# Patient Record
Sex: FEMALE | Race: WHITE | NOT HISPANIC OR LATINO | Employment: FULL TIME | URBAN - METROPOLITAN AREA
[De-identification: names, ages, dates, MRNs, and addresses within clinical notes are randomized per-mention and may not be internally consistent; named-entity substitution may affect disease eponyms.]

---

## 2018-02-22 ENCOUNTER — CLINICAL SUPPORT (OUTPATIENT)
Dept: ONCOLOGY | Facility: HOSPITAL | Age: 68
End: 2018-02-22

## 2018-02-22 ENCOUNTER — HOSPITAL ENCOUNTER (OUTPATIENT)
Dept: ONCOLOGY | Facility: HOSPITAL | Age: 68
Discharge: HOME | End: 2018-02-22
Attending: INTERNAL MEDICINE | Admitting: INTERNAL MEDICINE

## 2018-02-22 ENCOUNTER — HOSPITAL ENCOUNTER (OUTPATIENT)
Dept: ONCOLOGY | Facility: CLINIC | Age: 68
Setting detail: INFUSION SERIES
Discharge: HOME | End: 2018-02-22
Attending: INTERNAL MEDICINE | Admitting: INTERNAL MEDICINE

## 2018-02-22 NOTE — PROGRESS NOTES
PATIENTS ONCOLOGY RECORD LOCATED IN [facility]      Subjective     Name:  EDEN DE LA VEGA     Date:  2018  Address:  [address] [place] [address]  Home: [phone]  :  1950 AGE:  67 y.o.        RECORDS OBTAINED:  Patients Oncology Record is located in [facility]

## 2018-03-29 ENCOUNTER — HOSPITAL ENCOUNTER (OUTPATIENT)
Dept: ULTRASOUND IMAGING | Facility: HOSPITAL | Age: 68
Discharge: HOME | End: 2018-03-29
Attending: INTERNAL MEDICINE | Admitting: INTERNAL MEDICINE

## 2018-04-19 ENCOUNTER — CLINICAL SUPPORT (OUTPATIENT)
Dept: ONCOLOGY | Facility: HOSPITAL | Age: 68
End: 2018-04-19

## 2018-04-19 ENCOUNTER — HOSPITAL ENCOUNTER (OUTPATIENT)
Dept: ONCOLOGY | Facility: CLINIC | Age: 68
Setting detail: INFUSION SERIES
Discharge: HOME | End: 2018-04-19
Attending: INTERNAL MEDICINE | Admitting: INTERNAL MEDICINE

## 2018-04-19 NOTE — PROGRESS NOTES
PATIENTS ONCOLOGY RECORD LOCATED IN [facility]      Subjective     Name:  EDEN DE LA VEGA     Date:  2018  Address:  [address] [place] [address]  Home: [phone]  :  1950 AGE:  67 y.o.        RECORDS OBTAINED:  Patients Oncology Record is located in [facility]

## 2019-01-08 ENCOUNTER — HOSPITAL ENCOUNTER (OUTPATIENT)
Dept: LAB | Facility: HOSPITAL | Age: 69
Discharge: HOME | End: 2019-01-08
Attending: INTERNAL MEDICINE | Admitting: INTERNAL MEDICINE

## 2019-01-08 LAB
ANION GAP SERPL CALC-SCNC: 15.3 MMOL/L (ref 10–20)
BILIRUB UR QL STRIP: NEGATIVE MG/DL
BUN SERPL-MCNC: 17 MG/DL (ref 8–20)
BUN/CREAT SERPL: 14.2 (ref 5.4–26.2)
CALCIUM SERPL-MCNC: 9.6 MG/DL (ref 8.9–10.3)
CASTS URNS QL MICRO: ABNORMAL /[LPF]
CHLORIDE SERPL-SCNC: 102 MMOL/L (ref 101–111)
COLOR UR: YELLOW
CONV BACTERIA IN URINE MICRO: NEGATIVE
CONV CLARITY OF URINE: CLEAR
CONV CO2: 24 MMOL/L (ref 22–32)
CONV HYALINE CASTS IN URINE MICRO: 4 /[LPF] (ref 0–5)
CONV PROTEIN IN URINE BY AUTOMATED TEST STRIP: ABNORMAL MG/DL
CONV SMALL ROUND CELLS: ABNORMAL /[HPF]
CONV UROBILINOGEN IN URINE BY AUTOMATED TEST STRIP: 0.2 MG/DL
CREAT 24H UR-MCNC: 223.2 MG/DL
CREAT UR-MCNC: 1.2 MG/DL (ref 0.4–1)
CULTURE INDICATED?: ABNORMAL
ERYTHROCYTE [DISTWIDTH] IN BLOOD BY AUTOMATED COUNT: 12.9 % (ref 11.5–14.5)
GLUCOSE SERPL-MCNC: 248 MG/DL (ref 65–99)
GLUCOSE UR QL: 250 MG/DL
HCT VFR BLD AUTO: 43.5 % (ref 35–49)
HGB BLD-MCNC: 14.4 G/DL (ref 12–15)
HGB UR QL STRIP: NEGATIVE
KETONES UR QL STRIP: NEGATIVE MG/DL
LEUKOCYTE ESTERASE UR QL STRIP: NEGATIVE
MCH RBC QN AUTO: 29.9 PG (ref 26–32)
MCHC RBC AUTO-ENTMCNC: 33.2 G/DL (ref 32–36)
MCV RBC AUTO: 90.1 FL (ref 80–94)
NITRITE UR QL STRIP: NEGATIVE
PH UR STRIP.AUTO: 5.5 [PH] (ref 4.5–8)
PLATELET # BLD AUTO: 291 10*3/UL (ref 150–450)
PMV BLD AUTO: 7.3 FL (ref 7.4–10.4)
POTASSIUM SERPL-SCNC: 4.3 MMOL/L (ref 3.6–5.1)
PROT UR-MCNC: 26 MG/DL
PROT/CREAT UR: 0.1 MG/MG (ref 0–22)
RBC # BLD AUTO: 4.82 10*6/UL (ref 4–5.4)
RBC #/AREA URNS HPF: 1 /[HPF] (ref 0–3)
SODIUM SERPL-SCNC: 137 MMOL/L (ref 136–144)
SP GR UR: 1.03 (ref 1–1.03)
SPERM URNS QL MICRO: ABNORMAL /[HPF]
SQUAMOUS SPT QL MICRO: 2 /[HPF] (ref 0–5)
UNIDENT CRYS URNS QL MICRO: ABNORMAL /[HPF]
WBC # BLD AUTO: 10.4 10*3/UL (ref 4.5–11.5)
WBC #/AREA URNS HPF: 2 /[HPF] (ref 0–5)
YEAST SPEC QL WET PREP: ABNORMAL /[HPF]

## 2019-09-30 ENCOUNTER — APPOINTMENT (OUTPATIENT)
Dept: GENERAL RADIOLOGY | Facility: HOSPITAL | Age: 69
End: 2019-09-30

## 2019-09-30 ENCOUNTER — HOSPITAL ENCOUNTER (INPATIENT)
Facility: HOSPITAL | Age: 69
LOS: 9 days | Discharge: HOME | End: 2019-10-09
Attending: EMERGENCY MEDICINE | Admitting: INTERNAL MEDICINE

## 2019-09-30 DIAGNOSIS — I21.4 NON-STEMI (NON-ST ELEVATED MYOCARDIAL INFARCTION) (HCC): ICD-10-CM

## 2019-09-30 DIAGNOSIS — I95.9 HYPOTENSION, UNSPECIFIED HYPOTENSION TYPE: ICD-10-CM

## 2019-09-30 DIAGNOSIS — J44.1 COPD EXACERBATION (HCC): Primary | ICD-10-CM

## 2019-09-30 DIAGNOSIS — R19.7 DIARRHEA OF PRESUMED INFECTIOUS ORIGIN: ICD-10-CM

## 2019-09-30 DIAGNOSIS — N39.0 URINARY TRACT INFECTION WITHOUT HEMATURIA, SITE UNSPECIFIED: ICD-10-CM

## 2019-09-30 DIAGNOSIS — A41.9 SEPSIS, DUE TO UNSPECIFIED ORGANISM: ICD-10-CM

## 2019-09-30 LAB
ALBUMIN SERPL-MCNC: 2.5 G/DL (ref 3.5–4.8)
ALBUMIN SERPL-MCNC: 3.1 G/DL (ref 3.5–4.8)
ALBUMIN/GLOB SERPL: 0.7 G/DL (ref 1–1.7)
ALBUMIN/GLOB SERPL: 0.7 G/DL (ref 1–1.7)
ALP SERPL-CCNC: 58 U/L (ref 32–91)
ALP SERPL-CCNC: 66 U/L (ref 32–91)
ALT SERPL W P-5'-P-CCNC: 10 U/L (ref 14–54)
ALT SERPL W P-5'-P-CCNC: 11 U/L (ref 14–54)
ANION GAP SERPL CALCULATED.3IONS-SCNC: 13.6 MMOL/L (ref 5–15)
ANION GAP SERPL CALCULATED.3IONS-SCNC: 14.7 MMOL/L (ref 5–15)
APTT PPP: 23.9 SECONDS (ref 24–31)
ARTERIAL PATENCY WRIST A: POSITIVE
ARTERIAL PATENCY WRIST A: POSITIVE
AST SERPL-CCNC: 11 U/L (ref 15–41)
AST SERPL-CCNC: 13 U/L (ref 15–41)
ATMOSPHERIC PRESS: ABNORMAL MM[HG]
ATMOSPHERIC PRESS: ABNORMAL MM[HG]
BACTERIA UR QL AUTO: ABNORMAL /HPF
BASE EXCESS BLDA CALC-SCNC: -3.2 MMOL/L (ref 0–3)
BASE EXCESS BLDA CALC-SCNC: -4.3 MMOL/L (ref 0–3)
BASOPHILS # BLD AUTO: 0 10*3/MM3 (ref 0–0.2)
BASOPHILS # BLD AUTO: 0.1 10*3/MM3 (ref 0–0.2)
BASOPHILS # BLD AUTO: 0.1 10*3/MM3 (ref 0–0.2)
BASOPHILS NFR BLD AUTO: 0.3 % (ref 0–1.5)
BASOPHILS NFR BLD AUTO: 0.5 % (ref 0–1.5)
BASOPHILS NFR BLD AUTO: 0.5 % (ref 0–1.5)
BDY SITE: ABNORMAL
BDY SITE: ABNORMAL
BILIRUB SERPL-MCNC: 0.4 MG/DL (ref 0.3–1.2)
BILIRUB SERPL-MCNC: 0.4 MG/DL (ref 0.3–1.2)
BILIRUB UR QL STRIP: ABNORMAL
BNP SERPL-MCNC: 215 PG/ML
BUN BLD-MCNC: 17 MG/DL (ref 8–20)
BUN BLD-MCNC: 20 MG/DL (ref 8–20)
BUN/CREAT SERPL: 8 (ref 5.4–26.2)
BUN/CREAT SERPL: 8.5 (ref 5.4–26.2)
CA-I SERPL ISE-MCNC: 1.22 MMOL/L (ref 1.2–1.3)
CA-I SERPL ISE-MCNC: 1.29 MMOL/L (ref 1.2–1.3)
CALCIUM SPEC-SCNC: 8.3 MG/DL (ref 8.9–10.3)
CALCIUM SPEC-SCNC: 9.2 MG/DL (ref 8.9–10.3)
CHLORIDE SERPL-SCNC: 106 MMOL/L (ref 101–111)
CHLORIDE SERPL-SCNC: 97 MMOL/L (ref 101–111)
CK SERPL-CCNC: 187 U/L (ref 38–234)
CLARITY UR: ABNORMAL
CO2 BLDA-SCNC: 20 MMOL/L (ref 22–29)
CO2 BLDA-SCNC: 22.6 MMOL/L (ref 22–29)
CO2 SERPL-SCNC: 20 MMOL/L (ref 22–32)
CO2 SERPL-SCNC: 24 MMOL/L (ref 22–32)
COLOR UR: ABNORMAL
CREAT BLD-MCNC: 2 MG/DL (ref 0.4–1)
CREAT BLD-MCNC: 2.5 MG/DL (ref 0.4–1)
CRP SERPL-MCNC: 22.77 MG/DL (ref 0–0.7)
D-LACTATE SERPL-SCNC: 1.4 MMOL/L (ref 0.5–2)
DEPRECATED RDW RBC AUTO: 42.4 FL (ref 37–54)
DEPRECATED RDW RBC AUTO: 42.9 FL (ref 37–54)
DEPRECATED RDW RBC AUTO: 42.9 FL (ref 37–54)
EOSINOPHIL # BLD AUTO: 1.1 10*3/MM3 (ref 0–0.4)
EOSINOPHIL # BLD AUTO: 1.3 10*3/MM3 (ref 0–0.4)
EOSINOPHIL # BLD AUTO: 1.8 10*3/MM3 (ref 0–0.4)
EOSINOPHIL NFR BLD AUTO: 11.7 % (ref 0.3–6.2)
EOSINOPHIL NFR BLD AUTO: 7.2 % (ref 0.3–6.2)
EOSINOPHIL NFR BLD AUTO: 8.3 % (ref 0.3–6.2)
ERYTHROCYTE [DISTWIDTH] IN BLOOD BY AUTOMATED COUNT: 13.5 % (ref 12.3–15.4)
ERYTHROCYTE [DISTWIDTH] IN BLOOD BY AUTOMATED COUNT: 13.5 % (ref 12.3–15.4)
ERYTHROCYTE [DISTWIDTH] IN BLOOD BY AUTOMATED COUNT: 13.6 % (ref 12.3–15.4)
GFR SERPL CREATININE-BSD FRML MDRD: 19 ML/MIN/1.73
GFR SERPL CREATININE-BSD FRML MDRD: 25 ML/MIN/1.73
GLOBULIN UR ELPH-MCNC: 3.7 GM/DL (ref 2.5–3.8)
GLOBULIN UR ELPH-MCNC: 4.5 GM/DL (ref 2.5–3.8)
GLUCOSE BLD-MCNC: 207 MG/DL (ref 65–99)
GLUCOSE BLD-MCNC: 86 MG/DL (ref 65–99)
GLUCOSE BLDC GLUCOMTR-MCNC: 90 MG/DL (ref 70–105)
GLUCOSE UR STRIP-MCNC: ABNORMAL MG/DL
HCO3 BLDA-SCNC: 19 MMOL/L (ref 21–28)
HCO3 BLDA-SCNC: 21.5 MMOL/L (ref 21–28)
HCT VFR BLD AUTO: 42.3 % (ref 34–46.6)
HCT VFR BLD AUTO: 48.3 % (ref 34–46.6)
HCT VFR BLD AUTO: 49 % (ref 34–46.6)
HEMODILUTION: NO
HEMODILUTION: NO
HGB BLD-MCNC: 13.9 G/DL (ref 12–15.9)
HGB BLD-MCNC: 15.8 G/DL (ref 12–15.9)
HGB BLD-MCNC: 16.1 G/DL (ref 12–15.9)
HGB UR QL STRIP.AUTO: NEGATIVE
HOLD SPECIMEN: NORMAL
HOROWITZ INDEX BLD+IHG-RTO: <21 %
HOROWITZ INDEX BLD+IHG-RTO: <21 %
HYALINE CASTS UR QL AUTO: ABNORMAL /LPF
INR PPP: 1.09 (ref 0.9–1.1)
IRON 24H UR-MRATE: 16 MCG/DL (ref 28–170)
KETONES UR QL STRIP: ABNORMAL
LEUKOCYTE ESTERASE UR QL STRIP.AUTO: ABNORMAL
LIPASE SERPL-CCNC: 19 U/L (ref 22–51)
LYMPHOCYTES # BLD AUTO: 3 10*3/MM3 (ref 0.7–3.1)
LYMPHOCYTES # BLD AUTO: 3.1 10*3/MM3 (ref 0.7–3.1)
LYMPHOCYTES # BLD AUTO: 3.9 10*3/MM3 (ref 0.7–3.1)
LYMPHOCYTES NFR BLD AUTO: 19.8 % (ref 19.6–45.3)
LYMPHOCYTES NFR BLD AUTO: 20.7 % (ref 19.6–45.3)
LYMPHOCYTES NFR BLD AUTO: 25.4 % (ref 19.6–45.3)
MAGNESIUM SERPL-MCNC: 1.6 MG/DL (ref 1.8–2.5)
MAGNESIUM SERPL-MCNC: 1.9 MG/DL (ref 1.8–2.5)
MCH RBC QN AUTO: 29.6 PG (ref 26.6–33)
MCH RBC QN AUTO: 29.6 PG (ref 26.6–33)
MCH RBC QN AUTO: 29.7 PG (ref 26.6–33)
MCHC RBC AUTO-ENTMCNC: 32.8 G/DL (ref 31.5–35.7)
MCHC RBC AUTO-ENTMCNC: 32.8 G/DL (ref 31.5–35.7)
MCHC RBC AUTO-ENTMCNC: 32.9 G/DL (ref 31.5–35.7)
MCV RBC AUTO: 90 FL (ref 79–97)
MCV RBC AUTO: 90.4 FL (ref 79–97)
MCV RBC AUTO: 90.5 FL (ref 79–97)
MODALITY: ABNORMAL
MODALITY: ABNORMAL
MONOCYTES # BLD AUTO: 0.6 10*3/MM3 (ref 0.1–0.9)
MONOCYTES # BLD AUTO: 0.7 10*3/MM3 (ref 0.1–0.9)
MONOCYTES # BLD AUTO: 0.7 10*3/MM3 (ref 0.1–0.9)
MONOCYTES NFR BLD AUTO: 4.2 % (ref 5–12)
MONOCYTES NFR BLD AUTO: 4.5 % (ref 5–12)
MONOCYTES NFR BLD AUTO: 4.6 % (ref 5–12)
NEUTROPHILS # BLD AUTO: 10 10*3/MM3 (ref 1.7–7)
NEUTROPHILS # BLD AUTO: 10.1 10*3/MM3 (ref 1.7–7)
NEUTROPHILS # BLD AUTO: 8.9 10*3/MM3 (ref 1.7–7)
NEUTROPHILS NFR BLD AUTO: 57.9 % (ref 42.7–76)
NEUTROPHILS NFR BLD AUTO: 66.8 % (ref 42.7–76)
NEUTROPHILS NFR BLD AUTO: 67.6 % (ref 42.7–76)
NITRITE UR QL STRIP: NEGATIVE
NRBC BLD AUTO-RTO: 0 /100 WBC (ref 0–0.2)
NRBC BLD AUTO-RTO: 0.1 /100 WBC (ref 0–0.2)
NRBC BLD AUTO-RTO: 0.1 /100 WBC (ref 0–0.2)
PCO2 BLDA: 30.3 MM HG (ref 35–48)
PCO2 BLDA: 36.7 MM HG (ref 35–48)
PH BLDA: 7.38 PH UNITS (ref 7.35–7.45)
PH BLDA: 7.41 PH UNITS (ref 7.35–7.45)
PH UR STRIP.AUTO: 6 [PH] (ref 5–8)
PHOSPHATE SERPL-MCNC: 3.2 MG/DL (ref 2.4–4.7)
PHOSPHATE SERPL-MCNC: 3.8 MG/DL (ref 2.4–4.7)
PLATELET # BLD AUTO: 358 10*3/MM3 (ref 140–450)
PLATELET # BLD AUTO: 384 10*3/MM3 (ref 140–450)
PLATELET # BLD AUTO: 387 10*3/MM3 (ref 140–450)
PMV BLD AUTO: 7.3 FL (ref 6–12)
PMV BLD AUTO: 7.5 FL (ref 6–12)
PMV BLD AUTO: 7.7 FL (ref 6–12)
PO2 BLDA: 103.5 MM HG (ref 83–108)
PO2 BLDA: 77.8 MM HG (ref 83–108)
POTASSIUM BLD-SCNC: 3.6 MMOL/L (ref 3.6–5.1)
POTASSIUM BLD-SCNC: 3.7 MMOL/L (ref 3.6–5.1)
PROT SERPL-MCNC: 6.2 G/DL (ref 6.1–7.9)
PROT SERPL-MCNC: 7.6 G/DL (ref 6.1–7.9)
PROT UR QL STRIP: ABNORMAL
PROTHROMBIN TIME: 11.3 SECONDS (ref 9.6–11.7)
RBC # BLD AUTO: 4.68 10*6/MM3 (ref 3.77–5.28)
RBC # BLD AUTO: 5.33 10*6/MM3 (ref 3.77–5.28)
RBC # BLD AUTO: 5.44 10*6/MM3 (ref 3.77–5.28)
RBC # UR: ABNORMAL /HPF
REF LAB TEST METHOD: ABNORMAL
SAO2 % BLDCOA: 95.2 % (ref 94–98)
SAO2 % BLDCOA: 98.1 % (ref 94–98)
SODIUM BLD-SCNC: 132 MMOL/L (ref 136–144)
SODIUM BLD-SCNC: 136 MMOL/L (ref 136–144)
SP GR UR STRIP: 1.03 (ref 1–1.03)
SQUAMOUS #/AREA URNS HPF: ABNORMAL /HPF
TROPONIN I SERPL-MCNC: 0.14 NG/ML (ref 0–0.03)
TSH SERPL DL<=0.05 MIU/L-ACNC: 0.04 UIU/ML (ref 0.34–5.6)
UROBILINOGEN UR QL STRIP: ABNORMAL
WBC NRBC COR # BLD: 15 10*3/MM3 (ref 3.4–10.8)
WBC NRBC COR # BLD: 15 10*3/MM3 (ref 3.4–10.8)
WBC NRBC COR # BLD: 15.4 10*3/MM3 (ref 3.4–10.8)
WBC UR QL AUTO: ABNORMAL /HPF
WHOLE BLOOD HOLD SPECIMEN: NORMAL
WHOLE BLOOD HOLD SPECIMEN: NORMAL

## 2019-09-30 PROCEDURE — 86140 C-REACTIVE PROTEIN: CPT | Performed by: EMERGENCY MEDICINE

## 2019-09-30 PROCEDURE — 36600 WITHDRAWAL OF ARTERIAL BLOOD: CPT

## 2019-09-30 PROCEDURE — 87086 URINE CULTURE/COLONY COUNT: CPT | Performed by: EMERGENCY MEDICINE

## 2019-09-30 PROCEDURE — 83540 ASSAY OF IRON: CPT | Performed by: INTERNAL MEDICINE

## 2019-09-30 PROCEDURE — 94640 AIRWAY INHALATION TREATMENT: CPT

## 2019-09-30 PROCEDURE — 0097U HC BIOFIRE FILMARRAY GI PANEL: CPT | Performed by: EMERGENCY MEDICINE

## 2019-09-30 PROCEDURE — 93005 ELECTROCARDIOGRAM TRACING: CPT | Performed by: EMERGENCY MEDICINE

## 2019-09-30 PROCEDURE — 84100 ASSAY OF PHOSPHORUS: CPT | Performed by: INTERNAL MEDICINE

## 2019-09-30 PROCEDURE — 80053 COMPREHEN METABOLIC PANEL: CPT | Performed by: EMERGENCY MEDICINE

## 2019-09-30 PROCEDURE — 84443 ASSAY THYROID STIM HORMONE: CPT | Performed by: INTERNAL MEDICINE

## 2019-09-30 PROCEDURE — 99284 EMERGENCY DEPT VISIT MOD MDM: CPT

## 2019-09-30 PROCEDURE — 25010000002 PIPERACILLIN SOD-TAZOBACTAM PER 1 G: Performed by: EMERGENCY MEDICINE

## 2019-09-30 PROCEDURE — 82803 BLOOD GASES ANY COMBINATION: CPT

## 2019-09-30 PROCEDURE — 83880 ASSAY OF NATRIURETIC PEPTIDE: CPT | Performed by: INTERNAL MEDICINE

## 2019-09-30 PROCEDURE — 82962 GLUCOSE BLOOD TEST: CPT

## 2019-09-30 PROCEDURE — 87081 CULTURE SCREEN ONLY: CPT | Performed by: INTERNAL MEDICINE

## 2019-09-30 PROCEDURE — 82330 ASSAY OF CALCIUM: CPT | Performed by: EMERGENCY MEDICINE

## 2019-09-30 PROCEDURE — 87449 NOS EACH ORGANISM AG IA: CPT | Performed by: EMERGENCY MEDICINE

## 2019-09-30 PROCEDURE — 85730 THROMBOPLASTIN TIME PARTIAL: CPT | Performed by: EMERGENCY MEDICINE

## 2019-09-30 PROCEDURE — 83605 ASSAY OF LACTIC ACID: CPT

## 2019-09-30 PROCEDURE — 25010000002 PIPERACILLIN SOD-TAZOBACTAM PER 1 G: Performed by: INTERNAL MEDICINE

## 2019-09-30 PROCEDURE — 85025 COMPLETE CBC W/AUTO DIFF WBC: CPT | Performed by: EMERGENCY MEDICINE

## 2019-09-30 PROCEDURE — 85610 PROTHROMBIN TIME: CPT | Performed by: EMERGENCY MEDICINE

## 2019-09-30 PROCEDURE — 83735 ASSAY OF MAGNESIUM: CPT | Performed by: EMERGENCY MEDICINE

## 2019-09-30 PROCEDURE — 82330 ASSAY OF CALCIUM: CPT | Performed by: INTERNAL MEDICINE

## 2019-09-30 PROCEDURE — 82550 ASSAY OF CK (CPK): CPT | Performed by: INTERNAL MEDICINE

## 2019-09-30 PROCEDURE — 83735 ASSAY OF MAGNESIUM: CPT | Performed by: INTERNAL MEDICINE

## 2019-09-30 PROCEDURE — 71045 X-RAY EXAM CHEST 1 VIEW: CPT

## 2019-09-30 PROCEDURE — 87324 CLOSTRIDIUM AG IA: CPT | Performed by: EMERGENCY MEDICINE

## 2019-09-30 PROCEDURE — 94799 UNLISTED PULMONARY SVC/PX: CPT

## 2019-09-30 PROCEDURE — 84100 ASSAY OF PHOSPHORUS: CPT | Performed by: EMERGENCY MEDICINE

## 2019-09-30 PROCEDURE — 87040 BLOOD CULTURE FOR BACTERIA: CPT | Performed by: EMERGENCY MEDICINE

## 2019-09-30 PROCEDURE — 81001 URINALYSIS AUTO W/SCOPE: CPT | Performed by: EMERGENCY MEDICINE

## 2019-09-30 PROCEDURE — 84484 ASSAY OF TROPONIN QUANT: CPT | Performed by: INTERNAL MEDICINE

## 2019-09-30 PROCEDURE — 25010000002 VANCOMYCIN 10 G RECONSTITUTED SOLUTION: Performed by: EMERGENCY MEDICINE

## 2019-09-30 PROCEDURE — 80053 COMPREHEN METABOLIC PANEL: CPT | Performed by: INTERNAL MEDICINE

## 2019-09-30 PROCEDURE — 83690 ASSAY OF LIPASE: CPT | Performed by: EMERGENCY MEDICINE

## 2019-09-30 PROCEDURE — P9612 CATHETERIZE FOR URINE SPEC: HCPCS

## 2019-09-30 PROCEDURE — 85025 COMPLETE CBC W/AUTO DIFF WBC: CPT | Performed by: INTERNAL MEDICINE

## 2019-09-30 RX ORDER — NOREPINEPHRINE BIT/0.9 % NACL 8 MG/250ML
.02-.3 INFUSION BOTTLE (ML) INTRAVENOUS
Status: DISCONTINUED | OUTPATIENT
Start: 2019-09-30 | End: 2019-10-01

## 2019-09-30 RX ORDER — PANTOPRAZOLE SODIUM 40 MG/1
40 TABLET, DELAYED RELEASE ORAL EVERY MORNING
Status: DISCONTINUED | OUTPATIENT
Start: 2019-10-01 | End: 2019-10-09 | Stop reason: HOSPADM

## 2019-09-30 RX ORDER — ESOMEPRAZOLE MAGNESIUM 40 MG/1
40 CAPSULE, DELAYED RELEASE ORAL 2 TIMES DAILY
COMMUNITY

## 2019-09-30 RX ORDER — CYCLOBENZAPRINE HCL 10 MG
10 TABLET ORAL
COMMUNITY

## 2019-09-30 RX ORDER — SODIUM CHLORIDE 0.9 % (FLUSH) 0.9 %
10 SYRINGE (ML) INJECTION AS NEEDED
Status: DISCONTINUED | OUTPATIENT
Start: 2019-09-30 | End: 2019-10-09 | Stop reason: HOSPADM

## 2019-09-30 RX ORDER — LOSARTAN POTASSIUM 25 MG/1
25 TABLET ORAL DAILY
COMMUNITY
End: 2021-06-24

## 2019-09-30 RX ORDER — SODIUM CHLORIDE 0.9 % (FLUSH) 0.9 %
10 SYRINGE (ML) INJECTION EVERY 12 HOURS SCHEDULED
Status: DISCONTINUED | OUTPATIENT
Start: 2019-09-30 | End: 2019-10-09 | Stop reason: HOSPADM

## 2019-09-30 RX ORDER — DEXTROAMPHETAMINE SACCHARATE, AMPHETAMINE ASPARTATE, DEXTROAMPHETAMINE SULFATE AND AMPHETAMINE SULFATE 5; 5; 5; 5 MG/1; MG/1; MG/1; MG/1
20 TABLET ORAL 2 TIMES DAILY
COMMUNITY

## 2019-09-30 RX ORDER — ROSUVASTATIN CALCIUM 20 MG/1
20 TABLET, COATED ORAL DAILY
COMMUNITY
End: 2021-06-24

## 2019-09-30 RX ORDER — MINOCYCLINE HYDROCHLORIDE 50 MG/1
50 CAPSULE ORAL DAILY
COMMUNITY
End: 2019-11-11

## 2019-09-30 RX ORDER — GABAPENTIN 300 MG/1
300 CAPSULE ORAL EVERY MORNING
COMMUNITY
End: 2020-11-18 | Stop reason: SDUPTHER

## 2019-09-30 RX ORDER — METOPROLOL TARTRATE 50 MG/1
50 TABLET, FILM COATED ORAL 2 TIMES DAILY
COMMUNITY
End: 2022-07-27 | Stop reason: DRUGHIGH

## 2019-09-30 RX ORDER — IPRATROPIUM BROMIDE AND ALBUTEROL SULFATE 2.5; .5 MG/3ML; MG/3ML
3 SOLUTION RESPIRATORY (INHALATION)
Status: DISCONTINUED | OUTPATIENT
Start: 2019-09-30 | End: 2019-10-09 | Stop reason: HOSPADM

## 2019-09-30 RX ORDER — IPRATROPIUM BROMIDE AND ALBUTEROL SULFATE 2.5; .5 MG/3ML; MG/3ML
3 SOLUTION RESPIRATORY (INHALATION) EVERY 6 HOURS PRN
Status: DISCONTINUED | OUTPATIENT
Start: 2019-09-30 | End: 2019-10-09 | Stop reason: HOSPADM

## 2019-09-30 RX ORDER — DULOXETIN HYDROCHLORIDE 30 MG/1
30 CAPSULE, DELAYED RELEASE ORAL DAILY
COMMUNITY
End: 2021-07-10

## 2019-09-30 RX ORDER — GABAPENTIN 300 MG/1
600 CAPSULE ORAL 3 TIMES DAILY
COMMUNITY
End: 2021-07-10

## 2019-09-30 RX ORDER — LISINOPRIL 5 MG/1
5 TABLET ORAL DAILY
COMMUNITY
End: 2019-10-09 | Stop reason: HOSPADM

## 2019-09-30 RX ORDER — NOREPINEPHRINE BIT/0.9 % NACL 8 MG/250ML
INFUSION BOTTLE (ML) INTRAVENOUS
Status: COMPLETED
Start: 2019-09-30 | End: 2019-09-30

## 2019-09-30 RX ADMIN — VANCOMYCIN HYDROCHLORIDE 1250 MG: 10 INJECTION, POWDER, LYOPHILIZED, FOR SOLUTION INTRAVENOUS at 14:58

## 2019-09-30 RX ADMIN — Medication 10 ML: at 21:30

## 2019-09-30 RX ADMIN — PIPERACILLIN AND TAZOBACTAM 3.38 G: 3; .375 INJECTION, POWDER, LYOPHILIZED, FOR SOLUTION INTRAVENOUS at 14:24

## 2019-09-30 RX ADMIN — SODIUM CHLORIDE 1000 ML: 900 INJECTION, SOLUTION INTRAVENOUS at 12:53

## 2019-09-30 RX ADMIN — NOREPINEPHRINE BITARTRATE 0.1 MCG/KG/MIN: 1 INJECTION, SOLUTION, CONCENTRATE INTRAVENOUS at 15:11

## 2019-09-30 RX ADMIN — SODIUM CHLORIDE, SODIUM LACTATE, POTASSIUM CHLORIDE, AND CALCIUM CHLORIDE 1000 ML: 600; 310; 30; 20 INJECTION, SOLUTION INTRAVENOUS at 15:51

## 2019-09-30 RX ADMIN — SODIUM CHLORIDE 1000 ML: 900 INJECTION, SOLUTION INTRAVENOUS at 13:22

## 2019-09-30 RX ADMIN — PIPERACILLIN SODIUM,TAZOBACTAM SODIUM 3.38 G: 3; .375 INJECTION, POWDER, FOR SOLUTION INTRAVENOUS at 21:30

## 2019-09-30 RX ADMIN — IPRATROPIUM BROMIDE AND ALBUTEROL SULFATE 3 ML: .5; 3 SOLUTION RESPIRATORY (INHALATION) at 21:18

## 2019-09-30 RX ADMIN — Medication 0.1 MCG/KG/MIN: at 15:11

## 2019-10-01 ENCOUNTER — HOSPITAL ENCOUNTER (OUTPATIENT)
Dept: CARDIOLOGY | Facility: HOSPITAL | Age: 69
Discharge: HOME | End: 2019-10-01

## 2019-10-01 ENCOUNTER — APPOINTMENT (OUTPATIENT)
Dept: ULTRASOUND IMAGING | Facility: HOSPITAL | Age: 69
End: 2019-10-01

## 2019-10-01 VITALS
HEIGHT: 62 IN | WEIGHT: 149.91 LBS | SYSTOLIC BLOOD PRESSURE: 127 MMHG | HEART RATE: 96 BPM | OXYGEN SATURATION: 93 % | DIASTOLIC BLOOD PRESSURE: 78 MMHG | BODY MASS INDEX: 27.59 KG/M2 | RESPIRATION RATE: 20 BRPM

## 2019-10-01 LAB
ADV 40+41 DNA STL QL NAA+NON-PROBE: NOT DETECTED
ANION GAP SERPL CALCULATED.3IONS-SCNC: 13.2 MMOL/L (ref 5–15)
ASTRO TYP 1-8 RNA STL QL NAA+NON-PROBE: NOT DETECTED
BACTERIA SPEC AEROBE CULT: NO GROWTH
BACTERIA UR QL AUTO: ABNORMAL /HPF
BILIRUB UR QL STRIP: NEGATIVE
BUN BLD-MCNC: 16 MG/DL (ref 8–20)
BUN/CREAT SERPL: 7.6 (ref 5.4–26.2)
C CAYETANENSIS DNA STL QL NAA+NON-PROBE: NOT DETECTED
C DIFF GDH STL QL: NEGATIVE
CALCIUM SPEC-SCNC: 8 MG/DL (ref 8.9–10.3)
CAMPY SP DNA.DIARRHEA STL QL NAA+PROBE: NOT DETECTED
CHLORIDE SERPL-SCNC: 103 MMOL/L (ref 101–111)
CK SERPL-CCNC: 161 U/L (ref 20–180)
CLARITY UR: ABNORMAL
CO2 SERPL-SCNC: 21 MMOL/L (ref 22–32)
COLOR UR: ABNORMAL
CREAT BLD-MCNC: 2.1 MG/DL (ref 0.4–1)
CRYPTOSP STL CULT: NOT DETECTED
E COLI DNA SPEC QL NAA+PROBE: NOT DETECTED
E HISTOLYT AG STL-ACNC: NOT DETECTED
EAEC PAA PLAS AGGR+AATA ST NAA+NON-PRB: NOT DETECTED
EC STX1 + STX2 GENES STL NAA+PROBE: NOT DETECTED
EOSINOPHIL SPEC QL MICRO: 0 % EOS/100 CELLS (ref 0–0)
EPEC EAE GENE STL QL NAA+NON-PROBE: NOT DETECTED
ETEC LTA+ST1A+ST1B TOX ST NAA+NON-PROBE: NOT DETECTED
G LAMBLIA DNA SPEC QL NAA+PROBE: NOT DETECTED
GFR SERPL CREATININE-BSD FRML MDRD: 23 ML/MIN/1.73
GLUCOSE BLD-MCNC: 181 MG/DL (ref 65–99)
GLUCOSE BLDC GLUCOMTR-MCNC: 171 MG/DL (ref 70–105)
GLUCOSE BLDC GLUCOMTR-MCNC: 251 MG/DL (ref 70–105)
GLUCOSE UR STRIP-MCNC: NEGATIVE MG/DL
HGB UR QL STRIP.AUTO: NEGATIVE
HYALINE CASTS UR QL AUTO: ABNORMAL /LPF
KETONES UR QL STRIP: ABNORMAL
LEUKOCYTE ESTERASE UR QL STRIP.AUTO: NEGATIVE
MAGNESIUM SERPL-MCNC: 1.6 MG/DL (ref 1.8–2.5)
MRSA SPEC QL CULT: NORMAL
NITRITE UR QL STRIP: NEGATIVE
NOROVIRUS GI+II RNA STL QL NAA+NON-PROBE: NOT DETECTED
P SHIGELLOIDES DNA STL QL NAA+PROBE: NOT DETECTED
PH UR STRIP.AUTO: 6 [PH] (ref 5–8)
PHOSPHATE SERPL-MCNC: 3.9 MG/DL (ref 2.4–4.7)
POTASSIUM BLD-SCNC: 3.2 MMOL/L (ref 3.6–5.1)
PROT UR QL STRIP: ABNORMAL
RBC # UR: ABNORMAL /HPF
REF LAB TEST METHOD: ABNORMAL
RV RNA STL NAA+PROBE: NOT DETECTED
SALMONELLA DNA SPEC QL NAA+PROBE: NOT DETECTED
SAPO I+II+IV+V RNA STL QL NAA+NON-PROBE: NOT DETECTED
SHIGELLA SP+EIEC IPAH STL QL NAA+PROBE: NOT DETECTED
SODIUM BLD-SCNC: 134 MMOL/L (ref 136–144)
SODIUM UR-SCNC: 33 MMOL/L
SP GR UR STRIP: 1.03 (ref 1–1.03)
SQUAMOUS #/AREA URNS HPF: ABNORMAL /HPF
TROPONIN I SERPL-MCNC: 0.14 NG/ML (ref 0–0.03)
TSH SERPL DL<=0.05 MIU/L-ACNC: 0.05 UIU/ML (ref 0.34–5.6)
URATE SERPL-MCNC: 5.5 MG/DL (ref 2.6–8)
UROBILINOGEN UR QL STRIP: ABNORMAL
V CHOLERAE DNA SPEC QL NAA+PROBE: NOT DETECTED
VIBRIO DNA SPEC NAA+PROBE: NOT DETECTED
WBC UR QL AUTO: ABNORMAL /HPF
YERSINIA STL CULT: NOT DETECTED

## 2019-10-01 PROCEDURE — 94799 UNLISTED PULMONARY SVC/PX: CPT

## 2019-10-01 PROCEDURE — 25010000002 ENOXAPARIN PER 10 MG: Performed by: INTERNAL MEDICINE

## 2019-10-01 PROCEDURE — 80048 BASIC METABOLIC PNL TOTAL CA: CPT | Performed by: INTERNAL MEDICINE

## 2019-10-01 PROCEDURE — 84550 ASSAY OF BLOOD/URIC ACID: CPT | Performed by: INTERNAL MEDICINE

## 2019-10-01 PROCEDURE — P9047 ALBUMIN (HUMAN), 25%, 50ML: HCPCS | Performed by: INTERNAL MEDICINE

## 2019-10-01 PROCEDURE — 84443 ASSAY THYROID STIM HORMONE: CPT | Performed by: INTERNAL MEDICINE

## 2019-10-01 PROCEDURE — 84300 ASSAY OF URINE SODIUM: CPT | Performed by: INTERNAL MEDICINE

## 2019-10-01 PROCEDURE — 82550 ASSAY OF CK (CPK): CPT | Performed by: INTERNAL MEDICINE

## 2019-10-01 PROCEDURE — 25010000002 ALBUMIN HUMAN 25% PER 50 ML: Performed by: INTERNAL MEDICINE

## 2019-10-01 PROCEDURE — 25810000003 SODIUM CHLORIDE 0.9 % WITH KCL 20 MEQ 20-0.9 MEQ/L-% SOLUTION: Performed by: INTERNAL MEDICINE

## 2019-10-01 PROCEDURE — 93306 TTE W/DOPPLER COMPLETE: CPT

## 2019-10-01 PROCEDURE — 25010000002 MEROPENEM PER 100 MG: Performed by: INTERNAL MEDICINE

## 2019-10-01 PROCEDURE — 87205 SMEAR GRAM STAIN: CPT | Performed by: INTERNAL MEDICINE

## 2019-10-01 PROCEDURE — 82962 GLUCOSE BLOOD TEST: CPT

## 2019-10-01 PROCEDURE — 84484 ASSAY OF TROPONIN QUANT: CPT | Performed by: INTERNAL MEDICINE

## 2019-10-01 PROCEDURE — 94760 N-INVAS EAR/PLS OXIMETRY 1: CPT

## 2019-10-01 PROCEDURE — 83735 ASSAY OF MAGNESIUM: CPT | Performed by: INTERNAL MEDICINE

## 2019-10-01 PROCEDURE — 63710000001 PREDNISONE PER 1 MG: Performed by: INTERNAL MEDICINE

## 2019-10-01 PROCEDURE — 25010000002 MAGNESIUM SULFATE 2 GM/50ML SOLUTION: Performed by: INTERNAL MEDICINE

## 2019-10-01 PROCEDURE — 99222 1ST HOSP IP/OBS MODERATE 55: CPT | Performed by: INTERNAL MEDICINE

## 2019-10-01 PROCEDURE — 84100 ASSAY OF PHOSPHORUS: CPT | Performed by: INTERNAL MEDICINE

## 2019-10-01 PROCEDURE — 81001 URINALYSIS AUTO W/SCOPE: CPT | Performed by: INTERNAL MEDICINE

## 2019-10-01 PROCEDURE — 76775 US EXAM ABDO BACK WALL LIM: CPT

## 2019-10-01 RX ORDER — PREDNISONE 20 MG/1
20 TABLET ORAL 2 TIMES DAILY WITH MEALS
Status: DISCONTINUED | OUTPATIENT
Start: 2019-10-01 | End: 2019-10-03

## 2019-10-01 RX ORDER — BUDESONIDE 0.5 MG/2ML
0.5 INHALANT ORAL
Status: DISCONTINUED | OUTPATIENT
Start: 2019-10-01 | End: 2019-10-09 | Stop reason: HOSPADM

## 2019-10-01 RX ORDER — METOPROLOL TARTRATE 50 MG/1
50 TABLET, FILM COATED ORAL EVERY 12 HOURS SCHEDULED
Status: DISCONTINUED | OUTPATIENT
Start: 2019-10-01 | End: 2019-10-09 | Stop reason: HOSPADM

## 2019-10-01 RX ORDER — SODIUM CHLORIDE AND POTASSIUM CHLORIDE 150; 900 MG/100ML; MG/100ML
100 INJECTION, SOLUTION INTRAVENOUS CONTINUOUS
Status: DISCONTINUED | OUTPATIENT
Start: 2019-10-01 | End: 2019-10-03

## 2019-10-01 RX ORDER — POTASSIUM CHLORIDE 20 MEQ/1
40 TABLET, EXTENDED RELEASE ORAL ONCE
Status: COMPLETED | OUTPATIENT
Start: 2019-10-01 | End: 2019-10-01

## 2019-10-01 RX ORDER — MAGNESIUM SULFATE HEPTAHYDRATE 40 MG/ML
2 INJECTION, SOLUTION INTRAVENOUS EVERY 12 HOURS
Status: COMPLETED | OUTPATIENT
Start: 2019-10-01 | End: 2019-10-02

## 2019-10-01 RX ORDER — ALBUMIN (HUMAN) 12.5 G/50ML
25 SOLUTION INTRAVENOUS EVERY 8 HOURS
Status: COMPLETED | OUTPATIENT
Start: 2019-10-01 | End: 2019-10-02

## 2019-10-01 RX ORDER — METOPROLOL TARTRATE 5 MG/5ML
5 INJECTION INTRAVENOUS ONCE
Status: COMPLETED | OUTPATIENT
Start: 2019-10-01 | End: 2019-10-01

## 2019-10-01 RX ORDER — GUAIFENESIN 600 MG/1
600 TABLET, EXTENDED RELEASE ORAL EVERY 12 HOURS SCHEDULED
Status: DISCONTINUED | OUTPATIENT
Start: 2019-10-01 | End: 2019-10-09 | Stop reason: HOSPADM

## 2019-10-01 RX ADMIN — POTASSIUM CHLORIDE 40 MEQ: 1500 TABLET, EXTENDED RELEASE ORAL at 14:33

## 2019-10-01 RX ADMIN — METOPROLOL TARTRATE 50 MG: 50 TABLET, FILM COATED ORAL at 08:48

## 2019-10-01 RX ADMIN — Medication 10 ML: at 21:26

## 2019-10-01 RX ADMIN — IPRATROPIUM BROMIDE AND ALBUTEROL SULFATE 3 ML: .5; 3 SOLUTION RESPIRATORY (INHALATION) at 01:20

## 2019-10-01 RX ADMIN — GUAIFENESIN 600 MG: 600 TABLET, EXTENDED RELEASE ORAL at 21:26

## 2019-10-01 RX ADMIN — GUAIFENESIN 600 MG: 600 TABLET, EXTENDED RELEASE ORAL at 08:48

## 2019-10-01 RX ADMIN — IPRATROPIUM BROMIDE AND ALBUTEROL SULFATE 3 ML: .5; 3 SOLUTION RESPIRATORY (INHALATION) at 19:14

## 2019-10-01 RX ADMIN — IPRATROPIUM BROMIDE AND ALBUTEROL SULFATE 3 ML: .5; 3 SOLUTION RESPIRATORY (INHALATION) at 12:10

## 2019-10-01 RX ADMIN — MEROPENEM 500 MG: 500 INJECTION, POWDER, FOR SOLUTION INTRAVENOUS at 19:32

## 2019-10-01 RX ADMIN — SODIUM CHLORIDE AND POTASSIUM CHLORIDE 100 ML/HR: 9; 1.49 INJECTION, SOLUTION INTRAVENOUS at 14:34

## 2019-10-01 RX ADMIN — PREDNISONE 20 MG: 20 TABLET ORAL at 08:48

## 2019-10-01 RX ADMIN — ENOXAPARIN SODIUM 30 MG: 30 INJECTION SUBCUTANEOUS at 14:34

## 2019-10-01 RX ADMIN — MAGNESIUM SULFATE HEPTAHYDRATE 2 G: 40 INJECTION, SOLUTION INTRAVENOUS at 14:33

## 2019-10-01 RX ADMIN — PREDNISONE 20 MG: 20 TABLET ORAL at 17:54

## 2019-10-01 RX ADMIN — IPRATROPIUM BROMIDE AND ALBUTEROL SULFATE 3 ML: .5; 3 SOLUTION RESPIRATORY (INHALATION) at 07:04

## 2019-10-01 RX ADMIN — METOPROLOL TARTRATE 50 MG: 50 TABLET, FILM COATED ORAL at 21:26

## 2019-10-01 RX ADMIN — BUDESONIDE 0.5 MG: 0.5 INHALANT RESPIRATORY (INHALATION) at 12:10

## 2019-10-01 RX ADMIN — METOPROLOL TARTRATE 5 MG: 5 INJECTION INTRAVENOUS at 06:49

## 2019-10-01 RX ADMIN — PANTOPRAZOLE SODIUM 40 MG: 40 TABLET, DELAYED RELEASE ORAL at 06:49

## 2019-10-01 RX ADMIN — ALBUMIN HUMAN 25 G: 0.25 SOLUTION INTRAVENOUS at 21:25

## 2019-10-01 RX ADMIN — ALBUMIN HUMAN 25 G: 0.25 SOLUTION INTRAVENOUS at 14:33

## 2019-10-01 RX ADMIN — SODIUM BICARBONATE 50 MEQ: 84 INJECTION, SOLUTION INTRAVENOUS at 14:34

## 2019-10-01 RX ADMIN — BUDESONIDE 0.5 MG: 0.5 INHALANT RESPIRATORY (INHALATION) at 19:17

## 2019-10-01 RX ADMIN — Medication 10 ML: at 08:48

## 2019-10-01 NOTE — PROGRESS NOTES
ICU Daily Progress Note        Sepsis (CMS/[facility])      Assessment/Plan      Urosepsis  Shock, septic  Diarrhea, ? Hypovolemia  Possible left lower lobe pneumonia  Elevated troponin rule out acute coronary syndrome  Acute kidney injury elevated creatinine  COPD patient use trelegy inhaler at home  Diabetes   no significant obstructive sleep apnea based on home sleep study  Smoker less than pack a day for for 56 years  History of high altitude pulmonary edema        Plan  Antibiotics patient received 1 dose of vancomycin in the emergency room as well as Zosyn  DC Zosyn and start meropenem for acute kidney injury    CT scan of the chest without contrast to assess left lower lobe pneumonia    S/p fluid resuscitation per septic protocol  Maintenance IV fluid    Resume blood pressure medications  Will hold lisinopril due to elevated creatinine    Cardiology consult for elevated troponin  2D echo     pressors titration currently off Levophed  Bronchodilators  Oxygen titration  DVT prophylaxis  GI prophylaxis  Glycemic control             LOS: 1 day         Vital signs for last 24 hours:  Vitals:    10/01/19 0545 10/01/19 0649 10/01/19 0704 10/01/19 0715   BP:  161/76 156/74    Pulse: 109 104 93 93   Resp:   16 16   Temp:       TempSrc:       SpO2: 97%  99% 100%   Weight:       Height:           Intake/Output last 3 shifts:  I/O last 3 completed shifts:  In: 3100 [IV Piggyback:3100]  Out: -   Intake/Output this shift:  No intake/output data recorded.    Vent settings for last 24 hours:       Hemodynamic parameters for last 24 hours:       Radiology  Imaging Results (last 24 hours)     Procedure Component Value Units Date/Time    XR Chest 1 View [[id number]] Collected:  09/30/19 1323     Updated:  09/30/19 1325    Narrative:       DATE OF EXAM:  9/30/2019 12:38 PM     PROCEDURE:  XR CHEST 1 VW-     INDICATIONS:  Severe Sepsis triage protocol     COMPARISON:  August 28, 2018     TECHNIQUE:   Single radiographic AP view of the  chest was obtained.     FINDINGS:  Cardiac size is normal and the lungs remain clear.        Impression:       Negative portable chest     Electronically Signed By-Lee Costello On:9/30/2019 1:23 PM  This report was finalized on [id number] by  Lee Costello, .          Labs:  Results from last 7 days   Lab Units 09/30/19  1804   WBC 10*3/mm3 15.40*   HEMOGLOBIN g/dL 13.9   HEMATOCRIT % 42.3   PLATELETS 10*3/mm3 358     Results from last 7 days   Lab Units 10/01/19  0327 09/30/19  1804   SODIUM mmol/L 134* 136   POTASSIUM mmol/L 3.2* 3.6   CHLORIDE mmol/L 103 106   CO2 mmol/L 21.0* 20.0*   BUN mg/dL 16 17   CREATININE mg/dL 2.10* 2.00*   CALCIUM mg/dL 8.0* 8.3*   BILIRUBIN mg/dL  --  0.4   ALK PHOS U/L  --  58   ALT (SGPT) U/L  --  10*   AST (SGOT) U/L  --  13*   GLUCOSE mg/dL 181* 86     Results from last 7 days   Lab Units 09/30/19  1914   PH, ARTERIAL pH units 7.376   PO2 ART mm Hg 77.8*   PCO2, ARTERIAL mm Hg 36.7   HCO3 ART mmol/L 21.5     Results from last 7 days   Lab Units 09/30/19  1804 09/30/19  1250   ALBUMIN g/dL 2.50* 3.10*     Results from last 7 days   Lab Units 10/01/19  0557 09/30/19  1804   CK TOTAL U/L  --  187   TROPONIN I ng/mL 0.140* 0.140*         Results from last 7 days   Lab Units 10/01/19  0327   MAGNESIUM mg/dL 1.6*     Results from last 7 days   Lab Units 09/30/19  1250   INR  1.09   APTT seconds 23.9*     Results from last 7 days   Lab Units 09/30/19  1804   TSH uIU/mL 0.040*           Meds:   SCHEDULE    ipratropium-albuterol 3 mL Nebulization Q6H - RT   pantoprazole 40 mg Oral QAM   piperacillin-tazobactam 3.375 g Intravenous Q12H   sodium chloride 30 mL/kg Intravenous Once   sodium chloride 10 mL Intravenous Q12H     Infusions    norepinephrine 0.02-0.3 mcg/kg/min Last Rate: 0.1 mcg/kg/min (09/30/19 1511)     PRNs  ipratropium-albuterol  •  sodium chloride  •  [COMPLETED] Insert peripheral IV **AND** sodium chloride  •  sodium chloride  •  sodium chloride    Physical Exam:  Physical  Exam   Cardiovascular:   Murmur heard.  Pulmonary/Chest: No respiratory distress. She has wheezes. She has rales. She exhibits no tenderness.   Abdominal: She exhibits no distension. There is no tenderness.   Musculoskeletal: She exhibits edema.       ROS  Review of Systems   HENT: Positive for congestion. Negative for rhinorrhea and sneezing.    Respiratory: Positive for cough, shortness of breath and wheezing. Negative for apnea, choking, chest tightness and stridor.    Cardiovascular: Positive for leg swelling.         Critical Care Time greater than: 1 Hour  Total time spent with patient greater than: 1 Hour

## 2019-10-01 NOTE — CONSULTS
NEPHROLOGY CONSULTATION-----[facility]    [facility]  [phone]  Rashad Moore MD    Patient Care Team:  Deborah Ochoa MD as PCP - General    CC/REASON FOR CONSULTATION: RENAL FAILURE/ELEVATED SERUM CREATININE  PHYSICIAN REQUESTING CONSULTATION:     History of Present Illness     HPI    Patient is a 69 y.o WF, who I actively follow as an outpatient,  whom I was asked to see in consultation for evaluation and management of renal failure/elevated serum creatinine. Patient was admitted, after presenting to ER with 3 days worth of diarrhea and weakness and confusion, and triggered sepsis protocol.  Patient with known CRF/CKD STG 3. No NSAIDs or recent IV dye exposure. No known h/o hepatitis, TB, rheumatic fever, jaundice, SLE, bleeding/bruising disorders.  Some dysuria. No edema or fluid retention.  +Compliance with home meds. Was on ACE-I/ARB in the form of Lisinopril and Losartan  prior to admission. No herbal med use.      Review of Systems   Constitutional: Positive for activity change and fatigue. Negative for appetite change, chills, diaphoresis, fever and unexpected weight change.   HENT: Positive for congestion. Negative for dental problem, drooling, ear discharge, ear pain, facial swelling, hearing loss, mouth sores, nosebleeds, postnasal drip, rhinorrhea, sinus pressure, sinus pain, sneezing, sore throat, tinnitus, trouble swallowing and voice change.    Eyes: Negative for photophobia, pain, discharge, redness, itching and visual disturbance.   Respiratory: Positive for cough, shortness of breath and wheezing. Negative for apnea, choking, chest tightness and stridor.    Cardiovascular: Negative for chest pain, palpitations and leg swelling.   Gastrointestinal: Positive for diarrhea and nausea. Negative for abdominal distention, abdominal pain, anal bleeding, blood in stool, constipation, rectal pain and vomiting.   Endocrine: Negative for cold  intolerance, heat intolerance, polydipsia, polyphagia and polyuria.   Genitourinary: Negative for decreased urine volume, difficulty urinating, dysuria, enuresis, flank pain, frequency, genital sores, hematuria and urgency.   Musculoskeletal: Positive for arthralgias and back pain. Negative for gait problem, joint swelling, myalgias, neck pain and neck stiffness.   Skin: Negative for color change, pallor, rash and wound.   Allergic/Immunologic: Negative for environmental allergies, food allergies and immunocompromised state.   Neurological: Positive for weakness. Negative for dizziness, tremors, seizures, syncope, facial asymmetry, speech difficulty, light-headedness, numbness and headaches.   Hematological: Negative for adenopathy. Does not bruise/bleed easily.   Psychiatric/Behavioral: Negative for agitation, behavioral problems, confusion, decreased concentration, dysphoric mood, hallucinations, self-injury, sleep disturbance and suicidal ideas. The patient is not nervous/anxious and is not hyperactive.           No past medical history on file.    No past surgical history on file.    No family history on file.    Social History     Tobacco Use   • Smoking status: Current Every Day Smoker   Substance Use Topics   • Alcohol use: Not on file   • Drug use: Not on file       Home Meds:   Medications Prior to Admission   Medication Sig Dispense Refill Last Dose   • amphetamine-dextroamphetamine (ADDERALL) 20 MG tablet Take 20 mg by mouth 2 (Two) Times a Day.      • CINNAMON PO Take 1 capsule by mouth 2 (Two) Times a Day.      • cyclobenzaprine (FLEXERIL) 10 MG tablet Take 10 mg by mouth every night at bedtime.      • DULoxetine (CYMBALTA) 30 MG capsule Take 30 mg by mouth Daily.      • esomeprazole (nexIUM) 40 MG capsule Take 40 mg by mouth 2 (Two) Times a Day.      • estrogens, conjugated, (PREMARIN) 1.25 MG tablet Take 1.25 mg by mouth Daily.      • Fluticasone-Umeclidin-Vilant (TRELEGY ELLIPTA) 100-62.5-25 MCG/INH  aerosol powder  Inhale 1 container Daily.      • gabapentin (NEURONTIN) 300 MG capsule Take 300 mg by mouth Every Morning.      • gabapentin (NEURONTIN) 300 MG capsule Take 600 mg by mouth Every Evening.      • insulin aspart (novoLOG) 100 UNIT/ML injection Inject 5 Units under the skin into the appropriate area as directed 3 (Three) Times a Day Before Meals.      • Insulin Degludec (TRESIBA FLEXTOUCH) 200 UNIT/ML solution pen-injector pen injection Inject 32-34 Units under the skin into the appropriate area as directed every night at bedtime.      • lisinopril (PRINIVIL,ZESTRIL) 5 MG tablet Take 5 mg by mouth Daily.      • losartan (COZAAR) 25 MG tablet Take 25 mg by mouth Daily.      • metoprolol tartrate (LOPRESSOR) 50 MG tablet Take 50 mg by mouth 2 (Two) Times a Day.      • minocycline (MINOCIN,DYNACIN) 50 MG capsule Take 50 mg by mouth Daily.      • Mirabegron ER (MYRBETRIQ) 50 MG tablet sustained-release 24 hour 24 hr tablet Take 50 mg by mouth Daily.      • rosuvastatin (CRESTOR) 20 MG tablet Take 20 mg by mouth Daily.      • theophylline (SHARAN-24) 300 MG 24 hr capsule Take 300 mg by mouth Daily.      • TURMERIC PO Take 1 capsule by mouth 2 (Two) Times a Day.          Scheduled Meds:    budesonide 0.5 mg Nebulization BID - RT   enoxaparin 30 mg Subcutaneous Q24H   guaiFENesin 600 mg Oral Q12H   ipratropium-albuterol 3 mL Nebulization Q6H - RT   metoprolol tartrate 50 mg Oral Q12H   pantoprazole 40 mg Oral QAM   predniSONE 20 mg Oral BID With Meals   sodium chloride 30 mL/kg Intravenous Once   sodium chloride 10 mL Intravenous Q12H       Continuous Infusions:       PRN Meds:  ipratropium-albuterol  •  sodium chloride  •  [COMPLETED] Insert peripheral IV **AND** sodium chloride  •  sodium chloride  •  sodium chloride    Allergies:  Patient has no known allergies.    OBJECTIVE    Vital Signs  Temp:  [97.4 °F (36.3 °C)-97.7 °F (36.5 °C)] 97.7 °F (36.5 °C)  Heart Rate:  [] 106  Resp:  [15-16] 16  BP:  ()/(26-76) 157/50    No intake/output data recorded.  I/O last 3 completed shifts:  In: 3100 [IV Piggyback:3100]  Out: -     Physical Exam:  General Appearance: alert, appears stated age and cooperative  Head: normocephalic, without obvious abnormality and atraumatic +DRY OP  Eyes: conjunctivae and sclerae normal and no icterus  Neck: supple and no JVD  Lungs: clear to auscultation and respirations regular  Heart: regular rhythm & normal rate and normal S1, S2 +RADHA  Chest Wall: no abnormalities observed  Abdomen: normal bowel sounds and soft non-tender  Extremities: moves extremities well, no edema, no cyanosis and no redness  Skin: no bleeding, bruising or rash  Neurologic: Alert, and oriented. No focal deficits    Results Review:    I reviewed the patient's new clinical results.    WBC WBC   Date Value Ref Range Status   09/30/2019 15.40 (H) 3.40 - 10.80 10*3/mm3 Final   09/30/2019 15.00 (H) 3.40 - 10.80 10*3/mm3 Final   09/30/2019 15.00 (H) 3.40 - 10.80 10*3/mm3 Final      HGB Hemoglobin   Date Value Ref Range Status   09/30/2019 13.9 12.0 - 15.9 g/dL Final   09/30/2019 16.1 (H) 12.0 - 15.9 g/dL Final   09/30/2019 15.8 12.0 - 15.9 g/dL Final      HCT Hematocrit   Date Value Ref Range Status   09/30/2019 42.3 34.0 - 46.6 % Final   09/30/2019 49.0 (H) 34.0 - 46.6 % Final   09/30/2019 48.3 (H) 34.0 - 46.6 % Final      Platlets No results found for: LABPLAT   MCV MCV   Date Value Ref Range Status   09/30/2019 90.4 79.0 - 97.0 fL Final   09/30/2019 90.0 79.0 - 97.0 fL Final   09/30/2019 90.5 79.0 - 97.0 fL Final          Sodium Sodium   Date Value Ref Range Status   10/01/2019 134 (L) 136 - 144 mmol/L Final   09/30/2019 136 136 - 144 mmol/L Final   09/30/2019 132 (L) 136 - 144 mmol/L Final      Potassium Potassium   Date Value Ref Range Status   10/01/2019 3.2 (L) 3.6 - 5.1 mmol/L Final   09/30/2019 3.6 3.6 - 5.1 mmol/L Final   09/30/2019 3.7 3.6 - 5.1 mmol/L Final      Chloride Chloride   Date Value Ref Range  Status   10/01/2019 103 101 - 111 mmol/L Final   09/30/2019 106 101 - 111 mmol/L Final   09/30/2019 97 (L) 101 - 111 mmol/L Final      CO2 CO2   Date Value Ref Range Status   10/01/2019 21.0 (L) 22.0 - 32.0 mmol/L Final   09/30/2019 20.0 (L) 22.0 - 32.0 mmol/L Final   09/30/2019 24.0 22.0 - 32.0 mmol/L Final      BUN BUN   Date Value Ref Range Status   10/01/2019 16 8 - 20 mg/dL Final   09/30/2019 17 8 - 20 mg/dL Final   09/30/2019 20 8 - 20 mg/dL Final      Creatinine Creatinine   Date Value Ref Range Status   10/01/2019 2.10 (H) 0.40 - 1.00 mg/dL Final   09/30/2019 2.00 (H) 0.40 - 1.00 mg/dL Final   09/30/2019 2.50 (H) 0.40 - 1.00 mg/dL Final      Calcium Calcium   Date Value Ref Range Status   10/01/2019 8.0 (L) 8.9 - 10.3 mg/dL Final   09/30/2019 8.3 (L) 8.9 - 10.3 mg/dL Final   09/30/2019 9.2 8.9 - 10.3 mg/dL Final      PO4 No results found for: CAPO4   Albumin Albumin   Date Value Ref Range Status   09/30/2019 2.50 (L) 3.50 - 4.80 g/dL Final   09/30/2019 3.10 (L) 3.50 - 4.80 g/dL Final      Magnesium Magnesium   Date Value Ref Range Status   10/01/2019 1.6 (L) 1.8 - 2.5 mg/dL Final   09/30/2019 1.6 (L) 1.8 - 2.5 mg/dL Final   09/30/2019 1.9 1.8 - 2.5 mg/dL Final      Uric Acid No results found for: URICACID       Imaging Results (last 72 hours)     Procedure Component Value Units Date/Time    XR Chest 1 View [[id number]] Collected:  09/30/19 1323     Updated:  09/30/19 1325    Narrative:       DATE OF EXAM:  9/30/2019 12:38 PM     PROCEDURE:  XR CHEST 1 VW-     INDICATIONS:  Severe Sepsis triage protocol     COMPARISON:  August 28, 2018     TECHNIQUE:   Single radiographic AP view of the chest was obtained.     FINDINGS:  Cardiac size is normal and the lungs remain clear.        Impression:       Negative portable chest     Electronically Signed By-Lee Costello On:9/30/2019 1:23 PM  This report was finalized on [id number] by  Lee Costello, .            Results for orders placed during the hospital encounter of  09/30/19   XR Chest 1 View    Narrative DATE OF EXAM:  9/30/2019 12:38 PM     PROCEDURE:  XR CHEST 1 VW-     INDICATIONS:  Severe Sepsis triage protocol     COMPARISON:  August 28, 2018     TECHNIQUE:   Single radiographic AP view of the chest was obtained.     FINDINGS:  Cardiac size is normal and the lungs remain clear.        Impression Negative portable chest     Electronically Signed By-Lee Costello On:9/30/2019 1:23 PM  This report was finalized on [id number] by  Lee Costello, .              ASSESSMENT / PLAN      Sepsis (CMS/[facility])    1. RENAL FAILURE-------Nonoliguric. +ARF/ADAMARIS on top of known CRF/CKD STG 3 with a baseline serum creatinine of 1.2. CRF/CKD STG 3 secondary to DGS/HTN NS. +ARF/ADAMARIS is secondary to prerenal state/intravascular volume depletion with concomitant ACE-I/ARB use and some ATN from hypotension. D/C Lisinopril and Losartan. Hydrate. Check urine and serum studies and renal US and PVR. No NSAIDs or IV dye. Dose meds for CrCl less than 10 cc/min until ARF/ADAMARIS is resolved. Support BP. Avoid hypotension    2. HYPOKALEMIA------Replace po and with maintenance IVFs. Secondary to diarrhea. Replete Mg too. Secondary to stool losses    3. HYPONATREMIA-------Hypotonic and clinically hypovolemic. Give NS. Check few urine and serum studies. Give NS    4. HYPOALBUMINEMIA------IV Albumin to temporize.    5. HYPOMAGNESEMIA------Replace IV. Follow levels    6. ACIDOSIS------Type 4 RTA and stool losses. Give IV NaHCO3 x one and follow    7. DIARRHEA/SEPSIS/LEUKOCYTOSIS/HYPOTENSION-------per , Critical Care. Off BP meds. Cx pending        I discussed the patients findings and my recommendations with patient, family, nursing staff and consulting provider    Will follow along closely. Thank you for allowing us to see this patient in renal consultation.    [facility]  [phone]  MD Rashad De Leon MD  10/01/19  10:25 AM

## 2019-10-01 NOTE — PROGRESS NOTES
Discharge Planning Assessment  [facility]     Patient Name: Dyana Montemayor  MRN: [id number]  Today's Date: 10/1/2019    Admit Date: 9/30/2019    Discharge Needs Assessment     Row Name 10/01/19 1145       Living Environment    Lives With  spouse    Unique Family Situation  spouse is in the hospital also being worked up for a cabg    [facility] Care Provided by  [facility]    Quality of Family Relationships  supportive    Able to Return to Prior Arrangements  yes       Resource/Environmental Concerns    Transportation Concerns  car, none       Discharge Needs Assessment    Readmission Within the Last 30 Days  no previous admission in last 30 days    Concerns to be Addressed  no discharge needs identified    Discharge Coordination/Progress  d/c plan home         Discharge Plan     Row Name 10/01/19 1155       Plan    Plan  Physical therapy to eval . Patient is on 3l o2 . r/o pna         Destination      No service coordination in this encounter.      Durable Medical Equipment      No service coordination in this encounter.      Dialysis/Infusion      No service coordination in this encounter.      Home Medical Care      No service coordination in this encounter.      Therapy      No service coordination in this encounter.      Community Resources      No service coordination in this encounter.          Demographic Summary    No documentation.       Functional Status    No documentation.       Psychosocial    No documentation.       Abuse/Neglect    No documentation.       Legal    No documentation.       Substance Abuse    No documentation.       Patient Forms    No documentation.           Aurora Ahmadi RN

## 2019-10-01 NOTE — NURSING NOTE
Placed call to Dr Sotelo answering service. Pt now becoming hypertensive with heart rate trending up from 110's to 120.Will continue to monitor.

## 2019-10-01 NOTE — CONSULTS
Referring Provider: Pulmonologist  Reason for Consultation: STEMI    Patient Care Team:  Deborah Ochoa MD as PCP - General    Chief complaint is pain shortness of breath    Subjective .     History of present illness:  Dyana Montemayor is a 69 y.o. female with history of diabetes COPD hypertension hyperlipidemia presented to the hospital with complaints of malaise fatigue diarrhea chest pain or shortness of breath.  Patient was with her  in the hospital for 3 to 4 days and was not feeling very well and went home but presented to the ER with the symptoms.  Patient was noted to have urosepsis with possible septic shock.  Patient also has left lower lobe pneumonia.  Patient also has acute renal insufficiency.  Patient's troponin also was elevated consistent with non-ST elevation MI patient is not having any symptoms of palpitations PND orthopnea.  No pallor no dizziness syncope or swelling of the feet.    Review of Systems   Constitution: Negative for fever and malaise/fatigue.   HENT: Negative for ear pain and nosebleeds.    Eyes: Negative for blurred vision and double vision.   Cardiovascular: Positive for chest pain. Negative for dyspnea on exertion and palpitations.   Respiratory: Positive for shortness of breath. Negative for cough.    Skin: Negative for rash.   Musculoskeletal: Negative for joint pain.   Gastrointestinal: Negative for abdominal pain, nausea and vomiting.   Neurological: Negative for focal weakness and headaches.   Psychiatric/Behavioral: Negative for depression. The patient is not nervous/anxious.    All other systems reviewed and are negative.      History  No past medical history on file.    No past surgical history on file.    No family history on file.    Social History     Tobacco Use   • Smoking status: Current Every Day Smoker   Substance Use Topics   • Alcohol use: Not on file   • Drug use: Not on file        Medications Prior to Admission   Medication Sig Dispense Refill Last  Dose   • amphetamine-dextroamphetamine (ADDERALL) 20 MG tablet Take 20 mg by mouth 2 (Two) Times a Day.      • CINNAMON PO Take 1 capsule by mouth 2 (Two) Times a Day.      • cyclobenzaprine (FLEXERIL) 10 MG tablet Take 10 mg by mouth every night at bedtime.      • DULoxetine (CYMBALTA) 30 MG capsule Take 30 mg by mouth Daily.      • esomeprazole (nexIUM) 40 MG capsule Take 40 mg by mouth 2 (Two) Times a Day.      • estrogens, conjugated, (PREMARIN) 1.25 MG tablet Take 1.25 mg by mouth Daily.      • Fluticasone-Umeclidin-Vilant (TRELEGY ELLIPTA) 100-62.5-25 MCG/INH aerosol powder  Inhale 1 container Daily.      • gabapentin (NEURONTIN) 300 MG capsule Take 300 mg by mouth Every Morning.      • gabapentin (NEURONTIN) 300 MG capsule Take 600 mg by mouth Every Evening.      • insulin aspart (novoLOG) 100 UNIT/ML injection Inject 5 Units under the skin into the appropriate area as directed 3 (Three) Times a Day Before Meals.      • Insulin Degludec (TRESIBA FLEXTOUCH) 200 UNIT/ML solution pen-injector pen injection Inject 32-34 Units under the skin into the appropriate area as directed every night at bedtime.      • lisinopril (PRINIVIL,ZESTRIL) 5 MG tablet Take 5 mg by mouth Daily.      • losartan (COZAAR) 25 MG tablet Take 25 mg by mouth Daily.      • metoprolol tartrate (LOPRESSOR) 50 MG tablet Take 50 mg by mouth 2 (Two) Times a Day.      • minocycline (MINOCIN,DYNACIN) 50 MG capsule Take 50 mg by mouth Daily.      • Mirabegron ER (MYRBETRIQ) 50 MG tablet sustained-release 24 hour 24 hr tablet Take 50 mg by mouth Daily.      • rosuvastatin (CRESTOR) 20 MG tablet Take 20 mg by mouth Daily.      • theophylline (SHARAN-24) 300 MG 24 hr capsule Take 300 mg by mouth Daily.      • TURMERIC PO Take 1 capsule by mouth 2 (Two) Times a Day.            Patient has no known allergies.    Scheduled Meds:    albumin human 25 g Intravenous Q8H   budesonide 0.5 mg Nebulization BID - RT   enoxaparin 30 mg Subcutaneous Q24H  "  guaiFENesin 600 mg Oral Q12H   ipratropium-albuterol 3 mL Nebulization Q6H - RT   magnesium sulfate 2 g Intravenous Q12H   metoprolol tartrate 50 mg Oral Q12H   pantoprazole 40 mg Oral QAM   potassium chloride 40 mEq Oral Once   predniSONE 20 mg Oral BID With Meals   sodium bicarbonate 50 mEq Intravenous Once   sodium chloride 30 mL/kg Intravenous Once   sodium chloride 10 mL Intravenous Q12H     Continuous Infusions:    sodium chloride 0.9 % with KCl 20 mEq 100 mL/hr     PRN Meds:.ipratropium-albuterol  •  sodium chloride  •  [COMPLETED] Insert peripheral IV **AND** sodium chloride  •  sodium chloride  •  sodium chloride    Objective     VITAL SIGNS  Vitals:    10/01/19 1141 10/01/19 1210 10/01/19 1219 10/01/19 1241   BP: (!) 147/38 149/52  141/42   Pulse: 90 91 91 90   Resp:  16 16    Temp:       TempSrc:       SpO2: 97% 97% 99% 96%   Weight:       Height:           Flowsheet Rows      First Filed Value   Admission Height  157.5 cm (62\") Documented at 09/30/2019 1219   Admission Weight  68 kg (150 lb) Documented at 09/30/2019 1219           TELEMETRY: Sinus rhythm    Physical Exam:  Physical Exam   Constitutional: She appears well-developed and well-nourished.   HENT:   Head: Normocephalic and atraumatic.   Eyes: Conjunctivae and EOM are normal. Pupils are equal, round, and reactive to light. No scleral icterus.   Neck: Normal range of motion. Neck supple. No JVD present. Carotid bruit is not present.   Cardiovascular: Normal rate, regular rhythm, S1 normal, S2 normal, normal heart sounds and intact distal pulses. PMI is not displaced.   Pulmonary/Chest: Effort normal and breath sounds normal. She has no wheezes. She has no rales.   Abdominal: Soft. Bowel sounds are normal.   Musculoskeletal: Normal range of motion.   Neurological: She is alert. She has normal strength.   No focal deficits   Skin: Skin is warm and dry. No rash noted.   Psychiatric: She has a normal mood and affect.        Results Review:   I " reviewed the patient's new clinical results.  Lab Results (last 24 hours)     Procedure Component Value Units Date/Time    Blood Culture - Blood, Arm, Left [[id number]] Collected:  09/30/19 1248    Specimen:  Blood from Arm, Left Updated:  10/01/19 1300     Blood Culture No growth at 24 hours    Blood Culture - Blood, Blood, Venous Line [[id number]] Collected:  09/30/19 1250    Specimen:  Blood, Venous Line Updated:  10/01/19 1300     Blood Culture No growth at 24 hours    POC Glucose Once [[id number]]  (Abnormal) Collected:  10/01/19 1141    Specimen:  Blood Updated:  10/01/19 1145     Glucose 171 mg/dL      Comment: Serial Number: [id number]:  [id number]       CK [[id number]]  (Normal) Collected:  10/01/19 0327    Specimen:  Blood Updated:  10/01/19 1127     Creatine Kinase 161 U/L     TSH [[id number]]  (Abnormal) Collected:  10/01/19 0327    Specimen:  Blood Updated:  10/01/19 1127     TSH 0.050 uIU/mL      Comment: Results may be falsely decreased if patient taking Biotin.       Uric Acid [[id number]]  (Normal) Collected:  10/01/19 0327    Specimen:  Blood Updated:  10/01/19 1127     Uric Acid 5.5 mg/dL     Urine Culture - Urine, Urine, Catheter [[id number]]  (Normal) Collected:  09/30/19 1305    Specimen:  Urine, Catheter Updated:  10/01/19 1058     Urine Culture No growth    Gastrointestinal Panel, PCR - Stool, Per Rectum [[id number]]  (Normal) Collected:  09/30/19 1304    Specimen:  Stool from Per Rectum Updated:  10/01/19 0805     Campylobacter Not Detected     Plesiomonas shigelloides Not Detected     Salmonella Not Detected     Vibrio Not Detected     Vibrio cholerae Not Detected     Yersinia enterocolitica Not Detected     Enteroaggregative E. coli (EAEC) Not Detected     Enteropathogenic E. coli (EPEC) Not Detected     Enterotoxigenic E. coli (ETEC) lt/st Not Detected     Shiga-like toxin-producing E. coli (STEC) stx1/stx2 Not Detected     E. coli O157 Not Detected     Shigella/Enteroinvasive E. coli (EIEC)  Not Detected     Cryptosporidium Not Detected     Cyclospora cayetanensis Not Detected     Entamoeba histolytica Not Detected     Giardia lamblia Not Detected     Adenovirus F40/41 Not Detected     Astrovirus Not Detected     Norovirus GI/GII Not Detected     Rotavirus A Not Detected     Sapovirus (I, II, IV or V) Not Detected    Narrative:       If Aeromonas, Staphylococcus aureus or Bacillus cereus are suspected, please order [id number]: Stool Culture, Aeromonas, S aureus, B Cereus.    Clostridium Difficile Toxin - Stool, Per Rectum [[id number]] Collected:  09/30/19 1304    Specimen:  Stool from Per Rectum Updated:  10/01/19 0729    Narrative:       The following orders were created for panel order Clostridium Difficile Toxin - Stool, Per Rectum.  Procedure                               Abnormality         Status                     ---------                               -----------         ------                     Clostridium Difficile EI...[[id number]]  Normal              Final result                 Please view results for these tests on the individual orders.    Clostridium Difficile EIA - Stool, Per Rectum [[id number]]  (Normal) Collected:  09/30/19 1304    Specimen:  Stool from Per Rectum Updated:  10/01/19 0729     C Diff GDH / Toxin Negative    MRSA Screen Culture - Swab, Nares [[id number]]  (Normal) Collected:  09/30/19 1850    Specimen:  Swab from Nares Updated:  10/01/19 0708     MRSA SCREEN CX No Methicillin Resistant Staphylococcus aureus isolated    Troponin [[id number]]  (Abnormal) Collected:  10/01/19 0557    Specimen:  Blood Updated:  10/01/19 0655     Troponin I 0.140 ng/mL     Narrative:       Troponin I Reference Range:    0.00-0.03  Negative.  Repeat testing in 4-6 hours if clinically indicated.    0.04-0.29  Suspicious for myocardial injury. Serial measurements and clinical  correlation may be necessary to confirm or exclude diagnosis of acute  coronary syndrome.  Repeat testing in 4-6 hours if  indicated.     >0.29 Consistent with myocardial injury.  Recommend clinical and laboratory correlation.     Results my be falsely decreased if patient taking Biotin.     Basic Metabolic Panel [[id number]]  (Abnormal) Collected:  10/01/19 0327    Specimen:  Blood Updated:  10/01/19 0412     Glucose 181 mg/dL      BUN 16 mg/dL      Creatinine 2.10 mg/dL      Sodium 134 mmol/L      Potassium 3.2 mmol/L      Chloride 103 mmol/L      CO2 21.0 mmol/L      Calcium 8.0 mg/dL      eGFR Non African Amer 23 mL/min/1.73      BUN/Creatinine Ratio 7.6     Anion Gap 13.2 mmol/L     Magnesium [[id number]]  (Abnormal) Collected:  10/01/19 0327    Specimen:  Blood Updated:  10/01/19 0408     Magnesium 1.6 mg/dL     Phosphorus [[id number]]  (Normal) Collected:  10/01/19 0327    Specimen:  Blood Updated:  10/01/19 0408     Phosphorus 3.9 mg/dL     CBC Auto Differential [[id number]]  (Abnormal) Collected:  09/30/19 1804    Specimen:  Blood Updated:  09/30/19 1959     WBC 15.40 10*3/mm3      RBC 4.68 10*6/mm3      Hemoglobin 13.9 g/dL      Hematocrit 42.3 %      MCV 90.4 fL      MCH 29.6 pg      MCHC 32.8 g/dL      RDW 13.6 %      RDW-SD 42.9 fl      MPV 7.7 fL      Platelets 358 10*3/mm3      Neutrophil % 57.9 %      Lymphocyte % 25.4 %      Monocyte % 4.5 %      Eosinophil % 11.7 %      Basophil % 0.5 %      Neutrophils, Absolute 8.90 10*3/mm3      Lymphocytes, Absolute 3.90 10*3/mm3      Monocytes, Absolute 0.70 10*3/mm3      Eosinophils, Absolute 1.80 10*3/mm3      Basophils, Absolute 0.10 10*3/mm3      nRBC 0.0 /100 WBC     Comprehensive Metabolic Panel [[id number]]  (Abnormal) Collected:  09/30/19 1804    Specimen:  Blood Updated:  09/30/19 1938     Glucose 86 mg/dL      BUN 17 mg/dL      Creatinine 2.00 mg/dL      Sodium 136 mmol/L      Potassium 3.6 mmol/L      Chloride 106 mmol/L      CO2 20.0 mmol/L      Calcium 8.3 mg/dL      Total Protein 6.2 g/dL      Albumin 2.50 g/dL      ALT (SGPT) 10 U/L      AST (SGOT) 13 U/L      Alkaline  Phosphatase 58 U/L      Total Bilirubin 0.4 mg/dL      eGFR Non African Amer 25 mL/min/1.73      Globulin 3.7 gm/dL      A/G Ratio 0.7 g/dL      BUN/Creatinine Ratio 8.5     Anion Gap 13.6 mmol/L     Troponin [[id number]]  (Abnormal) Collected:  09/30/19 1804    Specimen:  Blood Updated:  09/30/19 1935     Troponin I 0.140 ng/mL     Narrative:       Troponin I Reference Range:    0.00-0.03  Negative.  Repeat testing in 4-6 hours if clinically indicated.    0.04-0.29  Suspicious for myocardial injury. Serial measurements and clinical  correlation may be necessary to confirm or exclude diagnosis of acute  coronary syndrome.  Repeat testing in 4-6 hours if indicated.     >0.29 Consistent with myocardial injury.  Recommend clinical and laboratory correlation.     Results my be falsely decreased if patient taking Biotin.     Iron [[id number]]  (Abnormal) Collected:  09/30/19 1804    Specimen:  Blood Updated:  09/30/19 1932     Iron 16 mcg/dL     CK [[id number]]  (Normal) Collected:  09/30/19 1804    Specimen:  Blood Updated:  09/30/19 1932     Creatine Kinase 187 U/L     Magnesium [[id number]]  (Abnormal) Collected:  09/30/19 1804    Specimen:  Blood Updated:  09/30/19 1932     Magnesium 1.6 mg/dL     Phosphorus [[id number]]  (Normal) Collected:  09/30/19 1804    Specimen:  Blood Updated:  09/30/19 1932     Phosphorus 3.2 mg/dL     TSH [[id number]]  (Abnormal) Collected:  09/30/19 1804    Specimen:  Blood Updated:  09/30/19 1932     TSH 0.040 uIU/mL      Comment: Results may be falsely decreased if patient taking Biotin.       BNP [[id number]]  (Abnormal) Collected:  09/30/19 1804    Specimen:  Blood Updated:  09/30/19 1923     .0 pg/mL      Comment: Results may be falsely decreased if patient taking Biotin.       Lipase [[id number]]  (Abnormal) Collected:  09/30/19 1250    Specimen:  Blood Updated:  09/30/19 1918     Lipase 19 U/L     Blood Gas, Arterial [[id number]]  (Abnormal) Collected:  09/30/19 1914    Specimen:   Arterial Blood Updated:  09/30/19 1917     Site Right Radial     Torey's Test Positive     pH, Arterial 7.376 pH units      pCO2, Arterial 36.7 mm Hg      pO2, Arterial 77.8 mm Hg      HCO3, Arterial 21.5 mmol/L      Base Excess, Arterial -3.2 mmol/L      Comment: Serial Number: [id number]:  [id number]        O2 Saturation, Arterial 95.2 %      CO2 Content 22.6 mmol/L      Barometric Pressure for Blood Gas --     Comment: N/A        Modality Cannula     FIO2 <21 %      Hemodilution No    Calcium, Ionized [[id number]]  (Normal) Collected:  09/30/19 1804    Specimen:  Blood Updated:  09/30/19 1854     Ionized Calcium 1.22 mmol/L     Christine Draw [[id number]] Collected:  09/30/19 1250    Specimen:  Blood Updated:  09/30/19 1848    Narrative:       The following orders were created for panel order Christine Draw.  Procedure                               Abnormality         Status                     ---------                               -----------         ------                     Light Blue Top[[id number]]                                   Final result               Green Top (Gel)[[id number]]                                                             Lavender Top[[id number]]                                     Final result               Gold Top - SST[[id number]]                                                                Please view results for these tests on the individual orders.    POC Glucose Once [[id number]]  (Normal) Collected:  09/30/19 1732    Specimen:  Blood Updated:  09/30/19 1733     Glucose 90 mg/dL      Comment: Serial Number: [id number]:  [id number]       Extra Tubes [[id number]] Collected:  09/30/19 1248    Specimen:  Blood, Venous Line Updated:  09/30/19 1401    Narrative:       The following orders were created for panel order Extra Tubes.  Procedure                               Abnormality         Status                     ---------                               -----------         ------                      Green Top (Gel)[[id number]]                                  Final result                 Please view results for these tests on the individual orders.    Green Top (Gel) [[id number]] Collected:  09/30/19 1248    Specimen:  Blood Updated:  09/30/19 1401     Extra Tube Hold for add-ons.     Comment: Auto resulted.       Light Blue Top [[id number]] Collected:  09/30/19 1250    Specimen:  Blood Updated:  09/30/19 1401     Extra Tube hold for add-on     Comment: Auto resulted       Lavender Top [[id number]] Collected:  09/30/19 1250    Specimen:  Blood Updated:  09/30/19 1401     Extra Tube hold for add-on     Comment: Auto resulted       Urinalysis With Culture If Indicated - Urine, Catheter [[id number]]  (Abnormal) Collected:  09/30/19 1305    Specimen:  Urine, Catheter Updated:  09/30/19 1400     Color, UA Dark Yellow     Appearance, UA Hazy     pH, UA 6.0     Specific Gravity, UA 1.028     Glucose,  mg/dL (Trace)     Ketones, UA Trace     Bilirubin, UA Small (1+)     Comment: Confirmation testing is unavailable.  A serum bilirubin is recommended for further assessment.        Blood, UA Negative     Protein,  mg/dL (2+)     Leuk Esterase, UA Small (1+)     Nitrite, UA Negative     Urobilinogen, UA 0.2 E.U./dL    Urinalysis, Microscopic Only - Urine, Catheter [[id number]]  (Abnormal) Collected:  09/30/19 1305    Specimen:  Urine, Catheter Updated:  09/30/19 1400     RBC, UA None Seen /HPF      WBC, UA 6-12 /HPF      Bacteria, UA 1+ /HPF      Squamous Epithelial Cells, UA 31-50 /HPF      Hyaline Casts, UA None Seen /LPF      Methodology Manual Light Microscopy    Magnesium [[id number]]  (Normal) Collected:  09/30/19 1250    Specimen:  Blood Updated:  09/30/19 1344     Magnesium 1.9 mg/dL     Phosphorus [[id number]]  (Normal) Collected:  09/30/19 1250    Specimen:  Blood Updated:  09/30/19 1344     Phosphorus 3.8 mg/dL     C-reactive Protein [[id number]]  (Abnormal) Collected:  09/30/19 1250     Specimen:  Blood Updated:  09/30/19 1344     C-Reactive Protein 22.77 mg/dL     Comprehensive Metabolic Panel [[id number]]  (Abnormal) Collected:  09/30/19 1250    Specimen:  Blood Updated:  09/30/19 1322     Glucose 207 mg/dL      BUN 20 mg/dL      Creatinine 2.50 mg/dL      Sodium 132 mmol/L      Potassium 3.7 mmol/L      Chloride 97 mmol/L      CO2 24.0 mmol/L      Calcium 9.2 mg/dL      Total Protein 7.6 g/dL      Albumin 3.10 g/dL      ALT (SGPT) 11 U/L      AST (SGOT) 11 U/L      Alkaline Phosphatase 66 U/L      Total Bilirubin 0.4 mg/dL      eGFR Non African Amer 19 mL/min/1.73      Globulin 4.5 gm/dL      A/G Ratio 0.7 g/dL      BUN/Creatinine Ratio 8.0     Anion Gap 14.7 mmol/L     Calcium, Ionized [[id number]]  (Normal) Collected:  09/30/19 1250    Specimen:  Blood Updated:  09/30/19 1313     Ionized Calcium 1.29 mmol/L     Protime-INR [[id number]]  (Normal) Collected:  09/30/19 1250    Specimen:  Blood Updated:  09/30/19 1309     Protime 11.3 Seconds      INR 1.09    aPTT [[id number]]  (Abnormal) Collected:  09/30/19 1250    Specimen:  Blood Updated:  09/30/19 1309     PTT 23.9 seconds           Imaging Results (last 24 hours)     Procedure Component Value Units Date/Time    XR Chest 1 View [[id number]] Collected:  09/30/19 1323     Updated:  09/30/19 1325    Narrative:       DATE OF EXAM:  9/30/2019 12:38 PM     PROCEDURE:  XR CHEST 1 VW-     INDICATIONS:  Severe Sepsis triage protocol     COMPARISON:  August 28, 2018     TECHNIQUE:   Single radiographic AP view of the chest was obtained.     FINDINGS:  Cardiac size is normal and the lungs remain clear.        Impression:       Negative portable chest     Electronically Signed By-Lee Costello On:9/30/2019 1:23 PM  This report was finalized on [id number] by  Lee Costello, .          EKG      I personally viewed and interpreted the patient's EKG/Telemetry data:    ECHOCARDIOGRAM:      STRESS MYOVIEW:    CARDIAC CATHETERIZATION:    OTHER:         Assessment/Plan      1 non-ST segment elevation MI  2.  Urosepsis  3.  Pneumonia  4.  Septic shock  5.  Acute renal failure  6.  COPD  7.  Diabetes  8.  Hypertension  9.  Hyperlipidemia    Patient presented with chest pain shortness of breath and fatigue and has urosepsis with pneumonia  Patient also has septic shock and is being worked up for the same  Patient's troponin is elevated consistent with non-ST segment elevation MI  Patient will have an echocardiogram for LV function valve abnormalities  Patient's renal function is being followed by nephrologist  Patient may need a cardiac catheterization when her infection and renal function are stable  Blood pressure and heart are stable  Continue current medications  I discussed the patients findings and my recommendations with patient and family    Ishan June MD  10/01/19  1:05 PM

## 2019-10-02 LAB
ALBUMIN SERPL-MCNC: 3.1 G/DL (ref 3.5–4.8)
ALBUMIN/GLOB SERPL: 1.1 G/DL (ref 1–1.7)
ALP SERPL-CCNC: 73 U/L (ref 32–91)
ALT SERPL W P-5'-P-CCNC: 11 U/L (ref 14–54)
ANION GAP SERPL CALCULATED.3IONS-SCNC: 16.7 MMOL/L (ref 5–15)
AST SERPL-CCNC: 15 U/L (ref 15–41)
BILIRUB SERPL-MCNC: 0.6 MG/DL (ref 0.3–1.2)
BUN BLD-MCNC: 17 MG/DL (ref 8–20)
BUN/CREAT SERPL: 9.4 (ref 5.4–26.2)
CA-I SERPL ISE-MCNC: 1.23 MMOL/L (ref 1.2–1.3)
CALCIUM SPEC-SCNC: 8.5 MG/DL (ref 8.9–10.3)
CHLORIDE SERPL-SCNC: 102 MMOL/L (ref 101–111)
CO2 SERPL-SCNC: 22 MMOL/L (ref 22–32)
CREAT BLD-MCNC: 1.8 MG/DL (ref 0.4–1)
DEPRECATED RDW RBC AUTO: 43.8 FL (ref 37–54)
ERYTHROCYTE [DISTWIDTH] IN BLOOD BY AUTOMATED COUNT: 13.6 % (ref 12.3–15.4)
GFR SERPL CREATININE-BSD FRML MDRD: 28 ML/MIN/1.73
GLOBULIN UR ELPH-MCNC: 2.9 GM/DL (ref 2.5–3.8)
GLUCOSE BLD-MCNC: 357 MG/DL (ref 65–99)
GLUCOSE BLDC GLUCOMTR-MCNC: 176 MG/DL (ref 70–105)
GLUCOSE BLDC GLUCOMTR-MCNC: 179 MG/DL (ref 70–105)
GLUCOSE BLDC GLUCOMTR-MCNC: 293 MG/DL (ref 70–105)
GLUCOSE BLDC GLUCOMTR-MCNC: 347 MG/DL (ref 70–105)
HCT VFR BLD AUTO: 35.3 % (ref 34–46.6)
HGB BLD-MCNC: 11.4 G/DL (ref 12–15.9)
IRON 24H UR-MRATE: 23 MCG/DL (ref 28–170)
MAGNESIUM SERPL-MCNC: 3 MG/DL (ref 1.8–2.5)
MCH RBC QN AUTO: 29.4 PG (ref 26.6–33)
MCHC RBC AUTO-ENTMCNC: 32.3 G/DL (ref 31.5–35.7)
MCV RBC AUTO: 90.8 FL (ref 79–97)
PHOSPHATE SERPL-MCNC: 3.3 MG/DL (ref 2.4–4.7)
PLATELET # BLD AUTO: 245 10*3/MM3 (ref 140–450)
PMV BLD AUTO: 7.5 FL (ref 6–12)
POTASSIUM BLD-SCNC: 4.7 MMOL/L (ref 3.6–5.1)
PROT SERPL-MCNC: 6 G/DL (ref 6.1–7.9)
RBC # BLD AUTO: 3.89 10*6/MM3 (ref 3.77–5.28)
SODIUM BLD-SCNC: 136 MMOL/L (ref 136–144)
WBC NRBC COR # BLD: 11.3 10*3/MM3 (ref 3.4–10.8)

## 2019-10-02 PROCEDURE — 25010000002 ALBUMIN HUMAN 25% PER 50 ML: Performed by: INTERNAL MEDICINE

## 2019-10-02 PROCEDURE — P9047 ALBUMIN (HUMAN), 25%, 50ML: HCPCS | Performed by: INTERNAL MEDICINE

## 2019-10-02 PROCEDURE — 82962 GLUCOSE BLOOD TEST: CPT

## 2019-10-02 PROCEDURE — 82330 ASSAY OF CALCIUM: CPT | Performed by: INTERNAL MEDICINE

## 2019-10-02 PROCEDURE — 94799 UNLISTED PULMONARY SVC/PX: CPT

## 2019-10-02 PROCEDURE — 25010000002 IRON SUCROSE PER 1 MG: Performed by: INTERNAL MEDICINE

## 2019-10-02 PROCEDURE — 25010000002 MAGNESIUM SULFATE 2 GM/50ML SOLUTION: Performed by: INTERNAL MEDICINE

## 2019-10-02 PROCEDURE — 85027 COMPLETE CBC AUTOMATED: CPT | Performed by: INTERNAL MEDICINE

## 2019-10-02 PROCEDURE — 94760 N-INVAS EAR/PLS OXIMETRY 1: CPT

## 2019-10-02 PROCEDURE — 84100 ASSAY OF PHOSPHORUS: CPT | Performed by: INTERNAL MEDICINE

## 2019-10-02 PROCEDURE — 80053 COMPREHEN METABOLIC PANEL: CPT | Performed by: INTERNAL MEDICINE

## 2019-10-02 PROCEDURE — 63710000001 INSULIN LISPRO (HUMAN) PER 5 UNITS: Performed by: FAMILY MEDICINE

## 2019-10-02 PROCEDURE — 63710000001 INSULIN GLARGINE PER 5 UNITS: Performed by: FAMILY MEDICINE

## 2019-10-02 PROCEDURE — 83540 ASSAY OF IRON: CPT | Performed by: INTERNAL MEDICINE

## 2019-10-02 PROCEDURE — 63710000001 PREDNISONE PER 1 MG: Performed by: INTERNAL MEDICINE

## 2019-10-02 PROCEDURE — 99233 SBSQ HOSP IP/OBS HIGH 50: CPT | Performed by: INTERNAL MEDICINE

## 2019-10-02 PROCEDURE — 25810000003 SODIUM CHLORIDE 0.9 % WITH KCL 20 MEQ 20-0.9 MEQ/L-% SOLUTION: Performed by: INTERNAL MEDICINE

## 2019-10-02 PROCEDURE — 83735 ASSAY OF MAGNESIUM: CPT | Performed by: INTERNAL MEDICINE

## 2019-10-02 PROCEDURE — 25010000002 MEROPENEM PER 100 MG: Performed by: INTERNAL MEDICINE

## 2019-10-02 PROCEDURE — 97162 PT EVAL MOD COMPLEX 30 MIN: CPT

## 2019-10-02 RX ORDER — ROSUVASTATIN CALCIUM 10 MG/1
10 TABLET, COATED ORAL NIGHTLY
Status: DISCONTINUED | OUTPATIENT
Start: 2019-10-02 | End: 2019-10-09 | Stop reason: HOSPADM

## 2019-10-02 RX ORDER — GABAPENTIN 300 MG/1
600 CAPSULE ORAL NIGHTLY
Status: DISCONTINUED | OUTPATIENT
Start: 2019-10-02 | End: 2019-10-09 | Stop reason: HOSPADM

## 2019-10-02 RX ORDER — DULOXETIN HYDROCHLORIDE 30 MG/1
30 CAPSULE, DELAYED RELEASE ORAL DAILY
Status: DISCONTINUED | OUTPATIENT
Start: 2019-10-02 | End: 2019-10-09 | Stop reason: HOSPADM

## 2019-10-02 RX ORDER — INSULIN GLARGINE 100 [IU]/ML
30 INJECTION, SOLUTION SUBCUTANEOUS NIGHTLY
Status: DISCONTINUED | OUTPATIENT
Start: 2019-10-02 | End: 2019-10-09 | Stop reason: HOSPADM

## 2019-10-02 RX ORDER — GABAPENTIN 300 MG/1
300 CAPSULE ORAL DAILY
Status: DISCONTINUED | OUTPATIENT
Start: 2019-10-02 | End: 2019-10-09 | Stop reason: HOSPADM

## 2019-10-02 RX ADMIN — IPRATROPIUM BROMIDE AND ALBUTEROL SULFATE 3 ML: .5; 3 SOLUTION RESPIRATORY (INHALATION) at 12:10

## 2019-10-02 RX ADMIN — SODIUM CHLORIDE AND POTASSIUM CHLORIDE 100 ML/HR: 9; 1.49 INJECTION, SOLUTION INTRAVENOUS at 01:22

## 2019-10-02 RX ADMIN — GUAIFENESIN 600 MG: 600 TABLET, EXTENDED RELEASE ORAL at 20:26

## 2019-10-02 RX ADMIN — INSULIN LISPRO 5 UNITS: 100 INJECTION, SOLUTION INTRAVENOUS; SUBCUTANEOUS at 13:19

## 2019-10-02 RX ADMIN — MAGNESIUM SULFATE HEPTAHYDRATE 2 G: 40 INJECTION, SOLUTION INTRAVENOUS at 00:04

## 2019-10-02 RX ADMIN — GABAPENTIN 300 MG: 300 CAPSULE ORAL at 13:20

## 2019-10-02 RX ADMIN — PREDNISONE 20 MG: 20 TABLET ORAL at 09:10

## 2019-10-02 RX ADMIN — Medication 10 ML: at 20:26

## 2019-10-02 RX ADMIN — IPRATROPIUM BROMIDE AND ALBUTEROL SULFATE 3 ML: .5; 3 SOLUTION RESPIRATORY (INHALATION) at 18:49

## 2019-10-02 RX ADMIN — GABAPENTIN 600 MG: 300 CAPSULE ORAL at 20:26

## 2019-10-02 RX ADMIN — PANTOPRAZOLE SODIUM 40 MG: 40 TABLET, DELAYED RELEASE ORAL at 09:10

## 2019-10-02 RX ADMIN — INSULIN GLARGINE 30 UNITS: 100 INJECTION, SOLUTION SUBCUTANEOUS at 20:25

## 2019-10-02 RX ADMIN — SODIUM CHLORIDE AND POTASSIUM CHLORIDE 100 ML/HR: 9; 1.49 INJECTION, SOLUTION INTRAVENOUS at 12:14

## 2019-10-02 RX ADMIN — MEROPENEM 500 MG: 500 INJECTION, POWDER, FOR SOLUTION INTRAVENOUS at 09:30

## 2019-10-02 RX ADMIN — THEOPHYLLINE ANHYDROUS 300 MG: 300 CAPSULE, EXTENDED RELEASE ORAL at 13:20

## 2019-10-02 RX ADMIN — Medication 10 ML: at 09:11

## 2019-10-02 RX ADMIN — SODIUM CHLORIDE AND POTASSIUM CHLORIDE 100 ML/HR: 9; 1.49 INJECTION, SOLUTION INTRAVENOUS at 23:07

## 2019-10-02 RX ADMIN — INSULIN LISPRO 5 UNITS: 100 INJECTION, SOLUTION INTRAVENOUS; SUBCUTANEOUS at 17:31

## 2019-10-02 RX ADMIN — IPRATROPIUM BROMIDE AND ALBUTEROL SULFATE 3 ML: .5; 3 SOLUTION RESPIRATORY (INHALATION) at 00:00

## 2019-10-02 RX ADMIN — DULOXETINE HYDROCHLORIDE 30 MG: 30 CAPSULE, DELAYED RELEASE ORAL at 13:20

## 2019-10-02 RX ADMIN — METOPROLOL TARTRATE 50 MG: 50 TABLET, FILM COATED ORAL at 20:26

## 2019-10-02 RX ADMIN — PREDNISONE 20 MG: 20 TABLET ORAL at 17:31

## 2019-10-02 RX ADMIN — IPRATROPIUM BROMIDE AND ALBUTEROL SULFATE 3 ML: .5; 3 SOLUTION RESPIRATORY (INHALATION) at 06:49

## 2019-10-02 RX ADMIN — BUDESONIDE 0.5 MG: 0.5 INHALANT RESPIRATORY (INHALATION) at 06:49

## 2019-10-02 RX ADMIN — MEROPENEM 500 MG: 500 INJECTION, POWDER, FOR SOLUTION INTRAVENOUS at 20:25

## 2019-10-02 RX ADMIN — ALBUMIN HUMAN 25 G: 0.25 SOLUTION INTRAVENOUS at 05:21

## 2019-10-02 RX ADMIN — BUDESONIDE 0.5 MG: 0.5 INHALANT RESPIRATORY (INHALATION) at 18:49

## 2019-10-02 RX ADMIN — ROSUVASTATIN CALCIUM 10 MG: 10 TABLET, FILM COATED ORAL at 20:26

## 2019-10-02 RX ADMIN — METOPROLOL TARTRATE 50 MG: 50 TABLET, FILM COATED ORAL at 09:10

## 2019-10-02 RX ADMIN — GUAIFENESIN 600 MG: 600 TABLET, EXTENDED RELEASE ORAL at 09:10

## 2019-10-02 RX ADMIN — IRON SUCROSE 200 MG: 20 INJECTION, SOLUTION INTRAVENOUS at 09:11

## 2019-10-02 NOTE — PLAN OF CARE
Problem: Patient Care Overview  Goal: Plan of Care Review  Outcome: Ongoing (interventions implemented as appropriate)   10/02/19 0385   Coping/Psychosocial   Plan of Care Reviewed With patient   OTHER   Outcome Summary 70 y/o female admitted on 9/30 due to sepsis. Pt able to perform all transfers without physical assistance. Pt normally on room air , works.Currently on 2l/nc and noted desat with activity on room air. Marked inc in work of breathing with activity on room air, recovers with rest in about 2-3 minutes. Pt will likely need supplemental O2 at d/c. Pt will need continued pulmo rehab at d/c but spouse will be undergoing CABG.

## 2019-10-02 NOTE — PROGRESS NOTES
LOS: 2 days   Patient Care Team:  Deborah Ochoa MD as PCP - General    Subjective     Interval History:     Patient Complaints: pt is currently having significant SOB related to her COPD.  She has been without her trelogy inhaler since admission.     History taken from: patient    Review of Systems        Objective     Vital Signs  Temp:  [97.6 °F (36.4 °C)-98.8 °F (37.1 °C)] 98.1 °F (36.7 °C)  Heart Rate:  [] 101  Resp:  [16-22] 20  BP: (135-171)/(61-83) 171/69    Physical Exam:     General Appearance:    Alert, cooperative, in no acute distress   Head:    Normocephalic, without obvious abnormality, atraumatic   Eyes:            Lids and lashes normal, conjunctivae and sclerae normal, no   icterus, no pallor, corneas clear, PERRLA   Ears:    Ears appear intact with no abnormalities noted   Throat:   No oral lesions, no thrush, oral mucosa moist   Neck:   No adenopathy, supple, trachea midline, no thyromegaly, no   carotid bruit, no JVD   Lungs:    course breath sounds with wheezing    Heart:  RRR with murmer   Chest Wall:    No abnormalities observed   Abdomen:     Normal bowel sounds, no masses, no organomegaly, soft        non-tender, non-distended, no guarding, no rebound                tenderness   Extremities:   Moves all extremities well, mild  edema, no cyanosis, no             redness   Pulses:   Pulses palpable and equal bilaterally   Skin:   No bleeding, bruising or rash   Lymph nodes:   No palpable adenopathy   Neurologic:   Cranial nerves 2 - 12 grossly intact, sensation intact, DTR       present and equal bilaterally        Results Review:    Lab Results (last 24 hours)     Procedure Component Value Units Date/Time    POC Glucose Once [[id number]]  (Abnormal) Collected:  10/02/19 1659    Specimen:  Blood Updated:  10/02/19 1700     Glucose 176 mg/dL      Comment: Serial Number: [id number]:  [id number]       Blood Culture - Blood, Arm, Left [[id number]] Collected:  09/30/19 1248     Specimen:  Blood from Arm, Left Updated:  10/02/19 1301     Blood Culture No growth at 2 days    Blood Culture - Blood, Blood, Venous Line [[id number]] Collected:  09/30/19 1250    Specimen:  Blood, Venous Line Updated:  10/02/19 1301     Blood Culture No growth at 2 days    POC Glucose Once [[id number]]  (Abnormal) Collected:  10/02/19 1144    Specimen:  Blood Updated:  10/02/19 1153     Glucose 347 mg/dL      Comment: Serial Number: [id number]:  [id number]       POC Glucose Once [[id number]]  (Abnormal) Collected:  10/02/19 0705    Specimen:  Blood Updated:  10/02/19 0707     Glucose 293 mg/dL      Comment: Serial Number: [id number]:  [id number]       Magnesium [[id number]]  (Abnormal) Collected:  10/02/19 0158    Specimen:  Blood Updated:  10/02/19 0343     Magnesium 3.0 mg/dL     Iron [[id number]]  (Abnormal) Collected:  10/02/19 0158    Specimen:  Blood Updated:  10/02/19 0343     Iron 23 mcg/dL     Comprehensive Metabolic Panel [[id number]]  (Abnormal) Collected:  10/02/19 0158    Specimen:  Blood Updated:  10/02/19 0342     Glucose 357 mg/dL      BUN 17 mg/dL      Creatinine 1.80 mg/dL      Sodium 136 mmol/L      Potassium 4.7 mmol/L      Chloride 102 mmol/L      CO2 22.0 mmol/L      Calcium 8.5 mg/dL      Total Protein 6.0 g/dL      Albumin 3.10 g/dL      ALT (SGPT) 11 U/L      AST (SGOT) 15 U/L      Alkaline Phosphatase 73 U/L      Total Bilirubin 0.6 mg/dL      eGFR Non African Amer 28 mL/min/1.73      Globulin 2.9 gm/dL      A/G Ratio 1.1 g/dL      BUN/Creatinine Ratio 9.4     Anion Gap 16.7 mmol/L     Phosphorus [[id number]]  (Normal) Collected:  10/02/19 0158    Specimen:  Blood Updated:  10/02/19 0339     Phosphorus 3.3 mg/dL     Calcium, Ionized [[id number]]  (Normal) Collected:  10/02/19 0158    Specimen:  Blood Updated:  10/02/19 0309     Ionized Calcium 1.23 mmol/L     CBC (No Diff) [[id number]]  (Abnormal) Collected:  10/02/19 0158    Specimen:  Blood Updated:  10/02/19 0308     WBC 11.30  10*3/mm3      RBC 3.89 10*6/mm3      Hemoglobin 11.4 g/dL      Comment: Result checked         Hematocrit 35.3 %      MCV 90.8 fL      MCH 29.4 pg      MCHC 32.3 g/dL      RDW 13.6 %      RDW-SD 43.8 fl      MPV 7.5 fL      Platelets 245 10*3/mm3     MRSA Screen Culture - Swab, Nares [[id number]]  (Normal) Collected:  09/30/19 1850    Specimen:  Swab from Nares Updated:  10/01/19 2128     MRSA SCREEN CX No Methicillin Resistant Staphylococcus aureus isolated    POC Glucose Once [[id number]]  (Abnormal) Collected:  10/01/19 1932    Specimen:  Blood Updated:  10/01/19 1933     Glucose 251 mg/dL      Comment: Serial Number: [id number]:  [id number]       Eosinophil Smear - Urine, Urine, Clean Catch [[id number]]  (Normal) Collected:  10/01/19 1703    Specimen:  Urine, Clean Catch Updated:  10/01/19 1855     Eosinophil Smear 0 % EOS/100 Cells     Sodium, Urine, Random - Urine, Clean Catch [[id number]] Collected:  10/01/19 1703    Specimen:  Urine, Clean Catch Updated:  10/01/19 1806     Sodium, Urine 33 mmol/L     Urinalysis, Microscopic Only - Urine, Clean Catch [[id number]]  (Abnormal) Collected:  10/01/19 1702    Specimen:  Urine, Clean Catch Updated:  10/01/19 1752     RBC, UA 0-2 /HPF      WBC, UA 3-5 /HPF      Bacteria, UA None Seen /HPF      Squamous Epithelial Cells, UA 0-2 /HPF      Hyaline Casts, UA None Seen /LPF      Methodology Manual Light Microscopy    Urinalysis With Culture If Indicated - Urine, Clean Catch [[id number]]  (Abnormal) Collected:  10/01/19 1702    Specimen:  Urine, Clean Catch Updated:  10/01/19 1737     Color, UA Dark Yellow     Comment: Result checked         Appearance, UA Cloudy     Comment: Result checked         pH, UA 6.0     Specific Gravity, UA 1.026     Glucose, UA Negative     Ketones, UA Trace     Bilirubin, UA Negative     Blood, UA Negative     Protein, UA 30 mg/dL (1+)     Leuk Esterase, UA Negative     Nitrite, UA Negative     Urobilinogen, UA 0.2 E.U./dL           Imaging  Results (last 24 hours)     ** No results found for the last 24 hours. **               I reviewed the patient's new clinical results.    Medication Review:   Scheduled Meds:  budesonide 0.5 mg Nebulization BID - RT   DULoxetine 30 mg Oral Daily   enoxaparin 30 mg Subcutaneous Q24H   gabapentin 300 mg Oral Daily   gabapentin 600 mg Oral Nightly   guaiFENesin 600 mg Oral Q12H   insulin glargine 30 Units Subcutaneous Nightly   insulin lispro 5 Units Subcutaneous TID With Meals   ipratropium-albuterol 3 mL Nebulization Q6H - RT   iron sucrose 200 mg Intravenous Q24H   meropenem 500 mg Intravenous Q12H   metoprolol tartrate 50 mg Oral Q12H   pantoprazole 40 mg Oral QAM   predniSONE 20 mg Oral BID With Meals   rosuvastatin 10 mg Oral Nightly   sodium chloride 30 mL/kg Intravenous Once   sodium chloride 10 mL Intravenous Q12H   theophylline 300 mg Oral Daily     Continuous Infusions:  sodium chloride 0.9 % with KCl 20 mEq 100 mL/hr Last Rate: 100 mL/hr (10/02/19 1214)     PRN Meds:.ipratropium-albuterol  •  sodium chloride  •  [COMPLETED] Insert peripheral IV **AND** sodium chloride  •  sodium chloride  •  sodium chloride     Assessment/Plan       Sepsis (CMS/HCC)   Urinary tract infection - improving   COPD exacerbation with a left lower lobe pneumonia - pulmonary following.  Restart trelogy   DM2 with CKD3 - insulin dependent   Non-STEMI -  Cardiology following.  Stress test vs cardiac cath   ARF with CKD3 - creatine improving.  Renal following      Plan for disposition: home    Deborah Ochoa MD  10/02/19  5:33 PM

## 2019-10-02 NOTE — PROGRESS NOTES
"NEPHROLOGY PROGRESS NOTE    [facility]  [phone]  Rashad Moore MD      Patient Care Team:  Deborah Ochoa MD as PCP - General      Provider:  Rashad Moore MD  Patient Name: Dyana Montemayor  :  1950    SUBJECTIVE:    Patient feeling better. No SOB, CP, dysuria, palpitations.    Medication:    budesonide 0.5 mg Nebulization BID - RT   enoxaparin 30 mg Subcutaneous Q24H   guaiFENesin 600 mg Oral Q12H   ipratropium-albuterol 3 mL Nebulization Q6H - RT   meropenem 500 mg Intravenous Q12H   metoprolol tartrate 50 mg Oral Q12H   pantoprazole 40 mg Oral QAM   predniSONE 20 mg Oral BID With Meals   sodium chloride 30 mL/kg Intravenous Once   sodium chloride 10 mL Intravenous Q12H       sodium chloride 0.9 % with KCl 20 mEq 100 mL/hr Last Rate: 100 mL/hr (10/02/19 0122)       OBJECTIVE    Vital Sign Min/Max for last 24 hours  Temp  Min: 97.6 °F (36.4 °C)  Max: 98.8 °F (37.1 °C)   BP  Min: 101/62  Max: 157/50   Pulse  Min: 82  Max: 118   Resp  Min: 16  Max: 20   SpO2  Min: 92 %  Max: 100 %   No Data Recorded   Weight  Min: 68 kg (149 lb 14.6 oz)  Max: 73.9 kg (162 lb 14.7 oz)     Flowsheet Rows      First Filed Value   Admission Height  157.5 cm (62\") Documented at 2019 1219   Admission Weight  68 kg (150 lb) Documented at 2019 1219          I/O this shift:  In: 240 [P.O.:240]  Out: -   I/O last 3 completed shifts:  In: 3200 [IV Piggyback:3200]  Out: 100 [Urine:100]    Physical Exam:  General Appearance: alert, appears stated age and cooperative  Head: normocephalic, without obvious abnormality and atraumatic  Eyes: conjunctivae and sclerae normal and no icterus  Neck: supple and no JVD  Lungs: clear to auscultation and respirations regular  Heart: regular rhythm & normal rate and normal S1, S2 +RADHA  Chest: Wall no abnormalities observed  Abdomen: normal bowel sounds and soft non-tender  Extremities: moves extremities well, no edema, no cyanosis and no " redness  Skin: no bleeding, bruising or rash, turgor normal, color normal and no leasions noted  Neurologic: Alert, and oriented. No focal deficits    Labs:    WBC WBC   Date Value Ref Range Status   10/02/2019 11.30 (H) 3.40 - 10.80 10*3/mm3 Final   09/30/2019 15.40 (H) 3.40 - 10.80 10*3/mm3 Final   09/30/2019 15.00 (H) 3.40 - 10.80 10*3/mm3 Final   09/30/2019 15.00 (H) 3.40 - 10.80 10*3/mm3 Final      HGB Hemoglobin   Date Value Ref Range Status   10/02/2019 11.4 (L) 12.0 - 15.9 g/dL Final     Comment:     Result checked    09/30/2019 13.9 12.0 - 15.9 g/dL Final   09/30/2019 16.1 (H) 12.0 - 15.9 g/dL Final   09/30/2019 15.8 12.0 - 15.9 g/dL Final      HCT Hematocrit   Date Value Ref Range Status   10/02/2019 35.3 34.0 - 46.6 % Final   09/30/2019 42.3 34.0 - 46.6 % Final   09/30/2019 49.0 (H) 34.0 - 46.6 % Final   09/30/2019 48.3 (H) 34.0 - 46.6 % Final      Platlets No results found for: LABPLAT   MCV MCV   Date Value Ref Range Status   10/02/2019 90.8 79.0 - 97.0 fL Final   09/30/2019 90.4 79.0 - 97.0 fL Final   09/30/2019 90.0 79.0 - 97.0 fL Final   09/30/2019 90.5 79.0 - 97.0 fL Final          Sodium Sodium   Date Value Ref Range Status   10/02/2019 136 136 - 144 mmol/L Final   10/01/2019 134 (L) 136 - 144 mmol/L Final   09/30/2019 136 136 - 144 mmol/L Final   09/30/2019 132 (L) 136 - 144 mmol/L Final      Potassium Potassium   Date Value Ref Range Status   10/02/2019 4.7 3.6 - 5.1 mmol/L Final   10/01/2019 3.2 (L) 3.6 - 5.1 mmol/L Final   09/30/2019 3.6 3.6 - 5.1 mmol/L Final   09/30/2019 3.7 3.6 - 5.1 mmol/L Final      Chloride Chloride   Date Value Ref Range Status   10/02/2019 102 101 - 111 mmol/L Final   10/01/2019 103 101 - 111 mmol/L Final   09/30/2019 106 101 - 111 mmol/L Final   09/30/2019 97 (L) 101 - 111 mmol/L Final      CO2 CO2   Date Value Ref Range Status   10/02/2019 22.0 22.0 - 32.0 mmol/L Final   10/01/2019 21.0 (L) 22.0 - 32.0 mmol/L Final   09/30/2019 20.0 (L) 22.0 - 32.0 mmol/L Final    09/30/2019 24.0 22.0 - 32.0 mmol/L Final      BUN BUN   Date Value Ref Range Status   10/02/2019 17 8 - 20 mg/dL Final   10/01/2019 16 8 - 20 mg/dL Final   09/30/2019 17 8 - 20 mg/dL Final   09/30/2019 20 8 - 20 mg/dL Final      Creatinine Creatinine   Date Value Ref Range Status   10/02/2019 1.80 (H) 0.40 - 1.00 mg/dL Final   10/01/2019 2.10 (H) 0.40 - 1.00 mg/dL Final   09/30/2019 2.00 (H) 0.40 - 1.00 mg/dL Final   09/30/2019 2.50 (H) 0.40 - 1.00 mg/dL Final      Calcium Calcium   Date Value Ref Range Status   10/02/2019 8.5 (L) 8.9 - 10.3 mg/dL Final   10/01/2019 8.0 (L) 8.9 - 10.3 mg/dL Final   09/30/2019 8.3 (L) 8.9 - 10.3 mg/dL Final   09/30/2019 9.2 8.9 - 10.3 mg/dL Final      PO4 No components found for: PO4   Albumin Albumin   Date Value Ref Range Status   10/02/2019 3.10 (L) 3.50 - 4.80 g/dL Final   09/30/2019 2.50 (L) 3.50 - 4.80 g/dL Final   09/30/2019 3.10 (L) 3.50 - 4.80 g/dL Final      Magnesium Magnesium   Date Value Ref Range Status   10/02/2019 3.0 (H) 1.8 - 2.5 mg/dL Final   10/01/2019 1.6 (L) 1.8 - 2.5 mg/dL Final   09/30/2019 1.6 (L) 1.8 - 2.5 mg/dL Final   09/30/2019 1.9 1.8 - 2.5 mg/dL Final      Uric Acid No components found for: URIC ACID     Imaging Results (last 72 hours)     Procedure Component Value Units Date/Time    US Renal Bilateral [[id number]] Collected:  10/01/19 1428     Updated:  10/01/19 1436    Narrative:       Examination: US RENAL BILATERAL-     Date of Exam: 10/1/2019 1:56 PM     Indication: ARF/CKD; A41.9-Sepsis, unspecified organism;  I95.9-Hypotension, unspecified; N39.0-Urinary tract infection, site not  specified; R19.7-Diarrhea, unspecified.     Comparison: None available.     Technique: Grayscale and color Doppler ultrasound evaluation of the  kidneys and urinary bladder was performed     Findings:  The right kidney measures 10.7 x 4.9 x 5.2 cm and the left kidney  measures 9.4 x 3.6 x 4.5 cm. Kidney echogenicity and vascularity appear  within normal limits. There  is no solid kidney mass.  No echogenic  shadowing stone.  No hydronephrosis.        Limited visualization of the urinary bladder is unremarkable. Bladder  volume is calculated at 83 mL.       Impression:       Kidney ultrasound is within normal limits.     Electronically Signed By-Samir Villasenor On:10/1/2019 2:29 PM  This report was finalized on [id number] by  Samir Villasenor, .    XR Chest 1 View [[id number]] Collected:  09/30/19 1323     Updated:  09/30/19 1325    Narrative:       DATE OF EXAM:  9/30/2019 12:38 PM     PROCEDURE:  XR CHEST 1 VW-     INDICATIONS:  Severe Sepsis triage protocol     COMPARISON:  August 28, 2018     TECHNIQUE:   Single radiographic AP view of the chest was obtained.     FINDINGS:  Cardiac size is normal and the lungs remain clear.        Impression:       Negative portable chest     Electronically Signed ByJose Costello On:9/30/2019 1:23 PM  This report was finalized on [id number] by  Lee Costello, .          Results for orders placed during the hospital encounter of 09/30/19   XR Chest 1 View    Narrative DATE OF EXAM:  9/30/2019 12:38 PM     PROCEDURE:  XR CHEST 1 VW-     INDICATIONS:  Severe Sepsis triage protocol     COMPARISON:  August 28, 2018     TECHNIQUE:   Single radiographic AP view of the chest was obtained.     FINDINGS:  Cardiac size is normal and the lungs remain clear.        Impression Negative portable chest     Electronically Signed ByJose Costello On:9/30/2019 1:23 PM  This report was finalized on [id number] by  Lee Costello, .              ASSESSMENT / PLAN      Sepsis (CMS/[facility])      1. ARF/ADAMARIS-------Nonoliguric. +ARF/ADAMARIS on top of known CRF/CKD STG 3 with a baseline serum creatinine of 1.2. CRF/CKD STG 3 secondary to DGS/HTN NS. +ARF/ADAMARIS is secondary to prerenal state/intravascular volume depletion with concomitant ACE-I/ARB use and some ATN from hypotension. D/C Lisinopril and Losartan. Hydrate. Check urine and serum studies and renal US and PVR. No NSAIDs or IV  dye. Dose meds for CrCl less than 10 cc/min until ARF/ADAMARIS is resolved. Support BP. Avoid hypotension     2. HYPOKALEMIA------Replaced     3. HYPONATREMIA-------Hypotonic and clinically hypovolemic. Give NS.       4. HYPOALBUMINEMIA------IV Albumin to temporize.     5. HYPOMAGNESEMIA------Replaced     6. ACIDOSIS------Type 4 RTA and stool losses. Better     7. DIARRHEA/SEPSIS/LEUKOCYTOSIS-------per , Critical Care. Off BP meds. Cx pending    8. HTN WITH CKD------BP ayse Moore MD  [facility]  [phone]  10/02/19  6:59 AM

## 2019-10-02 NOTE — PROGRESS NOTES
Referring Provider: Hospitalist    Reason for follow-up: Non-STEMI     Patient Care Team:  Deborah Ochoa MD as PCP - General    Subjective .  No chest pain or shortness of breath    Objective  Lying in bed comfortably     Review of Systems   Constitution: Negative for fever and malaise/fatigue.   HENT: Negative for ear pain and nosebleeds.    Eyes: Negative for blurred vision and double vision.   Cardiovascular: Negative for chest pain, dyspnea on exertion and palpitations.   Respiratory: Negative for cough and shortness of breath.    Skin: Negative for rash.   Musculoskeletal: Negative for joint pain.   Gastrointestinal: Negative for abdominal pain, nausea and vomiting.   Neurological: Negative for focal weakness and headaches.   Psychiatric/Behavioral: Negative for depression. The patient is not nervous/anxious.    All other systems reviewed and are negative.      Patient has no known allergies.    Scheduled Meds:    budesonide 0.5 mg Nebulization BID - RT   DULoxetine 30 mg Oral Daily   enoxaparin 30 mg Subcutaneous Q24H   gabapentin 300 mg Oral Daily   gabapentin 600 mg Oral Nightly   guaiFENesin 600 mg Oral Q12H   insulin glargine 30 Units Subcutaneous Nightly   insulin lispro 5 Units Subcutaneous TID With Meals   ipratropium-albuterol 3 mL Nebulization Q6H - RT   iron sucrose 200 mg Intravenous Q24H   meropenem 500 mg Intravenous Q12H   metoprolol tartrate 50 mg Oral Q12H   pantoprazole 40 mg Oral QAM   predniSONE 20 mg Oral BID With Meals   rosuvastatin 10 mg Oral Nightly   sodium chloride 30 mL/kg Intravenous Once   sodium chloride 10 mL Intravenous Q12H   theophylline 300 mg Oral Daily     Continuous Infusions:    sodium chloride 0.9 % with KCl 20 mEq 100 mL/hr Last Rate: 100 mL/hr (10/02/19 1214)     PRN Meds:.ipratropium-albuterol  •  sodium chloride  •  [COMPLETED] Insert peripheral IV **AND** sodium chloride  •  sodium chloride  •  sodium chloride        VITAL SIGNS  Vitals:    10/02/19 0700 10/02/19  "1139 10/02/19 1210 10/02/19 1215   BP:  165/83     BP Location:       Patient Position:       Pulse: 114 114 113 110   Resp: 16 22 22 22   Temp:  97.7 °F (36.5 °C)     TempSrc:  Oral     SpO2: 95% 93% 92% 95%   Weight:       Height:           Flowsheet Rows      First Filed Value   Admission Height  157.5 cm (62\") Documented at 09/30/2019 1219   Admission Weight  68 kg (150 lb) Documented at 09/30/2019 1219           TELEMETRY: Sinus rhythm    Physical Exam:  Physical Exam   Constitutional: She appears well-developed and well-nourished.   HENT:   Head: Normocephalic and atraumatic.   Eyes: Conjunctivae and EOM are normal. Pupils are equal, round, and reactive to light. No scleral icterus.   Neck: Normal range of motion. Neck supple. No JVD present. Carotid bruit is not present.   Cardiovascular: Normal rate, regular rhythm, S1 normal, S2 normal, normal heart sounds and intact distal pulses. PMI is not displaced.   Pulmonary/Chest: Effort normal and breath sounds normal. She has no wheezes. She has no rales.   Abdominal: Soft. Bowel sounds are normal.   Musculoskeletal: Normal range of motion.   Neurological: She is alert. She has normal strength.   No focal deficits   Skin: Skin is warm and dry. No rash noted.   Psychiatric: She has a normal mood and affect.        Results Review:   I reviewed the patient's new clinical results.  Lab Results (last 24 hours)     Procedure Component Value Units Date/Time    Blood Culture - Blood, Arm, Left [[id number]] Collected:  09/30/19 1248    Specimen:  Blood from Arm, Left Updated:  10/02/19 1301     Blood Culture No growth at 2 days    Blood Culture - Blood, Blood, Venous Line [[id number]] Collected:  09/30/19 1250    Specimen:  Blood, Venous Line Updated:  10/02/19 1301     Blood Culture No growth at 2 days    POC Glucose Once [[id number]]  (Abnormal) Collected:  10/02/19 1144    Specimen:  Blood Updated:  10/02/19 1153     Glucose 347 mg/dL      Comment: Serial Number: " [id number]:  [id number]       POC Glucose Once [[id number]]  (Abnormal) Collected:  10/02/19 0705    Specimen:  Blood Updated:  10/02/19 0707     Glucose 293 mg/dL      Comment: Serial Number: [id number]:  [id number]       Magnesium [[id number]]  (Abnormal) Collected:  10/02/19 0158    Specimen:  Blood Updated:  10/02/19 0343     Magnesium 3.0 mg/dL     Iron [[id number]]  (Abnormal) Collected:  10/02/19 0158    Specimen:  Blood Updated:  10/02/19 0343     Iron 23 mcg/dL     Comprehensive Metabolic Panel [[id number]]  (Abnormal) Collected:  10/02/19 0158    Specimen:  Blood Updated:  10/02/19 0342     Glucose 357 mg/dL      BUN 17 mg/dL      Creatinine 1.80 mg/dL      Sodium 136 mmol/L      Potassium 4.7 mmol/L      Chloride 102 mmol/L      CO2 22.0 mmol/L      Calcium 8.5 mg/dL      Total Protein 6.0 g/dL      Albumin 3.10 g/dL      ALT (SGPT) 11 U/L      AST (SGOT) 15 U/L      Alkaline Phosphatase 73 U/L      Total Bilirubin 0.6 mg/dL      eGFR Non African Amer 28 mL/min/1.73      Globulin 2.9 gm/dL      A/G Ratio 1.1 g/dL      BUN/Creatinine Ratio 9.4     Anion Gap 16.7 mmol/L     Phosphorus [[id number]]  (Normal) Collected:  10/02/19 0158    Specimen:  Blood Updated:  10/02/19 0339     Phosphorus 3.3 mg/dL     Calcium, Ionized [[id number]]  (Normal) Collected:  10/02/19 0158    Specimen:  Blood Updated:  10/02/19 0309     Ionized Calcium 1.23 mmol/L     CBC (No Diff) [[id number]]  (Abnormal) Collected:  10/02/19 0158    Specimen:  Blood Updated:  10/02/19 0308     WBC 11.30 10*3/mm3      RBC 3.89 10*6/mm3      Hemoglobin 11.4 g/dL      Comment: Result checked         Hematocrit 35.3 %      MCV 90.8 fL      MCH 29.4 pg      MCHC 32.3 g/dL      RDW 13.6 %      RDW-SD 43.8 fl      MPV 7.5 fL      Platelets 245 10*3/mm3     MRSA Screen Culture - Swab, Nares [[id number]]  (Normal) Collected:  09/30/19 1850    Specimen:  Swab from Nares Updated:  10/01/19 2128     MRSA SCREEN CX No Methicillin Resistant  Staphylococcus aureus isolated    POC Glucose Once [[id number]]  (Abnormal) Collected:  10/01/19 1932    Specimen:  Blood Updated:  10/01/19 1933     Glucose 251 mg/dL      Comment: Serial Number: [id number]:  [id number]       Eosinophil Smear - Urine, Urine, Clean Catch [[id number]]  (Normal) Collected:  10/01/19 1703    Specimen:  Urine, Clean Catch Updated:  10/01/19 1855     Eosinophil Smear 0 % EOS/100 Cells     Sodium, Urine, Random - Urine, Clean Catch [[id number]] Collected:  10/01/19 1703    Specimen:  Urine, Clean Catch Updated:  10/01/19 1806     Sodium, Urine 33 mmol/L     Urinalysis, Microscopic Only - Urine, Clean Catch [[id number]]  (Abnormal) Collected:  10/01/19 1702    Specimen:  Urine, Clean Catch Updated:  10/01/19 1752     RBC, UA 0-2 /HPF      WBC, UA 3-5 /HPF      Bacteria, UA None Seen /HPF      Squamous Epithelial Cells, UA 0-2 /HPF      Hyaline Casts, UA None Seen /LPF      Methodology Manual Light Microscopy    Urinalysis With Culture If Indicated - Urine, Clean Catch [[id number]]  (Abnormal) Collected:  10/01/19 1702    Specimen:  Urine, Clean Catch Updated:  10/01/19 1737     Color, UA Dark Yellow     Comment: Result checked         Appearance, UA Cloudy     Comment: Result checked         pH, UA 6.0     Specific Gravity, UA 1.026     Glucose, UA Negative     Ketones, UA Trace     Bilirubin, UA Negative     Blood, UA Negative     Protein, UA 30 mg/dL (1+)     Leuk Esterase, UA Negative     Nitrite, UA Negative     Urobilinogen, UA 0.2 E.U./dL          Imaging Results (last 24 hours)     Procedure Component Value Units Date/Time    US Renal Bilateral [[id number]] Collected:  10/01/19 1428     Updated:  10/01/19 1436    Narrative:       Examination: US RENAL BILATERAL-     Date of Exam: 10/1/2019 1:56 PM     Indication: ARF/CKD; A41.9-Sepsis, unspecified organism;  I95.9-Hypotension, unspecified; N39.0-Urinary tract infection, site not  specified; R19.7-Diarrhea, unspecified.      Comparison: None available.     Technique: Grayscale and color Doppler ultrasound evaluation of the  kidneys and urinary bladder was performed     Findings:  The right kidney measures 10.7 x 4.9 x 5.2 cm and the left kidney  measures 9.4 x 3.6 x 4.5 cm. Kidney echogenicity and vascularity appear  within normal limits. There is no solid kidney mass.  No echogenic  shadowing stone.  No hydronephrosis.        Limited visualization of the urinary bladder is unremarkable. Bladder  volume is calculated at 83 mL.       Impression:       Kidney ultrasound is within normal limits.     Electronically Signed By-Samir Villasenor On:10/1/2019 2:29 PM  This report was finalized on [id number] by  Samir Villasenor, .          EKG      I personally viewed and interpreted the patient's EKG/Telemetry data:    ECHOCARDIOGRAM:    STRESS MYOVIEW:    CARDIAC CATHETERIZATION:    OTHER:         Assessment/Plan     #1 sepsis  2.  Urinary tract infection  3.  COPD exacerbation with a left lower lobe pneumonia  4.  Acute renal failure  5.  Diabetes  6.  Non-STEMI    Patient had mild elevation of troponin which could be secondary renal insufficiency versus demand ischemia  Patient has urosepsis which is improving with antibiotics and other treatments including IV fluids  Patient is followed by the nephrologist and her creatinine is better now  Patient is followed by the pulmonologist for COPD and pneumonia  Patient will have a stress Myoview study in the morning  Patient had an echocardiogram for LV function valve abnormalities  I discussed the patients findings and my recommendations with patient and family    Ishan June MD  10/02/19  2:30 PM

## 2019-10-02 NOTE — PROGRESS NOTES
Continued Stay Note  VARUN Grubbs     Patient Name: Dyana Montemayor  MRN: [id number]  Today's Date: 10/2/2019    Admit Date: 9/30/2019    Discharge Plan     Row Name 10/02/19 1537       Plan    Plan  Physical therapy is recommending home health . Pt is refusing and states she is still working . D/C plan home .    Patient/Family in Agreement with Plan  yes        Discharge Codes    No documentation.             Aurora Ahmadi RN

## 2019-10-02 NOTE — PLAN OF CARE
Problem: Patient Care Overview  Goal: Plan of Care Review  Outcome: Ongoing (interventions implemented as appropriate)      Problem: Fall Risk (Adult)  Goal: Identify Related Risk Factors and Signs and Symptoms  Outcome: Ongoing (interventions implemented as appropriate)    Goal: Absence of Fall  Outcome: Ongoing (interventions implemented as appropriate)

## 2019-10-02 NOTE — PROGRESS NOTES
ICU Daily Progress Note        Sepsis (CMS/[facility])      Assessment/Plan      Urosepsis improving  Shock, septic resolved  Diarrhea, ? Hypovolemia  Possible left lower lobe pneumonia  Elevated troponin rule out acute coronary syndrome  Acute kidney injury elevated creatinine  COPD patient use trelegy inhaler at home  Diabetes   no significant obstructive sleep apnea based on home sleep study  Smoker less than pack a day for for 56 years  History of high altitude pulmonary edema        Plan  Antibiotics patient received 1 dose of vancomycin in the emergency room as well as Zosyn   off Zosyn and  Continue meropenem  Will adjust based on final culture results   currently blood cultures and urine cultures are negative    CT scan of the chest without contrast to assess left lower lobe pneumonia    S/p fluid resuscitation per septic protocol  Maintenance IV fluid    Resume blood pressure medications  Will hold lisinopril due to elevated creatinine    Cardiology consult for elevated troponin  2D echo     pressors titration currently off Levophed  Bronchodilators  Oxygen titration  DVT prophylaxis  GI prophylaxis  Glycemic control             LOS: 2 days         Vital signs for last 24 hours:  Vitals:    10/02/19 0700 10/02/19 1139 10/02/19 1210 10/02/19 1215   BP:  165/83     BP Location:       Patient Position:       Pulse: 114 114 113 110   Resp: 16 22 22 22   Temp:  97.7 °F (36.5 °C)     TempSrc:  Oral     SpO2: 95% 93% 92% 95%   Weight:       Height:           Intake/Output last 3 shifts:  I/O last 3 completed shifts:  In: 340 [P.O.:240; IV Piggyback:100]  Out: 100 [Urine:100]  Intake/Output this shift:  No intake/output data recorded.    Vent settings for last 24 hours:       Hemodynamic parameters for last 24 hours:       Radiology  Imaging Results (last 24 hours)     Procedure Component Value Units Date/Time    US Renal Bilateral [[id number]] Collected:  10/01/19 1428     Updated:  10/01/19 1436    Narrative:        Examination: US RENAL BILATERAL-     Date of Exam: 10/1/2019 1:56 PM     Indication: ARF/CKD; A41.9-Sepsis, unspecified organism;  I95.9-Hypotension, unspecified; N39.0-Urinary tract infection, site not  specified; R19.7-Diarrhea, unspecified.     Comparison: None available.     Technique: Grayscale and color Doppler ultrasound evaluation of the  kidneys and urinary bladder was performed     Findings:  The right kidney measures 10.7 x 4.9 x 5.2 cm and the left kidney  measures 9.4 x 3.6 x 4.5 cm. Kidney echogenicity and vascularity appear  within normal limits. There is no solid kidney mass.  No echogenic  shadowing stone.  No hydronephrosis.        Limited visualization of the urinary bladder is unremarkable. Bladder  volume is calculated at 83 mL.       Impression:       Kidney ultrasound is within normal limits.     Electronically Signed By-Samir Villasenor On:10/1/2019 2:29 PM  This report was finalized on [id number] by  Samir Villasenor, .          Labs:  Results from last 7 days   Lab Units 10/02/19  0158   WBC 10*3/mm3 11.30*   HEMOGLOBIN g/dL 11.4*   HEMATOCRIT % 35.3   PLATELETS 10*3/mm3 245     Results from last 7 days   Lab Units 10/02/19  0158   SODIUM mmol/L 136   POTASSIUM mmol/L 4.7   CHLORIDE mmol/L 102   CO2 mmol/L 22.0   BUN mg/dL 17   CREATININE mg/dL 1.80*   CALCIUM mg/dL 8.5*   BILIRUBIN mg/dL 0.6   ALK PHOS U/L 73   ALT (SGPT) U/L 11*   AST (SGOT) U/L 15   GLUCOSE mg/dL 357*     Results from last 7 days   Lab Units 09/30/19  1914   PH, ARTERIAL pH units 7.376   PO2 ART mm Hg 77.8*   PCO2, ARTERIAL mm Hg 36.7   HCO3 ART mmol/L 21.5     Results from last 7 days   Lab Units 10/02/19  0158 09/30/19  1804 09/30/19  1250   ALBUMIN g/dL 3.10* 2.50* 3.10*     Results from last 7 days   Lab Units 10/01/19  0557 10/01/19  0327 09/30/19  1804   CK TOTAL U/L  --  161 187   TROPONIN I ng/mL 0.140*  --  0.140*         Results from last 7 days   Lab Units 10/02/19  0158   MAGNESIUM mg/dL 3.0*     Results from  last 7 days   Lab Units 09/30/19  1250   INR  1.09   APTT seconds 23.9*     Results from last 7 days   Lab Units 10/01/19  0327   TSH uIU/mL 0.050*           Meds:   SCHEDULE    budesonide 0.5 mg Nebulization BID - RT   DULoxetine 30 mg Oral Daily   enoxaparin 30 mg Subcutaneous Q24H   gabapentin 300 mg Oral Daily   gabapentin 600 mg Oral Nightly   guaiFENesin 600 mg Oral Q12H   insulin glargine 30 Units Subcutaneous Nightly   insulin lispro 5 Units Subcutaneous TID With Meals   ipratropium-albuterol 3 mL Nebulization Q6H - RT   iron sucrose 200 mg Intravenous Q24H   meropenem 500 mg Intravenous Q12H   metoprolol tartrate 50 mg Oral Q12H   pantoprazole 40 mg Oral QAM   predniSONE 20 mg Oral BID With Meals   rosuvastatin 10 mg Oral Nightly   sodium chloride 30 mL/kg Intravenous Once   sodium chloride 10 mL Intravenous Q12H   theophylline 300 mg Oral Daily     Infusions    sodium chloride 0.9 % with KCl 20 mEq 100 mL/hr Last Rate: 100 mL/hr (10/02/19 1214)     PRNs  ipratropium-albuterol  •  sodium chloride  •  [COMPLETED] Insert peripheral IV **AND** sodium chloride  •  sodium chloride  •  sodium chloride    Physical Exam:  Physical Exam   Cardiovascular:   Murmur heard.  Pulmonary/Chest: No respiratory distress. She has wheezes. She has rales. She exhibits no tenderness.   Abdominal: She exhibits no distension. There is no tenderness.   Musculoskeletal: She exhibits edema.       ROS  Review of Systems   HENT: Positive for congestion. Negative for rhinorrhea and sneezing.    Respiratory: Positive for cough, shortness of breath and wheezing. Negative for apnea, choking, chest tightness and stridor.    Cardiovascular: Positive for leg swelling.         Critical Care Time greater than: 1 Hour  Total time spent with patient greater than: 1 Hour

## 2019-10-02 NOTE — THERAPY EVALUATION
Patient Name: Dyana Montemayor  : 1950    MRN: [id number]                              Today's Date: 10/2/2019       Admit Date: 2019    Visit Dx:     ICD-10-CM ICD-9-CM   1. Sepsis, due to unspecified organism A41.9 038.9     995.91   2. Hypotension, unspecified hypotension type I95.9 458.9   3. Urinary tract infection without hematuria, site unspecified N39.0 599.0   4. Diarrhea of presumed infectious origin R19.7 009.3     Patient Active Problem List   Diagnosis   • Sepsis (CMS/[facility])     No past medical history on file.  No past surgical history on file.  General Information     Row Name 10/02/19 1209          PT Evaluation Time/Intention    Document Type  evaluation  -CR     Mode of Treatment  physical therapy  -CR     Row Name 10/02/19 1209          General Information    Patient Profile Reviewed?  yes  -CR     Prior Level of Function  independent:;work;driving;dependent:;all household mobility  -CR     Row Name 10/02/19 1209          Relationship/Environment    Lives With  -- spouse will be undergoing CABG on 10/4  -     Row Name 10/02/19 1209          Cognitive Assessment/Intervention- PT/OT    Orientation Status (Cognition)  oriented x 4  -CR     Row Name 10/02/19 1209          Safety Issues, Functional Mobility    Impairments Affecting Function (Mobility)  endurance/activity tolerance;shortness of breath  -CR       User Key  (r) = Recorded By, (t) = Taken By, (c) = Cosigned By    Initials Name Provider Type    CR Reyes, Carmela, PT Physical Therapist        Mobility     Row Name 10/02/19 1209          Bed Mobility Assessment/Treatment    Bed Mobility Assessment/Treatment  bed mobility (all) activities  -CR     [place] Level (Bed Mobility)  conditional independence  -CR     Row Name 10/02/19 1209          Bed-Chair Transfer    Bed-Chair [place] (Transfers)  independent  -CR     Row Name 10/02/19 1209          Sit-Stand Transfer    Sit-Stand [place] (Transfers)  independent  -CR      Row Name 10/02/19 1209          Gait/Stairs Assessment/Training    [place] Level (Gait)  unable to assess pt SOA , inc work of breathing after using BSC  -CR       User Key  (r) = Recorded By, (t) = Taken By, (c) = Cosigned By    Initials Name Provider Type    CR Reyes, Carmela, GREGOR Physical Therapist        Obj/Interventions     Row Name 10/02/19 1210          General ROM    GENERAL ROM COMMENTS  active BUe/Le wfl  -CR     Row Name 10/02/19 1210          MMT (Manual Muscle Testing)    General MMT Comments  grossly wfl  -CR     Row Name 10/02/19 1210          Static Sitting Balance    Level of [place] (Unsupported Sitting, Static Balance)  independent  -CR     Sitting Position (Unsupported Sitting, Static Balance)  sitting on edge of bed  -CR     Time Able to Maintain Position (Unsupported Sitting, Static Balance)  1 to 2 minutes  -CR     Row Name 10/02/19 1210          Dynamic Sitting Balance    Level of [place], Reaches Outside Midline (Sitting, Dynamic Balance)  independent  -CR     Sitting Position, Reaches Outside Midline (Sitting, Dynamic Balance)  sitting on edge of bed  -CR     Row Name 10/02/19 1210          Static Standing Balance    Level of [place] (Supported Standing, Static Balance)  conditional independence  -CR     Time Able to Maintain Position (Supported Standing, Static Balance)  45 to 60 seconds  -CR     Row Name 10/02/19 1210          Dynamic Standing Balance    Level of [place], Reaches Outside Midline (Standing, Dynamic Balance)  supervision  -CR     Time Able to Maintain Position, Reaches Outside Midline (Standing, Dynamic Balance)  45 to 60 seconds  -CR     Row Name 10/02/19 1210          Sensory Assessment/Intervention    Sensory General Assessment  no sensation deficits identified  -CR       User Key  (r) = Recorded By, (t) = Taken By, (c) = Cosigned By    Initials Name Provider Type    CR Reyes, Carmela, PT Physical Therapist        Goals/Plan     Row Name  10/02/19 1214          Gait Training Goal 1 (PT)    Activity/Assistive Device (Gait Training Goal 1, PT)  gait (walking locomotion);increase endurance/gait distance;increase energy conservation  -CR     [place] Level (Gait Training Goal 1, PT)  conditional independence  -CR     Distance (Gait Goal 1, PT)  200  -CR     Time Frame (Gait Training Goal 1, PT)  1 week  -CR       User Key  (r) = Recorded By, (t) = Taken By, (c) = Cosigned By    Initials Name Provider Type    CR Reyes, Carmela, GREGOR Physical Therapist        Clinical Impression     Row Name 10/02/19 1212          Pain Assessment    Additional Documentation  Pain Scale: Numbers Pre/Post-Treatment (Group)  -CR     Row Name 10/02/19 1212          Pain Scale: Numbers Pre/Post-Treatment    Pain Scale: Numbers, Pretreatment  0/10 - no pain  -CR     Pain Scale: Numbers, Post-Treatment  0/10 - no pain  -CR     Row Name 10/02/19 1212          Plan of Care Review    Plan of Care Reviewed With  patient  -CR     Row Name 10/02/19 1212          Physical Therapy Clinical Impression    Criteria for Skilled Interventions Met (PT Clinical Impression)  yes;treatment indicated  -CR     Rehab Potential (PT Clinical Summary)  good, to achieve stated therapy goals  -CR     Row Name 10/02/19 1212          Vital Signs    Pre SpO2 (%)  94  -CR     O2 Delivery Pre Treatment  supplemental O2  -CR     Intra SpO2 (%)  85  -CR     O2 Delivery Intra Treatment  room air  -CR     Post SpO2 (%)  98  -CR     O2 Delivery Post Treatment  supplemental O2  -CR     Row Name 10/02/19 1212          Positioning and Restraints    Pre-Treatment Position  in bed  -CR     Post Treatment Position  bed  -CR     In Bed  notified nsg;supine;call light within reach  -CR       User Key  (r) = Recorded By, (t) = Taken By, (c) = Cosigned By    Initials Name Provider Type    CR Reyes, Carmela, PT Physical Therapist        Outcome Measures     Row Name 10/02/19 1223          How much help from another person  do you currently need...    Turning from your back to your side while in flat bed without using bedrails?  4  -CR     Moving from lying on back to sitting on the side of a flat bed without bedrails?  4  -CR     Moving to and from a bed to a chair (including a wheelchair)?  4  -CR     Standing up from a chair using your arms (e.g., wheelchair, bedside chair)?  4  -CR     Climbing 3-5 steps with a railing?  3  -CR     To walk in hospital room?  3  -CR     AM-PAC 6 Clicks Score (PT)  22  -CR     Row Name 10/02/19 1223          Functional Assessment    Outcome Measure Options  AM-PAC 6 Clicks Basic Mobility (PT)  -CR       User Key  (r) = Recorded By, (t) = Taken By, (c) = Cosigned By    Initials Name Provider Type    CR Reyes, Carmela, PT Physical Therapist        Physical Therapy Education     Title: PT OT SLP Therapies (In Progress)     Topic: Physical Therapy (In Progress)     Point: Mobility training (In Progress)     Learning Progress Summary           Patient Acceptance, E, NR by CR at 10/2/2019 12:15 PM                   Point: Home exercise program (In Progress)     Learning Progress Summary           Patient Acceptance, E, NR by CR at 10/2/2019 12:15 PM                               User Key     Initials Effective Dates Name Provider Type Discipline    CR 03/01/19 -  Reyes, Carmela, PT Physical Therapist PT              PT Recommendation and Plan  Planned Therapy Interventions (PT Eval): gait training, patient/family education, other (see comments), home exercise program(pulmo rehab)  Outcome Summary/Treatment Plan (PT)  Anticipated Discharge Disposition (PT): home with home health  Plan of Care Reviewed With: patient  Outcome Summary: 70 y/o female admitted on 9/30 due to sepsis. Pt able to perform all transfers without physical assistance. Pt normally on room air , works.Currently on 2l/nc and noted desat with activity on room air. Marked inc in work of breathing with activity on room air, recovers with rest  in about 2-3 minutes. Pt will likely need supplemental O2 at d/c. Pt will need continued pulmo rehab at d/c but spouse will be undergoing CABG.      Time Calculation:   PT Charges     Row Name 10/02/19 1223             Time Calculation    Start Time  1017  -CR      Stop Time  1033  -CR      Time Calculation (min)  16 min  -CR      PT Received On  10/02/19  -CR      PT - Next Appointment  10/03/19  -CR      PT Goal Re-Cert Due Date  10/16/19  -CR         Time Calculation- PT    Total Timed Code Minutes- PT  0 minute(s)  -CR        User Key  (r) = Recorded By, (t) = Taken By, (c) = Cosigned By    Initials Name Provider Type    CR Reyes, Carmela, PT Physical Therapist        Therapy Charges for Today     Code Description Service Date Service Provider Modifiers Qty    36732299126 HC PT EVAL MOD COMPLEXITY 4 10/2/2019 Reyes, Carmela, PT GP 1          PT G-Codes  Outcome Measure Options: AM-PAC 6 Clicks Basic Mobility (PT)  AM-PAC 6 Clicks Score (PT): 22    Carmela Reyes, PT  10/2/2019

## 2019-10-03 ENCOUNTER — APPOINTMENT (OUTPATIENT)
Dept: NUCLEAR MEDICINE | Facility: HOSPITAL | Age: 69
End: 2019-10-03

## 2019-10-03 LAB
ALBUMIN SERPL-MCNC: 3.5 G/DL (ref 3.5–4.8)
ALBUMIN/GLOB SERPL: 1.3 G/DL (ref 1–1.7)
ALP SERPL-CCNC: 75 U/L (ref 32–91)
ALT SERPL W P-5'-P-CCNC: 15 U/L (ref 14–54)
ANION GAP SERPL CALCULATED.3IONS-SCNC: 14.7 MMOL/L (ref 5–15)
AST SERPL-CCNC: 15 U/L (ref 15–41)
BH CV ECHO MEAS - ACS: 1.5 CM
BH CV ECHO MEAS - AO MAX PG (FULL): 9.5 MMHG
BH CV ECHO MEAS - AO MAX PG: 15.7 MMHG
BH CV ECHO MEAS - AO MEAN PG (FULL): 6.4 MMHG
BH CV ECHO MEAS - AO MEAN PG: 10.3 MMHG
BH CV ECHO MEAS - AO ROOT AREA (BSA CORRECTED): 1.6
BH CV ECHO MEAS - AO ROOT AREA: 6 CM^2
BH CV ECHO MEAS - AO ROOT DIAM: 2.8 CM
BH CV ECHO MEAS - AO V2 MAX: 198.3 CM/SEC
BH CV ECHO MEAS - AO V2 MEAN: 154.3 CM/SEC
BH CV ECHO MEAS - AO V2 VTI: 43.4 CM
BH CV ECHO MEAS - ASC AORTA: 2.7 CM
BH CV ECHO MEAS - AVA(I,A): 1.8 CM^2
BH CV ECHO MEAS - AVA(I,D): 1.8 CM^2
BH CV ECHO MEAS - AVA(V,A): 1.8 CM^2
BH CV ECHO MEAS - AVA(V,D): 1.8 CM^2
BH CV ECHO MEAS - BSA(HAYCOCK): 1.7 M^2
BH CV ECHO MEAS - BSA: 1.7 M^2
BH CV ECHO MEAS - BZI_BMI: 27.3 KILOGRAMS/M^2
BH CV ECHO MEAS - BZI_METRIC_HEIGHT: 157.5 CM
BH CV ECHO MEAS - BZI_METRIC_WEIGHT: 67.6 KG
BH CV ECHO MEAS - EDV(CUBED): 38 ML
BH CV ECHO MEAS - EDV(MOD-SP2): 41.4 ML
BH CV ECHO MEAS - EDV(MOD-SP4): 57.7 ML
BH CV ECHO MEAS - EDV(TEICH): 46.1 ML
BH CV ECHO MEAS - EF(CUBED): 65.3 %
BH CV ECHO MEAS - EF(MOD-BP): 64 %
BH CV ECHO MEAS - EF(MOD-SP2): 67.7 %
BH CV ECHO MEAS - EF(MOD-SP4): 58.4 %
BH CV ECHO MEAS - EF(TEICH): 58 %
BH CV ECHO MEAS - ESV(CUBED): 13.2 ML
BH CV ECHO MEAS - ESV(MOD-SP2): 13.4 ML
BH CV ECHO MEAS - ESV(MOD-SP4): 24 ML
BH CV ECHO MEAS - ESV(TEICH): 19.4 ML
BH CV ECHO MEAS - FS: 29.7 %
BH CV ECHO MEAS - IVS/LVPW: 0.93
BH CV ECHO MEAS - IVSD: 1.4 CM
BH CV ECHO MEAS - LA DIMENSION(2D): 3.3 CM
BH CV ECHO MEAS - LV DIASTOLIC VOL/BSA (35-75): 34.2 ML/M^2
BH CV ECHO MEAS - LV MASS(C)D: 182.3 GRAMS
BH CV ECHO MEAS - LV MASS(C)DI: 108.1 GRAMS/M^2
BH CV ECHO MEAS - LV MAX PG: 6.2 MMHG
BH CV ECHO MEAS - LV MEAN PG: 3.9 MMHG
BH CV ECHO MEAS - LV SYSTOLIC VOL/BSA (12-30): 14.2 ML/M^2
BH CV ECHO MEAS - LV V1 MAX: 124.9 CM/SEC
BH CV ECHO MEAS - LV V1 MEAN: 94.4 CM/SEC
BH CV ECHO MEAS - LV V1 VTI: 26.1 CM
BH CV ECHO MEAS - LVIDD: 3.4 CM
BH CV ECHO MEAS - LVIDS: 2.4 CM
BH CV ECHO MEAS - LVOT AREA: 2.9 CM^2
BH CV ECHO MEAS - LVOT DIAM: 1.9 CM
BH CV ECHO MEAS - LVPWD: 1.5 CM
BH CV ECHO MEAS - MV A MAX VEL: 170.1 CM/SEC
BH CV ECHO MEAS - MV DEC SLOPE: 366.1 CM/SEC^2
BH CV ECHO MEAS - MV DEC TIME: 0.33 SEC
BH CV ECHO MEAS - MV E MAX VEL: 120.6 CM/SEC
BH CV ECHO MEAS - MV E/A: 0.71
BH CV ECHO MEAS - MV MAX PG: 10.5 MMHG
BH CV ECHO MEAS - MV MEAN PG: 6 MMHG
BH CV ECHO MEAS - MV V2 MAX: 162.4 CM/SEC
BH CV ECHO MEAS - MV V2 MEAN: 118.5 CM/SEC
BH CV ECHO MEAS - MV V2 VTI: 31.2 CM
BH CV ECHO MEAS - MVA(VTI): 2.4 CM^2
BH CV ECHO MEAS - PA ACC TIME: 0.12 SEC
BH CV ECHO MEAS - PA MAX PG (FULL): 1 MMHG
BH CV ECHO MEAS - PA MAX PG: 3.7 MMHG
BH CV ECHO MEAS - PA MEAN PG (FULL): 0.51 MMHG
BH CV ECHO MEAS - PA MEAN PG: 2.7 MMHG
BH CV ECHO MEAS - PA PR(ACCEL): 22.9 MMHG
BH CV ECHO MEAS - PA V2 MAX: 96.5 CM/SEC
BH CV ECHO MEAS - PA V2 MEAN: 80.3 CM/SEC
BH CV ECHO MEAS - PA V2 VTI: 24.1 CM
BH CV ECHO MEAS - RV MAX PG: 2.7 MMHG
BH CV ECHO MEAS - RV MEAN PG: 2.2 MMHG
BH CV ECHO MEAS - RV V1 MAX: 81.8 CM/SEC
BH CV ECHO MEAS - RV V1 MEAN: 73.3 CM/SEC
BH CV ECHO MEAS - RV V1 VTI: 19.3 CM
BH CV ECHO MEAS - RVDD: 2.4 CM
BH CV ECHO MEAS - SI(AO): 154.6 ML/M^2
BH CV ECHO MEAS - SI(CUBED): 14.7 ML/M^2
BH CV ECHO MEAS - SI(LVOT): 45.2 ML/M^2
BH CV ECHO MEAS - SI(MOD-SP2): 16.6 ML/M^2
BH CV ECHO MEAS - SI(MOD-SP4): 20 ML/M^2
BH CV ECHO MEAS - SI(TEICH): 15.9 ML/M^2
BH CV ECHO MEAS - SV(AO): 260.8 ML
BH CV ECHO MEAS - SV(CUBED): 24.8 ML
BH CV ECHO MEAS - SV(LVOT): 76.2 ML
BH CV ECHO MEAS - SV(MOD-SP2): 28 ML
BH CV ECHO MEAS - SV(MOD-SP4): 33.7 ML
BH CV ECHO MEAS - SV(TEICH): 26.8 ML
BH CV NUCLEAR PRIOR STUDY: 3
BH CV STRESS BP STAGE 3: NORMAL
BH CV STRESS BP STAGE 4: NORMAL
BH CV STRESS COMMENTS STAGE 1: NORMAL
BH CV STRESS COMMENTS STAGE 2: NORMAL
BH CV STRESS DOSE REGADENOSON STAGE 1: 0.4
BH CV STRESS DURATION MIN STAGE 1: 0
BH CV STRESS DURATION MIN STAGE 2: 4
BH CV STRESS DURATION SEC STAGE 1: 10
BH CV STRESS DURATION SEC STAGE 2: 0
BH CV STRESS HR STAGE 1: 113
BH CV STRESS HR STAGE 2: 120
BH CV STRESS HR STAGE 3: 120
BH CV STRESS HR STAGE 4: 119
BH CV STRESS PROTOCOL 1: NORMAL
BH CV STRESS RECOVERY BP: NORMAL MMHG
BH CV STRESS RECOVERY HR: 117 BPM
BH CV STRESS STAGE 1: 1
BH CV STRESS STAGE 2: 2
BH CV STRESS STAGE 3: 3
BH CV STRESS STAGE 4: 4
BILIRUB SERPL-MCNC: 0.7 MG/DL (ref 0.3–1.2)
BUN BLD-MCNC: 17 MG/DL (ref 8–20)
BUN/CREAT SERPL: 13.1 (ref 5.4–26.2)
CA-I SERPL ISE-MCNC: 1.24 MMOL/L (ref 1.2–1.3)
CALCIUM SPEC-SCNC: 8.6 MG/DL (ref 8.9–10.3)
CHLORIDE SERPL-SCNC: 105 MMOL/L (ref 101–111)
CO2 SERPL-SCNC: 21 MMOL/L (ref 22–32)
CREAT BLD-MCNC: 1.3 MG/DL (ref 0.4–1)
DEPRECATED RDW RBC AUTO: 41.6 FL (ref 37–54)
ERYTHROCYTE [DISTWIDTH] IN BLOOD BY AUTOMATED COUNT: 13.4 % (ref 12.3–15.4)
GFR SERPL CREATININE-BSD FRML MDRD: 40 ML/MIN/1.73
GLOBULIN UR ELPH-MCNC: 2.7 GM/DL (ref 2.5–3.8)
GLUCOSE BLD-MCNC: 202 MG/DL (ref 65–99)
GLUCOSE BLDC GLUCOMTR-MCNC: 187 MG/DL (ref 70–105)
GLUCOSE BLDC GLUCOMTR-MCNC: 198 MG/DL (ref 70–105)
GLUCOSE BLDC GLUCOMTR-MCNC: 249 MG/DL (ref 70–105)
HCT VFR BLD AUTO: 33.6 % (ref 34–46.6)
HGB BLD-MCNC: 11.5 G/DL (ref 12–15.9)
LV EF NUC BP: 56 %
MAGNESIUM SERPL-MCNC: 2.2 MG/DL (ref 1.8–2.5)
MAXIMAL PREDICTED HEART RATE: 150 BPM
MCH RBC QN AUTO: 30.5 PG (ref 26.6–33)
MCHC RBC AUTO-ENTMCNC: 34.2 G/DL (ref 31.5–35.7)
MCV RBC AUTO: 89.3 FL (ref 79–97)
PERCENT MAX PREDICTED HR: 80 %
PHOSPHATE SERPL-MCNC: 2.6 MG/DL (ref 2.4–4.7)
PLATELET # BLD AUTO: 240 10*3/MM3 (ref 140–450)
PMV BLD AUTO: 6.9 FL (ref 6–12)
POTASSIUM BLD-SCNC: 4.7 MMOL/L (ref 3.6–5.1)
PROT SERPL-MCNC: 6.2 G/DL (ref 6.1–7.9)
RBC # BLD AUTO: 3.76 10*6/MM3 (ref 3.77–5.28)
SODIUM BLD-SCNC: 136 MMOL/L (ref 136–144)
STRESS BASELINE BP: NORMAL MMHG
STRESS BASELINE HR: 115 BPM
STRESS PERCENT HR: 94 %
STRESS POST PEAK BP: NORMAL MMHG
STRESS POST PEAK HR: 120 BPM
STRESS TARGET HR: 128 BPM
WBC NRBC COR # BLD: 13.5 10*3/MM3 (ref 3.4–10.8)

## 2019-10-03 PROCEDURE — 99232 SBSQ HOSP IP/OBS MODERATE 35: CPT | Performed by: INTERNAL MEDICINE

## 2019-10-03 PROCEDURE — A9500 TC99M SESTAMIBI: HCPCS | Performed by: FAMILY MEDICINE

## 2019-10-03 PROCEDURE — 63710000001 INSULIN LISPRO (HUMAN) PER 5 UNITS: Performed by: FAMILY MEDICINE

## 2019-10-03 PROCEDURE — 25010000002 IRON SUCROSE PER 1 MG: Performed by: INTERNAL MEDICINE

## 2019-10-03 PROCEDURE — 94799 UNLISTED PULMONARY SVC/PX: CPT

## 2019-10-03 PROCEDURE — 82962 GLUCOSE BLOOD TEST: CPT

## 2019-10-03 PROCEDURE — 93306 TTE W/DOPPLER COMPLETE: CPT | Performed by: INTERNAL MEDICINE

## 2019-10-03 PROCEDURE — 80053 COMPREHEN METABOLIC PANEL: CPT | Performed by: INTERNAL MEDICINE

## 2019-10-03 PROCEDURE — 78452 HT MUSCLE IMAGE SPECT MULT: CPT

## 2019-10-03 PROCEDURE — 93017 CV STRESS TEST TRACING ONLY: CPT

## 2019-10-03 PROCEDURE — 93018 CV STRESS TEST I&R ONLY: CPT | Performed by: INTERNAL MEDICINE

## 2019-10-03 PROCEDURE — 84100 ASSAY OF PHOSPHORUS: CPT | Performed by: INTERNAL MEDICINE

## 2019-10-03 PROCEDURE — 83735 ASSAY OF MAGNESIUM: CPT | Performed by: INTERNAL MEDICINE

## 2019-10-03 PROCEDURE — 25010000002 MEROPENEM PER 100 MG: Performed by: INTERNAL MEDICINE

## 2019-10-03 PROCEDURE — 85027 COMPLETE CBC AUTOMATED: CPT | Performed by: INTERNAL MEDICINE

## 2019-10-03 PROCEDURE — 82330 ASSAY OF CALCIUM: CPT | Performed by: INTERNAL MEDICINE

## 2019-10-03 PROCEDURE — 97116 GAIT TRAINING THERAPY: CPT

## 2019-10-03 PROCEDURE — 93016 CV STRESS TEST SUPVJ ONLY: CPT | Performed by: NURSE PRACTITIONER

## 2019-10-03 PROCEDURE — 63710000001 INSULIN GLARGINE PER 5 UNITS: Performed by: FAMILY MEDICINE

## 2019-10-03 PROCEDURE — 78452 HT MUSCLE IMAGE SPECT MULT: CPT | Performed by: INTERNAL MEDICINE

## 2019-10-03 PROCEDURE — 0 TECHNETIUM SESTAMIBI: Performed by: FAMILY MEDICINE

## 2019-10-03 PROCEDURE — 25010000002 ENOXAPARIN PER 10 MG: Performed by: FAMILY MEDICINE

## 2019-10-03 PROCEDURE — 25010000002 REGADENOSON 0.4 MG/5ML SOLUTION: Performed by: FAMILY MEDICINE

## 2019-10-03 RX ORDER — METOPROLOL TARTRATE 50 MG/1
50 TABLET, FILM COATED ORAL ONCE
Status: COMPLETED | OUTPATIENT
Start: 2019-10-03 | End: 2019-10-03

## 2019-10-03 RX ADMIN — ROSUVASTATIN CALCIUM 10 MG: 10 TABLET, FILM COATED ORAL at 21:43

## 2019-10-03 RX ADMIN — IPRATROPIUM BROMIDE AND ALBUTEROL SULFATE 3 ML: .5; 3 SOLUTION RESPIRATORY (INHALATION) at 19:13

## 2019-10-03 RX ADMIN — ENOXAPARIN SODIUM 40 MG: 40 INJECTION SUBCUTANEOUS at 18:11

## 2019-10-03 RX ADMIN — IPRATROPIUM BROMIDE AND ALBUTEROL SULFATE 3 ML: .5; 3 SOLUTION RESPIRATORY (INHALATION) at 00:28

## 2019-10-03 RX ADMIN — REGADENOSON 0.4 MG: 0.08 INJECTION, SOLUTION INTRAVENOUS at 10:05

## 2019-10-03 RX ADMIN — MEROPENEM 500 MG: 500 INJECTION, POWDER, FOR SOLUTION INTRAVENOUS at 19:24

## 2019-10-03 RX ADMIN — GUAIFENESIN 600 MG: 600 TABLET, EXTENDED RELEASE ORAL at 21:43

## 2019-10-03 RX ADMIN — GABAPENTIN 300 MG: 300 CAPSULE ORAL at 09:30

## 2019-10-03 RX ADMIN — GABAPENTIN 600 MG: 300 CAPSULE ORAL at 21:43

## 2019-10-03 RX ADMIN — PANTOPRAZOLE SODIUM 40 MG: 40 TABLET, DELAYED RELEASE ORAL at 06:27

## 2019-10-03 RX ADMIN — TECHNETIUM TC 99M SESTAMIBI 1 DOSE: 1 INJECTION INTRAVENOUS at 10:05

## 2019-10-03 RX ADMIN — INSULIN LISPRO 5 UNITS: 100 INJECTION, SOLUTION INTRAVENOUS; SUBCUTANEOUS at 18:12

## 2019-10-03 RX ADMIN — INSULIN GLARGINE 30 UNITS: 100 INJECTION, SOLUTION SUBCUTANEOUS at 21:43

## 2019-10-03 RX ADMIN — BUDESONIDE 0.5 MG: 0.5 INHALANT RESPIRATORY (INHALATION) at 19:13

## 2019-10-03 RX ADMIN — Medication 10 ML: at 21:44

## 2019-10-03 RX ADMIN — IRON SUCROSE 200 MG: 20 INJECTION, SOLUTION INTRAVENOUS at 09:30

## 2019-10-03 RX ADMIN — DULOXETINE HYDROCHLORIDE 30 MG: 30 CAPSULE, DELAYED RELEASE ORAL at 09:30

## 2019-10-03 RX ADMIN — METOPROLOL TARTRATE 50 MG: 50 TABLET, FILM COATED ORAL at 14:29

## 2019-10-03 RX ADMIN — IPRATROPIUM BROMIDE AND ALBUTEROL SULFATE 3 ML: .5; 3 SOLUTION RESPIRATORY (INHALATION) at 11:52

## 2019-10-03 RX ADMIN — MEROPENEM 500 MG: 500 INJECTION, POWDER, FOR SOLUTION INTRAVENOUS at 10:41

## 2019-10-03 RX ADMIN — METOPROLOL TARTRATE 50 MG: 50 TABLET, FILM COATED ORAL at 21:43

## 2019-10-03 RX ADMIN — TECHNETIUM TC 99M SESTAMIBI 1 DOSE: 1 INJECTION INTRAVENOUS at 08:45

## 2019-10-03 RX ADMIN — BUDESONIDE 0.5 MG: 0.5 INHALANT RESPIRATORY (INHALATION) at 06:46

## 2019-10-03 RX ADMIN — IPRATROPIUM BROMIDE AND ALBUTEROL SULFATE 3 ML: .5; 3 SOLUTION RESPIRATORY (INHALATION) at 06:46

## 2019-10-03 RX ADMIN — IPRATROPIUM BROMIDE AND ALBUTEROL SULFATE 3 ML: .5; 3 SOLUTION RESPIRATORY (INHALATION) at 23:31

## 2019-10-03 RX ADMIN — THEOPHYLLINE ANHYDROUS 300 MG: 300 CAPSULE, EXTENDED RELEASE ORAL at 09:30

## 2019-10-03 NOTE — PLAN OF CARE
Problem: Patient Care Overview  Goal: Plan of Care Review  Outcome: Ongoing (interventions implemented as appropriate)   10/03/19 1691   Coping/Psychosocial   Plan of Care Reviewed With patient   Plan of Care Review   Progress improving   OTHER   Outcome Summary Pt requiring less O2 this session and able to ambulate 150 ft on room air without inc work of breathing nor report of SOA. Pt did desat but able to quickly recover upon sitting. RN made aware.

## 2019-10-03 NOTE — PROGRESS NOTES
LOS: 3 days   Patient Care Team:  Deborah Ochoa MD as PCP - General    Subjective     Interval History:     Patient Complaints: pt is feeling better    History taken from: patient    Review of Systems   Constitutional: Positive for activity change and fatigue.   HENT: Positive for sinus pressure.    Respiratory: Positive for cough, chest tightness, shortness of breath and wheezing.    Cardiovascular: Negative for chest pain and palpitations.   Gastrointestinal: Negative for abdominal pain.   Genitourinary: Negative for frequency.   Musculoskeletal: Positive for arthralgias and back pain.   Neurological: Positive for weakness.   Psychiatric/Behavioral: Negative for confusion.           Objective     Vital Signs  Temp:  [97.8 °F (36.6 °C)-99 °F (37.2 °C)] 98.6 °F (37 °C)  Heart Rate:  [] 112  Resp:  [12-20] 16  BP: (113-155)/(52-91) 131/57    Physical Exam:     General Appearance:    Alert, cooperative, in no acute distress   Head:    Normocephalic, without obvious abnormality, atraumatic   Eyes:            Lids and lashes normal, conjunctivae and sclerae normal, no   icterus, no pallor, corneas clear, PERRLA   Ears:    Ears appear intact with no abnormalities noted   Throat:   No oral lesions, no thrush, oral mucosa moist   Neck:   No adenopathy, supple, trachea midline, no thyromegaly, no   carotid bruit, no JVD   Lungs:     Clear to auscultation,respirations regular, scattered wheezes    Heart:    Regular rhythm and normal rate, normal S1 and S2, no            murmur, no gallop, no rub, no click   Chest Wall:    No abnormalities observed   Abdomen:     Normal bowel sounds, no masses, no organomegaly, soft        non-tender, non-distended, no guarding, no rebound                tenderness   Extremities:   Moves all extremities well, LE  Edema present, no cyanosis, no             redness   Pulses:   Pulses palpable and equal bilaterally   Skin:   No bleeding, bruising or rash   Lymph nodes:   No palpable  adenopathy   Neurologic:   Cranial nerves 2 - 12 grossly intact, sensation intact, DTR       present and equal bilaterally        Results Review:    Lab Results (last 24 hours)     Procedure Component Value Units Date/Time    POC Glucose Once [[id number]]  (Abnormal) Collected:  10/03/19 1626    Specimen:  Blood Updated:  10/03/19 1630     Glucose 249 mg/dL      Comment: Serial Number: [id number]:  [id number]       Blood Culture - Blood, Arm, Left [[id number]] Collected:  09/30/19 1248    Specimen:  Blood from Arm, Left Updated:  10/03/19 1315     Blood Culture No growth at 3 days    Blood Culture - Blood, Blood, Venous Line [[id number]] Collected:  09/30/19 1250    Specimen:  Blood, Venous Line Updated:  10/03/19 1300     Blood Culture No growth at 3 days    POC Glucose Once [[id number]]  (Abnormal) Collected:  10/03/19 0739    Specimen:  Blood Updated:  10/03/19 0742     Glucose 198 mg/dL      Comment: Serial Number: [id number]:  [id number]       Magnesium [[id number]]  (Normal) Collected:  10/03/19 0326    Specimen:  Blood Updated:  10/03/19 0519     Magnesium 2.2 mg/dL     Comprehensive Metabolic Panel [[id number]]  (Abnormal) Collected:  10/03/19 0326    Specimen:  Blood Updated:  10/03/19 0517     Glucose 202 mg/dL      BUN 17 mg/dL      Creatinine 1.30 mg/dL      Sodium 136 mmol/L      Potassium 4.7 mmol/L      Chloride 105 mmol/L      CO2 21.0 mmol/L      Calcium 8.6 mg/dL      Total Protein 6.2 g/dL      Albumin 3.50 g/dL      ALT (SGPT) 15 U/L      AST (SGOT) 15 U/L      Alkaline Phosphatase 75 U/L      Total Bilirubin 0.7 mg/dL      eGFR Non African Amer 40 mL/min/1.73      Globulin 2.7 gm/dL      A/G Ratio 1.3 g/dL      BUN/Creatinine Ratio 13.1     Anion Gap 14.7 mmol/L     Phosphorus [[id number]]  (Normal) Collected:  10/03/19 0326    Specimen:  Blood Updated:  10/03/19 0517     Phosphorus 2.6 mg/dL     Calcium, Ionized [[id number]]  (Normal) Collected:  10/03/19 0326    Specimen:  Blood Updated:   10/03/19 0507     Ionized Calcium 1.24 mmol/L     CBC (No Diff) [[id number]]  (Abnormal) Collected:  10/03/19 0326    Specimen:  Blood Updated:  10/03/19 0448     WBC 13.50 10*3/mm3      RBC 3.76 10*6/mm3      Hemoglobin 11.5 g/dL      Hematocrit 33.6 %      MCV 89.3 fL      MCH 30.5 pg      MCHC 34.2 g/dL      RDW 13.4 %      RDW-SD 41.6 fl      MPV 6.9 fL      Platelets 240 10*3/mm3     POC Glucose Once [[id number]]  (Abnormal) Collected:  10/02/19 1926    Specimen:  Blood Updated:  10/02/19 1927     Glucose 179 mg/dL      Comment: Serial Number: [id number]:  [id number]              Imaging Results (last 24 hours)     ** No results found for the last 24 hours. **               I reviewed the patient's new clinical results.    Medication Review:   Scheduled Meds:  budesonide 0.5 mg Nebulization BID - RT   DULoxetine 30 mg Oral Daily   enoxaparin 40 mg Subcutaneous Q24H   gabapentin 300 mg Oral Daily   gabapentin 600 mg Oral Nightly   guaiFENesin 600 mg Oral Q12H   insulin glargine 30 Units Subcutaneous Nightly   insulin lispro 5 Units Subcutaneous TID With Meals   ipratropium-albuterol 3 mL Nebulization Q6H - RT   iron sucrose 200 mg Intravenous Q24H   meropenem 500 mg Intravenous Q8H   metoprolol tartrate 50 mg Oral Q12H   pantoprazole 40 mg Oral QAM   rosuvastatin 10 mg Oral Nightly   sodium chloride 30 mL/kg Intravenous Once   sodium chloride 10 mL Intravenous Q12H   theophylline 300 mg Oral Daily   Fluticasone-Umeclidin-Vilant 1 each Inhalation Daily     Continuous Infusions:   PRN Meds:.ipratropium-albuterol  •  sodium chloride  •  [COMPLETED] Insert peripheral IV **AND** sodium chloride  •  sodium chloride  •  sodium chloride     Assessment/Plan       Sepsis (CMS/HCC)  Urinary tract infection - improving   COPD exacerbation with a left lower lobe pneumonia - pulmonary following.  Restart trelogy.  Much improved today.     DM2 with CKD3 - insulin dependent   Non-STEMI -  Cardiology following.  Stress test  done today.  Results pending   ARF with CKD3 - creatine improving.  Renal following    Plan for disposition:home.  Possible tomorrow    Deborah Ochoa MD  10/03/19  5:22 PM

## 2019-10-03 NOTE — PLAN OF CARE
Problem: Patient Care Overview  Goal: Plan of Care Review  Outcome: Ongoing (interventions implemented as appropriate)   10/02/19 0516 10/03/19 0400 10/03/19 0504   Coping/Psychosocial   Plan of Care Reviewed With --  patient;family --    Plan of Care Review   Progress improving --  --    OTHER   Outcome Summary --  --  Pt slept throuhgout the night with no distress or complaints; will be having myoview today       Problem: Fall Risk (Adult)  Goal: Identify Related Risk Factors and Signs and Symptoms  Outcome: Ongoing (interventions implemented as appropriate)    Goal: Absence of Fall  Outcome: Ongoing (interventions implemented as appropriate)

## 2019-10-03 NOTE — PROGRESS NOTES
Referring Provider: Hospitalist    Reason for follow-up: Non-STEMI     Patient Care Team:  Deborah Ochoa MD as PCP - General    Subjective .  No chest pain or shortness of breath    Objective  Lying in bed comfortably     Review of Systems   Constitution: Negative for fever and malaise/fatigue.   HENT: Negative for ear pain and nosebleeds.    Eyes: Negative for blurred vision and double vision.   Cardiovascular: Negative for chest pain, dyspnea on exertion and palpitations.   Respiratory: Negative for cough and shortness of breath.    Skin: Negative for rash.   Musculoskeletal: Negative for joint pain.   Gastrointestinal: Negative for abdominal pain, nausea and vomiting.   Neurological: Negative for focal weakness and headaches.   Psychiatric/Behavioral: Negative for depression. The patient is not nervous/anxious.    All other systems reviewed and are negative.      Patient has no known allergies.    Scheduled Meds:    budesonide 0.5 mg Nebulization BID - RT   DULoxetine 30 mg Oral Daily   enoxaparin 40 mg Subcutaneous Q24H   gabapentin 300 mg Oral Daily   gabapentin 600 mg Oral Nightly   guaiFENesin 600 mg Oral Q12H   insulin glargine 30 Units Subcutaneous Nightly   insulin lispro 5 Units Subcutaneous TID With Meals   ipratropium-albuterol 3 mL Nebulization Q6H - RT   iron sucrose 200 mg Intravenous Q24H   meropenem 500 mg Intravenous Q12H   metoprolol tartrate 50 mg Oral Q12H   pantoprazole 40 mg Oral QAM   rosuvastatin 10 mg Oral Nightly   sodium chloride 30 mL/kg Intravenous Once   sodium chloride 10 mL Intravenous Q12H   theophylline 300 mg Oral Daily   Fluticasone-Umeclidin-Vilant 1 each Inhalation Daily     Continuous Infusions:    sodium chloride 0.9 % with KCl 20 mEq 100 mL/hr Last Rate: 100 mL/hr (10/02/19 8885)     PRN Meds:.ipratropium-albuterol  •  sodium chloride  •  [COMPLETED] Insert peripheral IV **AND** sodium chloride  •  sodium chloride  •  sodium chloride        VITAL SIGNS  Vitals:     "10/03/19 0529 10/03/19 0607 10/03/19 0646 10/03/19 0651   BP:  136/62     BP Location:       Patient Position:  Lying     Pulse:  96 98 97   Resp:  18 20 20   Temp:  98.5 °F (36.9 °C)     TempSrc:  Oral     SpO2:  98% 98% 100%   Weight: 74.4 kg (164 lb 0.4 oz)      Height:           Flowsheet Rows      First Filed Value   Admission Height  157.5 cm (62\") Documented at 09/30/2019 1219   Admission Weight  68 kg (150 lb) Documented at 09/30/2019 1219           TELEMETRY: Sinus rhythm    Physical Exam:  Physical Exam   Constitutional: She appears well-developed and well-nourished.   HENT:   Head: Normocephalic and atraumatic.   Eyes: Conjunctivae and EOM are normal. Pupils are equal, round, and reactive to light. No scleral icterus.   Neck: Normal range of motion. Neck supple. No JVD present. Carotid bruit is not present.   Cardiovascular: Normal rate, regular rhythm, S1 normal, S2 normal, normal heart sounds and intact distal pulses. PMI is not displaced.   Pulmonary/Chest: Effort normal and breath sounds normal. She has no wheezes. She has no rales.   Abdominal: Soft. Bowel sounds are normal.   Musculoskeletal: Normal range of motion.   Neurological: She is alert. She has normal strength.   No focal deficits   Skin: Skin is warm and dry. No rash noted.   Psychiatric: She has a normal mood and affect.        Results Review:   I reviewed the patient's new clinical results.  Lab Results (last 24 hours)     Procedure Component Value Units Date/Time    POC Glucose Once [[id number]]  (Abnormal) Collected:  10/03/19 0739    Specimen:  Blood Updated:  10/03/19 0742     Glucose 198 mg/dL      Comment: Serial Number: [id number]:  [id number]       Magnesium [[id number]]  (Normal) Collected:  10/03/19 0326    Specimen:  Blood Updated:  10/03/19 0519     Magnesium 2.2 mg/dL     Comprehensive Metabolic Panel [[id number]]  (Abnormal) Collected:  10/03/19 0326    Specimen:  Blood Updated:  10/03/19 0517     Glucose 202 mg/dL      " BUN 17 mg/dL      Creatinine 1.30 mg/dL      Sodium 136 mmol/L      Potassium 4.7 mmol/L      Chloride 105 mmol/L      CO2 21.0 mmol/L      Calcium 8.6 mg/dL      Total Protein 6.2 g/dL      Albumin 3.50 g/dL      ALT (SGPT) 15 U/L      AST (SGOT) 15 U/L      Alkaline Phosphatase 75 U/L      Total Bilirubin 0.7 mg/dL      eGFR Non African Amer 40 mL/min/1.73      Globulin 2.7 gm/dL      A/G Ratio 1.3 g/dL      BUN/Creatinine Ratio 13.1     Anion Gap 14.7 mmol/L     Phosphorus [[id number]]  (Normal) Collected:  10/03/19 0326    Specimen:  Blood Updated:  10/03/19 0517     Phosphorus 2.6 mg/dL     Calcium, Ionized [[id number]]  (Normal) Collected:  10/03/19 0326    Specimen:  Blood Updated:  10/03/19 0507     Ionized Calcium 1.24 mmol/L     CBC (No Diff) [[id number]]  (Abnormal) Collected:  10/03/19 0326    Specimen:  Blood Updated:  10/03/19 0448     WBC 13.50 10*3/mm3      RBC 3.76 10*6/mm3      Hemoglobin 11.5 g/dL      Hematocrit 33.6 %      MCV 89.3 fL      MCH 30.5 pg      MCHC 34.2 g/dL      RDW 13.4 %      RDW-SD 41.6 fl      MPV 6.9 fL      Platelets 240 10*3/mm3     POC Glucose Once [[id number]]  (Abnormal) Collected:  10/02/19 1926    Specimen:  Blood Updated:  10/02/19 1927     Glucose 179 mg/dL      Comment: Serial Number: [id number]:  [id number]       POC Glucose Once [[id number]]  (Abnormal) Collected:  10/02/19 1659    Specimen:  Blood Updated:  10/02/19 1700     Glucose 176 mg/dL      Comment: Serial Number: [id number]:  [id number]       Blood Culture - Blood, Arm, Left [[id number]] Collected:  09/30/19 1248    Specimen:  Blood from Arm, Left Updated:  10/02/19 1301     Blood Culture No growth at 2 days    Blood Culture - Blood, Blood, Venous Line [[id number]] Collected:  09/30/19 1250    Specimen:  Blood, Venous Line Updated:  10/02/19 1301     Blood Culture No growth at 2 days    POC Glucose Once [[id number]]  (Abnormal) Collected:  10/02/19 1144    Specimen:  Blood Updated:  10/02/19 1150      Glucose 347 mg/dL      Comment: Serial Number: [id number]:  [id number]             Imaging Results (last 24 hours)     ** No results found for the last 24 hours. **          EKG      I personally viewed and interpreted the patient's EKG/Telemetry data:    ECHOCARDIOGRAM:    STRESS MYOVIEW:    CARDIAC CATHETERIZATION:    OTHER:         Assessment/Plan     #1 sepsis  2.  Urinary tract infection  3.  COPD exacerbation with a left lower lobe pneumonia  4.  Acute renal failure  5.  Diabetes  6.  Non-STEMI    Patient had mild elevation of troponin which could be secondary renal insufficiency versus demand ischemia  Patient has urosepsis which is improving with antibiotics and other treatments including IV fluids  Patient is followed by the nephrologist and her creatinine is better now  Patient is followed by the pulmonologist for COPD and pneumonia  Patient is having a stress Myoview today  Further treatment based on stress test results  Patient had an echocardiogram for LV function valve abnormalities  I discussed the patients findings and my recommendations with patient and family    Ishan June MD  10/03/19  9:53 AM

## 2019-10-03 NOTE — THERAPY TREATMENT NOTE
Patient Name: Dyana Montemayor  : 1950    MRN: [id number]                              Today's Date: 10/3/2019       Admit Date: 2019    Visit Dx:     ICD-10-CM ICD-9-CM   1. Sepsis, due to unspecified organism A41.9 038.9     995.91   2. Hypotension, unspecified hypotension type I95.9 458.9   3. Urinary tract infection without hematuria, site unspecified N39.0 599.0   4. Diarrhea of presumed infectious origin R19.7 009.3     Patient Active Problem List   Diagnosis   • Sepsis (CMS/[facility])     No past medical history on file.  No past surgical history on file.  General Information     Row Name 10/03/19 1438          PT Evaluation Time/Intention    Document Type  therapy note (daily note)  -CR     Mode of Treatment  physical therapy  -CR       User Key  (r) = Recorded By, (t) = Taken By, (c) = Cosigned By    Initials Name Provider Type    CR Reyes, Carmela, PT Physical Therapist        Mobility     Row Name 10/03/19 1438          Bed Mobility Assessment/Treatment    [place] Level (Bed Mobility)  conditional independence  -CR     Row Name 10/03/19 1438          Sit-Stand Transfer    Sit-Stand [place] (Transfers)  independent  -CR     Row Name 10/03/19 1438          Gait/Stairs Assessment/Training    [place] Level (Gait)  stand by assist  -CR     Distance in Feet (Gait)  150  -CR     Deviations/Abnormal Patterns (Gait)  gait speed decreased  -CR       User Key  (r) = Recorded By, (t) = Taken By, (c) = Cosigned By    Initials Name Provider Type    CR Reyes, Carmela, PT Physical Therapist        Obj/Interventions     Row Name 10/03/19 1439          Dynamic Standing Balance    Level of [place], Reaches Outside Midline (Standing, Dynamic Balance)  standby assist  -CR     Time Able to Maintain Position, Reaches Outside Midline (Standing, Dynamic Balance)  more than 5 minutes  -CR       User Key  (r) = Recorded By, (t) = Taken By, (c) = Cosigned By    Initials Name Provider Type    CR Reyes,  GREGOR Quintanilla Physical Therapist        Goals/Plan     Row Name 10/03/19 1440          Gait Training Goal 1 (PT)    Progress/Outcome (Gait Training Goal 1, PT)  good progress toward goal  -CR       User Key  (r) = Recorded By, (t) = Taken By, (c) = Cosigned By    Initials Name Provider Type    CR Reyes, Carmela, PT Physical Therapist        Clinical Impression     Row Name 10/03/19 1439          Pain Assessment    Additional Documentation  Pain Scale: Numbers Pre/Post-Treatment (Group)  -CR     Row Name 10/03/19 1439          Pain Scale: Numbers Pre/Post-Treatment    Pain Scale: Numbers, Pretreatment  0/10 - no pain  -CR     Pain Scale: Numbers, Post-Treatment  2/10  -CR     Pain Location  chest  -CR     Pre/Post Treatment Pain Comment  when coughing  -CR     Row Name 10/03/19 1439          Plan of Care Review    Plan of Care Reviewed With  patient  -CR     Row Name 10/03/19 1439          Physical Therapy Clinical Impression    Rehab Potential (PT Clinical Summary)  good, to achieve stated therapy goals  -CR     Row Name 10/03/19 1439          Vital Signs    Pre SpO2 (%)  97  -CR     O2 Delivery Pre Treatment  supplemental O2  -CR     Intra SpO2 (%)  85  -CR     O2 Delivery Intra Treatment  room air  -CR     Post SpO2 (%)  94  -CR     O2 Delivery Post Treatment  supplemental O2  -CR     Row Name 10/03/19 1439          Positioning and Restraints    Pre-Treatment Position  in bed  -CR     Post Treatment Position  bed  -CR     In Bed  notified nsg;sitting EOB;call light within reach  -CR       User Key  (r) = Recorded By, (t) = Taken By, (c) = Cosigned By    Initials Name Provider Type    CR Reyes, Carmela, PT Physical Therapist        Outcome Measures     Row Name 10/03/19 1442          How much help from another person do you currently need...    Turning from your back to your side while in flat bed without using bedrails?  4  -CR     Moving from lying on back to sitting on the side of a flat bed without bedrails?  4   -CR     Moving to and from a bed to a chair (including a wheelchair)?  4  -CR     Standing up from a chair using your arms (e.g., wheelchair, bedside chair)?  4  -CR     Climbing 3-5 steps with a railing?  3  -CR     To walk in hospital room?  4  -CR     AM-PAC 6 Clicks Score (PT)  23  -CR       User Key  (r) = Recorded By, (t) = Taken By, (c) = Cosigned By    Initials Name Provider Type    CR Reyes, Carmela, PT Physical Therapist        Physical Therapy Education     Title: PT OT SLP Therapies (In Progress)     Topic: Physical Therapy (In Progress)     Point: Mobility training (Done)     Learning Progress Summary           Patient Acceptance, E, DU by CR at 10/3/2019  2:41 PM    Acceptance, E, NR by CR at 10/2/2019 12:15 PM                   Point: Home exercise program (In Progress)     Learning Progress Summary           Patient Acceptance, E, NR by CR at 10/2/2019 12:15 PM                               User Key     Initials Effective Dates Name Provider Type Discipline    CR 03/01/19 -  Reyes, Carmela, PT Physical Therapist PT              PT Recommendation and Plan  Planned Therapy Interventions (PT Eval): gait training, patient/family education, other (see comments), home exercise program(pulmo rehab)  Outcome Summary/Treatment Plan (PT)  Anticipated Discharge Disposition (PT): home with home health  Plan of Care Reviewed With: patient  Progress: improving  Outcome Summary: Pt requiring less O2 this session and able to ambulate 150 ft on room air without inc work of breathing nor report of SOA. Pt did desat but able to quickly recover upon sitting. RN made aware.      Time Calculation:   PT Charges     Row Name 10/03/19 1442             Time Calculation    Start Time  1240  -CR      Stop Time  1255  -CR      Time Calculation (min)  15 min  -CR      PT Received On  10/03/19  -CR      PT - Next Appointment  10/05/19  -CR         Time Calculation- PT    Total Timed Code Minutes- PT  15 minute(s)  -CR        User  Key  (r) = Recorded By, (t) = Taken By, (c) = Cosigned By    Initials Name Provider Type    CR Reyes, Carmela, PT Physical Therapist        Therapy Charges for Today     Code Description Service Date Service Provider Modifiers Qty    10739904906 HC PT EVAL MOD COMPLEXITY 4 10/2/2019 Reyes, Carmela, PT GP 1    30023831405 HC GAIT TRAINING EA 15 MIN 10/3/2019 Reyes, Carmela, PT GP 1          PT G-Codes  Outcome Measure Options: AM-PAC 6 Clicks Basic Mobility (PT)  AM-PAC 6 Clicks Score (PT): 23    Carmela Reyes, PT  10/3/2019

## 2019-10-03 NOTE — PROGRESS NOTES
"NEPHROLOGY PROGRESS NOTE    [facility]  [phone]  Espinoza Dunn MD      Patient Care Team:  Deborah Ochoa MD as PCP - General      Provider:  Espinoza Dunn MD  Patient Name: Dyana Montemayor  :  1950    SUBJECTIVE:  F/u ADAMARIS  Patient feeling better. No SOB, CP, dysuria, palpitations.    Medication:    budesonide 0.5 mg Nebulization BID - RT   DULoxetine 30 mg Oral Daily   enoxaparin 40 mg Subcutaneous Q24H   gabapentin 300 mg Oral Daily   gabapentin 600 mg Oral Nightly   guaiFENesin 600 mg Oral Q12H   insulin glargine 30 Units Subcutaneous Nightly   insulin lispro 5 Units Subcutaneous TID With Meals   ipratropium-albuterol 3 mL Nebulization Q6H - RT   iron sucrose 200 mg Intravenous Q24H   meropenem 500 mg Intravenous Q8H   metoprolol tartrate 50 mg Oral Q12H   pantoprazole 40 mg Oral QAM   rosuvastatin 10 mg Oral Nightly   sodium chloride 30 mL/kg Intravenous Once   sodium chloride 10 mL Intravenous Q12H   theophylline 300 mg Oral Daily   Fluticasone-Umeclidin-Vilant 1 each Inhalation Daily       sodium chloride 0.9 % with KCl 20 mEq 100 mL/hr Last Rate: 100 mL/hr (10/02/19 2307)       OBJECTIVE    Vital Sign Min/Max for last 24 hours  Temp  Min: 97.8 °F (36.6 °C)  Max: 99 °F (37.2 °C)   BP  Min: 113/91  Max: 171/69   Pulse  Min: 84  Max: 113   Resp  Min: 12  Max: 22   SpO2  Min: 92 %  Max: 100 %   No Data Recorded   Weight  Min: 74.4 kg (164 lb 0.4 oz)  Max: 74.4 kg (164 lb 0.4 oz)     Flowsheet Rows      First Filed Value   Admission Height  157.5 cm (62\") Documented at 2019 1219   Admission Weight  68 kg (150 lb) Documented at 2019 1219          No intake/output data recorded.  I/O last 3 completed shifts:  In: 840 [P.O.:840]  Out: 950 [Urine:950]    Physical Exam:  General Appearance: alert, appears stated age and cooperative  Head: normocephalic, without obvious abnormality and atraumatic  Eyes: conjunctivae and sclerae normal and no icterus  Neck: supple and " no JVD  Lungs: clear to auscultation and respirations regular  Heart: regular rhythm & normal rate and normal S1, S2 +RADHA  Chest: Wall no abnormalities observed  Abdomen: normal bowel sounds and soft non-tender  Extremities: moves extremities well, no edema, no cyanosis and no redness  Skin: no bleeding, bruising or rash, turgor normal, color normal and no leasions noted  Neurologic: Alert, and oriented. No focal deficits    Labs:    WBC WBC   Date Value Ref Range Status   10/03/2019 13.50 (H) 3.40 - 10.80 10*3/mm3 Final   10/02/2019 11.30 (H) 3.40 - 10.80 10*3/mm3 Final   09/30/2019 15.40 (H) 3.40 - 10.80 10*3/mm3 Final   09/30/2019 15.00 (H) 3.40 - 10.80 10*3/mm3 Final   09/30/2019 15.00 (H) 3.40 - 10.80 10*3/mm3 Final      HGB Hemoglobin   Date Value Ref Range Status   10/03/2019 11.5 (L) 12.0 - 15.9 g/dL Final   10/02/2019 11.4 (L) 12.0 - 15.9 g/dL Final     Comment:     Result checked    09/30/2019 13.9 12.0 - 15.9 g/dL Final   09/30/2019 16.1 (H) 12.0 - 15.9 g/dL Final   09/30/2019 15.8 12.0 - 15.9 g/dL Final      HCT Hematocrit   Date Value Ref Range Status   10/03/2019 33.6 (L) 34.0 - 46.6 % Final   10/02/2019 35.3 34.0 - 46.6 % Final   09/30/2019 42.3 34.0 - 46.6 % Final   09/30/2019 49.0 (H) 34.0 - 46.6 % Final   09/30/2019 48.3 (H) 34.0 - 46.6 % Final      Platlets No results found for: LABPLAT   MCV MCV   Date Value Ref Range Status   10/03/2019 89.3 79.0 - 97.0 fL Final   10/02/2019 90.8 79.0 - 97.0 fL Final   09/30/2019 90.4 79.0 - 97.0 fL Final   09/30/2019 90.0 79.0 - 97.0 fL Final   09/30/2019 90.5 79.0 - 97.0 fL Final          Sodium Sodium   Date Value Ref Range Status   10/03/2019 136 136 - 144 mmol/L Final   10/02/2019 136 136 - 144 mmol/L Final   10/01/2019 134 (L) 136 - 144 mmol/L Final   09/30/2019 136 136 - 144 mmol/L Final   09/30/2019 132 (L) 136 - 144 mmol/L Final      Potassium Potassium   Date Value Ref Range Status   10/03/2019 4.7 3.6 - 5.1 mmol/L Final   10/02/2019 4.7 3.6 - 5.1  mmol/L Final   10/01/2019 3.2 (L) 3.6 - 5.1 mmol/L Final   09/30/2019 3.6 3.6 - 5.1 mmol/L Final   09/30/2019 3.7 3.6 - 5.1 mmol/L Final      Chloride Chloride   Date Value Ref Range Status   10/03/2019 105 101 - 111 mmol/L Final   10/02/2019 102 101 - 111 mmol/L Final   10/01/2019 103 101 - 111 mmol/L Final   09/30/2019 106 101 - 111 mmol/L Final   09/30/2019 97 (L) 101 - 111 mmol/L Final      CO2 CO2   Date Value Ref Range Status   10/03/2019 21.0 (L) 22.0 - 32.0 mmol/L Final   10/02/2019 22.0 22.0 - 32.0 mmol/L Final   10/01/2019 21.0 (L) 22.0 - 32.0 mmol/L Final   09/30/2019 20.0 (L) 22.0 - 32.0 mmol/L Final   09/30/2019 24.0 22.0 - 32.0 mmol/L Final      BUN BUN   Date Value Ref Range Status   10/03/2019 17 8 - 20 mg/dL Final   10/02/2019 17 8 - 20 mg/dL Final   10/01/2019 16 8 - 20 mg/dL Final   09/30/2019 17 8 - 20 mg/dL Final   09/30/2019 20 8 - 20 mg/dL Final      Creatinine Creatinine   Date Value Ref Range Status   10/03/2019 1.30 (H) 0.40 - 1.00 mg/dL Final   10/02/2019 1.80 (H) 0.40 - 1.00 mg/dL Final   10/01/2019 2.10 (H) 0.40 - 1.00 mg/dL Final   09/30/2019 2.00 (H) 0.40 - 1.00 mg/dL Final   09/30/2019 2.50 (H) 0.40 - 1.00 mg/dL Final      Calcium Calcium   Date Value Ref Range Status   10/03/2019 8.6 (L) 8.9 - 10.3 mg/dL Final   10/02/2019 8.5 (L) 8.9 - 10.3 mg/dL Final   10/01/2019 8.0 (L) 8.9 - 10.3 mg/dL Final   09/30/2019 8.3 (L) 8.9 - 10.3 mg/dL Final   09/30/2019 9.2 8.9 - 10.3 mg/dL Final      PO4 No components found for: PO4   Albumin Albumin   Date Value Ref Range Status   10/03/2019 3.50 3.50 - 4.80 g/dL Final   10/02/2019 3.10 (L) 3.50 - 4.80 g/dL Final   09/30/2019 2.50 (L) 3.50 - 4.80 g/dL Final   09/30/2019 3.10 (L) 3.50 - 4.80 g/dL Final      Magnesium Magnesium   Date Value Ref Range Status   10/03/2019 2.2 1.8 - 2.5 mg/dL Final   10/02/2019 3.0 (H) 1.8 - 2.5 mg/dL Final   10/01/2019 1.6 (L) 1.8 - 2.5 mg/dL Final   09/30/2019 1.6 (L) 1.8 - 2.5 mg/dL Final   09/30/2019 1.9 1.8 - 2.5  mg/dL Final      Uric Acid No components found for: URIC ACID     Imaging Results (last 72 hours)     Procedure Component Value Units Date/Time    US Renal Bilateral [[id number]] Collected:  10/01/19 1428     Updated:  10/01/19 1436    Narrative:       Examination: US RENAL BILATERAL-     Date of Exam: 10/1/2019 1:56 PM     Indication: ARF/CKD; A41.9-Sepsis, unspecified organism;  I95.9-Hypotension, unspecified; N39.0-Urinary tract infection, site not  specified; R19.7-Diarrhea, unspecified.     Comparison: None available.     Technique: Grayscale and color Doppler ultrasound evaluation of the  kidneys and urinary bladder was performed     Findings:  The right kidney measures 10.7 x 4.9 x 5.2 cm and the left kidney  measures 9.4 x 3.6 x 4.5 cm. Kidney echogenicity and vascularity appear  within normal limits. There is no solid kidney mass.  No echogenic  shadowing stone.  No hydronephrosis.        Limited visualization of the urinary bladder is unremarkable. Bladder  volume is calculated at 83 mL.       Impression:       Kidney ultrasound is within normal limits.     Electronically Signed BySammi Villasenor On:10/1/2019 2:29 PM  This report was finalized on [id number] by  Samir Villasenor, .    XR Chest 1 View [[id number]] Collected:  09/30/19 1323     Updated:  09/30/19 1325    Narrative:       DATE OF EXAM:  9/30/2019 12:38 PM     PROCEDURE:  XR CHEST 1 VW-     INDICATIONS:  Severe Sepsis triage protocol     COMPARISON:  August 28, 2018     TECHNIQUE:   Single radiographic AP view of the chest was obtained.     FINDINGS:  Cardiac size is normal and the lungs remain clear.        Impression:       Negative portable chest     Electronically Signed ByJose Costello On:9/30/2019 1:23 PM  This report was finalized on [id number] by  Lee Costello, .          Results for orders placed during the hospital encounter of 09/30/19   XR Chest 1 View    Narrative DATE OF EXAM:  9/30/2019 12:38 PM     PROCEDURE:  XR CHEST 1 VW-      INDICATIONS:  Severe Sepsis triage protocol     COMPARISON:  August 28, 2018     TECHNIQUE:   Single radiographic AP view of the chest was obtained.     FINDINGS:  Cardiac size is normal and the lungs remain clear.        Impression Negative portable chest     Electronically Signed By-Lee Costello On:9/30/2019 1:23 PM  This report was finalized on [id number] by  Lee Costello, .              ASSESSMENT / PLAN      Sepsis (CMS/[facility])      1. ARF/ADAMARIS-------Nonoliguric. +ARF/ADAMARIS on top of known CRF/CKD STG 3 with a baseline serum creatinine of 1.2. CRF/CKD STG 3 secondary to DGS/HTN NS. +ARF/ADAMARIS is secondary to prerenal state/intravascular volume depletion with concomitant ACE-I/ARB use and some ATN from hypotension. D/C Lisinopril and Losartan. Hydrate. Check urine and serum studies and renal US and PVR. No NSAIDs or IV dye. Dose meds for CrCl less than 10 cc/min until ARF/ADAMARIS is resolved. Support BP. Avoid hypotension     2. HYPOKALEMIA------Replaced     3. HYPONATREMIA-------Hypotonic and clinically hypovolemic. Give NS.       4. HYPOALBUMINEMIA------IV Albumin to temporize.     5. HYPOMAGNESEMIA------Replaced     6. ACIDOSIS------Type 4 RTA and stool losses. Better     7. DIARRHEA/SEPSIS/LEUKOCYTOSIS-------per , Critical Care. Off BP meds. Cx pending    8. HTN WITH CKD------BP okay      Cr better  For cath at some point, will need mucomyst and ivf pre-cath    Espinoza Dunn MD  [facility]  [phone]  10/03/19  11:49 AM

## 2019-10-04 LAB
ANION GAP SERPL CALCULATED.3IONS-SCNC: 13 MMOL/L (ref 5–15)
BUN BLD-MCNC: 13 MG/DL (ref 8–20)
BUN/CREAT SERPL: 9.3 (ref 5.4–26.2)
CALCIUM SPEC-SCNC: 8.8 MG/DL (ref 8.9–10.3)
CHLORIDE SERPL-SCNC: 104 MMOL/L (ref 101–111)
CO2 SERPL-SCNC: 27 MMOL/L (ref 22–32)
CREAT BLD-MCNC: 1.4 MG/DL (ref 0.4–1)
DEPRECATED RDW RBC AUTO: 44.2 FL (ref 37–54)
ERYTHROCYTE [DISTWIDTH] IN BLOOD BY AUTOMATED COUNT: 13.8 % (ref 12.3–15.4)
GFR SERPL CREATININE-BSD FRML MDRD: 37 ML/MIN/1.73
GLUCOSE BLD-MCNC: 58 MG/DL (ref 65–99)
GLUCOSE BLDC GLUCOMTR-MCNC: 146 MG/DL (ref 70–105)
GLUCOSE BLDC GLUCOMTR-MCNC: 211 MG/DL (ref 70–105)
GLUCOSE BLDC GLUCOMTR-MCNC: 244 MG/DL (ref 70–105)
GLUCOSE BLDC GLUCOMTR-MCNC: 51 MG/DL (ref 70–105)
GLUCOSE BLDC GLUCOMTR-MCNC: 61 MG/DL (ref 70–105)
GLUCOSE BLDC GLUCOMTR-MCNC: 67 MG/DL (ref 70–105)
GLUCOSE BLDC GLUCOMTR-MCNC: 85 MG/DL (ref 70–105)
GLUCOSE BLDC GLUCOMTR-MCNC: 94 MG/DL (ref 70–105)
HCT VFR BLD AUTO: 37.2 % (ref 34–46.6)
HGB BLD-MCNC: 12.2 G/DL (ref 12–15.9)
MAGNESIUM SERPL-MCNC: 1.8 MG/DL (ref 1.8–2.5)
MCH RBC QN AUTO: 29.5 PG (ref 26.6–33)
MCHC RBC AUTO-ENTMCNC: 32.8 G/DL (ref 31.5–35.7)
MCV RBC AUTO: 89.7 FL (ref 79–97)
PHOSPHATE SERPL-MCNC: 3.3 MG/DL (ref 2.4–4.7)
PLATELET # BLD AUTO: 276 10*3/MM3 (ref 140–450)
PMV BLD AUTO: 6.9 FL (ref 6–12)
POTASSIUM BLD-SCNC: 4 MMOL/L (ref 3.6–5.1)
RBC # BLD AUTO: 4.15 10*6/MM3 (ref 3.77–5.28)
SODIUM BLD-SCNC: 140 MMOL/L (ref 136–144)
WBC NRBC COR # BLD: 14.8 10*3/MM3 (ref 3.4–10.8)

## 2019-10-04 PROCEDURE — 25010000002 IRON SUCROSE PER 1 MG: Performed by: INTERNAL MEDICINE

## 2019-10-04 PROCEDURE — 99233 SBSQ HOSP IP/OBS HIGH 50: CPT | Performed by: INTERNAL MEDICINE

## 2019-10-04 PROCEDURE — 63710000001 INSULIN GLARGINE PER 5 UNITS: Performed by: FAMILY MEDICINE

## 2019-10-04 PROCEDURE — 85027 COMPLETE CBC AUTOMATED: CPT | Performed by: FAMILY MEDICINE

## 2019-10-04 PROCEDURE — 94760 N-INVAS EAR/PLS OXIMETRY 1: CPT

## 2019-10-04 PROCEDURE — 94799 UNLISTED PULMONARY SVC/PX: CPT

## 2019-10-04 PROCEDURE — 80048 BASIC METABOLIC PNL TOTAL CA: CPT | Performed by: INTERNAL MEDICINE

## 2019-10-04 PROCEDURE — 82962 GLUCOSE BLOOD TEST: CPT

## 2019-10-04 PROCEDURE — 84100 ASSAY OF PHOSPHORUS: CPT | Performed by: INTERNAL MEDICINE

## 2019-10-04 PROCEDURE — 83735 ASSAY OF MAGNESIUM: CPT | Performed by: INTERNAL MEDICINE

## 2019-10-04 PROCEDURE — 25010000002 MEROPENEM PER 100 MG: Performed by: INTERNAL MEDICINE

## 2019-10-04 PROCEDURE — 25010000002 ENOXAPARIN PER 10 MG: Performed by: FAMILY MEDICINE

## 2019-10-04 RX ADMIN — BUDESONIDE 0.5 MG: 0.5 INHALANT RESPIRATORY (INHALATION) at 18:37

## 2019-10-04 RX ADMIN — METOPROLOL TARTRATE 50 MG: 50 TABLET, FILM COATED ORAL at 20:15

## 2019-10-04 RX ADMIN — Medication 10 ML: at 08:32

## 2019-10-04 RX ADMIN — GUAIFENESIN 600 MG: 600 TABLET, EXTENDED RELEASE ORAL at 20:15

## 2019-10-04 RX ADMIN — GABAPENTIN 600 MG: 300 CAPSULE ORAL at 20:14

## 2019-10-04 RX ADMIN — IPRATROPIUM BROMIDE AND ALBUTEROL SULFATE 3 ML: .5; 3 SOLUTION RESPIRATORY (INHALATION) at 06:40

## 2019-10-04 RX ADMIN — BUDESONIDE 0.5 MG: 0.5 INHALANT RESPIRATORY (INHALATION) at 06:40

## 2019-10-04 RX ADMIN — MEROPENEM 500 MG: 500 INJECTION, POWDER, FOR SOLUTION INTRAVENOUS at 02:54

## 2019-10-04 RX ADMIN — GUAIFENESIN 600 MG: 600 TABLET, EXTENDED RELEASE ORAL at 08:31

## 2019-10-04 RX ADMIN — MEROPENEM 500 MG: 500 INJECTION, POWDER, FOR SOLUTION INTRAVENOUS at 10:37

## 2019-10-04 RX ADMIN — GABAPENTIN 300 MG: 300 CAPSULE ORAL at 08:31

## 2019-10-04 RX ADMIN — THEOPHYLLINE ANHYDROUS 300 MG: 300 CAPSULE, EXTENDED RELEASE ORAL at 08:32

## 2019-10-04 RX ADMIN — MEROPENEM 500 MG: 500 INJECTION, POWDER, FOR SOLUTION INTRAVENOUS at 17:55

## 2019-10-04 RX ADMIN — IRON SUCROSE 200 MG: 20 INJECTION, SOLUTION INTRAVENOUS at 08:30

## 2019-10-04 RX ADMIN — INSULIN GLARGINE 30 UNITS: 100 INJECTION, SOLUTION SUBCUTANEOUS at 20:40

## 2019-10-04 RX ADMIN — METOPROLOL TARTRATE 50 MG: 50 TABLET, FILM COATED ORAL at 08:31

## 2019-10-04 RX ADMIN — ENOXAPARIN SODIUM 40 MG: 40 INJECTION SUBCUTANEOUS at 15:33

## 2019-10-04 RX ADMIN — IPRATROPIUM BROMIDE AND ALBUTEROL SULFATE 3 ML: .5; 3 SOLUTION RESPIRATORY (INHALATION) at 11:00

## 2019-10-04 RX ADMIN — DULOXETINE HYDROCHLORIDE 30 MG: 30 CAPSULE, DELAYED RELEASE ORAL at 08:31

## 2019-10-04 RX ADMIN — IPRATROPIUM BROMIDE AND ALBUTEROL SULFATE 3 ML: .5; 3 SOLUTION RESPIRATORY (INHALATION) at 18:37

## 2019-10-04 RX ADMIN — PANTOPRAZOLE SODIUM 40 MG: 40 TABLET, DELAYED RELEASE ORAL at 06:09

## 2019-10-04 RX ADMIN — ROSUVASTATIN CALCIUM 10 MG: 10 TABLET, FILM COATED ORAL at 20:15

## 2019-10-04 RX ADMIN — Medication 10 ML: at 20:14

## 2019-10-04 NOTE — PROGRESS NOTES
ICU Daily Progress Note        Sepsis (CMS/[facility])      Assessment/Plan      Urosepsis improving  Shock, septic resolved  Diarrhea, ? Hypovolemia  Possible left lower lobe pneumonia  Elevated troponin rule out acute coronary syndrome  Acute kidney injury elevated creatinine  COPD patient use trelegy inhaler at home  Diabetes   no significant obstructive sleep apnea based on home sleep study  Smoker less than pack a day for for 56 years  History of high altitude pulmonary edema        Plan  Antibiotics meropenem  Bronchodilators  Oxygen titration  DVT prophylaxis  GI prophylaxis  Glycemic control             LOS: 4 days         Vital signs for last 24 hours:  Vitals:    10/04/19 0645 10/04/19 1100 10/04/19 1105 10/04/19 1448   BP:  125/74  133/54   Patient Position:  Lying     Pulse: 91 86 86 88   Resp: 16 16 16 18   Temp:  98.6 °F (37 °C)  97.5 °F (36.4 °C)   TempSrc:  Oral  Oral   SpO2: (!) 89% 91%  93%   Weight:       Height:           Intake/Output last 3 shifts:  I/O last 3 completed shifts:  In: 720 [P.O.:720]  Out: 1950 [Urine:1950]  Intake/Output this shift:  I/O this shift:  In: 820 [P.O.:720; IV Piggyback:100]  Out: 1300 [Urine:1300]    Vent settings for last 24 hours:       Hemodynamic parameters for last 24 hours:       Radiology  Imaging Results (last 24 hours)     ** No results found for the last 24 hours. **          Labs:  Results from last 7 days   Lab Units 10/04/19  0149   WBC 10*3/mm3 14.80*   HEMOGLOBIN g/dL 12.2   HEMATOCRIT % 37.2   PLATELETS 10*3/mm3 276     Results from last 7 days   Lab Units 10/04/19  0150 10/03/19  0326   SODIUM mmol/L 140 136   POTASSIUM mmol/L 4.0 4.7   CHLORIDE mmol/L 104 105   CO2 mmol/L 27.0 21.0*   BUN mg/dL 13 17   CREATININE mg/dL 1.40* 1.30*   CALCIUM mg/dL 8.8* 8.6*   BILIRUBIN mg/dL  --  0.7   ALK PHOS U/L  --  75   ALT (SGPT) U/L  --  15   AST (SGOT) U/L  --  15   GLUCOSE mg/dL 58* 202*     Results from last 7 days   Lab Units 09/30/19  1914   PH, ARTERIAL pH  units 7.376   PO2 ART mm Hg 77.8*   PCO2, ARTERIAL mm Hg 36.7   HCO3 ART mmol/L 21.5     Results from last 7 days   Lab Units 10/03/19  0326 10/02/19  0158 09/30/19  1804   ALBUMIN g/dL 3.50 3.10* 2.50*     Results from last 7 days   Lab Units 10/01/19  0557 10/01/19  0327 09/30/19  1804   CK TOTAL U/L  --  161 187   TROPONIN I ng/mL 0.140*  --  0.140*         Results from last 7 days   Lab Units 10/04/19  0150   MAGNESIUM mg/dL 1.8     Results from last 7 days   Lab Units 09/30/19  1250   INR  1.09   APTT seconds 23.9*     Results from last 7 days   Lab Units 10/01/19  0327   TSH uIU/mL 0.050*           Meds:   SCHEDULE    budesonide 0.5 mg Nebulization BID - RT   DULoxetine 30 mg Oral Daily   enoxaparin 40 mg Subcutaneous Q24H   gabapentin 300 mg Oral Daily   gabapentin 600 mg Oral Nightly   guaiFENesin 600 mg Oral Q12H   insulin glargine 30 Units Subcutaneous Nightly   insulin lispro 5 Units Subcutaneous TID With Meals   ipratropium-albuterol 3 mL Nebulization Q6H - RT   meropenem 500 mg Intravenous Q8H   metoprolol tartrate 50 mg Oral Q12H   pantoprazole 40 mg Oral QAM   rosuvastatin 10 mg Oral Nightly   sodium chloride 10 mL Intravenous Q12H   theophylline 300 mg Oral Daily   Fluticasone-Umeclidin-Vilant 1 each Inhalation Daily     Infusions     PRNs  ipratropium-albuterol  •  sodium chloride  •  [COMPLETED] Insert peripheral IV **AND** sodium chloride  •  sodium chloride  •  sodium chloride    Physical Exam:  Physical Exam   Cardiovascular:   Murmur heard.  Pulmonary/Chest: No respiratory distress. She has wheezes. She has rales. She exhibits no tenderness.   Abdominal: She exhibits no distension. There is no tenderness.   Musculoskeletal: She exhibits edema.       ROS  Review of Systems   HENT: Positive for congestion. Negative for rhinorrhea and sneezing.    Respiratory: Positive for cough, shortness of breath and wheezing. Negative for apnea, choking, chest tightness and stridor.    Cardiovascular: Positive  for leg swelling.         Critical Care Time greater than: 1 Hour  Total time spent with patient greater than: 1 Hour

## 2019-10-04 NOTE — PLAN OF CARE
Problem: Patient Care Overview  Goal: Plan of Care Review  Outcome: Ongoing (interventions implemented as appropriate)   10/04/19 0810   Coping/Psychosocial   Plan of Care Reviewed With patient   Plan of Care Review   Progress improving   OTHER   Outcome Summary Patient currently pain free. Patient awaiting heart cath planned for Monday due to abnormal myoview.      Goal: Individualization and Mutuality  Outcome: Ongoing (interventions implemented as appropriate)    Goal: Discharge Needs Assessment  Outcome: Ongoing (interventions implemented as appropriate)      Problem: Fall Risk (Adult)  Goal: Identify Related Risk Factors and Signs and Symptoms  Outcome: Outcome(s) achieved Date Met: 10/04/19    Goal: Absence of Fall  Outcome: Ongoing (interventions implemented as appropriate)

## 2019-10-04 NOTE — PROGRESS NOTES
Referring Provider: Hospitalist    Reason for follow-up: Non-STEMI     Patient Care Team:  Deborah Ochoa MD as PCP - General    Subjective .  No chest pain or shortness of breath    Objective  Lying in bed comfortably     Review of Systems   Constitution: Negative for fever and malaise/fatigue.   HENT: Negative for ear pain and nosebleeds.    Eyes: Negative for blurred vision and double vision.   Cardiovascular: Negative for chest pain, dyspnea on exertion and palpitations.   Respiratory: Negative for cough and shortness of breath.    Skin: Negative for rash.   Musculoskeletal: Negative for joint pain.   Gastrointestinal: Negative for abdominal pain, nausea and vomiting.   Neurological: Negative for focal weakness and headaches.   Psychiatric/Behavioral: Negative for depression. The patient is not nervous/anxious.    All other systems reviewed and are negative.      Patient has no known allergies.    Scheduled Meds:    budesonide 0.5 mg Nebulization BID - RT   DULoxetine 30 mg Oral Daily   enoxaparin 40 mg Subcutaneous Q24H   gabapentin 300 mg Oral Daily   gabapentin 600 mg Oral Nightly   guaiFENesin 600 mg Oral Q12H   insulin glargine 30 Units Subcutaneous Nightly   insulin lispro 5 Units Subcutaneous TID With Meals   ipratropium-albuterol 3 mL Nebulization Q6H - RT   meropenem 500 mg Intravenous Q8H   metoprolol tartrate 50 mg Oral Q12H   pantoprazole 40 mg Oral QAM   rosuvastatin 10 mg Oral Nightly   sodium chloride 10 mL Intravenous Q12H   theophylline 300 mg Oral Daily   Fluticasone-Umeclidin-Vilant 1 each Inhalation Daily     Continuous Infusions:     PRN Meds:.ipratropium-albuterol  •  sodium chloride  •  [COMPLETED] Insert peripheral IV **AND** sodium chloride  •  sodium chloride  •  sodium chloride        VITAL SIGNS  Vitals:    10/04/19 0640 10/04/19 0645 10/04/19 1100 10/04/19 1105   BP:   125/74    Patient Position:   Lying    Pulse: 87 91 86 86   Resp: 16 16 16 16   Temp:   98.6 °F (37 °C)   "  TempSrc:   Oral    SpO2: (!) 89% (!) 89% 91%    Weight:       Height:           Flowsheet Rows      First Filed Value   Admission Height  157.5 cm (62\") Documented at 09/30/2019 1219   Admission Weight  68 kg (150 lb) Documented at 09/30/2019 1219           TELEMETRY: Sinus rhythm    Physical Exam:  Physical Exam   Constitutional: She appears well-developed and well-nourished.   HENT:   Head: Normocephalic and atraumatic.   Eyes: Conjunctivae and EOM are normal. Pupils are equal, round, and reactive to light. No scleral icterus.   Neck: Normal range of motion. Neck supple. No JVD present. Carotid bruit is not present.   Cardiovascular: Normal rate, regular rhythm, S1 normal, S2 normal, normal heart sounds and intact distal pulses. PMI is not displaced.   Pulmonary/Chest: Effort normal and breath sounds normal. She has no wheezes. She has no rales.   Abdominal: Soft. Bowel sounds are normal.   Musculoskeletal: Normal range of motion.   Neurological: She is alert. She has normal strength.   No focal deficits   Skin: Skin is warm and dry. No rash noted.   Psychiatric: She has a normal mood and affect.        Results Review:   I reviewed the patient's new clinical results.  Lab Results (last 24 hours)     Procedure Component Value Units Date/Time    Blood Culture - Blood, Arm, Left [[id number]] Collected:  09/30/19 1248    Specimen:  Blood from Arm, Left Updated:  10/04/19 1315     Blood Culture No growth at 4 days    Blood Culture - Blood, Blood, Venous Line [[id number]] Collected:  09/30/19 1250    Specimen:  Blood, Venous Line Updated:  10/04/19 1300     Blood Culture No growth at 4 days    POC Glucose Once [[id number]]  (Abnormal) Collected:  10/04/19 1106    Specimen:  Blood Updated:  10/04/19 1110     Glucose 211 mg/dL      Comment: Serial Number: [id number]:  [id number]       POC Glucose Once [[id number]]  (Normal) Collected:  10/04/19 0801    Specimen:  Blood Updated:  10/04/19 0803     Glucose 94 mg/dL      " Comment: Serial Number: [id number]:  [id number]       POC Glucose Once [[id number]]  (Abnormal) Collected:  10/04/19 0741    Specimen:  Blood Updated:  10/04/19 0742     Glucose 67 mg/dL      Comment: Serial Number: [id number]:  [id number]       POC Glucose Once [[id number]]  (Abnormal) Collected:  10/04/19 0723    Specimen:  Blood Updated:  10/04/19 0725     Glucose 51 mg/dL      Comment: Serial Number: [id number]:  [id number]       POC Glucose Once [[id number]]  (Normal) Collected:  10/04/19 0342    Specimen:  Blood Updated:  10/04/19 0343     Glucose 85 mg/dL      Comment: Serial Number: [id number]:  [id number]       POC Glucose Once [[id number]]  (Abnormal) Collected:  10/04/19 0325    Specimen:  Blood Updated:  10/04/19 0328     Glucose 61 mg/dL      Comment: Serial Number: [id number]:  [id number]       Phosphorus [[id number]]  (Normal) Collected:  10/04/19 0150    Specimen:  Blood Updated:  10/04/19 0323     Phosphorus 3.3 mg/dL     Basic Metabolic Panel [[id number]]  (Abnormal) Collected:  10/04/19 0150    Specimen:  Blood Updated:  10/04/19 0323     Glucose 58 mg/dL      BUN 13 mg/dL      Creatinine 1.40 mg/dL      Sodium 140 mmol/L      Potassium 4.0 mmol/L      Chloride 104 mmol/L      CO2 27.0 mmol/L      Calcium 8.8 mg/dL      eGFR Non African Amer 37 mL/min/1.73      BUN/Creatinine Ratio 9.3     Anion Gap 13.0 mmol/L     Magnesium [[id number]]  (Normal) Collected:  10/04/19 0150    Specimen:  Blood Updated:  10/04/19 0320     Magnesium 1.8 mg/dL     CBC (No Diff) [[id number]]  (Abnormal) Collected:  10/04/19 0149    Specimen:  Blood Updated:  10/04/19 0314     WBC 14.80 10*3/mm3      RBC 4.15 10*6/mm3      Hemoglobin 12.2 g/dL      Hematocrit 37.2 %      MCV 89.7 fL      MCH 29.5 pg      MCHC 32.8 g/dL      RDW 13.8 %      RDW-SD 44.2 fl      MPV 6.9 fL      Platelets 276 10*3/mm3     POC Glucose Once [[id number]]  (Abnormal) Collected:  10/03/19 1906    Specimen:  Blood Updated:   10/03/19 1907     Glucose 187 mg/dL      Comment: Serial Number: [id number]:  [id number]       POC Glucose Once [[id number]]  (Abnormal) Collected:  10/03/19 1626    Specimen:  Blood Updated:  10/03/19 1630     Glucose 249 mg/dL      Comment: Serial Number: [id number]:  [id number]             Imaging Results (last 24 hours)     ** No results found for the last 24 hours. **          EKG      I personally viewed and interpreted the patient's EKG/Telemetry data:    ECHOCARDIOGRAM:    STRESS MYOVIEW:    CARDIAC CATHETERIZATION:    OTHER:         Assessment/Plan     1 sepsis  2.  Urinary tract infection  3.  COPD exacerbation with a left lower lobe pneumonia  4.  Acute renal failure  5.  Diabetes  6.  Non-STEMI    Patient had mild elevation of troponin which could be secondary renal insufficiency versus demand ischemia  Patient has urosepsis which is improving with antibiotics and other treatments including IV fluids  Patient is followed by the nephrologist and her creatinine is better now  Patient is followed by the pulmonologist for COPD and pneumonia  Patient had a stress mostly which is abnormal with anteroapical wall ischemia   Patient will have a cardiac catheterization performed.  Discussed with patient and family about procedure risks and benefits   Patient will need clearance from nephrologist for cardiac catheterization  Patient's  is having coronary artery bypass surgery today and hence will delay the cardiac catheterization by day or 2.    Patient had an echocardiogram for LV function valve abnormalities  Blood pressure and heart rate are stable  I discussed the patients findings and my recommendations with patient and family    Ishan June MD  10/04/19  2:18 PM

## 2019-10-04 NOTE — NURSING NOTE
Dr. June in to see patient, patient's  is currently undergoing CABG. Dr. June gave ok for patient to transport to [facility] to visit  once awake. Patients family at bedside and aware. Will call report to [facility] nurse once patient is able to visit  so she is aware wife is a patient also.

## 2019-10-04 NOTE — PLAN OF CARE
Problem: Patient Care Overview  Goal: Plan of Care Review  Outcome: Ongoing (interventions implemented as appropriate)   10/04/19 3613   Coping/Psychosocial   Plan of Care Reviewed With patient   Plan of Care Review   Progress improving   OTHER   Outcome Summary Pt had myoview yesterday, have not heard any results. Titrating O2; currently on 0.5 liters       Problem: Fall Risk (Adult)  Goal: Identify Related Risk Factors and Signs and Symptoms  Outcome: Ongoing (interventions implemented as appropriate)    Goal: Absence of Fall  Outcome: Ongoing (interventions implemented as appropriate)

## 2019-10-04 NOTE — PROGRESS NOTES
LOS: 4 days   Patient Care Team:  Deborah Ochoa MD as PCP - General    Subjective     Interval History:     Patient Complaints: Has slight nonproductive cough last night.  This morning this has resolved.  Daughter at bedside    History taken from: patient    Review of Systems   Constitutional: Positive for activity change and fatigue.   Respiratory: Positive for cough, chest tightness, shortness of breath and wheezing.    Cardiovascular: Negative for chest pain and palpitations.   Gastrointestinal: Negative for abdominal pain.   Genitourinary: Negative for frequency.   Musculoskeletal: Positive for arthralgias and back pain.   Neurological: Positive for weakness.   Psychiatric/Behavioral: Negative for confusion.           Objective     Vital Signs  Temp:  [97.9 °F (36.6 °C)-98.8 °F (37.1 °C)] 98.8 °F (37.1 °C)  Heart Rate:  [] 91  Resp:  [12-18] 16  BP: (131-150)/(55-74) 138/68    Physical Exam:     General Appearance:    Alert, cooperative, in no acute distress, chronically ill-appearing and appears markedly older than stated age   Head:    Normocephalic, without obvious abnormality, atraumatic   Eyes:            Lids and lashes normal, conjunctivae and sclerae normal, no   icterus, no pallor, corneas clear, PERRLA   Ears:    Ears appear intact with no abnormalities noted   Throat:   No oral lesions, no thrush, oral mucosa moist   Neck:   No adenopathy, supple, trachea midline, no thyromegaly, no   carotid bruit, no JVD   Lungs:    Distant but adequate adventitial flow,respirations regular, scattered wheezes    Heart:    Regular rhythm and normal rate, normal S1 and S2, no            murmur, no gallop, no rub, no click   Chest Wall:    No abnormalities observed   Abdomen:     Normal bowel sounds, no masses, no organomegaly, soft        non-tender, non-distended, no guarding, no rebound                tenderness   Extremities:   Moves all extremities well, LE  Edema present, no cyanosis, no              redness   Pulses:   Pulses palpable and equal bilaterally   Skin:   No bleeding, bruising or rash   Lymph nodes:   No palpable adenopathy   Neurologic:   Cranial nerves 2 - 12 grossly intact, sensation intact, DTR       present and equal bilaterally        Results Review:    Lab Results (last 24 hours)     Procedure Component Value Units Date/Time    POC Glucose Once [[id number]]  (Normal) Collected:  10/04/19 0801    Specimen:  Blood Updated:  10/04/19 0803     Glucose 94 mg/dL      Comment: Serial Number: [id number]:  [id number]       POC Glucose Once [[id number]]  (Abnormal) Collected:  10/04/19 0741    Specimen:  Blood Updated:  10/04/19 0742     Glucose 67 mg/dL      Comment: Serial Number: [id number]:  [id number]       POC Glucose Once [[id number]]  (Abnormal) Collected:  10/04/19 0723    Specimen:  Blood Updated:  10/04/19 0725     Glucose 51 mg/dL      Comment: Serial Number: [id number]:  [id number]       POC Glucose Once [[id number]]  (Normal) Collected:  10/04/19 0342    Specimen:  Blood Updated:  10/04/19 0343     Glucose 85 mg/dL      Comment: Serial Number: [id number]:  [id number]       POC Glucose Once [[id number]]  (Abnormal) Collected:  10/04/19 0325    Specimen:  Blood Updated:  10/04/19 0328     Glucose 61 mg/dL      Comment: Serial Number: [id number]:  [id number]       Phosphorus [[id number]]  (Normal) Collected:  10/04/19 0150    Specimen:  Blood Updated:  10/04/19 0323     Phosphorus 3.3 mg/dL     Basic Metabolic Panel [[id number]]  (Abnormal) Collected:  10/04/19 0150    Specimen:  Blood Updated:  10/04/19 0323     Glucose 58 mg/dL      BUN 13 mg/dL      Creatinine 1.40 mg/dL      Sodium 140 mmol/L      Potassium 4.0 mmol/L      Chloride 104 mmol/L      CO2 27.0 mmol/L      Calcium 8.8 mg/dL      eGFR Non African Amer 37 mL/min/1.73      BUN/Creatinine Ratio 9.3     Anion Gap 13.0 mmol/L     Magnesium [[id number]]  (Normal) Collected:  10/04/19 0150    Specimen:  Blood  Updated:  10/04/19 0320     Magnesium 1.8 mg/dL     CBC (No Diff) [[id number]]  (Abnormal) Collected:  10/04/19 0149    Specimen:  Blood Updated:  10/04/19 0314     WBC 14.80 10*3/mm3      RBC 4.15 10*6/mm3      Hemoglobin 12.2 g/dL      Hematocrit 37.2 %      MCV 89.7 fL      MCH 29.5 pg      MCHC 32.8 g/dL      RDW 13.8 %      RDW-SD 44.2 fl      MPV 6.9 fL      Platelets 276 10*3/mm3     POC Glucose Once [[id number]]  (Abnormal) Collected:  10/03/19 1906    Specimen:  Blood Updated:  10/03/19 1907     Glucose 187 mg/dL      Comment: Serial Number: [id number]:  [id number]       POC Glucose Once [[id number]]  (Abnormal) Collected:  10/03/19 1626    Specimen:  Blood Updated:  10/03/19 1630     Glucose 249 mg/dL      Comment: Serial Number: [id number]:  [id number]       Blood Culture - Blood, Arm, Left [[id number]] Collected:  09/30/19 1248    Specimen:  Blood from Arm, Left Updated:  10/03/19 1315     Blood Culture No growth at 3 days    Blood Culture - Blood, Blood, Venous Line [[id number]] Collected:  09/30/19 1250    Specimen:  Blood, Venous Line Updated:  10/03/19 1300     Blood Culture No growth at 3 days           Imaging Results (last 24 hours)     ** No results found for the last 24 hours. **               I reviewed the patient's new clinical results.    Medication Review:   Scheduled Meds:    budesonide 0.5 mg Nebulization BID - RT   DULoxetine 30 mg Oral Daily   enoxaparin 40 mg Subcutaneous Q24H   gabapentin 300 mg Oral Daily   gabapentin 600 mg Oral Nightly   guaiFENesin 600 mg Oral Q12H   insulin glargine 30 Units Subcutaneous Nightly   insulin lispro 5 Units Subcutaneous TID With Meals   ipratropium-albuterol 3 mL Nebulization Q6H - RT   meropenem 500 mg Intravenous Q8H   metoprolol tartrate 50 mg Oral Q12H   pantoprazole 40 mg Oral QAM   rosuvastatin 10 mg Oral Nightly   sodium chloride 30 mL/kg Intravenous Once   sodium chloride 10 mL Intravenous Q12H   theophylline 300 mg Oral Daily    Fluticasone-Umeclidin-Vilant 1 each Inhalation Daily     Continuous Infusions:   PRN Meds:.ipratropium-albuterol  •  sodium chloride  •  [COMPLETED] Insert peripheral IV **AND** sodium chloride  •  sodium chloride  •  sodium chloride     Assessment/Plan      Abnormal stress Myoview with large area of severe ischemia anterior wall and apex: Further work-up per cardiology.  Suspect patient will most likely need further cardiac intervention.      Sepsis (CMS/HCC)  Urinary tract infection - improving   COPD exacerbation with a left lower lobe pneumonia - pulmonary following.  Restart trelogy.  Much improved today.  Patient will need home nebulizer for albuterol Nebules 4 times daily as needed.  She has no rescue inhalers at home apparently and has been using her [id number] nebulizer kit.   DM2 with CKD3 - insulin dependent   Non-STEMI -  Cardiology following.  Stress test done today.  Results pending   ARF with CKD3 - creatine improving.  Renal following    Plan for disposition: Needs further cardiac intervention for grossly abnormal stress Myoview results.    Lesvia Blanco DO  10/04/19  9:11 AM

## 2019-10-04 NOTE — PROGRESS NOTES
"NEPHROLOGY PROGRESS NOTE    [facility]  [phone]  Espinoza Dunn MD      Patient Care Team:  Deborah Ochoa MD as PCP - General      Provider:  Espinoza Dunn MD  Patient Name: Dyana Montemayor  :  1950    SUBJECTIVE:  F/u ADAMARIS  Patient feeling better. No SOB, CP, dysuria, palpitations.    Medication:    budesonide 0.5 mg Nebulization BID - RT   DULoxetine 30 mg Oral Daily   enoxaparin 40 mg Subcutaneous Q24H   gabapentin 300 mg Oral Daily   gabapentin 600 mg Oral Nightly   guaiFENesin 600 mg Oral Q12H   insulin glargine 30 Units Subcutaneous Nightly   insulin lispro 5 Units Subcutaneous TID With Meals   ipratropium-albuterol 3 mL Nebulization Q6H - RT   iron sucrose 200 mg Intravenous Q24H   meropenem 500 mg Intravenous Q8H   metoprolol tartrate 50 mg Oral Q12H   pantoprazole 40 mg Oral QAM   rosuvastatin 10 mg Oral Nightly   sodium chloride 30 mL/kg Intravenous Once   sodium chloride 10 mL Intravenous Q12H   theophylline 300 mg Oral Daily   Fluticasone-Umeclidin-Vilant 1 each Inhalation Daily          OBJECTIVE    Vital Sign Min/Max for last 24 hours  Temp  Min: 97.9 °F (36.6 °C)  Max: 98.8 °F (37.1 °C)   BP  Min: 131/57  Max: 150/61   Pulse  Min: 84  Max: 117   Resp  Min: 12  Max: 18   SpO2  Min: 89 %  Max: 100 %   No Data Recorded   Weight  Min: 75.3 kg (166 lb 0.1 oz)  Max: 75.3 kg (166 lb 0.1 oz)     Flowsheet Rows      First Filed Value   Admission Height  157.5 cm (62\") Documented at 2019 1219   Admission Weight  68 kg (150 lb) Documented at 2019 1219          No intake/output data recorded.  I/O last 3 completed shifts:  In: 720 [P.O.:720]  Out: 1950 [Urine:1950]    Physical Exam:  General Appearance: alert, appears stated age and cooperative  Head: normocephalic, without obvious abnormality and atraumatic  Eyes: conjunctivae and sclerae normal and no icterus  Neck: supple and no JVD  Lungs: clear to auscultation and respirations regular  Heart: regular rhythm " & normal rate and normal S1, S2 +RADHA  Chest: Wall no abnormalities observed  Abdomen: normal bowel sounds and soft non-tender  Extremities: moves extremities well, no edema, no cyanosis and no redness  Skin: no bleeding, bruising or rash, turgor normal, color normal and no leasions noted  Neurologic: Alert, and oriented. No focal deficits    Labs:    WBC WBC   Date Value Ref Range Status   10/04/2019 14.80 (H) 3.40 - 10.80 10*3/mm3 Final   10/03/2019 13.50 (H) 3.40 - 10.80 10*3/mm3 Final   10/02/2019 11.30 (H) 3.40 - 10.80 10*3/mm3 Final      HGB Hemoglobin   Date Value Ref Range Status   10/04/2019 12.2 12.0 - 15.9 g/dL Final   10/03/2019 11.5 (L) 12.0 - 15.9 g/dL Final   10/02/2019 11.4 (L) 12.0 - 15.9 g/dL Final     Comment:     Result checked       HCT Hematocrit   Date Value Ref Range Status   10/04/2019 37.2 34.0 - 46.6 % Final   10/03/2019 33.6 (L) 34.0 - 46.6 % Final   10/02/2019 35.3 34.0 - 46.6 % Final      Platlets No results found for: LABPLAT   MCV MCV   Date Value Ref Range Status   10/04/2019 89.7 79.0 - 97.0 fL Final   10/03/2019 89.3 79.0 - 97.0 fL Final   10/02/2019 90.8 79.0 - 97.0 fL Final          Sodium Sodium   Date Value Ref Range Status   10/04/2019 140 136 - 144 mmol/L Final   10/03/2019 136 136 - 144 mmol/L Final   10/02/2019 136 136 - 144 mmol/L Final      Potassium Potassium   Date Value Ref Range Status   10/04/2019 4.0 3.6 - 5.1 mmol/L Final   10/03/2019 4.7 3.6 - 5.1 mmol/L Final   10/02/2019 4.7 3.6 - 5.1 mmol/L Final      Chloride Chloride   Date Value Ref Range Status   10/04/2019 104 101 - 111 mmol/L Final   10/03/2019 105 101 - 111 mmol/L Final   10/02/2019 102 101 - 111 mmol/L Final      CO2 CO2   Date Value Ref Range Status   10/04/2019 27.0 22.0 - 32.0 mmol/L Final   10/03/2019 21.0 (L) 22.0 - 32.0 mmol/L Final   10/02/2019 22.0 22.0 - 32.0 mmol/L Final      BUN BUN   Date Value Ref Range Status   10/04/2019 13 8 - 20 mg/dL Final   10/03/2019 17 8 - 20 mg/dL Final   10/02/2019  17 8 - 20 mg/dL Final      Creatinine Creatinine   Date Value Ref Range Status   10/04/2019 1.40 (H) 0.40 - 1.00 mg/dL Final   10/03/2019 1.30 (H) 0.40 - 1.00 mg/dL Final   10/02/2019 1.80 (H) 0.40 - 1.00 mg/dL Final      Calcium Calcium   Date Value Ref Range Status   10/04/2019 8.8 (L) 8.9 - 10.3 mg/dL Final   10/03/2019 8.6 (L) 8.9 - 10.3 mg/dL Final   10/02/2019 8.5 (L) 8.9 - 10.3 mg/dL Final      PO4 No components found for: PO4   Albumin Albumin   Date Value Ref Range Status   10/03/2019 3.50 3.50 - 4.80 g/dL Final   10/02/2019 3.10 (L) 3.50 - 4.80 g/dL Final      Magnesium Magnesium   Date Value Ref Range Status   10/04/2019 1.8 1.8 - 2.5 mg/dL Final   10/03/2019 2.2 1.8 - 2.5 mg/dL Final   10/02/2019 3.0 (H) 1.8 - 2.5 mg/dL Final      Uric Acid No components found for: URIC ACID     Imaging Results (last 72 hours)     Procedure Component Value Units Date/Time    US Renal Bilateral [[id number]] Collected:  10/01/19 1428     Updated:  10/01/19 1436    Narrative:       Examination: US RENAL BILATERAL-     Date of Exam: 10/1/2019 1:56 PM     Indication: ARF/CKD; A41.9-Sepsis, unspecified organism;  I95.9-Hypotension, unspecified; N39.0-Urinary tract infection, site not  specified; R19.7-Diarrhea, unspecified.     Comparison: None available.     Technique: Grayscale and color Doppler ultrasound evaluation of the  kidneys and urinary bladder was performed     Findings:  The right kidney measures 10.7 x 4.9 x 5.2 cm and the left kidney  measures 9.4 x 3.6 x 4.5 cm. Kidney echogenicity and vascularity appear  within normal limits. There is no solid kidney mass.  No echogenic  shadowing stone.  No hydronephrosis.        Limited visualization of the urinary bladder is unremarkable. Bladder  volume is calculated at 83 mL.       Impression:       Kidney ultrasound is within normal limits.     Electronically Signed By-Samir Villaseonr On:10/1/2019 2:29 PM  This report was finalized on [id number] by  Samir Villasenor, .     XR Chest 1 View [[id number]] Collected:  09/30/19 1323     Updated:  09/30/19 1325    Narrative:       DATE OF EXAM:  9/30/2019 12:38 PM     PROCEDURE:  XR CHEST 1 VW-     INDICATIONS:  Severe Sepsis triage protocol     COMPARISON:  August 28, 2018     TECHNIQUE:   Single radiographic AP view of the chest was obtained.     FINDINGS:  Cardiac size is normal and the lungs remain clear.        Impression:       Negative portable chest     Electronically Signed By-Lee Costello On:9/30/2019 1:23 PM  This report was finalized on [id number] by  Lee Costello, .          Results for orders placed during the hospital encounter of 09/30/19   XR Chest 1 View    Narrative DATE OF EXAM:  9/30/2019 12:38 PM     PROCEDURE:  XR CHEST 1 VW-     INDICATIONS:  Severe Sepsis triage protocol     COMPARISON:  August 28, 2018     TECHNIQUE:   Single radiographic AP view of the chest was obtained.     FINDINGS:  Cardiac size is normal and the lungs remain clear.        Impression Negative portable chest     Electronically Signed By-Lee Costello On:9/30/2019 1:23 PM  This report was finalized on [id number] by  Lee Costello, .              ASSESSMENT / PLAN      Sepsis (CMS/[facility])      1. ADAMARIS/CKD3------Nonoliguric. +ARF/ADAMARIS on top of known CRF/CKD STG 3 with a baseline serum creatinine of 1.2. CRF/CKD STG 3 secondary to DGS/HTN NS. +ARF/ADAMARIS is secondary to prerenal state/intravascular volume depletion with concomitant ACE-I/ARB use and some ATN from hypotension. D/C Lisinopril and Losartan.     2. HYPOKALEMIA------Replaced     3. HYPONATREMIA-------Hypotonic and clinically hypovolemic. Give NS.       4. HYPOALBUMINEMIA------IV Albumin to temporize.     5. HYPOMAGNESEMIA------Replaced     6. ACIDOSIS------Type 4 RTA and stool losses. Better     7. DIARRHEA/SEPSIS/LEUKOCYTOSIS-------per , Critical Care. Off BP meds. Cx pending    8. HTN WITH CKD------BP okay      Cr stable  Cath planned for Monday  Will start ivf and mucomyst  Mo Dunn MD  [facility]  [phone]  10/04/19  8:09 AM

## 2019-10-05 LAB
ANION GAP SERPL CALCULATED.3IONS-SCNC: 14 MMOL/L (ref 5–15)
BACTERIA SPEC AEROBE CULT: NORMAL
BACTERIA SPEC AEROBE CULT: NORMAL
BUN BLD-MCNC: 12 MG/DL (ref 8–20)
BUN/CREAT SERPL: 10 (ref 5.4–26.2)
CALCIUM SPEC-SCNC: 8.6 MG/DL (ref 8.9–10.3)
CHLORIDE SERPL-SCNC: 99 MMOL/L (ref 101–111)
CO2 SERPL-SCNC: 28 MMOL/L (ref 22–32)
CREAT BLD-MCNC: 1.2 MG/DL (ref 0.4–1)
GFR SERPL CREATININE-BSD FRML MDRD: 44 ML/MIN/1.73
GLUCOSE BLD-MCNC: 85 MG/DL (ref 65–99)
GLUCOSE BLDC GLUCOMTR-MCNC: 141 MG/DL (ref 70–105)
GLUCOSE BLDC GLUCOMTR-MCNC: 163 MG/DL (ref 70–105)
GLUCOSE BLDC GLUCOMTR-MCNC: 327 MG/DL (ref 70–105)
GLUCOSE BLDC GLUCOMTR-MCNC: 69 MG/DL (ref 70–105)
MAGNESIUM SERPL-MCNC: 1.7 MG/DL (ref 1.8–2.5)
PHOSPHATE SERPL-MCNC: 4.3 MG/DL (ref 2.4–4.7)
POTASSIUM BLD-SCNC: 4 MMOL/L (ref 3.6–5.1)
SODIUM BLD-SCNC: 137 MMOL/L (ref 136–144)

## 2019-10-05 PROCEDURE — 83735 ASSAY OF MAGNESIUM: CPT | Performed by: INTERNAL MEDICINE

## 2019-10-05 PROCEDURE — 63710000001 INSULIN GLARGINE PER 5 UNITS: Performed by: FAMILY MEDICINE

## 2019-10-05 PROCEDURE — 80048 BASIC METABOLIC PNL TOTAL CA: CPT | Performed by: INTERNAL MEDICINE

## 2019-10-05 PROCEDURE — 97116 GAIT TRAINING THERAPY: CPT

## 2019-10-05 PROCEDURE — 84100 ASSAY OF PHOSPHORUS: CPT | Performed by: INTERNAL MEDICINE

## 2019-10-05 PROCEDURE — 94799 UNLISTED PULMONARY SVC/PX: CPT

## 2019-10-05 PROCEDURE — 25010000002 MAGNESIUM SULFATE IN D5W 1G/100ML (PREMIX) 1-5 GM/100ML-% SOLUTION: Performed by: INTERNAL MEDICINE

## 2019-10-05 PROCEDURE — 25010000002 ENOXAPARIN PER 10 MG: Performed by: FAMILY MEDICINE

## 2019-10-05 PROCEDURE — 63710000001 INSULIN LISPRO (HUMAN) PER 5 UNITS: Performed by: FAMILY MEDICINE

## 2019-10-05 PROCEDURE — 82962 GLUCOSE BLOOD TEST: CPT

## 2019-10-05 PROCEDURE — 25010000002 MEROPENEM PER 100 MG: Performed by: INTERNAL MEDICINE

## 2019-10-05 PROCEDURE — 99232 SBSQ HOSP IP/OBS MODERATE 35: CPT | Performed by: INTERNAL MEDICINE

## 2019-10-05 RX ORDER — MAGNESIUM SULFATE 1 G/100ML
1 INJECTION INTRAVENOUS ONCE
Status: COMPLETED | OUTPATIENT
Start: 2019-10-05 | End: 2019-10-05

## 2019-10-05 RX ORDER — ALPRAZOLAM 0.5 MG/1
0.5 TABLET ORAL 3 TIMES DAILY PRN
Status: DISCONTINUED | OUTPATIENT
Start: 2019-10-05 | End: 2019-10-09 | Stop reason: HOSPADM

## 2019-10-05 RX ADMIN — GABAPENTIN 600 MG: 300 CAPSULE ORAL at 20:39

## 2019-10-05 RX ADMIN — GUAIFENESIN 600 MG: 600 TABLET, EXTENDED RELEASE ORAL at 08:01

## 2019-10-05 RX ADMIN — METOPROLOL TARTRATE 50 MG: 50 TABLET, FILM COATED ORAL at 20:39

## 2019-10-05 RX ADMIN — MEROPENEM 500 MG: 500 INJECTION, POWDER, FOR SOLUTION INTRAVENOUS at 11:00

## 2019-10-05 RX ADMIN — ALPRAZOLAM 0.5 MG: 0.5 TABLET ORAL at 16:22

## 2019-10-05 RX ADMIN — DULOXETINE HYDROCHLORIDE 30 MG: 30 CAPSULE, DELAYED RELEASE ORAL at 08:01

## 2019-10-05 RX ADMIN — IPRATROPIUM BROMIDE AND ALBUTEROL SULFATE 3 ML: .5; 3 SOLUTION RESPIRATORY (INHALATION) at 00:15

## 2019-10-05 RX ADMIN — INSULIN LISPRO 3 UNITS: 100 INJECTION, SOLUTION INTRAVENOUS; SUBCUTANEOUS at 12:06

## 2019-10-05 RX ADMIN — INSULIN LISPRO 5 UNITS: 100 INJECTION, SOLUTION INTRAVENOUS; SUBCUTANEOUS at 16:48

## 2019-10-05 RX ADMIN — ALPRAZOLAM 0.5 MG: 0.5 TABLET ORAL at 08:31

## 2019-10-05 RX ADMIN — ROSUVASTATIN CALCIUM 10 MG: 10 TABLET, FILM COATED ORAL at 20:37

## 2019-10-05 RX ADMIN — Medication 10 ML: at 04:12

## 2019-10-05 RX ADMIN — ENOXAPARIN SODIUM 40 MG: 40 INJECTION SUBCUTANEOUS at 16:22

## 2019-10-05 RX ADMIN — MEROPENEM 500 MG: 500 INJECTION, POWDER, FOR SOLUTION INTRAVENOUS at 04:12

## 2019-10-05 RX ADMIN — THEOPHYLLINE ANHYDROUS 300 MG: 300 CAPSULE, EXTENDED RELEASE ORAL at 08:01

## 2019-10-05 RX ADMIN — Medication 10 ML: at 19:36

## 2019-10-05 RX ADMIN — GUAIFENESIN 600 MG: 600 TABLET, EXTENDED RELEASE ORAL at 20:37

## 2019-10-05 RX ADMIN — METOPROLOL TARTRATE 50 MG: 50 TABLET, FILM COATED ORAL at 08:01

## 2019-10-05 RX ADMIN — MAGNESIUM SULFATE HEPTAHYDRATE 1 G: 1 INJECTION, SOLUTION INTRAVENOUS at 14:28

## 2019-10-05 RX ADMIN — INSULIN GLARGINE 30 UNITS: 100 INJECTION, SOLUTION SUBCUTANEOUS at 21:13

## 2019-10-05 RX ADMIN — IPRATROPIUM BROMIDE AND ALBUTEROL SULFATE 3 ML: .5; 3 SOLUTION RESPIRATORY (INHALATION) at 15:54

## 2019-10-05 RX ADMIN — GABAPENTIN 300 MG: 300 CAPSULE ORAL at 08:01

## 2019-10-05 RX ADMIN — MEROPENEM 500 MG: 500 INJECTION, POWDER, FOR SOLUTION INTRAVENOUS at 19:36

## 2019-10-05 RX ADMIN — PANTOPRAZOLE SODIUM 40 MG: 40 TABLET, DELAYED RELEASE ORAL at 06:35

## 2019-10-05 RX ADMIN — Medication 10 ML: at 08:01

## 2019-10-05 NOTE — PLAN OF CARE
Problem: Patient Care Overview  Goal: Plan of Care Review  Outcome: Ongoing (interventions implemented as appropriate)   10/05/19 9740   Coping/Psychosocial   Plan of Care Reviewed With patient;family   Plan of Care Review   Progress no change   OTHER   Outcome Summary Patient had no complaints of pain today, still plan for cardiac cath on Monday. Patient made multiple visits to see her  in [facility] that suffered a stroke after CABG. Patient very tearful today, started on Xanax PRN for anxiety.     Goal: Individualization and Mutuality  Outcome: Ongoing (interventions implemented as appropriate)    Goal: Discharge Needs Assessment  Outcome: Ongoing (interventions implemented as appropriate)    Goal: Interprofessional Rounds/Family Conf  Outcome: Ongoing (interventions implemented as appropriate)      Problem: Fall Risk (Adult)  Goal: Absence of Fall  Outcome: Ongoing (interventions implemented as appropriate)      Problem: Breathing Pattern Ineffective (Adult)  Goal: Effective Oxygenation/Ventilation  Outcome: Ongoing (interventions implemented as appropriate)    Goal: Anxiety/Fear Reduction  Outcome: Ongoing (interventions implemented as appropriate)

## 2019-10-05 NOTE — PLAN OF CARE
Problem: Patient Care Overview  Goal: Plan of Care Review  Outcome: Ongoing (interventions implemented as appropriate)   10/05/19 0677   Coping/Psychosocial   Plan of Care Reviewed With patient;family   Plan of Care Review   Progress improving   OTHER   Outcome Summary patient seems to know her limits. Concerned about her  but was agreeable to amb in pm. SHould do well at home with HH to f/u. Heart cath supp to be Monday but may postpone depending on

## 2019-10-05 NOTE — THERAPY TREATMENT NOTE
Acute Care - Physical Therapy Treatment Note   Romie     Patient Name: Dyana Montemayor  : 1950  MRN: [id number]  Today's Date: 10/5/2019             Admit Date: 2019    Visit Dx:    ICD-10-CM ICD-9-CM   1. Sepsis, due to unspecified organism A41.9 038.9     995.91   2. Hypotension, unspecified hypotension type I95.9 458.9   3. Urinary tract infection without hematuria, site unspecified N39.0 599.0   4. Diarrhea of presumed infectious origin R19.7 009.3     Patient Active Problem List   Diagnosis   • Sepsis (CMS/[facility])       Therapy Treatment    Rehabilitation Treatment Summary     Row Name 10/05/19 1731             Treatment Time/Intention    Discipline  physical therapy assistant  -      Document Type  therapy note (daily note)  -MC      Subjective Information  no complaints  -      Mode of Treatment  physical therapy  -      Therapy Frequency (PT Clinical Impression)  3 times/wk  -      Comment  --  may transfer to [facility],pt supp to have Cath  -      Reason Treatment Not Performed  -- pt stated  stroked after CABG and they may move him  -MC      Recorded by [] Misty Stone, PTA 10/05/19 1754      Row Name 10/05/19 1731             Vital Signs    Pretreatment Heart Rate (beats/min)  -- 109  -      Posttreatment Heart Rate (beats/min)  -- 114  -MC      O2 Delivery Pre Treatment  -- room air  -      Intra SpO2 (%)  -- 85  -MC      Post SpO2 (%)  -- 90, pt stated as long at 88 or >  -MC      Pre Patient Position  -- supine  -      Post Patient Position  -- supine  -MC      Recorded by [] Misty Stone, PTA 10/05/19 1754      Row Name 10/05/19 1731             Bed Mobility Assessment/Treatment    Bed Mobility Assessment/Treatment  bed mobility (all) activities  -      [place] Level (Bed Mobility)  independent  -MC      Recorded by [] Misty Stone PTA 10/05/19 1754      Row Name 10/05/19 1731             Sit-Stand Transfer    Sit-Stand  [place] (Transfers)  independent  -MC      Recorded by [] Misty Stone, [facility] 10/05/19 1754      Row Name 10/05/19 1731             Gait/Stairs Assessment/Training    [place] Level (Gait)  supervision  -MC      Distance in Feet (Gait)  -- 350  -MC      Pattern (Gait)  -- no LOB noted, did not challenge pt  -MC      Recorded by [] Misty Stone, [facility] 10/05/19 1754      Row Name 10/05/19 1731             Pain Assessment    Additional Documentation  -- no c/o  -MC      Recorded by [] Misty Stone, [facility] 10/05/19 1754      Row Name 10/05/19 1731             Outcome Summary/Treatment Plan (PT)    Anticipated Discharge Disposition (PT)  home with home health  -MC      Recorded by [] Misty Stone, [facility] 10/05/19 1754        User Key  (r) = Recorded By, (t) = Taken By, (c) = Cosigned By    Initials Name Effective Dates Discipline     Misty Stone PTA 03/01/19 -  PT                   Physical Therapy Education     Title: PT OT SLP Therapies (Done)     Topic: Physical Therapy (Done)     Point: Mobility training (Done)     Learning Progress Summary           Patient Acceptance, E, VU by  at 10/5/2019  5:55 PM    Acceptance, E, DU by  at 10/3/2019  2:41 PM    Acceptance, E, NR by  at 10/2/2019 12:15 PM   Family Acceptance, E, VU by  at 10/5/2019  5:55 PM                   Point: Home exercise program (Done)     Learning Progress Summary           Patient Acceptance, E, VU by  at 10/5/2019  5:55 PM    Acceptance, E, NR by  at 10/2/2019 12:15 PM   Family Acceptance, E, VU by  at 10/5/2019  5:55 PM                               User Key     Initials Effective Dates Name Provider Type Discipline     03/01/19 -  Reyes, Carmela, PT Physical Therapist PT     03/01/19 -  Misty Stone PTA Physical Therapy Assistant PT                PT Recommendation and Plan  Anticipated Discharge Disposition (PT): home with home health  Therapy Frequency (PT Clinical Impression): 3  times/wk  Outcome Summary/Treatment Plan (PT)  Anticipated Discharge Disposition (PT): home with home health  Plan of Care Reviewed With: patient, family  Progress: improving  Outcome Summary: patient seems to know her limits. Concerned about her  but was agreeable to amb in pm. SHould do well at home with HH to f/u. Heart cath supp to be Monday but may postpone depending on      Time Calculation:   PT Charges     Row Name 10/05/19 1756 10/05/19 0826          Time Calculation    Start Time  1731  -  --     Stop Time  1741  -  --     Time Calculation (min)  10 min  -  --     PT Received On  10/05/19  -  --     PT - Next Appointment  10/07/19  -  10/06/19  -        Time Calculation- PT    Total Timed Code Minutes- PT  10 minute(s)  -  --        Timed Charges    56726 - Gait Training Minutes   10  -MC  --       User Key  (r) = Recorded By, (t) = Taken By, (c) = Cosigned By    Initials Name Provider Type     Misty Stone PTA Physical Therapy Assistant     Doreen Yadav PTA Physical Therapy Assistant        Therapy Charges for Today     Code Description Service Date Service Provider Modifiers Qty    51574709480 HC GAIT TRAINING EA 15 MIN 10/5/2019 Misty Stone PTA GP 1          PT G-Codes  Outcome Measure Options: AM-PAC 6 Clicks Basic Mobility (PT)  AM-PAC 6 Clicks Score (PT): 23    Misty Stone PTA  10/5/2019

## 2019-10-05 NOTE — PROGRESS NOTES
ICU Daily Progress Note        Sepsis (CMS/[facility])      Assessment/Plan   Dyspnea improving   Urosepsis improving  Shock, septic resolved  Diarrhea, ? Hypovolemia  Possible left lower lobe pneumonia  Elevated troponin rule out acute coronary syndrome  Acute kidney injury elevated creatinine  COPD patient use trelegy inhaler at home  Diabetes   no significant obstructive sleep apnea based on home sleep study  Smoker less than pack a day for for 56 years  History of high altitude pulmonary edema        Plan  Antibiotics meropenem  Bronchodilators  Oxygen titration  DVT prophylaxis  GI prophylaxis  Glycemic control             LOS: 5 days         Vital signs for last 24 hours:  Vitals:    10/05/19 0551 10/05/19 0607 10/05/19 0801 10/05/19 1105   BP:  131/50 180/76 127/47   Pulse:   95 76   Resp:  20  12   Temp:  98.2 °F (36.8 °C)  98 °F (36.7 °C)   TempSrc:    Oral   SpO2:    96%   Weight: 74.5 kg (164 lb 3.9 oz)      Height:           Intake/Output last 3 shifts:  I/O last 3 completed shifts:  In: 1540 [P.O.:1440; IV Piggyback:100]  Out: 3200 [Urine:3200]  Intake/Output this shift:  I/O this shift:  In: 420 [P.O.:420]  Out: -     Vent settings for last 24 hours:       Hemodynamic parameters for last 24 hours:       Radiology  Imaging Results (last 24 hours)     ** No results found for the last 24 hours. **          Labs:  Results from last 7 days   Lab Units 10/04/19  0149   WBC 10*3/mm3 14.80*   HEMOGLOBIN g/dL 12.2   HEMATOCRIT % 37.2   PLATELETS 10*3/mm3 276     Results from last 7 days   Lab Units 10/05/19  0349  10/03/19  0326   SODIUM mmol/L 137   < > 136   POTASSIUM mmol/L 4.0   < > 4.7   CHLORIDE mmol/L 99*   < > 105   CO2 mmol/L 28.0   < > 21.0*   BUN mg/dL 12   < > 17   CREATININE mg/dL 1.20*   < > 1.30*   CALCIUM mg/dL 8.6*   < > 8.6*   BILIRUBIN mg/dL  --   --  0.7   ALK PHOS U/L  --   --  75   ALT (SGPT) U/L  --   --  15   AST (SGOT) U/L  --   --  15   GLUCOSE mg/dL 85   < > 202*    < > = values in this  interval not displayed.     Results from last 7 days   Lab Units 09/30/19  1914   PH, ARTERIAL pH units 7.376   PO2 ART mm Hg 77.8*   PCO2, ARTERIAL mm Hg 36.7   HCO3 ART mmol/L 21.5     Results from last 7 days   Lab Units 10/03/19  0326 10/02/19  0158 09/30/19  1804   ALBUMIN g/dL 3.50 3.10* 2.50*     Results from last 7 days   Lab Units 10/01/19  0557 10/01/19  0327 09/30/19  1804   CK TOTAL U/L  --  161 187   TROPONIN I ng/mL 0.140*  --  0.140*         Results from last 7 days   Lab Units 10/05/19  0349   MAGNESIUM mg/dL 1.7*     Results from last 7 days   Lab Units 09/30/19  1250   INR  1.09   APTT seconds 23.9*     Results from last 7 days   Lab Units 10/01/19  0327   TSH uIU/mL 0.050*           Meds:   SCHEDULE    budesonide 0.5 mg Nebulization BID - RT   DULoxetine 30 mg Oral Daily   enoxaparin 40 mg Subcutaneous Q24H   gabapentin 300 mg Oral Daily   gabapentin 600 mg Oral Nightly   guaiFENesin 600 mg Oral Q12H   insulin glargine 30 Units Subcutaneous Nightly   insulin lispro 5 Units Subcutaneous TID With Meals   ipratropium-albuterol 3 mL Nebulization Q6H - RT   meropenem 500 mg Intravenous Q8H   metoprolol tartrate 50 mg Oral Q12H   pantoprazole 40 mg Oral QAM   rosuvastatin 10 mg Oral Nightly   sodium chloride 10 mL Intravenous Q12H   theophylline 300 mg Oral Daily   Fluticasone-Umeclidin-Vilant 1 each Inhalation Daily     Infusions     PRNs  •  ALPRAZolam  •  ipratropium-albuterol  •  sodium chloride  •  [COMPLETED] Insert peripheral IV **AND** sodium chloride  •  sodium chloride  •  sodium chloride    Physical Exam:  Physical Exam   Cardiovascular:   Murmur heard.  Pulmonary/Chest: No respiratory distress. She has wheezes. She has rales. She exhibits no tenderness.   Abdominal: She exhibits no distension. There is no tenderness.   Musculoskeletal: She exhibits edema.       ROS  Review of Systems   HENT: Positive for congestion. Negative for rhinorrhea and sneezing.    Respiratory: Positive for cough,  shortness of breath and wheezing. Negative for apnea, choking, chest tightness and stridor.    Cardiovascular: Positive for leg swelling.         Critical Care Time greater than: 1 Hour  Total time spent with patient greater than: 1 Hour

## 2019-10-05 NOTE — PROGRESS NOTES
Referring Provider: Hospitalist    Reason for follow-up: Non-STEMI     Patient Care Team:  Deborah Ochoa MD as PCP - General    Subjective .  No chest pain or shortness of breath    Objective  Lying in bed comfortably     Review of Systems   Constitution: Negative for fever and malaise/fatigue.   HENT: Negative for ear pain and nosebleeds.    Eyes: Negative for blurred vision and double vision.   Cardiovascular: Negative for chest pain, dyspnea on exertion and palpitations.   Respiratory: Negative for cough and shortness of breath.    Skin: Negative for rash.   Musculoskeletal: Negative for joint pain.   Gastrointestinal: Negative for abdominal pain, nausea and vomiting.   Neurological: Negative for focal weakness and headaches.   Psychiatric/Behavioral: Negative for depression. The patient is not nervous/anxious.    All other systems reviewed and are negative.      Patient has no known allergies.    Scheduled Meds:    budesonide 0.5 mg Nebulization BID - RT   DULoxetine 30 mg Oral Daily   enoxaparin 40 mg Subcutaneous Q24H   gabapentin 300 mg Oral Daily   gabapentin 600 mg Oral Nightly   guaiFENesin 600 mg Oral Q12H   insulin glargine 30 Units Subcutaneous Nightly   insulin lispro 5 Units Subcutaneous TID With Meals   ipratropium-albuterol 3 mL Nebulization Q6H - RT   magnesium sulfate 1 g Intravenous Once   meropenem 500 mg Intravenous Q8H   metoprolol tartrate 50 mg Oral Q12H   pantoprazole 40 mg Oral QAM   rosuvastatin 10 mg Oral Nightly   sodium chloride 10 mL Intravenous Q12H   theophylline 300 mg Oral Daily   Fluticasone-Umeclidin-Vilant 1 each Inhalation Daily     Continuous Infusions:     PRN Meds:.•  ALPRAZolam  •  ipratropium-albuterol  •  sodium chloride  •  [COMPLETED] Insert peripheral IV **AND** sodium chloride  •  sodium chloride  •  sodium chloride        VITAL SIGNS  Vitals:    10/05/19 0551 10/05/19 0607 10/05/19 0801 10/05/19 1105   BP:  131/50 180/76 127/47   Pulse:   95 76   Resp:  20  12  "  Temp:  98.2 °F (36.8 °C)  98 °F (36.7 °C)   TempSrc:    Oral   SpO2:    96%   Weight: 74.5 kg (164 lb 3.9 oz)      Height:           Flowsheet Rows      First Filed Value   Admission Height  157.5 cm (62\") Documented at 09/30/2019 1219   Admission Weight  68 kg (150 lb) Documented at 09/30/2019 1219           TELEMETRY: Sinus rhythm    Physical Exam:  Physical Exam   Constitutional: She appears well-developed and well-nourished.   HENT:   Head: Normocephalic and atraumatic.   Eyes: Conjunctivae and EOM are normal. Pupils are equal, round, and reactive to light. No scleral icterus.   Neck: Normal range of motion. Neck supple. No JVD present. Carotid bruit is not present.   Cardiovascular: Normal rate, regular rhythm, S1 normal, S2 normal, normal heart sounds and intact distal pulses. PMI is not displaced.   Pulmonary/Chest: Effort normal and breath sounds normal. She has no wheezes. She has no rales.   Abdominal: Soft. Bowel sounds are normal.   Musculoskeletal: Normal range of motion.   Neurological: She is alert. She has normal strength.   No focal deficits   Skin: Skin is warm and dry. No rash noted.   Psychiatric: She has a normal mood and affect.        Results Review:   I reviewed the patient's new clinical results.  Lab Results (last 24 hours)     Procedure Component Value Units Date/Time    POC Glucose Once [[id number]]  (Abnormal) Collected:  10/05/19 1148    Specimen:  Blood Updated:  10/05/19 1151     Glucose 163 mg/dL      Comment: Serial Number: [id number]:  [id number]       POC Glucose Once [[id number]]  (Abnormal) Collected:  10/05/19 0723    Specimen:  Blood Updated:  10/05/19 0725     Glucose 69 mg/dL      Comment: Serial Number: [id number]:  [id number]       Phosphorus [[id number]]  (Normal) Collected:  10/05/19 0349    Specimen:  Blood Updated:  10/05/19 0437     Phosphorus 4.3 mg/dL     Basic Metabolic Panel [[id number]]  (Abnormal) Collected:  10/05/19 0349    Specimen:  Blood Updated:  " 10/05/19 0434     Glucose 85 mg/dL      BUN 12 mg/dL      Creatinine 1.20 mg/dL      Sodium 137 mmol/L      Potassium 4.0 mmol/L      Chloride 99 mmol/L      CO2 28.0 mmol/L      Calcium 8.6 mg/dL      eGFR Non African Amer 44 mL/min/1.73      BUN/Creatinine Ratio 10.0     Anion Gap 14.0 mmol/L     Magnesium [[id number]]  (Abnormal) Collected:  10/05/19 0349    Specimen:  Blood Updated:  10/05/19 0434     Magnesium 1.7 mg/dL     POC Glucose Once [[id number]]  (Abnormal) Collected:  10/04/19 2029    Specimen:  Blood Updated:  10/04/19 2031     Glucose 244 mg/dL      Comment: Serial Number: [id number]:  [id number]       POC Glucose Once [[id number]]  (Abnormal) Collected:  10/04/19 1631    Specimen:  Blood Updated:  10/04/19 1632     Glucose 146 mg/dL      Comment: Serial Number: [id number]:  [id number]       Blood Culture - Blood, Arm, Left [[id number]] Collected:  09/30/19 1248    Specimen:  Blood from Arm, Left Updated:  10/04/19 1315     Blood Culture No growth at 4 days    Blood Culture - Blood, Blood, Venous Line [[id number]] Collected:  09/30/19 1250    Specimen:  Blood, Venous Line Updated:  10/04/19 1300     Blood Culture No growth at 4 days          Imaging Results (last 24 hours)     ** No results found for the last 24 hours. **          EKG      I personally viewed and interpreted the patient's EKG/Telemetry data:    ECHOCARDIOGRAM:    STRESS MYOVIEW:    CARDIAC CATHETERIZATION:    OTHER:         Assessment/Plan     1 sepsis  2.  Urinary tract infection  3.  COPD exacerbation with a left lower lobe pneumonia  4.  Acute renal failure  5.  Diabetes  6.  Non-STEMI    Patient had mild elevation of troponin which could be secondary renal insufficiency versus demand ischemia  Patient has urosepsis which is improving with antibiotics and other treatments including IV fluids  Patient is followed by the nephrologist and her creatinine is better now  Patient is followed by the pulmonologist for COPD and  pneumonia  Patient had a stress mostly which is abnormal with anteroapical wall ischemia   Patient will have a cardiac catheterization performed.  Discussed with patient and family about procedure risks and benefits   Patient will need clearance from nephrologist for cardiac catheterization  Patient's  had coronary bypass surgery yesterday and had complication with the massive stroke  Patient had an echocardiogram for LV function valve abnormalities  Blood pressure and heart rate are stable  I discussed the patients findings and my recommendations with patient and family    Ishan June MD  10/05/19  12:36 PM

## 2019-10-05 NOTE — PLAN OF CARE
Problem: Patient Care Overview  Goal: Plan of Care Review  Outcome: Ongoing (interventions implemented as appropriate)   10/04/19 3852   Plan of Care Review   Progress improving   OTHER   Outcome Summary patient is scheduled for cardiac cath on monday-        Problem: Breathing Pattern Ineffective (Adult)  Goal: Identify Related Risk Factors and Signs and Symptoms  Outcome: Ongoing (interventions implemented as appropriate)    Goal: Effective Oxygenation/Ventilation  Outcome: Ongoing (interventions implemented as appropriate)    Goal: Anxiety/Fear Reduction  Outcome: Ongoing (interventions implemented as appropriate)

## 2019-10-05 NOTE — PROGRESS NOTES
LOS: 5 days   Patient Care Team:  Deborah Ochoa MD as PCP - General    Subjective     Interval History:     Patient Complaints: Tearful and anxious today.   in the ICU after CABG x3 and unfortunately has had a stroke.  Patient obviously very worried about him and quite upset.  History taken from: patient    Review of Systems   Constitutional: Positive for activity change and fatigue.   Respiratory: Positive for cough, shortness of breath and wheezing.    Cardiovascular: Negative for chest pain and palpitations.   Gastrointestinal: Negative for abdominal pain.   Genitourinary: Negative for frequency.   Musculoskeletal: Positive for arthralgias and back pain.   Neurological: Positive for weakness.   Psychiatric/Behavioral: Positive for dysphoric mood. Negative for confusion.           Objective     Vital Signs  Temp:  [97.5 °F (36.4 °C)-98.6 °F (37 °C)] 98.2 °F (36.8 °C)  Heart Rate:  [] 95  Resp:  [16-22] 20  BP: (127-180)/(50-79) 180/76    Physical Exam:     General Appearance:   Anxious and tearful chronically ill-appearing and appears markedly older than stated age   Head:    Normocephalic, without obvious abnormality, atraumatic   Eyes:            Lids and lashes normal, conjunctivae and sclerae normal, no   icterus, no pallor, corneas clear, PERRLA   Ears:    Ears appear intact with no abnormalities noted   Throat:   No oral lesions, no thrush, oral mucosa moist   Neck:   No adenopathy, supple, trachea midline, no thyromegaly, no   carotid bruit, no JVD   Lungs:    Distant but adequate adventitial flow,respirations regular, scattered wheezes    Heart:    Regular rhythm and normal rate, normal S1 and S2, no            murmur, no gallop, no rub, no click   Chest Wall:    No abnormalities observed   Abdomen:     Normal bowel sounds, no masses, no organomegaly, soft        non-tender, non-distended, no guarding, no rebound                tenderness   Extremities:   Moves all extremities well, LE   Edema present, no cyanosis, no             redness   Pulses:   Pulses palpable and equal bilaterally   Skin:   No bleeding, bruising or rash   Lymph nodes:   No palpable adenopathy   Neurologic:   Cranial nerves 2 - 12 grossly intact, sensation intact, DTR       present and equal bilaterally        Results Review:    Lab Results (last 24 hours)     Procedure Component Value Units Date/Time    POC Glucose Once [[id number]]  (Abnormal) Collected:  10/05/19 0723    Specimen:  Blood Updated:  10/05/19 0725     Glucose 69 mg/dL      Comment: Serial Number: [id number]:  [id number]       Phosphorus [[id number]]  (Normal) Collected:  10/05/19 0349    Specimen:  Blood Updated:  10/05/19 0437     Phosphorus 4.3 mg/dL     Basic Metabolic Panel [[id number]]  (Abnormal) Collected:  10/05/19 0349    Specimen:  Blood Updated:  10/05/19 0434     Glucose 85 mg/dL      BUN 12 mg/dL      Creatinine 1.20 mg/dL      Sodium 137 mmol/L      Potassium 4.0 mmol/L      Chloride 99 mmol/L      CO2 28.0 mmol/L      Calcium 8.6 mg/dL      eGFR Non African Amer 44 mL/min/1.73      BUN/Creatinine Ratio 10.0     Anion Gap 14.0 mmol/L     Magnesium [[id number]]  (Abnormal) Collected:  10/05/19 0349    Specimen:  Blood Updated:  10/05/19 0434     Magnesium 1.7 mg/dL     POC Glucose Once [[id number]]  (Abnormal) Collected:  10/04/19 2029    Specimen:  Blood Updated:  10/04/19 2031     Glucose 244 mg/dL      Comment: Serial Number: [id number]:  [id number]       POC Glucose Once [[id number]]  (Abnormal) Collected:  10/04/19 1631    Specimen:  Blood Updated:  10/04/19 1632     Glucose 146 mg/dL      Comment: Serial Number: [id number]:  [id number]       Blood Culture - Blood, Arm, Left [[id number]] Collected:  09/30/19 1248    Specimen:  Blood from Arm, Left Updated:  10/04/19 1315     Blood Culture No growth at 4 days    Blood Culture - Blood, Blood, Venous Line [[id number]] Collected:  09/30/19 1250    Specimen:  Blood, Venous Line Updated:   10/04/19 1300     Blood Culture No growth at 4 days           Imaging Results (last 24 hours)     ** No results found for the last 24 hours. **               I reviewed the patient's new clinical results.    Medication Review:   Scheduled Meds:    budesonide 0.5 mg Nebulization BID - RT   DULoxetine 30 mg Oral Daily   enoxaparin 40 mg Subcutaneous Q24H   gabapentin 300 mg Oral Daily   gabapentin 600 mg Oral Nightly   guaiFENesin 600 mg Oral Q12H   insulin glargine 30 Units Subcutaneous Nightly   insulin lispro 5 Units Subcutaneous TID With Meals   ipratropium-albuterol 3 mL Nebulization Q6H - RT   meropenem 500 mg Intravenous Q8H   metoprolol tartrate 50 mg Oral Q12H   pantoprazole 40 mg Oral QAM   rosuvastatin 10 mg Oral Nightly   sodium chloride 10 mL Intravenous Q12H   theophylline 300 mg Oral Daily   Fluticasone-Umeclidin-Vilant 1 each Inhalation Daily     Continuous Infusions:   PRN Meds:.•  ALPRAZolam  •  ipratropium-albuterol  •  sodium chloride  •  [COMPLETED] Insert peripheral IV **AND** sodium chloride  •  sodium chloride  •  sodium chloride     Assessment/Plan      Abnormal stress Myoview with large area of severe ischemia anterior wall and apex: Further work-up per cardiology.  Cath planned in 2 days.  Patient would like to delay this for 2 days so that she can follow along with her 's progress.    Reactive anxiety secondary to his spouse's severe illness.-Start PRN Xanax      Sepsis (CMS/HCC)  Urinary tract infection - improving   COPD exacerbation with a left lower lobe pneumonia - pulmonary following.  Restart trelogy.  Much improved today.  Patient will need home nebulizer for albuterol Nebules 4 times daily as needed.  She has no rescue inhalers at home apparently and has been using her grandsons nebulizer kit.   DM2 with CKD3 - insulin dependent   Non-STEMI -  Cardiology following.     ARF with CKD3 - creatine improving.  Renal following    Plan for disposition: Pending results of cardiac  intervention    Lesvia Blanco DO  10/05/19  11:16 AM

## 2019-10-05 NOTE — PROGRESS NOTES
"NEPHROLOGY PROGRESS NOTE    [facility]  [phone]  Espinoza Dunn MD      Patient Care Team:  Deborah Ochoa MD as PCP - General      Provider:  Espinoza Dunn MD  Patient Name: Dyana Montemayor  :  1950    SUBJECTIVE:  F/u ADAMARIS  Patient feeling better. No SOB, CP, dysuria, palpitations.    Medication:    budesonide 0.5 mg Nebulization BID - RT   DULoxetine 30 mg Oral Daily   enoxaparin 40 mg Subcutaneous Q24H   gabapentin 300 mg Oral Daily   gabapentin 600 mg Oral Nightly   guaiFENesin 600 mg Oral Q12H   insulin glargine 30 Units Subcutaneous Nightly   insulin lispro 5 Units Subcutaneous TID With Meals   ipratropium-albuterol 3 mL Nebulization Q6H - RT   meropenem 500 mg Intravenous Q8H   metoprolol tartrate 50 mg Oral Q12H   pantoprazole 40 mg Oral QAM   rosuvastatin 10 mg Oral Nightly   sodium chloride 10 mL Intravenous Q12H   theophylline 300 mg Oral Daily   Fluticasone-Umeclidin-Vilant 1 each Inhalation Daily          OBJECTIVE    Vital Sign Min/Max for last 24 hours  Temp  Min: 97.5 °F (36.4 °C)  Max: 98.6 °F (37 °C)   BP  Min: 125/74  Max: 143/79   Pulse  Min: 81  Max: 107   Resp  Min: 16  Max: 22   SpO2  Min: 91 %  Max: 98 %   No Data Recorded   Weight  Min: 74.5 kg (164 lb 3.9 oz)  Max: 74.5 kg (164 lb 3.9 oz)     Flowsheet Rows      First Filed Value   Admission Height  157.5 cm (62\") Documented at 2019 1219   Admission Weight  68 kg (150 lb) Documented at 2019 1219          I/O this shift:  In: -   Out: 700 [Urine:700]  I/O last 3 completed shifts:  In: 1540 [P.O.:1440; IV Piggyback:100]  Out: 2500 [Urine:2500]    Physical Exam:  General Appearance: alert, appears stated age and cooperative  Head: normocephalic, without obvious abnormality and atraumatic  Eyes: conjunctivae and sclerae normal and no icterus  Neck: supple and no JVD  Lungs: clear to auscultation and respirations regular  Heart: regular rhythm & normal rate and normal S1, S2 +RADHA  Chest: Wall " no abnormalities observed  Abdomen: normal bowel sounds and soft non-tender  Extremities: moves extremities well, no edema, no cyanosis and no redness  Skin: no bleeding, bruising or rash, turgor normal, color normal and no leasions noted  Neurologic: Alert, and oriented. No focal deficits    Labs:    WBC WBC   Date Value Ref Range Status   10/04/2019 14.80 (H) 3.40 - 10.80 10*3/mm3 Final   10/03/2019 13.50 (H) 3.40 - 10.80 10*3/mm3 Final      HGB Hemoglobin   Date Value Ref Range Status   10/04/2019 12.2 12.0 - 15.9 g/dL Final   10/03/2019 11.5 (L) 12.0 - 15.9 g/dL Final      HCT Hematocrit   Date Value Ref Range Status   10/04/2019 37.2 34.0 - 46.6 % Final   10/03/2019 33.6 (L) 34.0 - 46.6 % Final      Platlets No results found for: LABPLAT   MCV MCV   Date Value Ref Range Status   10/04/2019 89.7 79.0 - 97.0 fL Final   10/03/2019 89.3 79.0 - 97.0 fL Final          Sodium Sodium   Date Value Ref Range Status   10/05/2019 137 136 - 144 mmol/L Final   10/04/2019 140 136 - 144 mmol/L Final   10/03/2019 136 136 - 144 mmol/L Final      Potassium Potassium   Date Value Ref Range Status   10/05/2019 4.0 3.6 - 5.1 mmol/L Final   10/04/2019 4.0 3.6 - 5.1 mmol/L Final   10/03/2019 4.7 3.6 - 5.1 mmol/L Final      Chloride Chloride   Date Value Ref Range Status   10/05/2019 99 (L) 101 - 111 mmol/L Final   10/04/2019 104 101 - 111 mmol/L Final   10/03/2019 105 101 - 111 mmol/L Final      CO2 CO2   Date Value Ref Range Status   10/05/2019 28.0 22.0 - 32.0 mmol/L Final   10/04/2019 27.0 22.0 - 32.0 mmol/L Final   10/03/2019 21.0 (L) 22.0 - 32.0 mmol/L Final      BUN BUN   Date Value Ref Range Status   10/05/2019 12 8 - 20 mg/dL Final   10/04/2019 13 8 - 20 mg/dL Final   10/03/2019 17 8 - 20 mg/dL Final      Creatinine Creatinine   Date Value Ref Range Status   10/05/2019 1.20 (H) 0.40 - 1.00 mg/dL Final   10/04/2019 1.40 (H) 0.40 - 1.00 mg/dL Final   10/03/2019 1.30 (H) 0.40 - 1.00 mg/dL Final      Calcium Calcium   Date Value  Ref Range Status   10/05/2019 8.6 (L) 8.9 - 10.3 mg/dL Final   10/04/2019 8.8 (L) 8.9 - 10.3 mg/dL Final   10/03/2019 8.6 (L) 8.9 - 10.3 mg/dL Final      PO4 No components found for: PO4   Albumin Albumin   Date Value Ref Range Status   10/03/2019 3.50 3.50 - 4.80 g/dL Final      Magnesium Magnesium   Date Value Ref Range Status   10/05/2019 1.7 (L) 1.8 - 2.5 mg/dL Final   10/04/2019 1.8 1.8 - 2.5 mg/dL Final   10/03/2019 2.2 1.8 - 2.5 mg/dL Final      Uric Acid No components found for: URIC ACID     Imaging Results (last 72 hours)     Procedure Component Value Units Date/Time    US Renal Bilateral [[id number]] Collected:  10/01/19 1428     Updated:  10/01/19 1436    Narrative:       Examination: US RENAL BILATERAL-     Date of Exam: 10/1/2019 1:56 PM     Indication: ARF/CKD; A41.9-Sepsis, unspecified organism;  I95.9-Hypotension, unspecified; N39.0-Urinary tract infection, site not  specified; R19.7-Diarrhea, unspecified.     Comparison: None available.     Technique: Grayscale and color Doppler ultrasound evaluation of the  kidneys and urinary bladder was performed     Findings:  The right kidney measures 10.7 x 4.9 x 5.2 cm and the left kidney  measures 9.4 x 3.6 x 4.5 cm. Kidney echogenicity and vascularity appear  within normal limits. There is no solid kidney mass.  No echogenic  shadowing stone.  No hydronephrosis.        Limited visualization of the urinary bladder is unremarkable. Bladder  volume is calculated at 83 mL.       Impression:       Kidney ultrasound is within normal limits.     Electronically Signed By-Samir Villasenor On:10/1/2019 2:29 PM  This report was finalized on [id number] by  Samir Villasenor, .    XR Chest 1 View [[id number]] Collected:  09/30/19 1323     Updated:  09/30/19 1325    Narrative:       DATE OF EXAM:  9/30/2019 12:38 PM     PROCEDURE:  XR CHEST 1 VW-     INDICATIONS:  Severe Sepsis triage protocol     COMPARISON:  August 28, 2018     TECHNIQUE:   Single radiographic AP view of  the chest was obtained.     FINDINGS:  Cardiac size is normal and the lungs remain clear.        Impression:       Negative portable chest     Electronically Signed By-Lee Costello On:9/30/2019 1:23 PM  This report was finalized on [id number] by  Lee Costello, .          Results for orders placed during the hospital encounter of 09/30/19   XR Chest 1 View    Narrative DATE OF EXAM:  9/30/2019 12:38 PM     PROCEDURE:  XR CHEST 1 VW-     INDICATIONS:  Severe Sepsis triage protocol     COMPARISON:  August 28, 2018     TECHNIQUE:   Single radiographic AP view of the chest was obtained.     FINDINGS:  Cardiac size is normal and the lungs remain clear.        Impression Negative portable chest     Electronically Signed By-Lee Costello On:9/30/2019 1:23 PM  This report was finalized on [id number] by  Lee Costello, .              ASSESSMENT / PLAN      Sepsis (CMS/[facility])      1. ADAMARIS/CKD3------Nonoliguric. +ARF/ADAMARIS on top of known CRF/CKD STG 3 with a baseline serum creatinine of 1.2. CRF/CKD STG 3 secondary to DGS/HTN NS. +ARF/ADAMARIS is secondary to prerenal state/intravascular volume depletion with concomitant ACE-I/ARB use and some ATN from hypotension. D/C Lisinopril and Losartan.     2. HYPOKALEMIA------Replaced     3. HYPONATREMIA-------Hypotonic and clinically hypovolemic. Give NS.       4. HYPOALBUMINEMIA------IV Albumin to temporize.     5. HYPOMAGNESEMIA------Replaced     6. ACIDOSIS------Type 4 RTA and stool losses. Better     7. DIARRHEA/SEPSIS/LEUKOCYTOSIS-------per , Critical Care. Off BP meds. Cx pending    8. HTN WITH CKD------BP okay      Cr stable  Cath planned for Monday  Will start ivf and mucomyst Sunday    Espinoza Dunn MD  [facility]  [phone]  10/05/19  6:45 AM

## 2019-10-06 LAB
ANION GAP SERPL CALCULATED.3IONS-SCNC: 13.7 MMOL/L (ref 5–15)
BUN BLD-MCNC: 15 MG/DL (ref 8–20)
BUN/CREAT SERPL: 11.5 (ref 5.4–26.2)
CALCIUM SPEC-SCNC: 8.5 MG/DL (ref 8.9–10.3)
CHLORIDE SERPL-SCNC: 99 MMOL/L (ref 101–111)
CO2 SERPL-SCNC: 28 MMOL/L (ref 22–32)
CREAT BLD-MCNC: 1.3 MG/DL (ref 0.4–1)
GFR SERPL CREATININE-BSD FRML MDRD: 40 ML/MIN/1.73
GLUCOSE BLD-MCNC: 335 MG/DL (ref 65–99)
GLUCOSE BLDC GLUCOMTR-MCNC: 126 MG/DL (ref 70–105)
GLUCOSE BLDC GLUCOMTR-MCNC: 148 MG/DL (ref 70–105)
GLUCOSE BLDC GLUCOMTR-MCNC: 224 MG/DL (ref 70–105)
GLUCOSE BLDC GLUCOMTR-MCNC: 239 MG/DL (ref 70–105)
MAGNESIUM SERPL-MCNC: 2 MG/DL (ref 1.8–2.5)
PHOSPHATE SERPL-MCNC: 4.6 MG/DL (ref 2.4–4.7)
POTASSIUM BLD-SCNC: 4.7 MMOL/L (ref 3.6–5.1)
SODIUM BLD-SCNC: 136 MMOL/L (ref 136–144)

## 2019-10-06 PROCEDURE — 94799 UNLISTED PULMONARY SVC/PX: CPT

## 2019-10-06 PROCEDURE — 25010000002 ENOXAPARIN PER 10 MG: Performed by: FAMILY MEDICINE

## 2019-10-06 PROCEDURE — 84100 ASSAY OF PHOSPHORUS: CPT | Performed by: INTERNAL MEDICINE

## 2019-10-06 PROCEDURE — 94760 N-INVAS EAR/PLS OXIMETRY 1: CPT

## 2019-10-06 PROCEDURE — 83735 ASSAY OF MAGNESIUM: CPT | Performed by: INTERNAL MEDICINE

## 2019-10-06 PROCEDURE — 25010000002 MEROPENEM PER 100 MG: Performed by: INTERNAL MEDICINE

## 2019-10-06 PROCEDURE — 63710000001 INSULIN GLARGINE PER 5 UNITS: Performed by: FAMILY MEDICINE

## 2019-10-06 PROCEDURE — 63710000001 INSULIN LISPRO (HUMAN) PER 5 UNITS: Performed by: FAMILY MEDICINE

## 2019-10-06 PROCEDURE — 82962 GLUCOSE BLOOD TEST: CPT

## 2019-10-06 PROCEDURE — 99232 SBSQ HOSP IP/OBS MODERATE 35: CPT | Performed by: INTERNAL MEDICINE

## 2019-10-06 PROCEDURE — 80048 BASIC METABOLIC PNL TOTAL CA: CPT | Performed by: INTERNAL MEDICINE

## 2019-10-06 RX ORDER — MIDAZOLAM HYDROCHLORIDE 1 MG/ML
1 INJECTION INTRAMUSCULAR; INTRAVENOUS ONCE
Status: CANCELLED | OUTPATIENT
Start: 2019-10-06 | End: 2019-10-06

## 2019-10-06 RX ORDER — FENTANYL CITRATE 50 UG/ML
25 INJECTION, SOLUTION INTRAMUSCULAR; INTRAVENOUS ONCE
Status: CANCELLED | OUTPATIENT
Start: 2019-10-06 | End: 2019-10-06

## 2019-10-06 RX ORDER — SODIUM CHLORIDE 9 MG/ML
100 INJECTION, SOLUTION INTRAVENOUS CONTINUOUS
Status: DISCONTINUED | OUTPATIENT
Start: 2019-10-07 | End: 2019-10-08

## 2019-10-06 RX ORDER — SODIUM CHLORIDE 9 MG/ML
1-3 INJECTION, SOLUTION INTRAVENOUS CONTINUOUS
Status: CANCELLED | OUTPATIENT
Start: 2019-10-06

## 2019-10-06 RX ADMIN — MEROPENEM 500 MG: 500 INJECTION, POWDER, FOR SOLUTION INTRAVENOUS at 05:05

## 2019-10-06 RX ADMIN — INSULIN LISPRO 5 UNITS: 100 INJECTION, SOLUTION INTRAVENOUS; SUBCUTANEOUS at 07:59

## 2019-10-06 RX ADMIN — IPRATROPIUM BROMIDE AND ALBUTEROL SULFATE 3 ML: .5; 3 SOLUTION RESPIRATORY (INHALATION) at 18:59

## 2019-10-06 RX ADMIN — Medication 10 ML: at 05:06

## 2019-10-06 RX ADMIN — INSULIN LISPRO 5 UNITS: 100 INJECTION, SOLUTION INTRAVENOUS; SUBCUTANEOUS at 18:06

## 2019-10-06 RX ADMIN — BUDESONIDE 0.5 MG: 0.5 INHALANT RESPIRATORY (INHALATION) at 18:59

## 2019-10-06 RX ADMIN — METOPROLOL TARTRATE 50 MG: 50 TABLET, FILM COATED ORAL at 08:00

## 2019-10-06 RX ADMIN — INSULIN GLARGINE 30 UNITS: 100 INJECTION, SOLUTION SUBCUTANEOUS at 20:40

## 2019-10-06 RX ADMIN — IPRATROPIUM BROMIDE AND ALBUTEROL SULFATE 3 ML: .5; 3 SOLUTION RESPIRATORY (INHALATION) at 01:26

## 2019-10-06 RX ADMIN — MEROPENEM 500 MG: 500 INJECTION, POWDER, FOR SOLUTION INTRAVENOUS at 11:35

## 2019-10-06 RX ADMIN — ROSUVASTATIN CALCIUM 10 MG: 10 TABLET, FILM COATED ORAL at 20:41

## 2019-10-06 RX ADMIN — Medication 10 ML: at 08:00

## 2019-10-06 RX ADMIN — DULOXETINE HYDROCHLORIDE 30 MG: 30 CAPSULE, DELAYED RELEASE ORAL at 08:00

## 2019-10-06 RX ADMIN — THEOPHYLLINE ANHYDROUS 300 MG: 300 CAPSULE, EXTENDED RELEASE ORAL at 08:00

## 2019-10-06 RX ADMIN — Medication 600 MG: at 08:01

## 2019-10-06 RX ADMIN — GABAPENTIN 600 MG: 300 CAPSULE ORAL at 20:41

## 2019-10-06 RX ADMIN — GUAIFENESIN 600 MG: 600 TABLET, EXTENDED RELEASE ORAL at 20:41

## 2019-10-06 RX ADMIN — INSULIN LISPRO 5 UNITS: 100 INJECTION, SOLUTION INTRAVENOUS; SUBCUTANEOUS at 12:28

## 2019-10-06 RX ADMIN — MEROPENEM 500 MG: 500 INJECTION, POWDER, FOR SOLUTION INTRAVENOUS at 19:23

## 2019-10-06 RX ADMIN — Medication 10 ML: at 20:44

## 2019-10-06 RX ADMIN — GUAIFENESIN 600 MG: 600 TABLET, EXTENDED RELEASE ORAL at 08:00

## 2019-10-06 RX ADMIN — BUDESONIDE 0.5 MG: 0.5 INHALANT RESPIRATORY (INHALATION) at 06:26

## 2019-10-06 RX ADMIN — IPRATROPIUM BROMIDE AND ALBUTEROL SULFATE 3 ML: .5; 3 SOLUTION RESPIRATORY (INHALATION) at 06:26

## 2019-10-06 RX ADMIN — GABAPENTIN 300 MG: 300 CAPSULE ORAL at 08:00

## 2019-10-06 RX ADMIN — ENOXAPARIN SODIUM 40 MG: 40 INJECTION SUBCUTANEOUS at 15:15

## 2019-10-06 RX ADMIN — METOPROLOL TARTRATE 50 MG: 50 TABLET, FILM COATED ORAL at 20:42

## 2019-10-06 RX ADMIN — ALPRAZOLAM 0.5 MG: 0.5 TABLET ORAL at 15:15

## 2019-10-06 RX ADMIN — Medication 600 MG: at 20:41

## 2019-10-06 RX ADMIN — IPRATROPIUM BROMIDE AND ALBUTEROL SULFATE 3 ML: .5; 3 SOLUTION RESPIRATORY (INHALATION) at 10:26

## 2019-10-06 RX ADMIN — PANTOPRAZOLE SODIUM 40 MG: 40 TABLET, DELAYED RELEASE ORAL at 06:35

## 2019-10-06 NOTE — PLAN OF CARE
Problem: Patient Care Overview  Goal: Plan of Care Review  Outcome: Ongoing (interventions implemented as appropriate)   10/06/19 0149   Coping/Psychosocial   Plan of Care Reviewed With patient;family   Plan of Care Review   Progress improving   OTHER   Outcome Summary scheduled to have a cardiac cath on monday.       Problem: Fall Risk (Adult)  Goal: Absence of Fall  Outcome: Ongoing (interventions implemented as appropriate)      Problem: Breathing Pattern Ineffective (Adult)  Goal: Identify Related Risk Factors and Signs and Symptoms  Outcome: Ongoing (interventions implemented as appropriate)    Goal: Effective Oxygenation/Ventilation  Outcome: Ongoing (interventions implemented as appropriate)    Goal: Anxiety/Fear Reduction  Outcome: Ongoing (interventions implemented as appropriate)

## 2019-10-06 NOTE — PROGRESS NOTES
ICU Daily Progress Note        Sepsis (CMS/[facility])      Assessment/Plan   Dyspnea improving   Urosepsis improving  Shock, septic resolved  Diarrhea, ? Hypovolemia  Possible left lower lobe pneumonia  Elevated troponin rule out acute coronary syndrome  Acute kidney injury elevated creatinine  COPD patient use trelegy inhaler at home  Diabetes   no significant obstructive sleep apnea based on home sleep study  Smoker less than pack a day for for 56 years  History of high altitude pulmonary edema        Plan  Antibiotics meropenem total of 7 days  Bronchodilators  Oxygen titration  DVT prophylaxis  GI prophylaxis  Glycemic control             LOS: 6 days         Vital signs for last 24 hours:  Vitals:    10/06/19 0759 10/06/19 1026 10/06/19 1029 10/06/19 1105   BP: 126/56   102/47   BP Location:       Patient Position:       Pulse: 103 76 79 87   Resp:  20 20 14   Temp:    97.9 °F (36.6 °C)   TempSrc:    Oral   SpO2:  95% 95% 91%   Weight:       Height:           Intake/Output last 3 shifts:  I/O last 3 completed shifts:  In: 900 [P.O.:900]  Out: 1600 [Urine:1600]  Intake/Output this shift:  I/O this shift:  In: -   Out: 800 [Urine:800]    Vent settings for last 24 hours:       Hemodynamic parameters for last 24 hours:       Radiology  Imaging Results (last 24 hours)     ** No results found for the last 24 hours. **          Labs:  Results from last 7 days   Lab Units 10/04/19  0149   WBC 10*3/mm3 14.80*   HEMOGLOBIN g/dL 12.2   HEMATOCRIT % 37.2   PLATELETS 10*3/mm3 276     Results from last 7 days   Lab Units 10/06/19  0227  10/03/19  0326   SODIUM mmol/L 136   < > 136   POTASSIUM mmol/L 4.7   < > 4.7   CHLORIDE mmol/L 99*   < > 105   CO2 mmol/L 28.0   < > 21.0*   BUN mg/dL 15   < > 17   CREATININE mg/dL 1.30*   < > 1.30*   CALCIUM mg/dL 8.5*   < > 8.6*   BILIRUBIN mg/dL  --   --  0.7   ALK PHOS U/L  --   --  75   ALT (SGPT) U/L  --   --  15   AST (SGOT) U/L  --   --  15   GLUCOSE mg/dL 335*   < > 202*    < > = values  in this interval not displayed.     Results from last 7 days   Lab Units 09/30/19  1914   PH, ARTERIAL pH units 7.376   PO2 ART mm Hg 77.8*   PCO2, ARTERIAL mm Hg 36.7   HCO3 ART mmol/L 21.5     Results from last 7 days   Lab Units 10/03/19  0326 10/02/19  0158 09/30/19  1804   ALBUMIN g/dL 3.50 3.10* 2.50*     Results from last 7 days   Lab Units 10/01/19  0557 10/01/19  0327 09/30/19  1804   CK TOTAL U/L  --  161 187   TROPONIN I ng/mL 0.140*  --  0.140*         Results from last 7 days   Lab Units 10/06/19  0227   MAGNESIUM mg/dL 2.0     Results from last 7 days   Lab Units 09/30/19  1250   INR  1.09   APTT seconds 23.9*     Results from last 7 days   Lab Units 10/01/19  0327   TSH uIU/mL 0.050*           Meds:   SCHEDULE    Acetylcysteine 600 mg Oral BID   budesonide 0.5 mg Nebulization BID - RT   DULoxetine 30 mg Oral Daily   enoxaparin 40 mg Subcutaneous Q24H   gabapentin 300 mg Oral Daily   gabapentin 600 mg Oral Nightly   guaiFENesin 600 mg Oral Q12H   insulin glargine 30 Units Subcutaneous Nightly   insulin lispro 5 Units Subcutaneous TID With Meals   ipratropium-albuterol 3 mL Nebulization Q6H - RT   meropenem 500 mg Intravenous Q8H   metoprolol tartrate 50 mg Oral Q12H   pantoprazole 40 mg Oral QAM   rosuvastatin 10 mg Oral Nightly   sodium chloride 10 mL Intravenous Q12H   theophylline 300 mg Oral Daily   Fluticasone-Umeclidin-Vilant 1 each Inhalation Daily     Infusions    [START ON 10/7/2019] sodium chloride 100 mL/hr     PRNs  ALPRAZolam  •  ipratropium-albuterol  •  sodium chloride  •  [COMPLETED] Insert peripheral IV **AND** sodium chloride  •  sodium chloride  •  sodium chloride    Physical Exam:  Physical Exam   Cardiovascular:   Murmur heard.  Pulmonary/Chest: No respiratory distress. She has wheezes. She has rales. She exhibits no tenderness.   Abdominal: She exhibits no distension. There is no tenderness.   Musculoskeletal: She exhibits edema.       ROS  Review of Systems   HENT: Positive for  congestion. Negative for rhinorrhea and sneezing.    Respiratory: Positive for cough, shortness of breath and wheezing. Negative for apnea, choking, chest tightness and stridor.    Cardiovascular: Positive for leg swelling.         Critical Care Time greater than: 1 Hour  Total time spent with patient greater than: 1 Hour

## 2019-10-06 NOTE — PROGRESS NOTES
"NEPHROLOGY PROGRESS NOTE    [facility]  [phone]  Espinoza Dunn MD      Patient Care Team:  Deborah Ochoa MD as PCP - General      Provider:  Espinoza Dunn MD  Patient Name: Dyana Montemayor  :  1950    SUBJECTIVE:  F/u ADAMARIS  Patient feeling better. No SOB, CP, dysuria, palpitations.    Medication:    budesonide 0.5 mg Nebulization BID - RT   DULoxetine 30 mg Oral Daily   enoxaparin 40 mg Subcutaneous Q24H   gabapentin 300 mg Oral Daily   gabapentin 600 mg Oral Nightly   guaiFENesin 600 mg Oral Q12H   insulin glargine 30 Units Subcutaneous Nightly   insulin lispro 5 Units Subcutaneous TID With Meals   ipratropium-albuterol 3 mL Nebulization Q6H - RT   meropenem 500 mg Intravenous Q8H   metoprolol tartrate 50 mg Oral Q12H   pantoprazole 40 mg Oral QAM   rosuvastatin 10 mg Oral Nightly   sodium chloride 10 mL Intravenous Q12H   theophylline 300 mg Oral Daily   Fluticasone-Umeclidin-Vilant 1 each Inhalation Daily          OBJECTIVE    Vital Sign Min/Max for last 24 hours  Temp  Min: 97.8 °F (36.6 °C)  Max: 98.3 °F (36.8 °C)   BP  Min: 127/43  Max: 180/76   Pulse  Min: 76  Max: 110   Resp  Min: 12  Max: 16   SpO2  Min: 91 %  Max: 97 %   No Data Recorded   Weight  Min: 76.1 kg (167 lb 12.3 oz)  Max: 76.1 kg (167 lb 12.3 oz)     Flowsheet Rows      First Filed Value   Admission Height  157.5 cm (62\") Documented at 2019 1219   Admission Weight  68 kg (150 lb) Documented at 2019 1219          No intake/output data recorded.  I/O last 3 completed shifts:  In: 900 [P.O.:900]  Out: 1600 [Urine:1600]    Physical Exam:  General Appearance: alert, appears stated age and cooperative  Head: normocephalic, without obvious abnormality and atraumatic  Eyes: conjunctivae and sclerae normal and no icterus  Neck: supple and no JVD  Lungs: clear to auscultation and respirations regular  Heart: regular rhythm & normal rate and normal S1, S2 +RADHA  Chest: Wall no abnormalities " observed  Abdomen: normal bowel sounds and soft non-tender  Extremities: moves extremities well, no edema, no cyanosis and no redness  Skin: no bleeding, bruising or rash, turgor normal, color normal and no leasions noted  Neurologic: Alert, and oriented. No focal deficits    Labs:    WBC WBC   Date Value Ref Range Status   10/04/2019 14.80 (H) 3.40 - 10.80 10*3/mm3 Final      HGB Hemoglobin   Date Value Ref Range Status   10/04/2019 12.2 12.0 - 15.9 g/dL Final      HCT Hematocrit   Date Value Ref Range Status   10/04/2019 37.2 34.0 - 46.6 % Final      Platlets No results found for: LABPLAT   MCV MCV   Date Value Ref Range Status   10/04/2019 89.7 79.0 - 97.0 fL Final          Sodium Sodium   Date Value Ref Range Status   10/06/2019 136 136 - 144 mmol/L Final   10/05/2019 137 136 - 144 mmol/L Final   10/04/2019 140 136 - 144 mmol/L Final      Potassium Potassium   Date Value Ref Range Status   10/06/2019 4.7 3.6 - 5.1 mmol/L Final     Comment:     Specimen hemolyzed.  Results may be affected.   10/05/2019 4.0 3.6 - 5.1 mmol/L Final   10/04/2019 4.0 3.6 - 5.1 mmol/L Final      Chloride Chloride   Date Value Ref Range Status   10/06/2019 99 (L) 101 - 111 mmol/L Final   10/05/2019 99 (L) 101 - 111 mmol/L Final   10/04/2019 104 101 - 111 mmol/L Final      CO2 CO2   Date Value Ref Range Status   10/06/2019 28.0 22.0 - 32.0 mmol/L Final   10/05/2019 28.0 22.0 - 32.0 mmol/L Final   10/04/2019 27.0 22.0 - 32.0 mmol/L Final      BUN BUN   Date Value Ref Range Status   10/06/2019 15 8 - 20 mg/dL Final   10/05/2019 12 8 - 20 mg/dL Final   10/04/2019 13 8 - 20 mg/dL Final      Creatinine Creatinine   Date Value Ref Range Status   10/06/2019 1.30 (H) 0.40 - 1.00 mg/dL Final   10/05/2019 1.20 (H) 0.40 - 1.00 mg/dL Final   10/04/2019 1.40 (H) 0.40 - 1.00 mg/dL Final      Calcium Calcium   Date Value Ref Range Status   10/06/2019 8.5 (L) 8.9 - 10.3 mg/dL Final   10/05/2019 8.6 (L) 8.9 - 10.3 mg/dL Final   10/04/2019 8.8 (L) 8.9 -  10.3 mg/dL Final      PO4 No components found for: PO4   Albumin No results found for: ALBUMIN   Magnesium Magnesium   Date Value Ref Range Status   10/06/2019 2.0 1.8 - 2.5 mg/dL Final   10/05/2019 1.7 (L) 1.8 - 2.5 mg/dL Final   10/04/2019 1.8 1.8 - 2.5 mg/dL Final      Uric Acid No components found for: URIC ACID     Imaging Results (last 72 hours)     Procedure Component Value Units Date/Time    US Renal Bilateral [[id number]] Collected:  10/01/19 1428     Updated:  10/01/19 1436    Narrative:       Examination: US RENAL BILATERAL-     Date of Exam: 10/1/2019 1:56 PM     Indication: ARF/CKD; A41.9-Sepsis, unspecified organism;  I95.9-Hypotension, unspecified; N39.0-Urinary tract infection, site not  specified; R19.7-Diarrhea, unspecified.     Comparison: None available.     Technique: Grayscale and color Doppler ultrasound evaluation of the  kidneys and urinary bladder was performed     Findings:  The right kidney measures 10.7 x 4.9 x 5.2 cm and the left kidney  measures 9.4 x 3.6 x 4.5 cm. Kidney echogenicity and vascularity appear  within normal limits. There is no solid kidney mass.  No echogenic  shadowing stone.  No hydronephrosis.        Limited visualization of the urinary bladder is unremarkable. Bladder  volume is calculated at 83 mL.       Impression:       Kidney ultrasound is within normal limits.     Electronically Signed BySammi Villasenor On:10/1/2019 2:29 PM  This report was finalized on [id number] by  Samir Villasenor, .    XR Chest 1 View [[id number]] Collected:  09/30/19 1323     Updated:  09/30/19 1325    Narrative:       DATE OF EXAM:  9/30/2019 12:38 PM     PROCEDURE:  XR CHEST 1 VW-     INDICATIONS:  Severe Sepsis triage protocol     COMPARISON:  August 28, 2018     TECHNIQUE:   Single radiographic AP view of the chest was obtained.     FINDINGS:  Cardiac size is normal and the lungs remain clear.        Impression:       Negative portable chest     Electronically Signed ByJose Costello  On:9/30/2019 1:23 PM  This report was finalized on [id number] by  Lee Costello, .          Results for orders placed during the hospital encounter of 09/30/19   XR Chest 1 View    Narrative DATE OF EXAM:  9/30/2019 12:38 PM     PROCEDURE:  XR CHEST 1 VW-     INDICATIONS:  Severe Sepsis triage protocol     COMPARISON:  August 28, 2018     TECHNIQUE:   Single radiographic AP view of the chest was obtained.     FINDINGS:  Cardiac size is normal and the lungs remain clear.        Impression Negative portable chest     Electronically Signed By-Lee Costello On:9/30/2019 1:23 PM  This report was finalized on [id number] by  Lee Costello, .              ASSESSMENT / PLAN      Sepsis (CMS/HCC)      · ADAMARIS/CKD3------Nonoliguric. +ARF/ADAMARIS on top of known CRF/CKD STG 3 with a baseline serum creatinine of 1.2. CRF/CKD STG 3 secondary to DGS/HTN NS. +ARF/ADAMARIS is secondary to prerenal state/intravascular volume depletion with concomitant ACE-I/ARB use and some ATN from hypotension. D/C Lisinopril and Losartan.  · DIARRHEA/SEPSIS/LEUKOCYTOSIS-------per , Critical Care. Off BP meds. Cx pending  · HTN WITH CKD------BP okay  · NSTEMI--cath tomorrow      Cr stable  Cath planned for Monday  Will start ivf and mucomyst today    Espinoza Dunn MD  [facility]  [phone]  10/06/19  7:18 AM

## 2019-10-06 NOTE — PROGRESS NOTES
LOS: 6 days   Patient Care Team:  Deborah Ochoa MD as PCP - General    Subjective     Interval History:    Patient Complaints: Sleeping; per nursing staff she is less anxious today; no specific complaints    History taken from: patient     ROS:  Deferred due to sleeping soundly;       Objective     Vital Signs  Temp:  [97.8 °F (36.6 °C)-98.3 °F (36.8 °C)] 97.9 °F (36.6 °C)  Heart Rate:  [] 87  Resp:  [12-20] 14  BP: (102-156)/(43-79) 102/47    Physical Exam:     General Appearance:    Sleeping in no acute distress,   Head:    Normocephalic, without obvious abnormality, atraumatic   Eyes:            Lids and lashes normal, conjunctivae and sclerae normal, no   icterus, no pallor, corneas clear, PERRLA   Ears:    Ears appear intact with no abnormalities noted   Throat:   No oral lesions, no thrush, oral mucosa moist   Neck:   No adenopathy, supple, trachea midline, no thyromegaly, no   carotid bruit, no JVD   Lungs:     Clear to auscultation,respirations regular, even and                  unlabored    Heart:    Regular rhythm and normal rate, normal S1 and S2, no            murmur, no gallop, no rub, no click   Chest Wall:    No abnormalities observed   Abdomen:     Normal bowel sounds, no masses, no organomegaly, soft        Non-tender non-distended, no guarding,   Extremities:   Moves all extremities well, no edema, no cyanosis, no             Redness   Pulses:   Pulses palpable and equal bilaterally   Skin:   No bleeding, bruising or rash   Lymph nodes:   No palpable adenopathy   Neurologic:   Not assessmed        Results Review:    Lab Results (last 24 hours)     Procedure Component Value Units Date/Time    POC Glucose Once [[id number]]  (Abnormal) Collected:  10/06/19 1026    Specimen:  Blood Updated:  10/06/19 1027     Glucose 126 mg/dL      Comment: Serial Number: [id number]:  [id number]       POC Glucose Once [[id number]]  (Abnormal) Collected:  10/06/19 0727    Specimen:  Blood Updated:   10/06/19 0729     Glucose 224 mg/dL      Comment: Serial Number: [id number]:  [id number]       Basic Metabolic Panel [[id number]]  (Abnormal) Collected:  10/06/19 0227    Specimen:  Blood Updated:  10/06/19 0341     Glucose 335 mg/dL      BUN 15 mg/dL      Creatinine 1.30 mg/dL      Sodium 136 mmol/L      Potassium 4.7 mmol/L      Comment: Specimen hemolyzed.  Results may be affected.        Chloride 99 mmol/L      CO2 28.0 mmol/L      Calcium 8.5 mg/dL      eGFR Non African Amer 40 mL/min/1.73      BUN/Creatinine Ratio 11.5     Anion Gap 13.7 mmol/L     Magnesium [[id number]]  (Normal) Collected:  10/06/19 0227    Specimen:  Blood Updated:  10/06/19 0340     Magnesium 2.0 mg/dL     Phosphorus [[id number]]  (Normal) Collected:  10/06/19 0227    Specimen:  Blood Updated:  10/06/19 0340     Phosphorus 4.6 mg/dL     POC Glucose Once [[id number]]  (Abnormal) Collected:  10/05/19 2025    Specimen:  Blood Updated:  10/05/19 2026     Glucose 327 mg/dL      Comment: Serial Number: [id number]:  [id number]       POC Glucose Once [[id number]]  (Abnormal) Collected:  10/05/19 1633    Specimen:  Blood Updated:  10/05/19 1634     Glucose 141 mg/dL      Comment: Serial Number: [id number]:  [id number]              Imaging Results (last 24 hours)     ** No results found for the last 24 hours. **               I reviewed the patient's new clinical results.    Medication Review:   Scheduled Meds:  Acetylcysteine 600 mg Oral BID   budesonide 0.5 mg Nebulization BID - RT   DULoxetine 30 mg Oral Daily   enoxaparin 40 mg Subcutaneous Q24H   gabapentin 300 mg Oral Daily   gabapentin 600 mg Oral Nightly   guaiFENesin 600 mg Oral Q12H   insulin glargine 30 Units Subcutaneous Nightly   insulin lispro 5 Units Subcutaneous TID With Meals   ipratropium-albuterol 3 mL Nebulization Q6H - RT   meropenem 500 mg Intravenous Q8H   metoprolol tartrate 50 mg Oral Q12H   pantoprazole 40 mg Oral QAM   rosuvastatin 10 mg Oral Nightly   sodium  chloride 10 mL Intravenous Q12H   theophylline 300 mg Oral Daily   Fluticasone-Umeclidin-Vilant 1 each Inhalation Daily     Continuous Infusions:  [START ON 10/7/2019] sodium chloride 100 mL/hr     PRN Meds:.ALPRAZolam  •  ipratropium-albuterol  •  sodium chloride  •  [COMPLETED] Insert peripheral IV **AND** sodium chloride  •  sodium chloride  •  sodium chloride     Assessment/Plan       Sepsis (CMS/HCC)  UTI  Left lower lobe pneumonia  DM-II with complication of nephropathy  CKD stage 3  Abnormal stress test  Anxiety  Panlobular emphysema    Finish course of meropenem; heart cath tomorrow after Mucomyst and hydration overnight.    Plan for disposition:KAYE Fam MD  10/06/19  2:10 PM

## 2019-10-06 NOTE — PROGRESS NOTES
Referring Provider: Hospitalist    Reason for follow-up: Non-STEMI     Patient Care Team:  Deborah Ochoa MD as PCP - General    Subjective .  No chest pain or shortness of breath    Objective  Lying in bed comfortably     Review of Systems   Constitution: Negative for fever and malaise/fatigue.   HENT: Negative for ear pain and nosebleeds.    Eyes: Negative for blurred vision and double vision.   Cardiovascular: Negative for chest pain, dyspnea on exertion and palpitations.   Respiratory: Negative for cough and shortness of breath.    Skin: Negative for rash.   Musculoskeletal: Negative for joint pain.   Gastrointestinal: Negative for abdominal pain, nausea and vomiting.   Neurological: Negative for focal weakness and headaches.   Psychiatric/Behavioral: Negative for depression. The patient is not nervous/anxious.    All other systems reviewed and are negative.      Patient has no known allergies.    Scheduled Meds:    Acetylcysteine 600 mg Oral BID   budesonide 0.5 mg Nebulization BID - RT   DULoxetine 30 mg Oral Daily   enoxaparin 40 mg Subcutaneous Q24H   gabapentin 300 mg Oral Daily   gabapentin 600 mg Oral Nightly   guaiFENesin 600 mg Oral Q12H   insulin glargine 30 Units Subcutaneous Nightly   insulin lispro 5 Units Subcutaneous TID With Meals   ipratropium-albuterol 3 mL Nebulization Q6H - RT   meropenem 500 mg Intravenous Q8H   metoprolol tartrate 50 mg Oral Q12H   pantoprazole 40 mg Oral QAM   rosuvastatin 10 mg Oral Nightly   sodium chloride 10 mL Intravenous Q12H   theophylline 300 mg Oral Daily   Fluticasone-Umeclidin-Vilant 1 each Inhalation Daily     Continuous Infusions:    [START ON 10/7/2019] sodium chloride 100 mL/hr     PRN Meds:.ALPRAZolam  •  ipratropium-albuterol  •  sodium chloride  •  [COMPLETED] Insert peripheral IV **AND** sodium chloride  •  sodium chloride  •  sodium chloride        VITAL SIGNS  Vitals:    10/06/19 0630 10/06/19 0759 10/06/19 1026 10/06/19 1029   BP: 137/54 126/56    "  BP Location: Right arm      Patient Position: Lying      Pulse: 96 103 76 79   Resp: 14  20 20   Temp: 98.3 °F (36.8 °C)      TempSrc: Oral      SpO2: 94%  95% 95%   Weight:       Height:           Flowsheet Rows      First Filed Value   Admission Height  157.5 cm (62\") Documented at 09/30/2019 1219   Admission Weight  68 kg (150 lb) Documented at 09/30/2019 1219           TELEMETRY: Sinus rhythm    Physical Exam:  Physical Exam   Constitutional: She appears well-developed and well-nourished.   HENT:   Head: Normocephalic and atraumatic.   Eyes: Conjunctivae and EOM are normal. Pupils are equal, round, and reactive to light. No scleral icterus.   Neck: Normal range of motion. Neck supple. No JVD present. Carotid bruit is not present.   Cardiovascular: Normal rate, regular rhythm, S1 normal, S2 normal, normal heart sounds and intact distal pulses. PMI is not displaced.   Pulmonary/Chest: Effort normal and breath sounds normal. She has no wheezes. She has no rales.   Abdominal: Soft. Bowel sounds are normal.   Musculoskeletal: Normal range of motion.   Neurological: She is alert. She has normal strength.   No focal deficits   Skin: Skin is warm and dry. No rash noted.   Psychiatric: She has a normal mood and affect.        Results Review:   I reviewed the patient's new clinical results.  Lab Results (last 24 hours)     Procedure Component Value Units Date/Time    POC Glucose Once [[id number]]  (Abnormal) Collected:  10/06/19 1026    Specimen:  Blood Updated:  10/06/19 1027     Glucose 126 mg/dL      Comment: Serial Number: [id number]:  [id number]       POC Glucose Once [[id number]]  (Abnormal) Collected:  10/06/19 0727    Specimen:  Blood Updated:  10/06/19 0729     Glucose 224 mg/dL      Comment: Serial Number: [id number]:  [id number]       Basic Metabolic Panel [[id number]]  (Abnormal) Collected:  10/06/19 0227    Specimen:  Blood Updated:  10/06/19 0341     Glucose 335 mg/dL      BUN 15 mg/dL      " Creatinine 1.30 mg/dL      Sodium 136 mmol/L      Potassium 4.7 mmol/L      Comment: Specimen hemolyzed.  Results may be affected.        Chloride 99 mmol/L      CO2 28.0 mmol/L      Calcium 8.5 mg/dL      eGFR Non African Amer 40 mL/min/1.73      BUN/Creatinine Ratio 11.5     Anion Gap 13.7 mmol/L     Magnesium [[id number]]  (Normal) Collected:  10/06/19 0227    Specimen:  Blood Updated:  10/06/19 0340     Magnesium 2.0 mg/dL     Phosphorus [[id number]]  (Normal) Collected:  10/06/19 0227    Specimen:  Blood Updated:  10/06/19 0340     Phosphorus 4.6 mg/dL     POC Glucose Once [[id number]]  (Abnormal) Collected:  10/05/19 2025    Specimen:  Blood Updated:  10/05/19 2026     Glucose 327 mg/dL      Comment: Serial Number: [id number]:  [id number]       POC Glucose Once [[id number]]  (Abnormal) Collected:  10/05/19 1633    Specimen:  Blood Updated:  10/05/19 1634     Glucose 141 mg/dL      Comment: Serial Number: [id number]:  [id number]       Blood Culture - Blood, Arm, Left [[id number]] Collected:  09/30/19 1248    Specimen:  Blood from Arm, Left Updated:  10/05/19 1315     Blood Culture No growth at 5 days    Blood Culture - Blood, Blood, Venous Line [[id number]] Collected:  09/30/19 1250    Specimen:  Blood, Venous Line Updated:  10/05/19 1300     Blood Culture No growth at 5 days          Imaging Results (last 24 hours)     ** No results found for the last 24 hours. **          EKG      I personally viewed and interpreted the patient's EKG/Telemetry data:    ECHOCARDIOGRAM:    STRESS MYOVIEW:    CARDIAC CATHETERIZATION:    OTHER:         Assessment/Plan     1 sepsis  2.  Urinary tract infection  3.  COPD exacerbation with a left lower lobe pneumonia  4.  Acute renal failure  5.  Diabetes  6.  Non-STEMI    Patient had mild elevation of troponin which could be secondary renal insufficiency versus demand ischemia  Patient has urosepsis which is improving with antibiotics and other treatments including IV  fluids  Patient is followed by the nephrologist and her creatinine is better now  Patient is followed by the pulmonologist for COPD and pneumonia  Patient had a stress mostly which is abnormal with anteroapical wall ischemia   Patient will have a cardiac catheterization performed.  Discussed with patient and family about procedure risks and benefits   Patient will need clearance from nephrologist for cardiac catheterization  Patient's  had coronary bypass surgery  and had complication with the massive stroke  Patient had an echocardiogram for LV function valve abnormalities  Blood pressure and heart rate are stable  I discussed the patients findings and my recommendations with patient and family    Ishan June MD  10/06/19  12:10 PM

## 2019-10-06 NOTE — PLAN OF CARE
Problem: Patient Care Overview  Goal: Plan of Care Review  Outcome: Ongoing (interventions implemented as appropriate)   10/06/19 3779   Coping/Psychosocial   Plan of Care Reviewed With patient;family   Plan of Care Review   Progress improving   OTHER   Outcome Summary Patient rested for a majority of the day, started on mucomyst per nephrology pending cardiac cath tomorrow.     Goal: Individualization and Mutuality  Outcome: Ongoing (interventions implemented as appropriate)    Goal: Discharge Needs Assessment  Outcome: Ongoing (interventions implemented as appropriate)    Goal: Interprofessional Rounds/Family Conf  Outcome: Ongoing (interventions implemented as appropriate)      Problem: Breathing Pattern Ineffective (Adult)  Goal: Effective Oxygenation/Ventilation  Outcome: Ongoing (interventions implemented as appropriate)

## 2019-10-07 ENCOUNTER — HOSPITAL ENCOUNTER (OUTPATIENT)
Facility: HOSPITAL | Age: 69
Setting detail: HOSPITAL OUTPATIENT SURGERY
End: 2019-10-07
Attending: INTERNAL MEDICINE | Admitting: INTERNAL MEDICINE

## 2019-10-07 DIAGNOSIS — I21.4 NON-STEMI (NON-ST ELEVATED MYOCARDIAL INFARCTION) (HCC): ICD-10-CM

## 2019-10-07 LAB
ANION GAP SERPL CALCULATED.3IONS-SCNC: 12.8 MMOL/L (ref 5–15)
APTT PPP: 25.8 SECONDS (ref 24–31)
BUN BLD-MCNC: 14 MG/DL (ref 8–20)
BUN/CREAT SERPL: 11.7 (ref 5.4–26.2)
CALCIUM SPEC-SCNC: 8.9 MG/DL (ref 8.9–10.3)
CHLORIDE SERPL-SCNC: 102 MMOL/L (ref 101–111)
CO2 SERPL-SCNC: 27 MMOL/L (ref 22–32)
CREAT BLD-MCNC: 1.2 MG/DL (ref 0.4–1)
DEPRECATED RDW RBC AUTO: 42.9 FL (ref 37–54)
EOSINOPHIL # BLD MANUAL: 5.57 10*3/MM3 (ref 0–0.4)
EOSINOPHIL NFR BLD MANUAL: 35 % (ref 0.3–6.2)
ERYTHROCYTE [DISTWIDTH] IN BLOOD BY AUTOMATED COUNT: 13.7 % (ref 12.3–15.4)
GFR SERPL CREATININE-BSD FRML MDRD: 44 ML/MIN/1.73
GLUCOSE BLD-MCNC: 258 MG/DL (ref 65–99)
GLUCOSE BLDC GLUCOMTR-MCNC: 131 MG/DL (ref 70–105)
GLUCOSE BLDC GLUCOMTR-MCNC: 158 MG/DL (ref 70–105)
GLUCOSE BLDC GLUCOMTR-MCNC: 178 MG/DL (ref 70–105)
GLUCOSE BLDC GLUCOMTR-MCNC: 282 MG/DL (ref 70–105)
HCT VFR BLD AUTO: 37.6 % (ref 34–46.6)
HGB BLD-MCNC: 12.6 G/DL (ref 12–15.9)
INR PPP: 1.09 (ref 0.9–1.1)
LYMPHOCYTES # BLD MANUAL: 5.09 10*3/MM3 (ref 0.7–3.1)
LYMPHOCYTES NFR BLD MANUAL: 32 % (ref 19.6–45.3)
LYMPHOCYTES NFR BLD MANUAL: 5 % (ref 5–12)
MAGNESIUM SERPL-MCNC: 1.8 MG/DL (ref 1.8–2.5)
MCH RBC QN AUTO: 30.2 PG (ref 26.6–33)
MCHC RBC AUTO-ENTMCNC: 33.4 G/DL (ref 31.5–35.7)
MCV RBC AUTO: 90.2 FL (ref 79–97)
MONOCYTES # BLD AUTO: 0.8 10*3/MM3 (ref 0.1–0.9)
NEUTROPHILS # BLD AUTO: 4.45 10*3/MM3 (ref 1.7–7)
NEUTROPHILS NFR BLD MANUAL: 28 % (ref 42.7–76)
PATHOLOGY REVIEW: YES
PHOSPHATE SERPL-MCNC: 3.7 MG/DL (ref 2.4–4.7)
PLAT MORPH BLD: NORMAL
PLATELET # BLD AUTO: 232 10*3/MM3 (ref 140–450)
PMV BLD AUTO: 7.3 FL (ref 6–12)
POTASSIUM BLD-SCNC: 4.8 MMOL/L (ref 3.6–5.1)
PROTHROMBIN TIME: 11.2 SECONDS (ref 9.6–11.7)
RBC # BLD AUTO: 4.17 10*6/MM3 (ref 3.77–5.28)
RBC MORPH BLD: NORMAL
SCAN SLIDE: NORMAL
SODIUM BLD-SCNC: 137 MMOL/L (ref 136–144)
WBC MORPH BLD: NORMAL
WBC NRBC COR # BLD: 15.9 10*3/MM3 (ref 3.4–10.8)

## 2019-10-07 PROCEDURE — 84100 ASSAY OF PHOSPHORUS: CPT | Performed by: INTERNAL MEDICINE

## 2019-10-07 PROCEDURE — 80048 BASIC METABOLIC PNL TOTAL CA: CPT | Performed by: INTERNAL MEDICINE

## 2019-10-07 PROCEDURE — 93454 CORONARY ARTERY ANGIO S&I: CPT | Performed by: INTERNAL MEDICINE

## 2019-10-07 PROCEDURE — 85025 COMPLETE CBC W/AUTO DIFF WBC: CPT | Performed by: INTERNAL MEDICINE

## 2019-10-07 PROCEDURE — C1894 INTRO/SHEATH, NON-LASER: HCPCS | Performed by: INTERNAL MEDICINE

## 2019-10-07 PROCEDURE — 25010000002 MIDAZOLAM PER 1 MG: Performed by: INTERNAL MEDICINE

## 2019-10-07 PROCEDURE — 63710000001 INSULIN GLARGINE PER 5 UNITS: Performed by: FAMILY MEDICINE

## 2019-10-07 PROCEDURE — 99152 MOD SED SAME PHYS/QHP 5/>YRS: CPT | Performed by: INTERNAL MEDICINE

## 2019-10-07 PROCEDURE — 85730 THROMBOPLASTIN TIME PARTIAL: CPT | Performed by: INTERNAL MEDICINE

## 2019-10-07 PROCEDURE — 25010000002 MEROPENEM PER 100 MG: Performed by: INTERNAL MEDICINE

## 2019-10-07 PROCEDURE — 82962 GLUCOSE BLOOD TEST: CPT

## 2019-10-07 PROCEDURE — B2111ZZ FLUOROSCOPY OF MULTIPLE CORONARY ARTERIES USING LOW OSMOLAR CONTRAST: ICD-10-PCS | Performed by: INTERNAL MEDICINE

## 2019-10-07 PROCEDURE — 97116 GAIT TRAINING THERAPY: CPT

## 2019-10-07 PROCEDURE — 25010000002 FENTANYL CITRATE (PF) 100 MCG/2ML SOLUTION: Performed by: INTERNAL MEDICINE

## 2019-10-07 PROCEDURE — 94799 UNLISTED PULMONARY SVC/PX: CPT

## 2019-10-07 PROCEDURE — 0 IOPAMIDOL PER 1 ML: Performed by: INTERNAL MEDICINE

## 2019-10-07 PROCEDURE — 83735 ASSAY OF MAGNESIUM: CPT | Performed by: INTERNAL MEDICINE

## 2019-10-07 PROCEDURE — 85007 BL SMEAR W/DIFF WBC COUNT: CPT | Performed by: INTERNAL MEDICINE

## 2019-10-07 PROCEDURE — C1769 GUIDE WIRE: HCPCS | Performed by: INTERNAL MEDICINE

## 2019-10-07 PROCEDURE — C1760 CLOSURE DEV, VASC: HCPCS | Performed by: INTERNAL MEDICINE

## 2019-10-07 PROCEDURE — 4A023N7 MEASUREMENT OF CARDIAC SAMPLING AND PRESSURE, LEFT HEART, PERCUTANEOUS APPROACH: ICD-10-PCS | Performed by: INTERNAL MEDICINE

## 2019-10-07 PROCEDURE — 63710000001 INSULIN REGULAR HUMAN PER 5 UNITS: Performed by: INTERNAL MEDICINE

## 2019-10-07 PROCEDURE — 85610 PROTHROMBIN TIME: CPT | Performed by: INTERNAL MEDICINE

## 2019-10-07 PROCEDURE — 99232 SBSQ HOSP IP/OBS MODERATE 35: CPT | Performed by: INTERNAL MEDICINE

## 2019-10-07 RX ORDER — MIDAZOLAM HYDROCHLORIDE 1 MG/ML
INJECTION INTRAMUSCULAR; INTRAVENOUS AS NEEDED
Status: DISCONTINUED | OUTPATIENT
Start: 2019-10-07 | End: 2019-10-07 | Stop reason: HOSPADM

## 2019-10-07 RX ORDER — LIDOCAINE HYDROCHLORIDE 20 MG/ML
INJECTION, SOLUTION INFILTRATION; PERINEURAL AS NEEDED
Status: DISCONTINUED | OUTPATIENT
Start: 2019-10-07 | End: 2019-10-07 | Stop reason: HOSPADM

## 2019-10-07 RX ORDER — SODIUM CHLORIDE 9 MG/ML
75 INJECTION, SOLUTION INTRAVENOUS CONTINUOUS
Status: DISCONTINUED | OUTPATIENT
Start: 2019-10-07 | End: 2019-10-08

## 2019-10-07 RX ORDER — SODIUM CHLORIDE 9 MG/ML
250 INJECTION, SOLUTION INTRAVENOUS ONCE AS NEEDED
Status: DISCONTINUED | OUTPATIENT
Start: 2019-10-07 | End: 2019-10-09 | Stop reason: HOSPADM

## 2019-10-07 RX ORDER — FENTANYL CITRATE 50 UG/ML
INJECTION, SOLUTION INTRAMUSCULAR; INTRAVENOUS AS NEEDED
Status: DISCONTINUED | OUTPATIENT
Start: 2019-10-07 | End: 2019-10-07 | Stop reason: HOSPADM

## 2019-10-07 RX ORDER — ATROPINE SULFATE 1 MG/ML
.5-1 INJECTION, SOLUTION INTRAMUSCULAR; INTRAVENOUS; SUBCUTANEOUS
Status: DISCONTINUED | OUTPATIENT
Start: 2019-10-07 | End: 2019-10-09 | Stop reason: HOSPADM

## 2019-10-07 RX ADMIN — METOPROLOL TARTRATE 50 MG: 50 TABLET, FILM COATED ORAL at 08:43

## 2019-10-07 RX ADMIN — MEROPENEM 500 MG: 500 INJECTION, POWDER, FOR SOLUTION INTRAVENOUS at 21:30

## 2019-10-07 RX ADMIN — METOPROLOL TARTRATE 50 MG: 50 TABLET, FILM COATED ORAL at 21:30

## 2019-10-07 RX ADMIN — GUAIFENESIN 600 MG: 600 TABLET, EXTENDED RELEASE ORAL at 21:28

## 2019-10-07 RX ADMIN — ROSUVASTATIN CALCIUM 10 MG: 10 TABLET, FILM COATED ORAL at 21:30

## 2019-10-07 RX ADMIN — IPRATROPIUM BROMIDE AND ALBUTEROL SULFATE 3 ML: .5; 3 SOLUTION RESPIRATORY (INHALATION) at 06:49

## 2019-10-07 RX ADMIN — IPRATROPIUM BROMIDE AND ALBUTEROL SULFATE 3 ML: .5; 3 SOLUTION RESPIRATORY (INHALATION) at 11:13

## 2019-10-07 RX ADMIN — SODIUM CHLORIDE 100 ML/HR: 900 INJECTION, SOLUTION INTRAVENOUS at 14:17

## 2019-10-07 RX ADMIN — PANTOPRAZOLE SODIUM 40 MG: 40 TABLET, DELAYED RELEASE ORAL at 08:43

## 2019-10-07 RX ADMIN — GABAPENTIN 600 MG: 300 CAPSULE ORAL at 21:29

## 2019-10-07 RX ADMIN — ALPRAZOLAM 0.5 MG: 0.5 TABLET ORAL at 21:34

## 2019-10-07 RX ADMIN — Medication 10 ML: at 21:49

## 2019-10-07 RX ADMIN — Medication 600 MG: at 21:28

## 2019-10-07 RX ADMIN — BUDESONIDE 0.5 MG: 0.5 INHALANT RESPIRATORY (INHALATION) at 06:49

## 2019-10-07 RX ADMIN — Medication 600 MG: at 08:45

## 2019-10-07 RX ADMIN — MEROPENEM 500 MG: 500 INJECTION, POWDER, FOR SOLUTION INTRAVENOUS at 11:29

## 2019-10-07 RX ADMIN — DULOXETINE HYDROCHLORIDE 30 MG: 30 CAPSULE, DELAYED RELEASE ORAL at 08:43

## 2019-10-07 RX ADMIN — INSULIN GLARGINE 30 UNITS: 100 INJECTION, SOLUTION SUBCUTANEOUS at 21:35

## 2019-10-07 RX ADMIN — GABAPENTIN 300 MG: 300 CAPSULE ORAL at 08:43

## 2019-10-07 RX ADMIN — THEOPHYLLINE ANHYDROUS 300 MG: 300 CAPSULE, EXTENDED RELEASE ORAL at 08:43

## 2019-10-07 RX ADMIN — ALPRAZOLAM 0.5 MG: 0.5 TABLET ORAL at 01:33

## 2019-10-07 RX ADMIN — MEROPENEM 500 MG: 500 INJECTION, POWDER, FOR SOLUTION INTRAVENOUS at 03:19

## 2019-10-07 RX ADMIN — GUAIFENESIN 600 MG: 600 TABLET, EXTENDED RELEASE ORAL at 08:43

## 2019-10-07 RX ADMIN — IPRATROPIUM BROMIDE AND ALBUTEROL SULFATE 3 ML: .5; 3 SOLUTION RESPIRATORY (INHALATION) at 00:47

## 2019-10-07 RX ADMIN — Medication 10 ML: at 08:44

## 2019-10-07 RX ADMIN — INSULIN HUMAN 3 UNITS: 100 INJECTION, SOLUTION PARENTERAL at 08:45

## 2019-10-07 NOTE — PROGRESS NOTES
"NEPHROLOGY PROGRESS NOTE    [facility]  [phone]  Espinoza Dunn MD      Patient Care Team:  Deborah Ochoa MD as PCP - General      Provider:  Espinoza Dunn MD  Patient Name: Dyana Montemayor  :  1950    SUBJECTIVE:  F/u ADAMARIS  Patient feeling better. No SOB, CP, dysuria, palpitations.    Medication:    Acetylcysteine 600 mg Oral BID   budesonide 0.5 mg Nebulization BID - RT   DULoxetine 30 mg Oral Daily   enoxaparin 40 mg Subcutaneous Q24H   gabapentin 300 mg Oral Daily   gabapentin 600 mg Oral Nightly   guaiFENesin 600 mg Oral Q12H   insulin glargine 30 Units Subcutaneous Nightly   insulin lispro 5 Units Subcutaneous TID With Meals   ipratropium-albuterol 3 mL Nebulization Q6H - RT   meropenem 500 mg Intravenous Q8H   metoprolol tartrate 50 mg Oral Q12H   pantoprazole 40 mg Oral QAM   rosuvastatin 10 mg Oral Nightly   sodium chloride 10 mL Intravenous Q12H   theophylline 300 mg Oral Daily   Fluticasone-Umeclidin-Vilant 1 each Inhalation Daily       sodium chloride 100 mL/hr       OBJECTIVE    Vital Sign Min/Max for last 24 hours  Temp  Min: 97.9 °F (36.6 °C)  Max: 98.6 °F (37 °C)   BP  Min: 102/47  Max: 158/68   Pulse  Min: 76  Max: 108   Resp  Min: 11  Max: 20   SpO2  Min: 90 %  Max: 97 %   No Data Recorded   Weight  Min: 76.9 kg (169 lb 8.5 oz)  Max: 76.9 kg (169 lb 8.5 oz)     Flowsheet Rows      First Filed Value   Admission Height  157.5 cm (62\") Documented at 2019 1219   Admission Weight  68 kg (150 lb) Documented at 2019 1219          No intake/output data recorded.  I/O last 3 completed shifts:  In: 1380 [P.O.:1380]  Out: 2500 [Urine:2500]    Physical Exam:  General Appearance: alert, appears stated age and cooperative  Head: normocephalic, without obvious abnormality and atraumatic  Eyes: conjunctivae and sclerae normal and no icterus  Neck: supple and no JVD  Lungs: clear to auscultation and respirations regular  Heart: regular rhythm & normal rate and " normal S1, S2 +RADHA  Chest: Wall no abnormalities observed  Abdomen: normal bowel sounds and soft non-tender  Extremities: moves extremities well, no edema, no cyanosis and no redness  Skin: no bleeding, bruising or rash, turgor normal, color normal and no leasions noted  Neurologic: Alert, and oriented. No focal deficits    Labs:    WBC WBC   Date Value Ref Range Status   10/07/2019 15.90 (H) 3.40 - 10.80 10*3/mm3 Final      HGB Hemoglobin   Date Value Ref Range Status   10/07/2019 12.6 12.0 - 15.9 g/dL Final      HCT Hematocrit   Date Value Ref Range Status   10/07/2019 37.6 34.0 - 46.6 % Final      Platlets No results found for: LABPLAT   MCV MCV   Date Value Ref Range Status   10/07/2019 90.2 79.0 - 97.0 fL Final          Sodium Sodium   Date Value Ref Range Status   10/07/2019 137 136 - 144 mmol/L Final   10/06/2019 136 136 - 144 mmol/L Final   10/05/2019 137 136 - 144 mmol/L Final      Potassium Potassium   Date Value Ref Range Status   10/07/2019 4.8 3.6 - 5.1 mmol/L Final   10/06/2019 4.7 3.6 - 5.1 mmol/L Final     Comment:     Specimen hemolyzed.  Results may be affected.   10/05/2019 4.0 3.6 - 5.1 mmol/L Final      Chloride Chloride   Date Value Ref Range Status   10/07/2019 102 101 - 111 mmol/L Final   10/06/2019 99 (L) 101 - 111 mmol/L Final   10/05/2019 99 (L) 101 - 111 mmol/L Final      CO2 CO2   Date Value Ref Range Status   10/07/2019 27.0 22.0 - 32.0 mmol/L Final   10/06/2019 28.0 22.0 - 32.0 mmol/L Final   10/05/2019 28.0 22.0 - 32.0 mmol/L Final      BUN BUN   Date Value Ref Range Status   10/07/2019 14 8 - 20 mg/dL Final   10/06/2019 15 8 - 20 mg/dL Final   10/05/2019 12 8 - 20 mg/dL Final      Creatinine Creatinine   Date Value Ref Range Status   10/07/2019 1.20 (H) 0.40 - 1.00 mg/dL Final   10/06/2019 1.30 (H) 0.40 - 1.00 mg/dL Final   10/05/2019 1.20 (H) 0.40 - 1.00 mg/dL Final      Calcium Calcium   Date Value Ref Range Status   10/07/2019 8.9 8.9 - 10.3 mg/dL Final   10/06/2019 8.5 (L) 8.9 -  10.3 mg/dL Final   10/05/2019 8.6 (L) 8.9 - 10.3 mg/dL Final      PO4 No components found for: PO4   Albumin No results found for: ALBUMIN   Magnesium Magnesium   Date Value Ref Range Status   10/07/2019 1.8 1.8 - 2.5 mg/dL Final   10/06/2019 2.0 1.8 - 2.5 mg/dL Final   10/05/2019 1.7 (L) 1.8 - 2.5 mg/dL Final      Uric Acid No components found for: URIC ACID     Imaging Results (last 72 hours)     Procedure Component Value Units Date/Time    US Renal Bilateral [[id number]] Collected:  10/01/19 1428     Updated:  10/01/19 1436    Narrative:       Examination: US RENAL BILATERAL-     Date of Exam: 10/1/2019 1:56 PM     Indication: ARF/CKD; A41.9-Sepsis, unspecified organism;  I95.9-Hypotension, unspecified; N39.0-Urinary tract infection, site not  specified; R19.7-Diarrhea, unspecified.     Comparison: None available.     Technique: Grayscale and color Doppler ultrasound evaluation of the  kidneys and urinary bladder was performed     Findings:  The right kidney measures 10.7 x 4.9 x 5.2 cm and the left kidney  measures 9.4 x 3.6 x 4.5 cm. Kidney echogenicity and vascularity appear  within normal limits. There is no solid kidney mass.  No echogenic  shadowing stone.  No hydronephrosis.        Limited visualization of the urinary bladder is unremarkable. Bladder  volume is calculated at 83 mL.       Impression:       Kidney ultrasound is within normal limits.     Electronically Signed By-Samir Villasenor On:10/1/2019 2:29 PM  This report was finalized on [id number] by  Samir Villasenor, .    XR Chest 1 View [[id number]] Collected:  09/30/19 1323     Updated:  09/30/19 1325    Narrative:       DATE OF EXAM:  9/30/2019 12:38 PM     PROCEDURE:  XR CHEST 1 VW-     INDICATIONS:  Severe Sepsis triage protocol     COMPARISON:  August 28, 2018     TECHNIQUE:   Single radiographic AP view of the chest was obtained.     FINDINGS:  Cardiac size is normal and the lungs remain clear.        Impression:       Negative portable chest      Electronically Signed By-Lee Costello On:9/30/2019 1:23 PM  This report was finalized on [id number] by  Lee Costello, .          Results for orders placed during the hospital encounter of 09/30/19   XR Chest 1 View    Narrative DATE OF EXAM:  9/30/2019 12:38 PM     PROCEDURE:  XR CHEST 1 VW-     INDICATIONS:  Severe Sepsis triage protocol     COMPARISON:  August 28, 2018     TECHNIQUE:   Single radiographic AP view of the chest was obtained.     FINDINGS:  Cardiac size is normal and the lungs remain clear.        Impression Negative portable chest     Electronically Signed By-Lee Costello On:9/30/2019 1:23 PM  This report was finalized on [id number] by  Lee Costello, .              ASSESSMENT / PLAN      Sepsis (CMS/HCC)      · ADAMARIS/CKD3------Nonoliguric. +ARF/ADAMARIS on top of known CRF/CKD STG 3 with a baseline serum creatinine of 1.2. CRF/CKD STG 3 secondary to DGS/HTN NS. +ARF/ADAMARIS is secondary to prerenal state/intravascular volume depletion with concomitant ACE-I/ARB use and some ATN from hypotension. D/C Lisinopril and Losartan.  · DIARRHEA/SEPSIS/LEUKOCYTOSIS-------per , Critical Care. Off BP meds. Cx pending  · HTN WITH CKD------BP okay  · NSTEMI--cath tomorrow      Cr stable  Cath planned for today  On ivf and mucomyst     Espinoza Dunn MD  [facility]  [phone]  10/07/19  6:33 AM

## 2019-10-07 NOTE — PLAN OF CARE
Problem: Patient Care Overview  Goal: Discharge Needs Assessment  Outcome: Ongoing (interventions implemented as appropriate)   10/07/19 1655   Discharge Needs Assessment   Readmission Within the Last 30 Days no previous admission in last 30 days   Concerns to be Addressed no discharge needs identified   Patient/Family Anticipates Transition to home with family   Patient/Family Anticipated Services at Transition none   Transportation Concerns car, none   Transportation Anticipated family or friend will provide   Anticipated Changes Related to Illness none   Equipment Needed After Discharge none     Goal: Interprofessional Rounds/Family Conf  Outcome: Ongoing (interventions implemented as appropriate)   10/07/19 1655   Interdisciplinary Rounds/Family Conf   Participants ;nursing;patient;physician       Problem: Breathing Pattern Ineffective (Adult)  Goal: Identify Related Risk Factors and Signs and Symptoms  Outcome: Ongoing (interventions implemented as appropriate)   10/07/19 1655   Breathing Pattern Ineffective (Adult)   Related Risk Factors (Breathing Pattern Ineffective) fatigue   Signs and Symptoms (Breathing Pattern Ineffective) anxiousness     Goal: Effective Oxygenation/Ventilation  Outcome: Ongoing (interventions implemented as appropriate)   10/07/19 1655   Breathing Pattern Ineffective (Adult)   Effective Oxygenation/Ventilation making progress toward outcome     Goal: Anxiety/Fear Reduction  Outcome: Ongoing (interventions implemented as appropriate)   10/07/19 1655   Breathing Pattern Ineffective (Adult)   Anxiety/Fear Reduction making progress toward outcome

## 2019-10-07 NOTE — THERAPY TREATMENT NOTE
Acute Care - Physical Therapy Treatment Note   Romie     Patient Name: Dyana Montemayor  : 1950  MRN: [id number]  Today's Date: 10/7/2019             Admit Date: 2019    Visit Dx:    ICD-10-CM ICD-9-CM   1. Sepsis, due to unspecified organism A41.9 038.9     995.91   2. Hypotension, unspecified hypotension type I95.9 458.9   3. Urinary tract infection without hematuria, site unspecified N39.0 599.0   4. Diarrhea of presumed infectious origin R19.7 009.3   5. Non-STEMI (non-ST elevated myocardial infarction) (CMS/[facility]) I21.4 410.70     Patient Active Problem List   Diagnosis   • Sepsis (CMS/[facility])   • Non-STEMI (non-ST elevated myocardial infarction) (CMS/[facility])       Therapy Treatment    Rehabilitation Treatment Summary     Row Name 10/07/19 1008             Treatment Time/Intention    Discipline  physical therapy assistant  -MC      Subjective Information  no complaints  -MC      Therapy Frequency (PT Clinical Impression)  3 times/wk  -MC      Patient Effort  excellent  -MC      Recorded by [] Felipa Alanis, [facility] 10/07/19 1143      Row Name 10/07/19 1008             Vital Signs    Pre Systolic BP Rehab  137  -MC      Pre Treatment Diastolic BP  82  -MC      Post Systolic BP Rehab  135  -MC      Post Treatment Diastolic BP  80  -MC      Pre SpO2 (%)  98  -MC      O2 Delivery Pre Treatment  room air  -MC      Recorded by [MC] Felipa Alanis, [facility] 10/07/19 1143      Row Name 10/07/19 1008             Cognitive Assessment/Intervention- PT/OT    Orientation Status (Cognition)  oriented x 4  -MC      Recorded by [] Felipa Alanis, [facility] 10/07/19 1143      Row Name 10/07/19 1008             Safety Issues, Functional Mobility    Comment, Safety Issues/Impairments (Mobility)  no safety issues at this time   -MC      Recorded by [] Felipa Alanis [facility] 10/07/19 1143      Row Name 10/07/19 1008             Bed Mobility Assessment/Treatment    Bed Mobility Assessment/Treatment  bed mobility (all) activities  -MC       Grayson Level (Bed Mobility)  independent  -MC      Recorded by [MC] Felipa Alanis, PTA 10/07/19 1143      Row Name 10/07/19 1008             Sit-Stand Transfer    Sit-Stand [place] (Transfers)  independent  -MC      Recorded by [MC] Felipa Alanis, PTA 10/07/19 1143      Row Name 10/07/19 1008             Gait/Stairs Assessment/Training    Gait/Stairs Assessment/Training  gait/ambulation independence  -MC      Grayson Level (Gait)  supervision  -MC      Distance in Feet (Gait)  400  -MC      Pattern (Gait)  step-through  -MC      Deviations/Abnormal Patterns (Gait)  gait speed decreased  -MC      Comment (Gait/Stairs)  Pt had no LOB during amb this date  -MC      Recorded by [MC] Felipa Alanis, PTA 10/07/19 1143      Row Name 10/07/19 1008             Static Sitting Balance    Level of Grayson (Unsupported Sitting, Static Balance)  independent  -MC      Sitting Position (Unsupported Sitting, Static Balance)  sitting on edge of bed  -MC      Time Able to Maintain Position (Unsupported Sitting, Static Balance)  1 to 2 minutes  -MC      Recorded by [MC] Felipa Alanis, PTA 10/07/19 1143      Row Name 10/07/19 1008             Static Standing Balance    Level of [place] (Supported Standing, Static Balance)  independent  -MC      Recorded by [MC] Felipa Alanis, PTA 10/07/19 1143      Row Name 10/07/19 1008             Dynamic Standing Balance    Level of Gr[place], Reaches Outside Midline (Standing, Dynamic Balance)  standby assist  -MC      Time Able to Maintain Position, Reaches Outside Midline (Standing, Dynamic Balance)  more than 5 minutes  -MC      Recorded by [MC] Felipa Alanis, PTA 10/07/19 1143      Row Name 10/07/19 1008             Positioning and Restraints    Pre-Treatment Position  in bed  -MC      Post Treatment Position  bed  -MC      In Bed  sitting EOB;call light within reach;with family/caregiver Eating breakfast  -MC      Recorded by [MC] Felipa Alanis, PTA 10/07/19 1143       Row Name 10/07/19 1008             Pain Scale: Numbers Pre/Post-Treatment    Pre/Post Treatment Pain Comment  No c/o pain this date  -MC      Recorded by [] Zeke Felipa, [facility] 10/07/19 1143      Row Name 10/07/19 1008             Coping    Observed Emotional State  accepting;cooperative;calm  -MC      Verbalized Emotional State  acceptance  -MC      Recorded by [] Felipa Alanis, [facility] 10/07/19 1143      Row Name 10/07/19 1008             Plan of Care Review    Plan of Care Reviewed With  patient  -MC      Recorded by [] Felipa Alanis, [facility] 10/07/19 1143      Row Name 10/07/19 1008             Outcome Summary/Treatment Plan (PT)    Anticipated Discharge Disposition (PT)  home with assist ATT, Getting cath this afternoon will assess following  -MC      Recorded by [] Felipa Alanis, [facility] 10/07/19 1143        User Key  (r) = Recorded By, (t) = Taken By, (c) = Cosigned By    Initials Name Effective Dates Discipline     Felipa Alanis, [facility] 06/05/19 -  PT                   Physical Therapy Education     Title: PT OT SLP Therapies (Done)     Topic: Physical Therapy (Done)     Point: Mobility training (Done)     Learning Progress Summary           Patient Acceptance, E,TB,D, VU,DU by  at 10/7/2019 11:44 AM    Acceptance, E, VU by [facility] at 10/5/2019  5:55 PM    Acceptance, E, DU by  at 10/3/2019  2:41 PM    Acceptance, E, NR by  at 10/2/2019 12:15 PM   Family Acceptance, E, VU by [facility] at 10/5/2019  5:55 PM                   Point: Home exercise program (Done)     Learning Progress Summary           Patient Acceptance, E, VU by [facility] at 10/5/2019  5:55 PM    Acceptance, E, NR by  at 10/2/2019 12:15 PM   Family Acceptance, E, VU by [facility] at 10/5/2019  5:55 PM                               User Key     Initials Effective Dates Name Provider Type Discipline     03/01/19 -  Reyes, Carmela, PT Physical Therapist PT    [facility] 03/01/19 -  Misty Stone PTA Physical Therapy Assistant PT     06/05/19 -  Felipa Alanis,  GRACY Physical Therapy Assistant PT                PT Recommendation and Plan  Anticipated Discharge Disposition (PT): home with assist(ATT, Getting cath this afternoon will assess following)  Therapy Frequency (PT Clinical Impression): 3 times/wk  Outcome Summary/Treatment Plan (PT)  Anticipated Discharge Disposition (PT): home with assist(ATT, Getting cath this afternoon will assess following)  Plan of Care Reviewed With: patient  Progress: improving  Outcome Summary: Pt agreed to therapy and has excellent motivation. Pt is independent for all bed mobility and transfers. Pt amb 400ft with supervision and no noted unsteadiness or LOB. Pt and family had no questions or concerns at this time. Pt to get cath this pm, if all goes well judit with assist recommended at HI.      Time Calculation:   PT Charges     Row Name 10/07/19 1143             Time Calculation    Start Time  1010  -      Stop Time  1025  -      Time Calculation (min)  15 min  -      PT Received On  10/07/19  -      PT - Next Appointment  10/09/19  -         Time Calculation- PT    Total Timed Code Minutes- PT  15 minute(s)  -        User Key  (r) = Recorded By, (t) = Taken By, (c) = Cosigned By    Initials Name Provider Type     Felipa Alanis PTA Physical Therapy Assistant        Therapy Charges for Today     Code Description Service Date Service Provider Modifiers Qty    92662289039 HC GAIT TRAINING EA 15 MIN 10/7/2019 Felipa Alanis PTA GP 1          PT G-Codes  Outcome Measure Options: AM-PAC 6 Clicks Basic Mobility (PT)  AM-PAC 6 Clicks Score (PT): 23    Felipa Alanis PTA  10/7/2019

## 2019-10-07 NOTE — PROGRESS NOTES
Discharge Planning Assessment   Romie     Patient Name: Dyana Montemayor  MRN: [id number]  Today's Date: 10/7/2019    Admit Date: 9/30/2019    Discharge Needs Assessment    No documentation.       Discharge Plan     Row Name 10/07/19 1337       Plan    Plan  D/C plan home     Plan Comments  Pt is going for a cardiac cath today         Destination      No service coordination in this encounter.      Durable Medical Equipment      No service coordination in this encounter.      Dialysis/Infusion      No service coordination in this encounter.      Home Medical Care      No service coordination in this encounter.      Therapy      No service coordination in this encounter.      Community Resources      No service coordination in this encounter.        Expected Discharge Date and Time     Expected Discharge Date Expected Discharge Time    Oct 4, 2019         Demographic Summary    No documentation.       Functional Status    No documentation.       Psychosocial    No documentation.       Abuse/Neglect    No documentation.       Legal    No documentation.       Substance Abuse    No documentation.       Patient Forms    No documentation.           Aurora Ahmadi RN

## 2019-10-07 NOTE — PROGRESS NOTES
ICU Daily Progress Note        Sepsis (CMS/[facility])      Assessment/Plan   Dyspnea improving   Urosepsis improving  Shock, septic resolved  Diarrhea, ? Hypovolemia  Possible left lower lobe pneumonia  Elevated troponin rule out acute coronary syndrome  Acute kidney injury elevated creatinine  COPD patient use trelegy inhaler at home  Diabetes   no significant obstructive sleep apnea based on home sleep study  Smoker less than pack a day for for 56 years  History of high altitude pulmonary edema        Plan  Antibiotics meropenem total of 7 days  Bronchodilators  Oxygen titration  DVT prophylaxis  GI prophylaxis  Glycemic control             LOS: 7 days         Vital signs for last 24 hours:  Vitals:    10/07/19 1113 10/07/19 1116 10/07/19 1124 10/07/19 1500   BP:   134/49 149/63   Pulse: 80 81 95 87   Resp: 16 16 20 22   Temp:   97.7 °F (36.5 °C) 98.2 °F (36.8 °C)   TempSrc:   Oral Oral   SpO2: 93%  95% 94%   Weight:       Height:           Intake/Output last 3 shifts:  I/O last 3 completed shifts:  In: 720 [P.O.:720]  Out: 2700 [Urine:2700]  Intake/Output this shift:  I/O this shift:  In: 1360 [P.O.:360; I.V.:1000]  Out: -     Vent settings for last 24 hours:       Hemodynamic parameters for last 24 hours:       Radiology  Imaging Results (last 24 hours)     ** No results found for the last 24 hours. **          Labs:  Results from last 7 days   Lab Units 10/07/19  0155   WBC 10*3/mm3 15.90*   HEMOGLOBIN g/dL 12.6   HEMATOCRIT % 37.6   PLATELETS 10*3/mm3 232     Results from last 7 days   Lab Units 10/07/19  0155  10/03/19  0326   SODIUM mmol/L 137   < > 136   POTASSIUM mmol/L 4.8   < > 4.7   CHLORIDE mmol/L 102   < > 105   CO2 mmol/L 27.0   < > 21.0*   BUN mg/dL 14   < > 17   CREATININE mg/dL 1.20*   < > 1.30*   CALCIUM mg/dL 8.9   < > 8.6*   BILIRUBIN mg/dL  --   --  0.7   ALK PHOS U/L  --   --  75   ALT (SGPT) U/L  --   --  15   AST (SGOT) U/L  --   --  15   GLUCOSE mg/dL 258*   < > 202*    < > = values in this  interval not displayed.     Results from last 7 days   Lab Units 09/30/19  1914   PH, ARTERIAL pH units 7.376   PO2 ART mm Hg 77.8*   PCO2, ARTERIAL mm Hg 36.7   HCO3 ART mmol/L 21.5     Results from last 7 days   Lab Units 10/03/19  0326 10/02/19  0158 09/30/19  1804   ALBUMIN g/dL 3.50 3.10* 2.50*     Results from last 7 days   Lab Units 10/01/19  0557 10/01/19  0327 09/30/19  1804   CK TOTAL U/L  --  161 187   TROPONIN I ng/mL 0.140*  --  0.140*         Results from last 7 days   Lab Units 10/07/19  0155   MAGNESIUM mg/dL 1.8     Results from last 7 days   Lab Units 10/07/19  0156   INR  1.09   APTT seconds 25.8     Results from last 7 days   Lab Units 10/01/19  0327   TSH uIU/mL 0.050*           Meds:   SCHEDULE    Acetylcysteine 600 mg Oral BID   budesonide 0.5 mg Nebulization BID - RT   DULoxetine 30 mg Oral Daily   enoxaparin 40 mg Subcutaneous Q24H   gabapentin 300 mg Oral Daily   gabapentin 600 mg Oral Nightly   guaiFENesin 600 mg Oral Q12H   insulin glargine 30 Units Subcutaneous Nightly   insulin lispro 5 Units Subcutaneous TID With Meals   ipratropium-albuterol 3 mL Nebulization Q6H - RT   meropenem 500 mg Intravenous Q8H   metoprolol tartrate 50 mg Oral Q12H   pantoprazole 40 mg Oral QAM   rosuvastatin 10 mg Oral Nightly   sodium chloride 10 mL Intravenous Q12H   theophylline 300 mg Oral Daily   Fluticasone-Umeclidin-Vilant 1 each Inhalation Daily     Infusions    sodium chloride 100 mL/hr Last Rate: 100 mL/hr (10/07/19 1417)     PRNs  ALPRAZolam  •  ipratropium-albuterol  •  sodium chloride  •  [COMPLETED] Insert peripheral IV **AND** sodium chloride  •  sodium chloride  •  sodium chloride    Physical Exam:  Physical Exam   Cardiovascular:   Murmur heard.  Pulmonary/Chest: No respiratory distress. She has wheezes. She has rales. She exhibits no tenderness.   Abdominal: She exhibits no distension. There is no tenderness.   Musculoskeletal: She exhibits edema.       ROS  Review of Systems   HENT:  Positive for congestion. Negative for rhinorrhea and sneezing.    Respiratory: Positive for cough, shortness of breath and wheezing. Negative for apnea, choking, chest tightness and stridor.    Cardiovascular: Positive for leg swelling.         Critical Care Time greater than: 1 Hour  Total time spent with patient greater than: 1 Hour

## 2019-10-07 NOTE — PROGRESS NOTES
LOS: 7 days   Patient Care Team:  Deborah Ochoa MD as PCP - General    Subjective     Interval History:    Patient Complaints: Fatigued but otherwise ok    History taken from: patient    Review of Systems   Constitutional: Positive for activity change, appetite change and fatigue. Negative for chills, diaphoresis and fever.   HENT: Negative for congestion and rhinorrhea.    Eyes: Negative for visual disturbance.   Respiratory: Positive for cough, shortness of breath and wheezing. Negative for apnea, choking, chest tightness and stridor.    Cardiovascular: Negative for chest pain, palpitations and leg swelling.   Gastrointestinal: Negative for diarrhea, nausea, rectal pain and vomiting.   Genitourinary: Negative for vaginal pain.   Musculoskeletal: Negative for neck pain.   Skin: Negative for rash.   Neurological: Positive for weakness.   Psychiatric/Behavioral: The patient is nervous/anxious.            Objective     Vital Signs  Temp:  [97.7 °F (36.5 °C)-98.6 °F (37 °C)] 98.2 °F (36.8 °C)  Heart Rate:  [] 87  Resp:  [11-22] 22  BP: (134-158)/(49-90) 149/63    Physical Exam:     General Appearance:    Alert, cooperative, in no acute distress,   Head:    Normocephalic, without obvious abnormality, atraumatic   Eyes:            Lids and lashes normal, conjunctivae and sclerae normal, no   icterus, no pallor, corneas clear, PERRLA   Ears:    Ears appear intact with no abnormalities noted   Throat:   No oral lesions, no thrush, oral mucosa moist   Neck:   No adenopathy, supple, trachea midline, no thyromegaly, no   carotid bruit, no JVD   Lungs:     Scattered wheezes with forced expirations    Heart:    Regular rhythm and normal rate, normal S1 and S2, no            murmur, no gallop, no rub, no click   Chest Wall:    No abnormalities observed   Abdomen:     Normal bowel sounds, no masses, no organomegaly, soft        Non-tender non-distended, no guarding,   Extremities:   Moves all extremities well, no  edema, no cyanosis, no             Redness   Pulses:   Pulses palpable and equal bilaterally   Skin:   No bleeding, bruising or rash   Lymph nodes:   No palpable adenopathy   Neurologic:   Cranial nerves 2 - 12 grossly intact, sensation intact, DTR       present and equal bilaterally        Results Review:    Lab Results (last 24 hours)     Procedure Component Value Units Date/Time    POC Glucose Once [[id number]]  (Abnormal) Collected:  10/07/19 1636    Specimen:  Blood Updated:  10/07/19 1642     Glucose 158 mg/dL      Comment: Serial Number: [id number]:  [id number]       POC Glucose Once [[id number]]  (Abnormal) Collected:  10/07/19 1121    Specimen:  Blood Updated:  10/07/19 1129     Glucose 178 mg/dL      Comment: Serial Number: [id number]:  [id number]       POC Glucose Once [[id number]]  (Abnormal) Collected:  10/07/19 0714    Specimen:  Blood Updated:  10/07/19 0715     Glucose 282 mg/dL      Comment: Serial Number: [id number]:  [id number]       Scan Slide [[id number]] Collected:  10/07/19 0155    Specimen:  Blood Updated:  10/07/19 0319     Scan Slide --     Comment: See Manual Differential Results       Manual Differential [[id number]]  (Abnormal) Collected:  10/07/19 0155    Specimen:  Blood Updated:  10/07/19 0319     Neutrophil % 28.0 %      Lymphocyte % 32.0 %      Monocyte % 5.0 %      Eosinophil % 35.0 %      Neutrophils Absolute 4.45 10*3/mm3      Lymphocytes Absolute 5.09 10*3/mm3      Monocytes Absolute 0.80 10*3/mm3      Eosinophils Absolute 5.57 10*3/mm3      RBC Morphology Normal     WBC Morphology Normal     Platelet Morphology Normal    Path Consult Reflex [[id number]] Collected:  10/07/19 0155    Specimen:  Blood Updated:  10/07/19 0319     Pathology Review Yes    CBC & Differential [[id number]] Collected:  10/07/19 0155    Specimen:  Blood Updated:  10/07/19 0319    Narrative:       The following orders were created for panel order CBC & Differential.  Procedure                                Abnormality         Status                     ---------                               -----------         ------                     CBC Auto Differential[[id number]]        Abnormal            Final result                 Please view results for these tests on the individual orders.    CBC Auto Differential [[id number]]  (Abnormal) Collected:  10/07/19 0155    Specimen:  Blood Updated:  10/07/19 0319     WBC 15.90 10*3/mm3      RBC 4.17 10*6/mm3      Hemoglobin 12.6 g/dL      Hematocrit 37.6 %      MCV 90.2 fL      MCH 30.2 pg      MCHC 33.4 g/dL      RDW 13.7 %      RDW-SD 42.9 fl      MPV 7.3 fL      Platelets 232 10*3/mm3     Basic Metabolic Panel [[id number]]  (Abnormal) Collected:  10/07/19 0155    Specimen:  Blood Updated:  10/07/19 0309     Glucose 258 mg/dL      BUN 14 mg/dL      Creatinine 1.20 mg/dL      Sodium 137 mmol/L      Potassium 4.8 mmol/L      Chloride 102 mmol/L      CO2 27.0 mmol/L      Calcium 8.9 mg/dL      eGFR Non African Amer 44 mL/min/1.73      BUN/Creatinine Ratio 11.7     Anion Gap 12.8 mmol/L     Magnesium [[id number]]  (Normal) Collected:  10/07/19 0155    Specimen:  Blood Updated:  10/07/19 0309     Magnesium 1.8 mg/dL     Phosphorus [[id number]]  (Normal) Collected:  10/07/19 0155    Specimen:  Blood Updated:  10/07/19 0309     Phosphorus 3.7 mg/dL     Protime-INR [[id number]]  (Normal) Collected:  10/07/19 0156    Specimen:  Blood Updated:  10/07/19 0255     Protime 11.2 Seconds      INR 1.09    aPTT [[id number]]  (Normal) Collected:  10/07/19 0156    Specimen:  Blood Updated:  10/07/19 0255     PTT 25.8 seconds     POC Glucose Once [[id number]]  (Abnormal) Collected:  10/06/19 2013    Specimen:  Blood Updated:  10/06/19 2017     Glucose 239 mg/dL      Comment: Serial Number: [id number]:  [id number]              Imaging Results (last 24 hours)     ** No results found for the last 24 hours. **               I reviewed the patient's new clinical results.    Medication  Review:   Scheduled Meds:  [MAR Hold] Acetylcysteine 600 mg Oral BID   [MAR Hold] budesonide 0.5 mg Nebulization BID - RT   [MAR Hold] DULoxetine 30 mg Oral Daily   [MAR Hold] enoxaparin 40 mg Subcutaneous Q24H   gabapentin 300 mg Oral Daily   gabapentin 600 mg Oral Nightly   [MAR Hold] guaiFENesin 600 mg Oral Q12H   [MAR Hold] insulin glargine 30 Units Subcutaneous Nightly   [MAR Hold] insulin lispro 5 Units Subcutaneous TID With Meals   [MAR Hold] ipratropium-albuterol 3 mL Nebulization Q6H - RT   [MAR Hold] meropenem 500 mg Intravenous Q8H   metoprolol tartrate 50 mg Oral Q12H   [MAR Hold] pantoprazole 40 mg Oral QAM   [MAR Hold] rosuvastatin 10 mg Oral Nightly   [MAR Hold] sodium chloride 10 mL Intravenous Q12H   [MAR Hold] theophylline 300 mg Oral Daily   [MAR Hold] Fluticasone-Umeclidin-Vilant 1 each Inhalation Daily     Continuous Infusions:  sodium chloride 100 mL/hr Last Rate: 100 mL/hr (10/07/19 1417)     PRN Meds:.[MAR Hold] ALPRAZolam  •  fentaNYL citrate (PF)  •  [MAR Hold] ipratropium-albuterol  •  lidocaine  •  midazolam  •  [MAR Hold] sodium chloride  •  [COMPLETED] Insert peripheral IV **AND** [MAR Hold] sodium chloride  •  [MAR Hold] sodium chloride  •  [MAR Hold] sodium chloride     Assessment/Plan       Non-STEMI (non-ST elevated myocardial infarction) (CMS/HCC)    Sepsis (CMS/HCC)  Pneumonia  UTI  DM-II with complication of vascular disease  Panlobular emphysema    Continue antibiotics and supportive care.  Heart cath planned for today.         Plan for disposition:TBD.    Merna Fam MD  10/07/19  6:04 PM

## 2019-10-07 NOTE — PROGRESS NOTES
Nutrition Services    Patient Name:  Dyana Montemayor  YOB: 1950  MRN: [id number]  Admit Date:  9/30/2019    LOS review (7 days): Wt stable since 2016. BMI 31.00. Skin intact. Meal intakes % of meals. + BM. Will follow prn or at next LOS.     Electronically signed by:  Sharon Reilly RD  10/07/19 4:05 PM

## 2019-10-07 NOTE — THERAPY DISCHARGE NOTE
Acute Care - Physical Therapy Treatment Note/Discharge  VARUN Grubbs     Patient Name: Dyana Montemayor  : 1950  MRN: [id number]  Today's Date: 10/7/2019             Admit Date: 2019    Visit Dx:    ICD-10-CM ICD-9-CM   1. Sepsis, due to unspecified organism A41.9 038.9     995.91   2. Hypotension, unspecified hypotension type I95.9 458.9   3. Urinary tract infection without hematuria, site unspecified N39.0 599.0   4. Diarrhea of presumed infectious origin R19.7 009.3   5. Non-STEMI (non-ST elevated myocardial infarction) (CMS/[facility]) I21.4 410.70     Patient Active Problem List   Diagnosis   • Sepsis (CMS/[facility])   • Non-STEMI (non-ST elevated myocardial infarction) (CMS/[facility])       Physical Therapy Education     Title: PT OT SLP Therapies (Done)     Topic: Physical Therapy (Done)     Point: Mobility training (Done)     Learning Progress Summary           Patient Acceptance, E,TB,D, VU,DU by  at 10/7/2019 11:44 AM    Acceptance, E, VU by [facility] at 10/5/2019  5:55 PM    Acceptance, E, DU by  at 10/3/2019  2:41 PM    Acceptance, E, NR by  at 10/2/2019 12:15 PM   Family Acceptance, E, VU by [facility] at 10/5/2019  5:55 PM                   Point: Home exercise program (Done)     Learning Progress Summary           Patient Acceptance, E, VU by [facility] at 10/5/2019  5:55 PM    Acceptance, E, NR by  at 10/2/2019 12:15 PM   Family Acceptance, E, VU by [facility] at 10/5/2019  5:55 PM                               User Key     Initials Effective Dates Name Provider Type Discipline     19 -  Reyes, Carmela, PT Physical Therapist PT    [facility] 19 -  Misty Stone PTA Physical Therapy Assistant PT     19 -  Felipa Alanis PTA Physical Therapy Assistant PT                Therapy Treatment  Rehabilitation Treatment Summary     Row Name 10/07/19 1008             Treatment Time/Intention    Discipline  physical therapy assistant  -      Subjective Information  no complaints  -      Therapy Frequency (PT Clinical  Impression)  3 times/wk  -MC      Patient Effort  excellent  -MC      Recorded by [MC] Felipa Alanis, PTA 10/07/19 1143      Row Name 10/07/19 1008             Vital Signs    Pre Systolic BP Rehab  137  -MC      Pre Treatment Diastolic BP  82  -MC      Post Systolic BP Rehab  135  -MC      Post Treatment Diastolic BP  80  -MC      Pre SpO2 (%)  98  -MC      O2 Delivery Pre Treatment  room air  -MC      Recorded by [] Felipa Alanis, PTA 10/07/19 1143      Row Name 10/07/19 1008             Cognitive Assessment/Intervention- PT/OT    Orientation Status (Cognition)  oriented x 4  -MC      Recorded by [MC] Felipa Alanis, PTA 10/07/19 1143      Row Name 10/07/19 1008             Safety Issues, Functional Mobility    Comment, Safety Issues/Impairments (Mobility)  no safety issues at this time   -MC      Recorded by [] Felipa Alanis, PTA 10/07/19 1143      Row Name 10/07/19 1008             Bed Mobility Assessment/Treatment    Bed Mobility Assessment/Treatment  bed mobility (all) activities  -      [place] Level (Bed Mobility)  independent  -MC      Recorded by [MC] Felipa Alanis, PTA 10/07/19 1143      Row Name 10/07/19 1008             Sit-Stand Transfer    Sit-Stand [place] (Transfers)  independent  -MC      Recorded by [] Felipa Alanis, PTA 10/07/19 1143      Row Name 10/07/19 1008             Gait/Stairs Assessment/Training    Gait/Stairs Assessment/Training  gait/ambulation independence  -      [place] Level (Gait)  supervision  -      Distance in Feet (Gait)  400  -      Pattern (Gait)  step-through  -      Deviations/Abnormal Patterns (Gait)  gait speed decreased  -MC      Comment (Gait/Stairs)  Pt had no LOB during amb this date  -MC      Recorded by [] Felipa Alanis, PTA 10/07/19 1143      Row Name 10/07/19 1008             Static Sitting Balance    Level of [place] (Unsupported Sitting, Static Balance)  independent  -MC      Sitting Position (Unsupported Sitting, Static  Balance)  sitting on edge of bed  -      Time Able to Maintain Position (Unsupported Sitting, Static Balance)  1 to 2 minutes  -MC      Recorded by [] Felipa Alanis, [facility] 10/07/19 1143      Row Name 10/07/19 1008             Static Standing Balance    Level of [place] (Supported Standing, Static Balance)  independent  -MC      Recorded by [MC] Felipa Alanis, [facility] 10/07/19 1143      Row Name 10/07/19 1008             Dynamic Standing Balance    Level of [place], Reaches Outside Midline (Standing, Dynamic Balance)  standby assist  -MC      Time Able to Maintain Position, Reaches Outside Midline (Standing, Dynamic Balance)  more than 5 minutes  -MC      Recorded by [] Felipa Alanis, [facility] 10/07/19 1143      Row Name 10/07/19 1008             Positioning and Restraints    Pre-Treatment Position  in bed  -MC      Post Treatment Position  bed  -MC      In Bed  sitting EOB;call light within reach;with family/caregiver Eating breakfast  -MC      Recorded by [] Felipa Alanis, [facility] 10/07/19 1143      Row Name 10/07/19 1008             Pain Scale: Numbers Pre/Post-Treatment    Pre/Post Treatment Pain Comment  No c/o pain this date  -MC      Recorded by [] Felipa Alanis, [facility] 10/07/19 1143      Row Name 10/07/19 1008             Coping    Observed Emotional State  accepting;cooperative;calm  -      Verbalized Emotional State  acceptance  -MC      Recorded by [] Felipa Alanis, [facility] 10/07/19 1143      Row Name 10/07/19 1008             Plan of Care Review    Plan of Care Reviewed With  patient  -MC      Recorded by [] Felipa Alanis, [facility] 10/07/19 1143      Row Name 10/07/19 1008             Outcome Summary/Treatment Plan (PT)    Anticipated Discharge Disposition (PT)  home with assist ATT, Getting cath this afternoon will assess following  -MC      Recorded by [] Felipa Alanis, [facility] 10/07/19 1143        User Key  (r) = Recorded By, (t) = Taken By, (c) = Cosigned By    Initials Name Effective Dates Discipline     Felipa Ewing PTA 06/05/19 -  PT             PT Recommendation and Plan  Anticipated Discharge Disposition (PT): (Home with intermittent assist of family as needed. )  Outcome Summary/Treatment Plan (PT)  Anticipated Discharge Disposition (PT): (Home with intermittent assist of family as needed. )         Time Calculation:   PT Charges     Row Name 10/07/19 1143             Time Calculation    Start Time  1010  -      Stop Time  1025  -      Time Calculation (min)  15 min  -      PT Received On  10/07/19  -      PT - Next Appointment  10/09/19  -         Time Calculation- PT    Total Timed Code Minutes- PT  15 minute(s)  -        User Key  (r) = Recorded By, (t) = Taken By, (c) = Cosigned By    Initials Name Provider Type    Felipa Ewing PTA Physical Therapy Assistant            PT G-Codes  Outcome Measure Options: AM-PAC 6 Clicks Basic Mobility (PT)  AM-PAC 6 Clicks Score (PT): 23    PT Discharge Summary  Anticipated Discharge Disposition (PT): (Home with intermittent assist of family as needed. )  Reason for Discharge: All goals achieved  Outcomes Achieved: Able to achieve all goals within established timeline  Discharge Destination: Home    Kylah Spear, PT  10/7/2019

## 2019-10-07 NOTE — PROGRESS NOTES
Referring Provider: Hospitalist    Reason for follow-up: Non-STEMI     Patient Care Team:  Deborah Ochoa MD as PCP - General    Subjective .  No chest pain or shortness of breath    Objective  Lying in bed comfortably     Review of Systems   Constitution: Negative for fever and malaise/fatigue.   HENT: Negative for ear pain and nosebleeds.    Eyes: Negative for blurred vision and double vision.   Cardiovascular: Negative for chest pain, dyspnea on exertion and palpitations.   Respiratory: Negative for cough and shortness of breath.    Skin: Negative for rash.   Musculoskeletal: Negative for joint pain.   Gastrointestinal: Negative for abdominal pain, nausea and vomiting.   Neurological: Negative for focal weakness and headaches.   Psychiatric/Behavioral: Negative for depression. The patient is not nervous/anxious.    All other systems reviewed and are negative.      Patient has no known allergies.    Scheduled Meds:    Acetylcysteine 600 mg Oral BID   budesonide 0.5 mg Nebulization BID - RT   DULoxetine 30 mg Oral Daily   enoxaparin 40 mg Subcutaneous Q24H   gabapentin 300 mg Oral Daily   gabapentin 600 mg Oral Nightly   guaiFENesin 600 mg Oral Q12H   insulin glargine 30 Units Subcutaneous Nightly   insulin lispro 5 Units Subcutaneous TID With Meals   ipratropium-albuterol 3 mL Nebulization Q6H - RT   meropenem 500 mg Intravenous Q8H   metoprolol tartrate 50 mg Oral Q12H   pantoprazole 40 mg Oral QAM   rosuvastatin 10 mg Oral Nightly   sodium chloride 10 mL Intravenous Q12H   theophylline 300 mg Oral Daily   Fluticasone-Umeclidin-Vilant 1 each Inhalation Daily     Continuous Infusions:    sodium chloride 100 mL/hr     PRN Meds:.ALPRAZolam  •  ipratropium-albuterol  •  sodium chloride  •  [COMPLETED] Insert peripheral IV **AND** sodium chloride  •  sodium chloride  •  sodium chloride        VITAL SIGNS  Vitals:    10/07/19 0649 10/07/19 0653 10/07/19 1113 10/07/19 1116   BP:       BP Location:       Patient  "Position:       Pulse: 86 87 80 81   Resp: 16 16 16 16   Temp:       TempSrc:       SpO2: 92%  93%    Weight:       Height:           Flowsheet Rows      First Filed Value   Admission Height  157.5 cm (62\") Documented at 09/30/2019 1219   Admission Weight  68 kg (150 lb) Documented at 09/30/2019 1219           TELEMETRY: Sinus rhythm    Physical Exam:  Physical Exam   Constitutional: She appears well-developed and well-nourished.   HENT:   Head: Normocephalic and atraumatic.   Eyes: Conjunctivae and EOM are normal. Pupils are equal, round, and reactive to light. No scleral icterus.   Neck: Normal range of motion. Neck supple. No JVD present. Carotid bruit is not present.   Cardiovascular: Normal rate, regular rhythm, S1 normal, S2 normal, normal heart sounds and intact distal pulses. PMI is not displaced.   Pulmonary/Chest: Effort normal and breath sounds normal. She has no wheezes. She has no rales.   Abdominal: Soft. Bowel sounds are normal.   Musculoskeletal: Normal range of motion.   Neurological: She is alert. She has normal strength.   No focal deficits   Skin: Skin is warm and dry. No rash noted.   Psychiatric: She has a normal mood and affect.        Results Review:   I reviewed the patient's new clinical results.  Lab Results (last 24 hours)     Procedure Component Value Units Date/Time    POC Glucose Once [[id number]]  (Abnormal) Collected:  10/07/19 1121    Specimen:  Blood Updated:  10/07/19 1129     Glucose 178 mg/dL      Comment: Serial Number: [id number]:  [id number]       POC Glucose Once [[id number]]  (Abnormal) Collected:  10/07/19 0714    Specimen:  Blood Updated:  10/07/19 0715     Glucose 282 mg/dL      Comment: Serial Number: [id number]:  [id number]       Scan Slide [[id number]] Collected:  10/07/19 0155    Specimen:  Blood Updated:  10/07/19 0319     Scan Slide --     Comment: See Manual Differential Results       Manual Differential [[id number]]  (Abnormal) Collected:  10/07/19 0155 "    Specimen:  Blood Updated:  10/07/19 0319     Neutrophil % 28.0 %      Lymphocyte % 32.0 %      Monocyte % 5.0 %      Eosinophil % 35.0 %      Neutrophils Absolute 4.45 10*3/mm3      Lymphocytes Absolute 5.09 10*3/mm3      Monocytes Absolute 0.80 10*3/mm3      Eosinophils Absolute 5.57 10*3/mm3      RBC Morphology Normal     WBC Morphology Normal     Platelet Morphology Normal    Path Consult Reflex [[id number]] Collected:  10/07/19 0155    Specimen:  Blood Updated:  10/07/19 0319     Pathology Review Yes    CBC & Differential [[id number]] Collected:  10/07/19 0155    Specimen:  Blood Updated:  10/07/19 0319    Narrative:       The following orders were created for panel order CBC & Differential.  Procedure                               Abnormality         Status                     ---------                               -----------         ------                     CBC Auto Differential[[id number]]        Abnormal            Final result                 Please view results for these tests on the individual orders.    CBC Auto Differential [[id number]]  (Abnormal) Collected:  10/07/19 0155    Specimen:  Blood Updated:  10/07/19 0319     WBC 15.90 10*3/mm3      RBC 4.17 10*6/mm3      Hemoglobin 12.6 g/dL      Hematocrit 37.6 %      MCV 90.2 fL      MCH 30.2 pg      MCHC 33.4 g/dL      RDW 13.7 %      RDW-SD 42.9 fl      MPV 7.3 fL      Platelets 232 10*3/mm3     Basic Metabolic Panel [[id number]]  (Abnormal) Collected:  10/07/19 0155    Specimen:  Blood Updated:  10/07/19 0309     Glucose 258 mg/dL      BUN 14 mg/dL      Creatinine 1.20 mg/dL      Sodium 137 mmol/L      Potassium 4.8 mmol/L      Chloride 102 mmol/L      CO2 27.0 mmol/L      Calcium 8.9 mg/dL      eGFR Non African Amer 44 mL/min/1.73      BUN/Creatinine Ratio 11.7     Anion Gap 12.8 mmol/L     Magnesium [[id number]]  (Normal) Collected:  10/07/19 0155    Specimen:  Blood Updated:  10/07/19 0309     Magnesium 1.8 mg/dL     Phosphorus [[id number]]   (Normal) Collected:  10/07/19 0155    Specimen:  Blood Updated:  10/07/19 0309     Phosphorus 3.7 mg/dL     Protime-INR [[id number]]  (Normal) Collected:  10/07/19 0156    Specimen:  Blood Updated:  10/07/19 0255     Protime 11.2 Seconds      INR 1.09    aPTT [[id number]]  (Normal) Collected:  10/07/19 0156    Specimen:  Blood Updated:  10/07/19 0255     PTT 25.8 seconds     POC Glucose Once [[id number]]  (Abnormal) Collected:  10/06/19 2013    Specimen:  Blood Updated:  10/06/19 2017     Glucose 239 mg/dL      Comment: Serial Number: [id number]:  [id number]       POC Glucose Once [[id number]]  (Abnormal) Collected:  10/06/19 1617    Specimen:  Blood Updated:  10/06/19 1619     Glucose 148 mg/dL      Comment: Serial Number: [id number]:  [id number]             Imaging Results (last 24 hours)     ** No results found for the last 24 hours. **          EKG      I personally viewed and interpreted the patient's EKG/Telemetry data:    ECHOCARDIOGRAM:    STRESS MYOVIEW:    CARDIAC CATHETERIZATION:    OTHER:         Assessment/Plan     1 sepsis  2.  Urinary tract infection  3.  COPD exacerbation with a left lower lobe pneumonia  4.  Acute renal failure  5.  Diabetes  6.  Non-STEMI    Patient had mild elevation of troponin which could be secondary renal insufficiency versus demand ischemia  Patient has urosepsis which is improving with antibiotics and other treatments including IV fluids  Patient is followed by the nephrologist and her creatinine is better now  Patient is followed by the pulmonologist for COPD and pneumonia  Patient had a stress mostly which is abnormal with anteroapical wall ischemia   Patient is having a cardiac catheterization performed today.  Discussed with patient and family about procedure risks and benefits   Patient will need clearance from nephrologist for cardiac catheterization  Patient's  had coronary bypass surgery  and had complication with the massive stroke  Patient had an  echocardiogram for LV function valve abnormalities  Blood pressure and heart rate are stable  I discussed the patients findings and my recommendations with patient and family    Ishan June MD  10/07/19  12:45 PM

## 2019-10-07 NOTE — THERAPY DISCHARGE NOTE
Acute Care - Physical Therapy Discharge Summary   Romie       Patient Name: Dyana Montemayor  : 1950  MRN: [id number]    Today's Date: 10/7/2019                 Admit Date: 2019      PT Recommendation and Plan    Visit Dx:    ICD-10-CM ICD-9-CM   1. Sepsis, due to unspecified organism A41.9 038.9     995.91   2. Hypotension, unspecified hypotension type I95.9 458.9   3. Urinary tract infection without hematuria, site unspecified N39.0 599.0   4. Diarrhea of presumed infectious origin R19.7 009.3   5. Non-STEMI (non-ST elevated myocardial infarction) (CMS/[facility]) I21.4 410.70           PT Charges     Row Name 10/07/19 1143             Time Calculation    Start Time  1010  -      Stop Time  1025  -      Time Calculation (min)  15 min  -      PT Received On  10/07/19  -      PT - Next Appointment  10/09/19  -         Time Calculation- PT    Total Timed Code Minutes- PT  15 minute(s)  -        User Key  (r) = Recorded By, (t) = Taken By, (c) = Cosigned By    Initials Name Provider Type    Felipa Ewing PTA Physical Therapy Assistant              Therapy Charges for Today     Code Description Service Date Service Provider Modifiers Qty    05392071421 HC GAIT TRAINING EA 15 MIN 10/7/2019 Felipa Alanis PTA GP 1          PT Discharge Summary  Anticipated Discharge Disposition (PT): home with assist(ATT, Getting cath this afternoon will assess following)      Felipa Alanis PTA   10/7/2019

## 2019-10-07 NOTE — PLAN OF CARE
Problem: Patient Care Overview  Goal: Individualization and Mutuality  Outcome: Ongoing (interventions implemented as appropriate)    Goal: Discharge Needs Assessment  Outcome: Ongoing (interventions implemented as appropriate)    Goal: Interprofessional Rounds/Family Conf  Outcome: Ongoing (interventions implemented as appropriate)      Problem: Breathing Pattern Ineffective (Adult)  Goal: Identify Related Risk Factors and Signs and Symptoms  Outcome: Ongoing (interventions implemented as appropriate)    Goal: Effective Oxygenation/Ventilation  Outcome: Ongoing (interventions implemented as appropriate)    Goal: Anxiety/Fear Reduction  Outcome: Ongoing (interventions implemented as appropriate)

## 2019-10-07 NOTE — PLAN OF CARE
Problem: Patient Care Overview  Goal: Plan of Care Review  Outcome: Ongoing (interventions implemented as appropriate)   10/07/19 6805   Coping/Psychosocial   Plan of Care Reviewed With patient   Plan of Care Review   Progress improving   OTHER   Outcome Summary Pt agreed to therapy and has excellent motivation. Pt is independent for all bed mobility and transfers. Pt amb 400ft with supervision and no noted unsteadiness or LOB. Pt and family had no questions or concerns at this time. Pt to get cath this pm, if all goes well home with assist recommended at [place].

## 2019-10-08 LAB
ANION GAP SERPL CALCULATED.3IONS-SCNC: 12.3 MMOL/L (ref 5–15)
BUN BLD-MCNC: 12 MG/DL (ref 8–20)
BUN/CREAT SERPL: 12 (ref 5.4–26.2)
CALCIUM SPEC-SCNC: 8.7 MG/DL (ref 8.9–10.3)
CHLORIDE SERPL-SCNC: 100 MMOL/L (ref 101–111)
CO2 SERPL-SCNC: 28 MMOL/L (ref 22–32)
CREAT BLD-MCNC: 1 MG/DL (ref 0.4–1)
GFR SERPL CREATININE-BSD FRML MDRD: 55 ML/MIN/1.73
GLUCOSE BLD-MCNC: 284 MG/DL (ref 65–99)
GLUCOSE BLDC GLUCOMTR-MCNC: 110 MG/DL (ref 70–105)
GLUCOSE BLDC GLUCOMTR-MCNC: 166 MG/DL (ref 70–105)
GLUCOSE BLDC GLUCOMTR-MCNC: 198 MG/DL (ref 70–105)
GLUCOSE BLDC GLUCOMTR-MCNC: 207 MG/DL (ref 70–105)
LAB AP CASE REPORT: NORMAL
MAGNESIUM SERPL-MCNC: 1.7 MG/DL (ref 1.8–2.5)
PATH REPORT.FINAL DX SPEC: NORMAL
PHOSPHATE SERPL-MCNC: 3.3 MG/DL (ref 2.4–4.7)
POTASSIUM BLD-SCNC: 4.3 MMOL/L (ref 3.6–5.1)
SODIUM BLD-SCNC: 136 MMOL/L (ref 136–144)

## 2019-10-08 PROCEDURE — 63710000001 INSULIN GLARGINE PER 5 UNITS: Performed by: FAMILY MEDICINE

## 2019-10-08 PROCEDURE — 83735 ASSAY OF MAGNESIUM: CPT | Performed by: INTERNAL MEDICINE

## 2019-10-08 PROCEDURE — 25010000002 MEROPENEM PER 100 MG: Performed by: INTERNAL MEDICINE

## 2019-10-08 PROCEDURE — 94799 UNLISTED PULMONARY SVC/PX: CPT

## 2019-10-08 PROCEDURE — 84100 ASSAY OF PHOSPHORUS: CPT | Performed by: INTERNAL MEDICINE

## 2019-10-08 PROCEDURE — 25010000002 ENOXAPARIN PER 10 MG: Performed by: FAMILY MEDICINE

## 2019-10-08 PROCEDURE — 99232 SBSQ HOSP IP/OBS MODERATE 35: CPT | Performed by: INTERNAL MEDICINE

## 2019-10-08 PROCEDURE — 63710000001 INSULIN LISPRO (HUMAN) PER 5 UNITS: Performed by: FAMILY MEDICINE

## 2019-10-08 PROCEDURE — 80048 BASIC METABOLIC PNL TOTAL CA: CPT | Performed by: INTERNAL MEDICINE

## 2019-10-08 PROCEDURE — 82962 GLUCOSE BLOOD TEST: CPT

## 2019-10-08 RX ADMIN — METOPROLOL TARTRATE 50 MG: 50 TABLET, FILM COATED ORAL at 20:23

## 2019-10-08 RX ADMIN — INSULIN LISPRO 5 UNITS: 100 INJECTION, SOLUTION INTRAVENOUS; SUBCUTANEOUS at 18:03

## 2019-10-08 RX ADMIN — METOPROLOL TARTRATE 50 MG: 50 TABLET, FILM COATED ORAL at 08:32

## 2019-10-08 RX ADMIN — GABAPENTIN 300 MG: 300 CAPSULE ORAL at 08:33

## 2019-10-08 RX ADMIN — IPRATROPIUM BROMIDE AND ALBUTEROL SULFATE 3 ML: .5; 3 SOLUTION RESPIRATORY (INHALATION) at 06:35

## 2019-10-08 RX ADMIN — THEOPHYLLINE ANHYDROUS 300 MG: 300 CAPSULE, EXTENDED RELEASE ORAL at 08:33

## 2019-10-08 RX ADMIN — IPRATROPIUM BROMIDE AND ALBUTEROL SULFATE 3 ML: .5; 3 SOLUTION RESPIRATORY (INHALATION) at 00:00

## 2019-10-08 RX ADMIN — DULOXETINE HYDROCHLORIDE 30 MG: 30 CAPSULE, DELAYED RELEASE ORAL at 08:32

## 2019-10-08 RX ADMIN — MEROPENEM 500 MG: 500 INJECTION, POWDER, FOR SOLUTION INTRAVENOUS at 03:56

## 2019-10-08 RX ADMIN — MEROPENEM 500 MG: 500 INJECTION, POWDER, FOR SOLUTION INTRAVENOUS at 12:15

## 2019-10-08 RX ADMIN — INSULIN GLARGINE 30 UNITS: 100 INJECTION, SOLUTION SUBCUTANEOUS at 20:23

## 2019-10-08 RX ADMIN — GABAPENTIN 600 MG: 300 CAPSULE ORAL at 20:23

## 2019-10-08 RX ADMIN — ALPRAZOLAM 0.5 MG: 0.5 TABLET ORAL at 16:02

## 2019-10-08 RX ADMIN — Medication 10 ML: at 20:30

## 2019-10-08 RX ADMIN — BUDESONIDE 0.5 MG: 0.5 INHALANT RESPIRATORY (INHALATION) at 06:35

## 2019-10-08 RX ADMIN — ALPRAZOLAM 0.5 MG: 0.5 TABLET ORAL at 08:32

## 2019-10-08 RX ADMIN — ENOXAPARIN SODIUM 40 MG: 40 INJECTION SUBCUTANEOUS at 16:02

## 2019-10-08 RX ADMIN — IPRATROPIUM BROMIDE AND ALBUTEROL SULFATE 3 ML: .5; 3 SOLUTION RESPIRATORY (INHALATION) at 18:55

## 2019-10-08 RX ADMIN — IPRATROPIUM BROMIDE AND ALBUTEROL SULFATE 3 ML: .5; 3 SOLUTION RESPIRATORY (INHALATION) at 11:32

## 2019-10-08 RX ADMIN — BUDESONIDE 0.5 MG: 0.5 INHALANT RESPIRATORY (INHALATION) at 18:55

## 2019-10-08 RX ADMIN — INSULIN LISPRO 5 UNITS: 100 INJECTION, SOLUTION INTRAVENOUS; SUBCUTANEOUS at 08:32

## 2019-10-08 RX ADMIN — ROSUVASTATIN CALCIUM 10 MG: 10 TABLET, FILM COATED ORAL at 20:23

## 2019-10-08 RX ADMIN — GUAIFENESIN 600 MG: 600 TABLET, EXTENDED RELEASE ORAL at 08:33

## 2019-10-08 RX ADMIN — PANTOPRAZOLE SODIUM 40 MG: 40 TABLET, DELAYED RELEASE ORAL at 08:33

## 2019-10-08 RX ADMIN — GUAIFENESIN 600 MG: 600 TABLET, EXTENDED RELEASE ORAL at 20:23

## 2019-10-08 NOTE — PROGRESS NOTES
Referring Provider: Hospitalist    Reason for follow-up: Non-STEMI     Patient Care Team:  Deborah Ochoa MD as PCP - General    Subjective .  No chest pain or shortness of breath    Objective  Lying in bed comfortably     Review of Systems   Constitution: Negative for fever and malaise/fatigue.   HENT: Negative for ear pain and nosebleeds.    Eyes: Negative for blurred vision and double vision.   Cardiovascular: Negative for chest pain, dyspnea on exertion and palpitations.   Respiratory: Negative for cough and shortness of breath.    Skin: Negative for rash.   Musculoskeletal: Negative for joint pain.   Gastrointestinal: Negative for abdominal pain, nausea and vomiting.   Neurological: Negative for focal weakness and headaches.   Psychiatric/Behavioral: Negative for depression. The patient is not nervous/anxious.    All other systems reviewed and are negative.      Patient has no known allergies.    Scheduled Meds:    budesonide 0.5 mg Nebulization BID - RT   DULoxetine 30 mg Oral Daily   enoxaparin 40 mg Subcutaneous Q24H   gabapentin 300 mg Oral Daily   gabapentin 600 mg Oral Nightly   guaiFENesin 600 mg Oral Q12H   insulin glargine 30 Units Subcutaneous Nightly   insulin lispro 5 Units Subcutaneous TID With Meals   ipratropium-albuterol 3 mL Nebulization Q6H - RT   metoprolol tartrate 50 mg Oral Q12H   pantoprazole 40 mg Oral QAM   rosuvastatin 10 mg Oral Nightly   sodium chloride 10 mL Intravenous Q12H   theophylline 300 mg Oral Daily   Fluticasone-Umeclidin-Vilant 1 each Inhalation Daily     Continuous Infusions:     PRN Meds:.•  ALPRAZolam  •  atropine  •  ipratropium-albuterol  •  sodium chloride  •  [COMPLETED] Insert peripheral IV **AND** sodium chloride  •  sodium chloride  •  sodium chloride  •  sodium chloride        VITAL SIGNS  Vitals:    10/08/19 1100 10/08/19 1132 10/08/19 1137 10/08/19 1451   BP: 132/52   133/64   Pulse: 83 84 82 90   Resp: 22 18 18 20   Temp: 98.5 °F (36.9 °C)   99.1 °F (37.3  "°C)   TempSrc: Oral   Oral   SpO2: 93% 98% 100% 92%   Weight:       Height:           Flowsheet Rows      First Filed Value   Admission Height  157.5 cm (62\") Documented at 09/30/2019 1219   Admission Weight  68 kg (150 lb) Documented at 09/30/2019 1219           TELEMETRY: Sinus rhythm    Physical Exam:  Physical Exam   Constitutional: She appears well-developed and well-nourished.   HENT:   Head: Normocephalic and atraumatic.   Eyes: Conjunctivae and EOM are normal. Pupils are equal, round, and reactive to light. No scleral icterus.   Neck: Normal range of motion. Neck supple. No JVD present. Carotid bruit is not present.   Cardiovascular: Normal rate, regular rhythm, S1 normal, S2 normal, normal heart sounds and intact distal pulses. PMI is not displaced.   Pulmonary/Chest: Effort normal and breath sounds normal. She has no wheezes. She has no rales.   Abdominal: Soft. Bowel sounds are normal.   Musculoskeletal: Normal range of motion.   Neurological: She is alert. She has normal strength.   No focal deficits   Skin: Skin is warm and dry. No rash noted.   Psychiatric: She has a normal mood and affect.        Results Review:   I reviewed the patient's new clinical results.  Lab Results (last 24 hours)     Procedure Component Value Units Date/Time    POC Glucose Once [[id number]]  (Abnormal) Collected:  10/08/19 1656    Specimen:  Blood Updated:  10/08/19 1657     Glucose 198 mg/dL      Comment: Serial Number: [id number]:  [id number]       Pathology Consultation [[id number]] Collected:  10/07/19 0155    Specimen:  Blood, Venous Line Updated:  10/08/19 1322     Final Diagnosis --     Leukocytosis with peripheral eosinophilia    Comment: Rule out drug reaction, allergy, autoimmune and other causes.         Case Report --     Surgical Pathology Report                         Case: [id number]                                  Authorizing Provider:  Ishan June MD          Collected:           10/07/2019 01:55 AM  "         Ordering Location:     [facility]       Received:            10/08/2019 01:11 PM                                 PROGRESS CARE                                                                Pathologist:           Fran Padgett MD                                                             Specimen:    Blood, Venous Line                                                                         POC Glucose Once [[id number]]  (Abnormal) Collected:  10/08/19 1130    Specimen:  Blood Updated:  10/08/19 1134     Glucose 110 mg/dL      Comment: Serial Number: [id number]:  [id number]       POC Glucose Once [[id number]]  (Abnormal) Collected:  10/08/19 0700    Specimen:  Blood Updated:  10/08/19 0702     Glucose 207 mg/dL      Comment: Serial Number: [id number]:  [id number]       Basic Metabolic Panel [[id number]]  (Abnormal) Collected:  10/08/19 0205    Specimen:  Blood Updated:  10/08/19 0336     Glucose 284 mg/dL      BUN 12 mg/dL      Creatinine 1.00 mg/dL      Sodium 136 mmol/L      Potassium 4.3 mmol/L      Chloride 100 mmol/L      CO2 28.0 mmol/L      Calcium 8.7 mg/dL      eGFR Non African Amer 55 mL/min/1.73      BUN/Creatinine Ratio 12.0     Anion Gap 12.3 mmol/L     Magnesium [[id number]]  (Abnormal) Collected:  10/08/19 0205    Specimen:  Blood Updated:  10/08/19 0336     Magnesium 1.7 mg/dL     Phosphorus [[id number]]  (Normal) Collected:  10/08/19 0205    Specimen:  Blood Updated:  10/08/19 0336     Phosphorus 3.3 mg/dL     POC Glucose Once [[id number]]  (Abnormal) Collected:  10/07/19 2007    Specimen:  Blood Updated:  10/07/19 2008     Glucose 131 mg/dL      Comment: Serial Number: [id number]:  [id number]             Imaging Results (last 24 hours)     ** No results found for the last 24 hours. **          EKG      I personally viewed and interpreted the patient's EKG/Telemetry data:    ECHOCARDIOGRAM:    STRESS MYOVIEW:    CARDIAC CATHETERIZATION:    OTHER:         Assessment/Plan      1 sepsis  2.  Urinary tract infection  3.  COPD exacerbation with a left lower lobe pneumonia  4.  Acute renal failure  5.  Diabetes  6.  Non-STEMI    Patient had mild elevation of troponin which could be secondary renal insufficiency versus demand ischemia  Patient has urosepsis which is improving with antibiotics and other treatments including IV fluids  Patient is followed by the nephrologist and her creatinine is better now  Patient is followed by the pulmonologist for COPD and pneumonia  Patient had a stress mostly which is abnormal with anteroapical wall ischemia   Patient had a cardiac catheterization which showed small vessel disease but the major arteries had no coronary artery disease   patient's  had coronary bypass surgery  and had complication with the massive stroke  Patient had an echocardiogram for LV function valve abnormalities  Blood pressure and heart rate are stable  I discussed the patients findings and my recommendations with patient and family    Ishan June MD  10/08/19  6:15 PM

## 2019-10-08 NOTE — PLAN OF CARE
Problem: Patient Care Overview  Goal: Interprofessional Rounds/Family Conf   10/08/19 1822   Interdisciplinary Rounds/Family Conf   Summary pt very upset throughout day regarding husbands condition; respiratory status slightly improved from this am   Participants ;nursing;family;patient;pastoral care;pharmacy;physician       Problem: Breathing Pattern Ineffective (Adult)  Goal: Identify Related Risk Factors and Signs and Symptoms  Outcome: Ongoing (interventions implemented as appropriate)   10/08/19 1822   Breathing Pattern Ineffective (Adult)   Related Risk Factors (Breathing Pattern Ineffective) fatigue   Signs and Symptoms (Breathing Pattern Ineffective) anxiousness  (concerned about  in [facility])     Goal: Effective Oxygenation/Ventilation  Outcome: Ongoing (interventions implemented as appropriate)   10/08/19 1822   Breathing Pattern Ineffective (Adult)   Effective Oxygenation/Ventilation making progress toward outcome     Goal: Anxiety/Fear Reduction  Outcome: Ongoing (interventions implemented as appropriate)   10/08/19 1822   Breathing Pattern Ineffective (Adult)   Anxiety/Fear Reduction making progress toward outcome

## 2019-10-08 NOTE — PROGRESS NOTES
Continued Stay Note  VARUN Grubbs     Patient Name: Dyana Montemayor  MRN: [id number]  Today's Date: 10/8/2019    Admit Date: 9/30/2019    Discharge Plan     Row Name 10/08/19 1141       Plan    Plan  d/c plan home on Wednesday .    Plan Comments  Cardiac cath o.k. yesterday . Plan d/c tomorrow . Pt's  is in cvcu s/p CABG and has had a stroke post op .        Discharge Codes    No documentation.       Expected Discharge Date and Time     Expected Discharge Date Expected Discharge Time    Oct 4, 2019             Aurora Ahmadi RN

## 2019-10-08 NOTE — PROGRESS NOTES
"NEPHROLOGY PROGRESS NOTE    [facility]  [phone]  Espinoza Dunn MD      Patient Care Team:  Deborah Ochoa MD as PCP - General      Provider:  Espinoza Dunn MD  Patient Name: Dyana Montemayor  :  1950    SUBJECTIVE:  F/u ADAMARIS  Patient feeling better. No SOB, CP, dysuria, palpitations.    Medication:    budesonide 0.5 mg Nebulization BID - RT   DULoxetine 30 mg Oral Daily   enoxaparin 40 mg Subcutaneous Q24H   gabapentin 300 mg Oral Daily   gabapentin 600 mg Oral Nightly   guaiFENesin 600 mg Oral Q12H   insulin glargine 30 Units Subcutaneous Nightly   insulin lispro 5 Units Subcutaneous TID With Meals   ipratropium-albuterol 3 mL Nebulization Q6H - RT   meropenem 500 mg Intravenous Q8H   metoprolol tartrate 50 mg Oral Q12H   pantoprazole 40 mg Oral QAM   rosuvastatin 10 mg Oral Nightly   sodium chloride 10 mL Intravenous Q12H   theophylline 300 mg Oral Daily   Fluticasone-Umeclidin-Vilant 1 each Inhalation Daily       sodium chloride 100 mL/hr Last Rate: 100 mL/hr (10/07/19 141)   sodium chloride 75 mL/hr Last Rate: 75 mL/hr (10/07/19 1912)       OBJECTIVE    Vital Sign Min/Max for last 24 hours  Temp  Min: 97 °F (36.1 °C)  Max: 98.5 °F (36.9 °C)   BP  Min: 131/53  Max: 157/65   Pulse  Min: 82  Max: 100   Resp  Min: 16  Max: 22   SpO2  Min: 92 %  Max: 100 %   No Data Recorded   Weight  Min: 77.9 kg (171 lb 11.8 oz)  Max: 77.9 kg (171 lb 11.8 oz)     Flowsheet Rows      First Filed Value   Admission Height  157.5 cm (62\") Documented at 2019 1219   Admission Weight  68 kg (150 lb) Documented at 2019 1219          I/O this shift:  In: 100 [IV Piggyback:100]  Out: -   I/O last 3 completed shifts:  In: 1360 [P.O.:360; I.V.:1000]  Out: 2150 [Urine:2150]    Physical Exam:  General Appearance: alert, appears stated age and cooperative  Head: normocephalic, without obvious abnormality and atraumatic  Eyes: conjunctivae and sclerae normal and no icterus  Neck: supple and no " JVD  Lungs: clear to auscultation and respirations regular  Heart: regular rhythm & normal rate and normal S1, S2 +RADHA  Chest: Wall no abnormalities observed  Abdomen: normal bowel sounds and soft non-tender  Extremities: moves extremities well, no edema, no cyanosis and no redness  Skin: no bleeding, bruising or rash, turgor normal, color normal and no leasions noted  Neurologic: Alert, and oriented. No focal deficits    Labs:    WBC WBC   Date Value Ref Range Status   10/07/2019 15.90 (H) 3.40 - 10.80 10*3/mm3 Final      HGB Hemoglobin   Date Value Ref Range Status   10/07/2019 12.6 12.0 - 15.9 g/dL Final      HCT Hematocrit   Date Value Ref Range Status   10/07/2019 37.6 34.0 - 46.6 % Final      Platlets No results found for: LABPLAT   MCV MCV   Date Value Ref Range Status   10/07/2019 90.2 79.0 - 97.0 fL Final          Sodium Sodium   Date Value Ref Range Status   10/08/2019 136 136 - 144 mmol/L Final   10/07/2019 137 136 - 144 mmol/L Final   10/06/2019 136 136 - 144 mmol/L Final      Potassium Potassium   Date Value Ref Range Status   10/08/2019 4.3 3.6 - 5.1 mmol/L Final   10/07/2019 4.8 3.6 - 5.1 mmol/L Final   10/06/2019 4.7 3.6 - 5.1 mmol/L Final     Comment:     Specimen hemolyzed.  Results may be affected.      Chloride Chloride   Date Value Ref Range Status   10/08/2019 100 (L) 101 - 111 mmol/L Final   10/07/2019 102 101 - 111 mmol/L Final   10/06/2019 99 (L) 101 - 111 mmol/L Final      CO2 CO2   Date Value Ref Range Status   10/08/2019 28.0 22.0 - 32.0 mmol/L Final   10/07/2019 27.0 22.0 - 32.0 mmol/L Final   10/06/2019 28.0 22.0 - 32.0 mmol/L Final      BUN BUN   Date Value Ref Range Status   10/08/2019 12 8 - 20 mg/dL Final   10/07/2019 14 8 - 20 mg/dL Final   10/06/2019 15 8 - 20 mg/dL Final      Creatinine Creatinine   Date Value Ref Range Status   10/08/2019 1.00 0.40 - 1.00 mg/dL Final   10/07/2019 1.20 (H) 0.40 - 1.00 mg/dL Final   10/06/2019 1.30 (H) 0.40 - 1.00 mg/dL Final      Calcium Calcium    Date Value Ref Range Status   10/08/2019 8.7 (L) 8.9 - 10.3 mg/dL Final   10/07/2019 8.9 8.9 - 10.3 mg/dL Final   10/06/2019 8.5 (L) 8.9 - 10.3 mg/dL Final      PO4 No components found for: PO4   Albumin No results found for: ALBUMIN   Magnesium Magnesium   Date Value Ref Range Status   10/08/2019 1.7 (L) 1.8 - 2.5 mg/dL Final   10/07/2019 1.8 1.8 - 2.5 mg/dL Final   10/06/2019 2.0 1.8 - 2.5 mg/dL Final      Uric Acid No components found for: URIC ACID     Imaging Results (last 72 hours)     Procedure Component Value Units Date/Time    US Renal Bilateral [[id number]] Collected:  10/01/19 1428     Updated:  10/01/19 1436    Narrative:       Examination: US RENAL BILATERAL-     Date of Exam: 10/1/2019 1:56 PM     Indication: ARF/CKD; A41.9-Sepsis, unspecified organism;  I95.9-Hypotension, unspecified; N39.0-Urinary tract infection, site not  specified; R19.7-Diarrhea, unspecified.     Comparison: None available.     Technique: Grayscale and color Doppler ultrasound evaluation of the  kidneys and urinary bladder was performed     Findings:  The right kidney measures 10.7 x 4.9 x 5.2 cm and the left kidney  measures 9.4 x 3.6 x 4.5 cm. Kidney echogenicity and vascularity appear  within normal limits. There is no solid kidney mass.  No echogenic  shadowing stone.  No hydronephrosis.        Limited visualization of the urinary bladder is unremarkable. Bladder  volume is calculated at 83 mL.       Impression:       Kidney ultrasound is within normal limits.     Electronically Signed By-Samir Villasenor On:10/1/2019 2:29 PM  This report was finalized on [id number] by  Samir Villasenor, .    XR Chest 1 View [[id number]] Collected:  09/30/19 1323     Updated:  09/30/19 1325    Narrative:       DATE OF EXAM:  9/30/2019 12:38 PM     PROCEDURE:  XR CHEST 1 VW-     INDICATIONS:  Severe Sepsis triage protocol     COMPARISON:  August 28, 2018     TECHNIQUE:   Single radiographic AP view of the chest was obtained.      FINDINGS:  Cardiac size is normal and the lungs remain clear.        Impression:       Negative portable chest     Electronically Signed ByJose Costello On:9/30/2019 1:23 PM  This report was finalized on [id number] by  Lee Costello, .          Results for orders placed during the hospital encounter of 09/30/19   XR Chest 1 View    Narrative DATE OF EXAM:  9/30/2019 12:38 PM     PROCEDURE:  XR CHEST 1 VW-     INDICATIONS:  Severe Sepsis triage protocol     COMPARISON:  August 28, 2018     TECHNIQUE:   Single radiographic AP view of the chest was obtained.     FINDINGS:  Cardiac size is normal and the lungs remain clear.        Impression Negative portable chest     Electronically Signed By-Lee Costello On:9/30/2019 1:23 PM  This report was finalized on [id number] by  Lee Costello, .              ASSESSMENT / PLAN      Non-STEMI (non-ST elevated myocardial infarction) (CMS/HCC)    Sepsis (CMS/[facility])      · ADAMARIS/CKD3------Nonoliguric. +ARF/ADAMARIS on top of known CRF/CKD STG 3 with a baseline serum creatinine of 1.2. CRF/CKD STG 3 secondary to DGS/HTN NS. +ARF/ADAMARIS is secondary to prerenal state/intravascular volume depletion with concomitant ACE-I/ARB use and some ATN from hypotension. D/C Lisinopril and Losartan.  · DIARRHEA/SEPSIS/LEUKOCYTOSIS-------per , Critical Care. Off BP meds. Cx pending  · HTN WITH CKD------BP okay  · NSTEMI--cath tomorrow      Cr stable      Espinoza Dunn MD  [facility]  [phone]  10/08/19  1:11 PM

## 2019-10-09 VITALS
HEIGHT: 62 IN | TEMPERATURE: 98.4 F | HEART RATE: 86 BPM | OXYGEN SATURATION: 94 % | DIASTOLIC BLOOD PRESSURE: 57 MMHG | WEIGHT: 165.34 LBS | BODY MASS INDEX: 30.43 KG/M2 | SYSTOLIC BLOOD PRESSURE: 123 MMHG | RESPIRATION RATE: 16 BRPM

## 2019-10-09 LAB
ANION GAP SERPL CALCULATED.3IONS-SCNC: 13.2 MMOL/L (ref 5–15)
BUN BLD-MCNC: 14 MG/DL (ref 8–20)
BUN/CREAT SERPL: 10.8 (ref 5.4–26.2)
CALCIUM SPEC-SCNC: 9.3 MG/DL (ref 8.9–10.3)
CHLORIDE SERPL-SCNC: 101 MMOL/L (ref 101–111)
CO2 SERPL-SCNC: 28 MMOL/L (ref 22–32)
CREAT BLD-MCNC: 1.3 MG/DL (ref 0.4–1)
GFR SERPL CREATININE-BSD FRML MDRD: 40 ML/MIN/1.73
GLUCOSE BLD-MCNC: 167 MG/DL (ref 65–99)
GLUCOSE BLDC GLUCOMTR-MCNC: 160 MG/DL (ref 70–105)
GLUCOSE BLDC GLUCOMTR-MCNC: 231 MG/DL (ref 70–105)
MAGNESIUM SERPL-MCNC: 1.8 MG/DL (ref 1.8–2.5)
PHOSPHATE SERPL-MCNC: 4.3 MG/DL (ref 2.4–4.7)
POTASSIUM BLD-SCNC: 4.2 MMOL/L (ref 3.6–5.1)
SODIUM BLD-SCNC: 138 MMOL/L (ref 136–144)

## 2019-10-09 PROCEDURE — 84100 ASSAY OF PHOSPHORUS: CPT | Performed by: INTERNAL MEDICINE

## 2019-10-09 PROCEDURE — 82962 GLUCOSE BLOOD TEST: CPT

## 2019-10-09 PROCEDURE — 94799 UNLISTED PULMONARY SVC/PX: CPT

## 2019-10-09 PROCEDURE — 80048 BASIC METABOLIC PNL TOTAL CA: CPT | Performed by: INTERNAL MEDICINE

## 2019-10-09 PROCEDURE — 99232 SBSQ HOSP IP/OBS MODERATE 35: CPT | Performed by: INTERNAL MEDICINE

## 2019-10-09 PROCEDURE — 83735 ASSAY OF MAGNESIUM: CPT | Performed by: INTERNAL MEDICINE

## 2019-10-09 RX ORDER — ALPRAZOLAM 0.5 MG/1
0.5 TABLET ORAL NIGHTLY PRN
Qty: 30 TABLET | Refills: 0 | OUTPATIENT
Start: 2019-10-09 | End: 2019-11-08

## 2019-10-09 RX ORDER — IPRATROPIUM BROMIDE AND ALBUTEROL SULFATE 2.5; .5 MG/3ML; MG/3ML
3 SOLUTION RESPIRATORY (INHALATION) EVERY 6 HOURS PRN
Qty: 360 ML | Refills: 1 | Status: SHIPPED | OUTPATIENT
Start: 2019-10-09 | End: 2021-07-10

## 2019-10-09 RX ORDER — GUAIFENESIN 600 MG/1
600 TABLET, EXTENDED RELEASE ORAL EVERY 12 HOURS SCHEDULED
Qty: 60 TABLET | Refills: 5 | Status: SHIPPED | OUTPATIENT
Start: 2019-10-09 | End: 2021-07-10

## 2019-10-09 RX ADMIN — IPRATROPIUM BROMIDE AND ALBUTEROL SULFATE 3 ML: .5; 3 SOLUTION RESPIRATORY (INHALATION) at 07:33

## 2019-10-09 RX ADMIN — METOPROLOL TARTRATE 50 MG: 50 TABLET, FILM COATED ORAL at 08:20

## 2019-10-09 RX ADMIN — BUDESONIDE 0.5 MG: 0.5 INHALANT RESPIRATORY (INHALATION) at 07:33

## 2019-10-09 RX ADMIN — IPRATROPIUM BROMIDE AND ALBUTEROL SULFATE 3 ML: .5; 3 SOLUTION RESPIRATORY (INHALATION) at 00:06

## 2019-10-09 RX ADMIN — IPRATROPIUM BROMIDE AND ALBUTEROL SULFATE 3 ML: .5; 3 SOLUTION RESPIRATORY (INHALATION) at 11:23

## 2019-10-09 RX ADMIN — PANTOPRAZOLE SODIUM 40 MG: 40 TABLET, DELAYED RELEASE ORAL at 08:20

## 2019-10-09 RX ADMIN — GUAIFENESIN 600 MG: 600 TABLET, EXTENDED RELEASE ORAL at 08:19

## 2019-10-09 RX ADMIN — GABAPENTIN 300 MG: 300 CAPSULE ORAL at 08:20

## 2019-10-09 RX ADMIN — Medication 10 ML: at 08:20

## 2019-10-09 RX ADMIN — THEOPHYLLINE ANHYDROUS 300 MG: 300 CAPSULE, EXTENDED RELEASE ORAL at 08:20

## 2019-10-09 RX ADMIN — DULOXETINE HYDROCHLORIDE 30 MG: 30 CAPSULE, DELAYED RELEASE ORAL at 08:20

## 2019-10-09 NOTE — PROGRESS NOTES
Daily Progress Note        Non-STEMI (non-ST elevated myocardial infarction) (CMS/[facility])    Sepsis (CMS/[facility])    Assessment    Dyspnea improved   Urosepsis resolved  Shock, septic resolved  Cardiac catherization completed  COPD patient use trelegy inhaler at home  Diabetes  No significant obstructive sleep apnea based on home sleep study  Smoker less than pack a day for for 56 years  History of high altitude pulmonary edema    Plan    Continue antibiotics  Oxygen  Bronchodilators  Inhaled steroids  Ok for discharge  Follow up in the office      LOS: 9 days     Subjective     Objective     Vital signs for last 24 hours:  Vitals:    10/09/19 0500 10/09/19 0623 10/09/19 0733 10/09/19 0736   BP:  131/48     Pulse:  93 94 94   Resp:  16 16    Temp:  98.5 °F (36.9 °C)     TempSrc:  Oral     SpO2:  99% 96%    Weight: 75 kg (165 lb 5.5 oz)      Height:           Intake/Output last 3 shifts:  I/O last 3 completed shifts:  In: 1490 [P.O.:840; I.V.:550; IV Piggyback:100]  Out: 3800 [Urine:3800]  Intake/Output this shift:  No intake/output data recorded.      Radiology  Imaging Results (last 24 hours)     ** No results found for the last 24 hours. **          Labs:  Results from last 7 days   Lab Units 10/07/19  0155   WBC 10*3/mm3 15.90*   HEMOGLOBIN g/dL 12.6   HEMATOCRIT % 37.6   PLATELETS 10*3/mm3 232     Results from last 7 days   Lab Units 10/09/19  0231  10/03/19  0326   SODIUM mmol/L 138   < > 136   POTASSIUM mmol/L 4.2   < > 4.7   CHLORIDE mmol/L 101   < > 105   CO2 mmol/L 28.0   < > 21.0*   BUN mg/dL 14   < > 17   CREATININE mg/dL 1.30*   < > 1.30*   CALCIUM mg/dL 9.3   < > 8.6*   BILIRUBIN mg/dL  --   --  0.7   ALK PHOS U/L  --   --  75   ALT (SGPT) U/L  --   --  15   AST (SGOT) U/L  --   --  15   GLUCOSE mg/dL 167*   < > 202*    < > = values in this interval not displayed.         Results from last 7 days   Lab Units 10/03/19  0326   ALBUMIN g/dL 3.50             Results from last 7 days   Lab Units 10/09/19  0236    MAGNESIUM mg/dL 1.8     Results from last 7 days   Lab Units 10/07/19  0156   INR  1.09   APTT seconds 25.8           Meds:   SCHEDULE    budesonide 0.5 mg Nebulization BID - RT   DULoxetine 30 mg Oral Daily   enoxaparin 40 mg Subcutaneous Q24H   gabapentin 300 mg Oral Daily   gabapentin 600 mg Oral Nightly   guaiFENesin 600 mg Oral Q12H   insulin glargine 30 Units Subcutaneous Nightly   insulin lispro 5 Units Subcutaneous TID With Meals   ipratropium-albuterol 3 mL Nebulization Q6H - RT   metoprolol tartrate 50 mg Oral Q12H   pantoprazole 40 mg Oral QAM   rosuvastatin 10 mg Oral Nightly   sodium chloride 10 mL Intravenous Q12H   theophylline 300 mg Oral Daily   Fluticasone-Umeclidin-Vilant 1 each Inhalation Daily     Infusions     PRNs  •  ALPRAZolam  •  atropine  •  ipratropium-albuterol  •  sodium chloride  •  [COMPLETED] Insert peripheral IV **AND** sodium chloride  •  sodium chloride  •  sodium chloride  •  sodium chloride    Physical Exam:  Physical Exam   Pulmonary/Chest: Effort normal and breath sounds normal.   Musculoskeletal: She exhibits edema.       ROS  Review of Systems   Constitutional: Positive for fatigue.             Total time spent with patient 1 hour

## 2019-10-09 NOTE — DISCHARGE SUMMARY
Date of Discharge:  10/9/2019    Discharge Diagnosis: Sepsis, hypotension, LLL pneumonia, non-STEMI, acute on chronic kidney disease, panlobular emphysema,  diarrhea, DM-II well controlled with complication of nephropathy, acute hypoxemic respiratory failure, mood disorder, ADD    Presenting Problem/History of Present Illness  Active Hospital Problems    Diagnosis  POA   • **Sepsis (CMS/[facility]) [A41.9]  Yes   • Non-STEMI (non-ST elevated myocardial infarction) (CMS/[facility]) [I21.4]  Yes      Resolved Hospital Problems   No resolved problems to display.          Hospital Course  Patient is a 69 y.o. female presented with fever, weakness, diarrhea and hypotension.  She was admitted to the ICU with septic shock related to pneumonia.  She had acute kidney injury.  She responded well to fluid resuscitation, pressors, antibiotics.  She had elevated troponin due to demand ischemia.  Heart cath showed non-obstructive disease.   At the time of discharge her renal function is back to her baseline and blood pressure has normalized.  She does not have an oxygen requirement.  She is under a great deal of stress with her  who has had a devastating stroke post-operatively after CABG and remains intubated in the ICU.  She is given a script for low dose Xanax to take as needed at night to help her sleep.  She will follow up with Dr. Ochoa in 1 week.    Procedures Performed    Procedure(s):  Left Heart Cath and coronary angiogram  -------------------       Consults:   Consults     Date and Time Order Name Status Description    10/1/2019 1652 Inpatient Hospitalist Consult      10/1/2019 0753 Inpatient Nephrology Consult Completed     10/1/2019 0753 Inpatient Cardiology Consult            Pertinent Test Results:    Lab Results (most recent)     Procedure Component Value Units Date/Time    POC Glucose Once [[id number]]  (Abnormal) Collected:  10/09/19 1043    Specimen:  Blood Updated:  10/09/19 1044     Glucose 231 mg/dL      Comment:  Serial Number: [id number]:  [id number]       POC Glucose Once [[id number]]  (Abnormal) Collected:  10/09/19 0657    Specimen:  Blood Updated:  10/09/19 0658     Glucose 160 mg/dL      Comment: Serial Number: [id number]  [id number]       Phosphorus [[id number]]  (Normal) Collected:  10/09/19 0231    Specimen:  Blood Updated:  10/09/19 0342     Phosphorus 4.3 mg/dL     Basic Metabolic Panel [[id number]]  (Abnormal) Collected:  10/09/19 0231    Specimen:  Blood Updated:  10/09/19 0337     Glucose 167 mg/dL      BUN 14 mg/dL      Creatinine 1.30 mg/dL      Sodium 138 mmol/L      Potassium 4.2 mmol/L      Chloride 101 mmol/L      CO2 28.0 mmol/L      Calcium 9.3 mg/dL      eGFR Non African Amer 40 mL/min/1.73      BUN/Creatinine Ratio 10.8     Anion Gap 13.2 mmol/L     Magnesium [[id number]]  (Normal) Collected:  10/09/19 0231    Specimen:  Blood Updated:  10/09/19 0337     Magnesium 1.8 mg/dL     Pathology Consultation [[id number]] Collected:  10/07/19 0155    Specimen:  Blood, Venous Line Updated:  10/08/19 1322     Final Diagnosis --     Leukocytosis with peripheral eosinophilia    Comment: Rule out drug reaction, allergy, autoimmune and other causes.         Case Report --     Surgical Pathology Report                         Case: [id number]                                  Authorizing Provider:  Ishan June MD          Collected:           10/07/2019 01:55 AM          Ordering Location:     [facility]       Received:            10/08/2019 01:11 PM                                 PROGRESS CARE                                                                Pathologist:           Fran Padgett MD                                                             Specimen:    Blood, Venous Line                                                                         Basic Metabolic Panel [[id number]]  (Abnormal) Collected:  10/08/19 0205    Specimen:  Blood Updated:  10/08/19 0336     Glucose 284 mg/dL       BUN 12 mg/dL      Creatinine 1.00 mg/dL      Sodium 136 mmol/L      Potassium 4.3 mmol/L      Chloride 100 mmol/L      CO2 28.0 mmol/L      Calcium 8.7 mg/dL      eGFR Non African Amer 55 mL/min/1.73      BUN/Creatinine Ratio 12.0     Anion Gap 12.3 mmol/L     Magnesium [[id number]]  (Abnormal) Collected:  10/08/19 0205    Specimen:  Blood Updated:  10/08/19 0336     Magnesium 1.7 mg/dL     Phosphorus [[id number]]  (Normal) Collected:  10/08/19 0205    Specimen:  Blood Updated:  10/08/19 0336     Phosphorus 3.3 mg/dL     Scan Slide [[id number]] Collected:  10/07/19 0155    Specimen:  Blood Updated:  10/07/19 0319     Scan Slide --     Comment: See Manual Differential Results       Manual Differential [[id number]]  (Abnormal) Collected:  10/07/19 0155    Specimen:  Blood Updated:  10/07/19 0319     Neutrophil % 28.0 %      Lymphocyte % 32.0 %      Monocyte % 5.0 %      Eosinophil % 35.0 %      Neutrophils Absolute 4.45 10*3/mm3      Lymphocytes Absolute 5.09 10*3/mm3      Monocytes Absolute 0.80 10*3/mm3      Eosinophils Absolute 5.57 10*3/mm3      RBC Morphology Normal     WBC Morphology Normal     Platelet Morphology Normal    Path Consult Reflex [[id number]] Collected:  10/07/19 0155    Specimen:  Blood Updated:  10/07/19 0319     Pathology Review Yes    CBC & Differential [[id number]] Collected:  10/07/19 0155    Specimen:  Blood Updated:  10/07/19 0319    Narrative:       The following orders were created for panel order CBC & Differential.  Procedure                               Abnormality         Status                     ---------                               -----------         ------                     CBC Auto Differential[[id number]]        Abnormal            Final result                 Please view results for these tests on the individual orders.    CBC Auto Differential [[id number]]  (Abnormal) Collected:  10/07/19 0155    Specimen:  Blood Updated:  10/07/19 0319     WBC 15.90 10*3/mm3      RBC 4.17  10*6/mm3      Hemoglobin 12.6 g/dL      Hematocrit 37.6 %      MCV 90.2 fL      MCH 30.2 pg      MCHC 33.4 g/dL      RDW 13.7 %      RDW-SD 42.9 fl      MPV 7.3 fL      Platelets 232 10*3/mm3     Protime-INR [[id number]]  (Normal) Collected:  10/07/19 0156    Specimen:  Blood Updated:  10/07/19 0255     Protime 11.2 Seconds      INR 1.09    aPTT [[id number]]  (Normal) Collected:  10/07/19 0156    Specimen:  Blood Updated:  10/07/19 0255     PTT 25.8 seconds     Blood Culture - Blood, Arm, Left [[id number]] Collected:  09/30/19 1248    Specimen:  Blood from Arm, Left Updated:  10/05/19 1315     Blood Culture No growth at 5 days    Blood Culture - Blood, Blood, Venous Line [[id number]] Collected:  09/30/19 1250    Specimen:  Blood, Venous Line Updated:  10/05/19 1300     Blood Culture No growth at 5 days    CBC (No Diff) [[id number]]  (Abnormal) Collected:  10/04/19 0149    Specimen:  Blood Updated:  10/04/19 0314     WBC 14.80 10*3/mm3      RBC 4.15 10*6/mm3      Hemoglobin 12.2 g/dL      Hematocrit 37.2 %      MCV 89.7 fL      MCH 29.5 pg      MCHC 32.8 g/dL      RDW 13.8 %      RDW-SD 44.2 fl      MPV 6.9 fL      Platelets 276 10*3/mm3     Comprehensive Metabolic Panel [[id number]]  (Abnormal) Collected:  10/03/19 0326    Specimen:  Blood Updated:  10/03/19 0517     Glucose 202 mg/dL      BUN 17 mg/dL      Creatinine 1.30 mg/dL      Sodium 136 mmol/L      Potassium 4.7 mmol/L      Chloride 105 mmol/L      CO2 21.0 mmol/L      Calcium 8.6 mg/dL      Total Protein 6.2 g/dL      Albumin 3.50 g/dL      ALT (SGPT) 15 U/L      AST (SGOT) 15 U/L      Alkaline Phosphatase 75 U/L      Total Bilirubin 0.7 mg/dL      eGFR Non African Amer 40 mL/min/1.73      Globulin 2.7 gm/dL      A/G Ratio 1.3 g/dL      BUN/Creatinine Ratio 13.1     Anion Gap 14.7 mmol/L     Calcium, Ionized [[id number]]  (Normal) Collected:  10/03/19 0326    Specimen:  Blood Updated:  10/03/19 0507     Ionized Calcium 1.24 mmol/L     CBC (No Diff) [[id number]]   (Abnormal) Collected:  10/03/19 0326    Specimen:  Blood Updated:  10/03/19 0448     WBC 13.50 10*3/mm3      RBC 3.76 10*6/mm3      Hemoglobin 11.5 g/dL      Hematocrit 33.6 %      MCV 89.3 fL      MCH 30.5 pg      MCHC 34.2 g/dL      RDW 13.4 %      RDW-SD 41.6 fl      MPV 6.9 fL      Platelets 240 10*3/mm3     Iron [[id number]]  (Abnormal) Collected:  10/02/19 0158    Specimen:  Blood Updated:  10/02/19 0343     Iron 23 mcg/dL     Comprehensive Metabolic Panel [[id number]]  (Abnormal) Collected:  10/02/19 0158    Specimen:  Blood Updated:  10/02/19 0342     Glucose 357 mg/dL      BUN 17 mg/dL      Creatinine 1.80 mg/dL      Sodium 136 mmol/L      Potassium 4.7 mmol/L      Chloride 102 mmol/L      CO2 22.0 mmol/L      Calcium 8.5 mg/dL      Total Protein 6.0 g/dL      Albumin 3.10 g/dL      ALT (SGPT) 11 U/L      AST (SGOT) 15 U/L      Alkaline Phosphatase 73 U/L      Total Bilirubin 0.6 mg/dL      eGFR Non African Amer 28 mL/min/1.73      Globulin 2.9 gm/dL      A/G Ratio 1.1 g/dL      BUN/Creatinine Ratio 9.4     Anion Gap 16.7 mmol/L     Calcium, Ionized [[id number]]  (Normal) Collected:  10/02/19 0158    Specimen:  Blood Updated:  10/02/19 0309     Ionized Calcium 1.23 mmol/L     MRSA Screen Culture - Swab, Nares [[id number]]  (Normal) Collected:  09/30/19 1850    Specimen:  Swab from Nares Updated:  10/01/19 2128     MRSA SCREEN CX No Methicillin Resistant Staphylococcus aureus isolated    Eosinophil Smear - Urine, Urine, Clean Catch [[id number]]  (Normal) Collected:  10/01/19 1703    Specimen:  Urine, Clean Catch Updated:  10/01/19 1855     Eosinophil Smear 0 % EOS/100 Cells     Sodium, Urine, Random - Urine, Clean Catch [[id number]] Collected:  10/01/19 1703    Specimen:  Urine, Clean Catch Updated:  10/01/19 1806     Sodium, Urine 33 mmol/L     Urinalysis, Microscopic Only - Urine, Clean Catch [[id number]]  (Abnormal) Collected:  10/01/19 1702    Specimen:  Urine, Clean Catch Updated:  10/01/19 1752     RBC, UA  0-2 /HPF      WBC, UA 3-5 /HPF      Bacteria, UA None Seen /HPF      Squamous Epithelial Cells, UA 0-2 /HPF      Hyaline Casts, UA None Seen /LPF      Methodology Manual Light Microscopy    Urinalysis With Culture If Indicated - Urine, Clean Catch [[id number]]  (Abnormal) Collected:  10/01/19 1702    Specimen:  Urine, Clean Catch Updated:  10/01/19 1737     Color, UA Dark Yellow     Comment: Result checked         Appearance, UA Cloudy     Comment: Result checked         pH, UA 6.0     Specific Gravity, UA 1.026     Glucose, UA Negative     Ketones, UA Trace     Bilirubin, UA Negative     Blood, UA Negative     Protein, UA 30 mg/dL (1+)     Leuk Esterase, UA Negative     Nitrite, UA Negative     Urobilinogen, UA 0.2 E.U./dL    CK [[id number]]  (Normal) Collected:  10/01/19 0327    Specimen:  Blood Updated:  10/01/19 1127     Creatine Kinase 161 U/L     TSH [[id number]]  (Abnormal) Collected:  10/01/19 0327    Specimen:  Blood Updated:  10/01/19 1127     TSH 0.050 uIU/mL      Comment: Results may be falsely decreased if patient taking Biotin.       Uric Acid [[id number]]  (Normal) Collected:  10/01/19 0327    Specimen:  Blood Updated:  10/01/19 1127     Uric Acid 5.5 mg/dL     Urine Culture - Urine, Urine, Catheter [[id number]]  (Normal) Collected:  09/30/19 1305    Specimen:  Urine, Catheter Updated:  10/01/19 1058     Urine Culture No growth    Gastrointestinal Panel, PCR - Stool, Per Rectum [[id number]]  (Normal) Collected:  09/30/19 1304    Specimen:  Stool from Per Rectum Updated:  10/01/19 0805     Campylobacter Not Detected     Plesiomonas shigelloides Not Detected     Salmonella Not Detected     Vibrio Not Detected     Vibrio cholerae Not Detected     Yersinia enterocolitica Not Detected     Enteroaggregative E. coli (EAEC) Not Detected     Enteropathogenic E. coli (EPEC) Not Detected     Enterotoxigenic E. coli (ETEC) lt/st Not Detected     Shiga-like toxin-producing E. coli (STEC) stx1/stx2 Not Detected     E.  coli O157 Not Detected     Shigella/Enteroinvasive E. coli (EIEC) Not Detected     Cryptosporidium Not Detected     Cyclospora cayetanensis Not Detected     Entamoeba histolytica Not Detected     Giardia lamblia Not Detected     Adenovirus F40/41 Not Detected     Astrovirus Not Detected     Norovirus GI/GII Not Detected     Rotavirus A Not Detected     Sapovirus (I, II, IV or V) Not Detected    Narrative:       If Aeromonas, Staphylococcus aureus or Bacillus cereus are suspected, please order [id number]: Stool Culture, Aeromonas, S aureus, B Cereus.    Clostridium Difficile Toxin - Stool, Per Rectum [[id number]] Collected:  09/30/19 1304    Specimen:  Stool from Per Rectum Updated:  10/01/19 0729    Narrative:       The following orders were created for panel order Clostridium Difficile Toxin - Stool, Per Rectum.  Procedure                               Abnormality         Status                     ---------                               -----------         ------                     Clostridium Difficile EI...[[id number]]  Normal              Final result                 Please view results for these tests on the individual orders.    Clostridium Difficile EIA - Stool, Per Rectum [[id number]]  (Normal) Collected:  09/30/19 1304    Specimen:  Stool from Per Rectum Updated:  10/01/19 0729     C Diff GDH / Toxin Negative    Troponin [[id number]]  (Abnormal) Collected:  10/01/19 0557    Specimen:  Blood Updated:  10/01/19 0655     Troponin I 0.140 ng/mL     Narrative:       Troponin I Reference Range:    0.00-0.03  Negative.  Repeat testing in 4-6 hours if clinically indicated.    0.04-0.29  Suspicious for myocardial injury. Serial measurements and clinical  correlation may be necessary to confirm or exclude diagnosis of acute  coronary syndrome.  Repeat testing in 4-6 hours if indicated.     >0.29 Consistent with myocardial injury.  Recommend clinical and laboratory correlation.     Results my be falsely decreased if  patient taking Biotin.     CBC Auto Differential [[id number]]  (Abnormal) Collected:  09/30/19 1804    Specimen:  Blood Updated:  09/30/19 1959     WBC 15.40 10*3/mm3      RBC 4.68 10*6/mm3      Hemoglobin 13.9 g/dL      Hematocrit 42.3 %      MCV 90.4 fL      MCH 29.6 pg      MCHC 32.8 g/dL      RDW 13.6 %      RDW-SD 42.9 fl      MPV 7.7 fL      Platelets 358 10*3/mm3      Neutrophil % 57.9 %      Lymphocyte % 25.4 %      Monocyte % 4.5 %      Eosinophil % 11.7 %      Basophil % 0.5 %      Neutrophils, Absolute 8.90 10*3/mm3      Lymphocytes, Absolute 3.90 10*3/mm3      Monocytes, Absolute 0.70 10*3/mm3      Eosinophils, Absolute 1.80 10*3/mm3      Basophils, Absolute 0.10 10*3/mm3      nRBC 0.0 /100 WBC     Troponin [[id number]]  (Abnormal) Collected:  09/30/19 1804    Specimen:  Blood Updated:  09/30/19 1935     Troponin I 0.140 ng/mL     Narrative:       Troponin I Reference Range:    0.00-0.03  Negative.  Repeat testing in 4-6 hours if clinically indicated.    0.04-0.29  Suspicious for myocardial injury. Serial measurements and clinical  correlation may be necessary to confirm or exclude diagnosis of acute  coronary syndrome.  Repeat testing in 4-6 hours if indicated.     >0.29 Consistent with myocardial injury.  Recommend clinical and laboratory correlation.     Results my be falsely decreased if patient taking Biotin.     Iron [[id number]]  (Abnormal) Collected:  09/30/19 1804    Specimen:  Blood Updated:  09/30/19 1932     Iron 16 mcg/dL     CK [[id number]]  (Normal) Collected:  09/30/19 1804    Specimen:  Blood Updated:  09/30/19 1932     Creatine Kinase 187 U/L     TSH [[id number]]  (Abnormal) Collected:  09/30/19 1804    Specimen:  Blood Updated:  09/30/19 1932     TSH 0.040 uIU/mL      Comment: Results may be falsely decreased if patient taking Biotin.       BNP [[id number]]  (Abnormal) Collected:  09/30/19 1804    Specimen:  Blood Updated:  09/30/19 1923     .0 pg/mL      Comment: Results may be  falsely decreased if patient taking Biotin.       Lipase [[id number]]  (Abnormal) Collected:  09/30/19 1250    Specimen:  Blood Updated:  09/30/19 1918     Lipase 19 U/L     Blood Gas, Arterial [[id number]]  (Abnormal) Collected:  09/30/19 1914    Specimen:  Arterial Blood Updated:  09/30/19 1917     Site Right Radial     Torey's Test Positive     pH, Arterial 7.376 pH units      pCO2, Arterial 36.7 mm Hg      pO2, Arterial 77.8 mm Hg      HCO3, Arterial 21.5 mmol/L      Base Excess, Arterial -3.2 mmol/L      Comment: Serial Number: [id number]  [id number]        O2 Saturation, Arterial 95.2 %      CO2 Content 22.6 mmol/L      Barometric Pressure for Blood Gas --     Comment: N/A        Modality Cannula     FIO2 <21 %      Hemodilution No    Gibson Draw [[id number]] Collected:  09/30/19 1250    Specimen:  Blood Updated:  09/30/19 1848    Narrative:       The following orders were created for panel order Gibson Draw.  Procedure                               Abnormality         Status                     ---------                               -----------         ------                     Light Blue Top[[id number]]                                   Final result               Green Top (Gel)[[id number]]                                                             Lavender Top[[id number]]                                     Final result               Gold Top - SST[[id number]]                                                                Please view results for these tests on the individual orders.    Extra Tubes [[id number]] Collected:  09/30/19 1248    Specimen:  Blood, Venous Line Updated:  09/30/19 1401    Narrative:       The following orders were created for panel order Extra Tubes.  Procedure                               Abnormality         Status                     ---------                               -----------         ------                     Green Top (Gel)[[id number]]                                  Final  result                 Please view results for these tests on the individual orders.    Green Top (Gel) [[id number]] Collected:  09/30/19 1248    Specimen:  Blood Updated:  09/30/19 1401     Extra Tube Hold for add-ons.     Comment: Auto resulted.       Light Blue Top [[id number]] Collected:  09/30/19 1250    Specimen:  Blood Updated:  09/30/19 1401     Extra Tube hold for add-on     Comment: Auto resulted       Lavender Top [[id number]] Collected:  09/30/19 1250    Specimen:  Blood Updated:  09/30/19 1401     Extra Tube hold for add-on     Comment: Auto resulted       Urinalysis With Culture If Indicated - Urine, Catheter [[id number]]  (Abnormal) Collected:  09/30/19 1305    Specimen:  Urine, Catheter Updated:  09/30/19 1400     Color, UA Dark Yellow     Appearance, UA Hazy     pH, UA 6.0     Specific Gravity, UA 1.028     Glucose,  mg/dL (Trace)     Ketones, UA Trace     Bilirubin, UA Small (1+)     Comment: Confirmation testing is unavailable.  A serum bilirubin is recommended for further assessment.        Blood, UA Negative     Protein,  mg/dL (2+)     Leuk Esterase, UA Small (1+)     Nitrite, UA Negative     Urobilinogen, UA 0.2 E.U./dL    Urinalysis, Microscopic Only - Urine, Catheter [[id number]]  (Abnormal) Collected:  09/30/19 1305    Specimen:  Urine, Catheter Updated:  09/30/19 1400     RBC, UA None Seen /HPF      WBC, UA 6-12 /HPF      Bacteria, UA 1+ /HPF      Squamous Epithelial Cells, UA 31-50 /HPF      Hyaline Casts, UA None Seen /LPF      Methodology Manual Light Microscopy    C-reactive Protein [[id number]]  (Abnormal) Collected:  09/30/19 1250    Specimen:  Blood Updated:  09/30/19 1344     C-Reactive Protein 22.77 mg/dL     Protime-INR [[id number]]  (Normal) Collected:  09/30/19 1250    Specimen:  Blood Updated:  09/30/19 1309     Protime 11.3 Seconds      INR 1.09    aPTT [[id number]]  (Abnormal) Collected:  09/30/19 1250    Specimen:  Blood Updated:  09/30/19 1309     PTT 23.9 seconds      Blood Gas, Arterial [[id number]]  (Abnormal) Collected:  09/30/19 1301    Specimen:  Arterial Blood Updated:  09/30/19 1305     Site Right Radial     Torey's Test Positive     pH, Arterial 7.406 pH units      pCO2, Arterial 30.3 mm Hg      pO2, Arterial 103.5 mm Hg      HCO3, Arterial 19.0 mmol/L      Base Excess, Arterial -4.3 mmol/L      Comment: Serial Number: [id number]  [id number]        O2 Saturation, Arterial 98.1 %      CO2 Content 20.0 mmol/L      Barometric Pressure for Blood Gas --     Comment: N/A        Modality Cannula     FIO2 <21 %      Hemodilution No    CBC & Differential [[id number]] Collected:  09/30/19 1250    Specimen:  Blood Updated:  09/30/19 1300    Narrative:       The following orders were created for panel order CBC & Differential.  Procedure                               Abnormality         Status                     ---------                               -----------         ------                     CBC Auto Differential[[id number]]        Abnormal            Final result                 Please view results for these tests on the individual orders.    POC Lactate [[id number]]  (Normal) Collected:  09/30/19 1245    Specimen:  Blood Updated:  09/30/19 1246     Lactate 1.4 mmol/L      Comment: Serial Number: [id number]:  [id number]              Results for orders placed during the hospital encounter of 09/30/19   Adult Transthoracic Echo Complete W/ Cont if Necessary Per Protocol    Narrative · Left ventricular systolic function is normal.  · Moderate mitral valve regurgitation is present  · Mild to moderate tricuspid valve regurgitation is present.  · LV ejection fraction about 60 to 65%  · No pericardial effusion noted                  Condition on Discharge:  Stable    Vital Signs  Temp:  [97.7 °F (36.5 °C)-98.5 °F (36.9 °C)] 98.4 °F (36.9 °C)  Heart Rate:  [] 86  Resp:  [16-22] 16  BP: (123-149)/(48-75) 123/57    Physical Exam:     General Appearance:    Alert, cooperative, in  no acute distress   Head:    Normocephalic, without obvious abnormality, atraumatic   Eyes:            Lids and lashes normal, conjunctivae and sclerae normal, no   icterus, no pallor, corneas clear, PERRLA   Ears:    Ears appear intact with no abnormalities noted   Throat:   No oral lesions, no thrush, oral mucosa moist   Neck:   No adenopathy, supple, trachea midline, no thyromegaly, no   carotid bruit, no JVD   Lungs:     Mild, scattered rhonchi,respirations regular, even and                  unlabored    Heart:    Regular rhythm and normal rate, normal S1 and S2, no            murmur, no gallop, no rub, no click   Chest Wall:    No abnormalities observed   Abdomen:     Normal bowel sounds, no masses, no organomegaly, soft        non-tender, non-distended, no guarding, no rebound                tenderness   Extremities:   Moves all extremities well, no edema, no cyanosis, no             redness   Pulses:   Pulses palpable and equal bilaterally   Skin:   No bleeding, bruising or rash   Lymph nodes:   No palpable adenopathy   Neurologic:   Cranial nerves 2 - 12 grossly intact, sensation intact, DTR       present and equal bilaterally       Discharge Disposition  Home or Self Care    Discharge Medications     Discharge Medications      New Medications      Instructions Start Date   ALPRAZolam 0.5 MG tablet  Commonly known as:  XANAX   0.5 mg, Oral, Nightly PRN      aspirin 81 MG tablet   81 mg, Oral, Daily      guaiFENesin 600 MG 12 hr tablet  Commonly known as:  MUCINEX   600 mg, Oral, Every 12 Hours Scheduled      ipratropium-albuterol 0.5-2.5 mg/3 ml nebulizer  Commonly known as:  DUO-NEB   3 mL, Nebulization, Every 6 Hours PRN         Continue These Medications      Instructions Start Date   amphetamine-dextroamphetamine 20 MG tablet  Commonly known as:  ADDERALL   20 mg, Oral, 2 Times Daily      CINNAMON PO   1 capsule, Oral, 2 Times Daily      cyclobenzaprine 10 MG tablet  Commonly known as:  FLEXERIL   10 mg,  Oral, Every Night at Bedtime      DULoxetine 30 MG capsule  Commonly known as:  CYMBALTA   30 mg, Oral, Daily      esomeprazole 40 MG capsule  Commonly known as:  nexIUM   40 mg, Oral, 2 Times Daily      estrogens (conjugated) 1.25 MG tablet  Commonly known as:  PREMARIN   1.25 mg, Oral, Daily      gabapentin 300 MG capsule  Commonly known as:  NEURONTIN   300 mg, Oral, Every Morning      gabapentin 300 MG capsule  Commonly known as:  NEURONTIN   600 mg, Oral, Every Evening      insulin aspart 100 UNIT/ML injection  Commonly known as:  novoLOG   5 Units, Subcutaneous, 3 Times Daily Before Meals      losartan 25 MG tablet  Commonly known as:  COZAAR   25 mg, Oral, Daily      metoprolol tartrate 50 MG tablet  Commonly known as:  LOPRESSOR   50 mg, Oral, 2 Times Daily      minocycline 50 MG capsule  Commonly known as:  MINOCIN,DYNACIN   50 mg, Oral, Daily      Mirabegron ER 50 MG tablet sustained-release 24 hour 24 hr tablet  Commonly known as:  MYRBETRIQ   50 mg, Oral, Daily      rosuvastatin 20 MG tablet  Commonly known as:  CRESTOR   20 mg, Oral, Daily      theophylline 300 MG 24 hr capsule  Commonly known as:  SHARAN-24   300 mg, Oral, Daily      TRELEGY ELLIPTA 100-62.5-25 MCG/INH aerosol powder   Generic drug:  Fluticasone-Umeclidin-Vilant   1 container, Inhalation, Daily      TRESIBA FLEXTOUCH 200 UNIT/ML solution pen-injector pen injection  Generic drug:  Insulin Degludec   32-34 Units, Subcutaneous, Every Night at Bedtime      TURMERIC PO   1 capsule, Oral, 2 Times Daily         Stop These Medications    lisinopril 5 MG tablet  Commonly known as:  PRINIVIL,ZESTRIL            Discharge Diet: low concentrated sweets    Activity at Discharge: Unresricted    Follow-up Appointments  Future Appointments   Date Time Provider Department Center   11/11/2019  3:40 PM Ishan June MD [facility]     Additional Instructions for the Follow-ups that You Need to Schedule     Discharge Follow-up with PCP   As  directed       Currently Documented PCP:    Deborah Ochoa MD    PCP Phone Number:    [phone]     Follow Up Details:  Call office for appointment [phone] to be seen within a week               Test Results Pending at Discharge       Merna Fam MD  10/09/19  2:53 PM    Time: Discharge 25 min

## 2019-10-09 NOTE — PROGRESS NOTES
LOS: 8 days   Patient Care Team:  Deborah Ochoa MD as PCP - General    Subjective     Interval History:    Patient Complaints:  Cough, mild SOA, wheezing; overall improved    History taken from: patient    Review of Systems   Constitutional: Positive for activity change and fatigue. Negative for appetite change, chills and fever.   HENT: Negative for nosebleeds.    Eyes: Negative for visual disturbance.   Respiratory: Positive for cough, shortness of breath and wheezing.    Cardiovascular: Negative for chest pain, palpitations and leg swelling.   Gastrointestinal: Negative for abdominal distention, diarrhea and nausea.   Genitourinary: Negative for urgency.   Musculoskeletal: Negative for back pain.   Skin: Negative for rash.   Neurological: Negative for tremors and weakness.   Hematological: Negative for adenopathy.   Psychiatric/Behavioral: Negative for confusion and dysphoric mood. The patient is nervous/anxious.            Objective     Vital Signs  Temp:  [97 °F (36.1 °C)-99.1 °F (37.3 °C)] 98.2 °F (36.8 °C)  Heart Rate:  [] 86  Resp:  [16-22] 20  BP: (128-157)/(52-65) 128/55    Physical Exam:     General Appearance:    Alert, cooperative, in no acute distress,   Head:    Normocephalic, without obvious abnormality, atraumatic   Eyes:            Lids and lashes normal, conjunctivae and sclerae normal, no   icterus, no pallor, corneas clear, PERRLA   Ears:    Ears appear intact with no abnormalities noted   Throat:   No oral lesions, no thrush, oral mucosa moist   Neck:   No adenopathy, supple, trachea midline, no thyromegaly, no   carotid bruit, no JVD   Lungs:     Scattered wheezes    Heart:    Regular rhythm and normal rate, normal S1 and S2, no            murmur, no gallop, no rub, no click   Chest Wall:    No abnormalities observed   Abdomen:     Normal bowel sounds, no masses, no organomegaly, soft        Non-tender non-distended, no guarding,   Extremities:   Moves all extremities well, no  edema, no cyanosis, no             Redness   Pulses:   Pulses palpable and equal bilaterally   Skin:   No bleeding, bruising or rash   Lymph nodes:   No palpable adenopathy   Neurologic:   Cranial nerves 2 - 12 grossly intact, sensation intact, DTR       present and equal bilaterally        Results Review:    Lab Results (last 24 hours)     Procedure Component Value Units Date/Time    POC Glucose Once [[id number]]  (Abnormal) Collected:  10/08/19 1656    Specimen:  Blood Updated:  10/08/19 1657     Glucose 198 mg/dL      Comment: Serial Number: [id number]:  [id number]       Pathology Consultation [[id number]] Collected:  10/07/19 0155    Specimen:  Blood, Venous Line Updated:  10/08/19 1322     Final Diagnosis --     Leukocytosis with peripheral eosinophilia    Comment: Rule out drug reaction, allergy, autoimmune and other causes.         Case Report --     Surgical Pathology Report                         Case: [id number]                                  Authorizing Provider:  Ishan June MD          Collected:           10/07/2019 01:55 AM          Ordering Location:     [facility]       Received:            10/08/2019 01:11 PM                                 PROGRESS CARE                                                                Pathologist:           Fran Padgett MD                                                             Specimen:    Blood, Venous Line                                                                         POC Glucose Once [[id number]]  (Abnormal) Collected:  10/08/19 1130    Specimen:  Blood Updated:  10/08/19 1134     Glucose 110 mg/dL      Comment: Serial Number: [id number]:  [id number]       POC Glucose Once [[id number]]  (Abnormal) Collected:  10/08/19 0700    Specimen:  Blood Updated:  10/08/19 0702     Glucose 207 mg/dL      Comment: Serial Number: [id number]:  [id number]       Basic Metabolic Panel [[id number]]  (Abnormal) Collected:  10/08/19 0205     Specimen:  Blood Updated:  10/08/19 0336     Glucose 284 mg/dL      BUN 12 mg/dL      Creatinine 1.00 mg/dL      Sodium 136 mmol/L      Potassium 4.3 mmol/L      Chloride 100 mmol/L      CO2 28.0 mmol/L      Calcium 8.7 mg/dL      eGFR Non African Amer 55 mL/min/1.73      BUN/Creatinine Ratio 12.0     Anion Gap 12.3 mmol/L     Magnesium [[id number]]  (Abnormal) Collected:  10/08/19 0205    Specimen:  Blood Updated:  10/08/19 0336     Magnesium 1.7 mg/dL     Phosphorus [[id number]]  (Normal) Collected:  10/08/19 0205    Specimen:  Blood Updated:  10/08/19 0336     Phosphorus 3.3 mg/dL            Imaging Results (last 24 hours)     ** No results found for the last 24 hours. **               I reviewed the patient's new clinical results.    Medication Review:   Scheduled Meds:  budesonide 0.5 mg Nebulization BID - RT   DULoxetine 30 mg Oral Daily   enoxaparin 40 mg Subcutaneous Q24H   gabapentin 300 mg Oral Daily   gabapentin 600 mg Oral Nightly   guaiFENesin 600 mg Oral Q12H   insulin glargine 30 Units Subcutaneous Nightly   insulin lispro 5 Units Subcutaneous TID With Meals   ipratropium-albuterol 3 mL Nebulization Q6H - RT   metoprolol tartrate 50 mg Oral Q12H   pantoprazole 40 mg Oral QAM   rosuvastatin 10 mg Oral Nightly   sodium chloride 10 mL Intravenous Q12H   theophylline 300 mg Oral Daily   Fluticasone-Umeclidin-Vilant 1 each Inhalation Daily     Continuous Infusions:   PRN Meds:.•  ALPRAZolam  •  atropine  •  ipratropium-albuterol  •  sodium chloride  •  [COMPLETED] Insert peripheral IV **AND** sodium chloride  •  sodium chloride  •  sodium chloride  •  sodium chloride     Assessment/Plan       Non-STEMI (non-ST elevated myocardial infarction) (CMS/HCC)    Sepsis (CMS/[facility])  Pneumonia  COPD exacerbation    Continue antibiotics, oxygen, bronchodilators, inhaled steroids; anticipate discharge tomorrow.    Plan for disposition:    Merna Fam MD  10/08/19  8:22 PM

## 2019-10-09 NOTE — PROGRESS NOTES
Referring Provider: Hospitalist    Reason for follow-up: Non-STEMI     Patient Care Team:  Deborah Ochoa MD as PCP - General    Subjective .  No chest pain or shortness of breath    Objective  Lying in bed comfortably     Review of Systems   Constitution: Negative for fever and malaise/fatigue.   HENT: Negative for ear pain and nosebleeds.    Eyes: Negative for blurred vision and double vision.   Cardiovascular: Negative for chest pain, dyspnea on exertion and palpitations.   Respiratory: Negative for cough and shortness of breath.    Skin: Negative for rash.   Musculoskeletal: Negative for joint pain.   Gastrointestinal: Negative for abdominal pain, nausea and vomiting.   Neurological: Negative for focal weakness and headaches.   Psychiatric/Behavioral: Negative for depression. The patient is not nervous/anxious.    All other systems reviewed and are negative.      Patient has no known allergies.    Scheduled Meds:    budesonide 0.5 mg Nebulization BID - RT   DULoxetine 30 mg Oral Daily   enoxaparin 40 mg Subcutaneous Q24H   gabapentin 300 mg Oral Daily   gabapentin 600 mg Oral Nightly   guaiFENesin 600 mg Oral Q12H   insulin glargine 30 Units Subcutaneous Nightly   insulin lispro 5 Units Subcutaneous TID With Meals   ipratropium-albuterol 3 mL Nebulization Q6H - RT   metoprolol tartrate 50 mg Oral Q12H   pantoprazole 40 mg Oral QAM   rosuvastatin 10 mg Oral Nightly   sodium chloride 10 mL Intravenous Q12H   theophylline 300 mg Oral Daily   Fluticasone-Umeclidin-Vilant 1 each Inhalation Daily     Continuous Infusions:     PRN Meds:.•  ALPRAZolam  •  atropine  •  ipratropium-albuterol  •  sodium chloride  •  [COMPLETED] Insert peripheral IV **AND** sodium chloride  •  sodium chloride  •  sodium chloride  •  sodium chloride        VITAL SIGNS  Vitals:    10/09/19 0736 10/09/19 1123 10/09/19 1124 10/09/19 1126   BP:   123/57    Pulse: 94 87 82 86   Resp:  16 16 16   Temp:   98.4 °F (36.9 °C)    TempSrc:       SpO2:  " 94%     Weight:       Height:           Flowsheet Rows      First Filed Value   Admission Height  157.5 cm (62\") Documented at 09/30/2019 1219   Admission Weight  68 kg (150 lb) Documented at 09/30/2019 1219           TELEMETRY: Sinus rhythm    Physical Exam:  Physical Exam   Constitutional: She appears well-developed and well-nourished.   HENT:   Head: Normocephalic and atraumatic.   Eyes: Conjunctivae and EOM are normal. Pupils are equal, round, and reactive to light. No scleral icterus.   Neck: Normal range of motion. Neck supple. No JVD present. Carotid bruit is not present.   Cardiovascular: Normal rate, regular rhythm, S1 normal, S2 normal, normal heart sounds and intact distal pulses. PMI is not displaced.   Pulmonary/Chest: Effort normal and breath sounds normal. She has no wheezes. She has no rales.   Abdominal: Soft. Bowel sounds are normal.   Musculoskeletal: Normal range of motion.   Neurological: She is alert. She has normal strength.   No focal deficits   Skin: Skin is warm and dry. No rash noted.   Psychiatric: She has a normal mood and affect.        Results Review:   I reviewed the patient's new clinical results.  Lab Results (last 24 hours)     Procedure Component Value Units Date/Time    POC Glucose Once [[id number]]  (Abnormal) Collected:  10/09/19 1043    Specimen:  Blood Updated:  10/09/19 1044     Glucose 231 mg/dL      Comment: Serial Number: [id number]:  [id number]       POC Glucose Once [[id number]]  (Abnormal) Collected:  10/09/19 0657    Specimen:  Blood Updated:  10/09/19 0658     Glucose 160 mg/dL      Comment: Serial Number: [id number]:  [id number]       Phosphorus [[id number]]  (Normal) Collected:  10/09/19 0231    Specimen:  Blood Updated:  10/09/19 0342     Phosphorus 4.3 mg/dL     Basic Metabolic Panel [[id number]]  (Abnormal) Collected:  10/09/19 0231    Specimen:  Blood Updated:  10/09/19 0337     Glucose 167 mg/dL      BUN 14 mg/dL      Creatinine 1.30 mg/dL      Sodium " 138 mmol/L      Potassium 4.2 mmol/L      Chloride 101 mmol/L      CO2 28.0 mmol/L      Calcium 9.3 mg/dL      eGFR Non African Amer 40 mL/min/1.73      BUN/Creatinine Ratio 10.8     Anion Gap 13.2 mmol/L     Magnesium [[id number]]  (Normal) Collected:  10/09/19 0231    Specimen:  Blood Updated:  10/09/19 0337     Magnesium 1.8 mg/dL     POC Glucose Once [[id number]]  (Abnormal) Collected:  10/08/19 2031    Specimen:  Blood Updated:  10/08/19 2034     Glucose 166 mg/dL      Comment: Serial Number: [id number]:  [id number]       POC Glucose Once [[id number]]  (Abnormal) Collected:  10/08/19 1656    Specimen:  Blood Updated:  10/08/19 1657     Glucose 198 mg/dL      Comment: Serial Number: [id number]:  [id number]             Imaging Results (last 24 hours)     ** No results found for the last 24 hours. **          EKG      I personally viewed and interpreted the patient's EKG/Telemetry data:    ECHOCARDIOGRAM:    STRESS MYOVIEW:    CARDIAC CATHETERIZATION:    OTHER:         Assessment/Plan     1 sepsis  2.  Urinary tract infection  3.  COPD exacerbation with a left lower lobe pneumonia  4.  Acute renal failure  5.  Diabetes  6.  Non-STEMI    Patient had mild elevation of troponin which could be secondary renal insufficiency versus demand ischemia  Patient has urosepsis which is improving with antibiotics and other treatments including IV fluids  Patient is followed by the nephrologist and her creatinine is better now  Patient is followed by the pulmonologist for COPD and pneumonia  Patient had a stress mostly which is abnormal with anteroapical wall ischemia   Patient had a cardiac catheterization which showed small vessel disease but the major arteries had no coronary artery disease   patient's  had coronary bypass surgery  and had complication with the massive stroke  Patient had an echocardiogram for LV function valve abnormalities  Blood pressure and heart rate are stable  I discussed the patients  findings and my recommendations with patient and family    Ishan June MD  10/09/19  1:33 PM

## 2019-10-10 ENCOUNTER — READMISSION MANAGEMENT (OUTPATIENT)
Dept: CALL CENTER | Facility: HOSPITAL | Age: 69
End: 2019-10-10

## 2019-10-10 NOTE — DISCHARGE PLACEMENT REQUEST
"Dyana Montemayor (69 y.o. Female)     Date of Birth Social Security Number Address Home Phone MRN    1950  [address]  [facility] [address] [phone] [id number]    Nondenominational Marital Status          Other        Admission Date Admission Type Admitting Provider Attending Provider Department, Room/Bed    9/30/19 Emergency Draw, MD Jaylene  [facility], 2103/1    Discharge Date Discharge Disposition Discharge Destination        10/9/2019 Home or Self Care              Attending Provider:  (none)   Allergies:  No Known Allergies    Isolation:  None   Infection:  None   Code Status:  Prior    Ht:  157.5 cm (62\")   Wt:  75 kg (165 lb 5.5 oz)    Admission Cmt:  None   Principal Problem:  Sepsis (CMS/[facility]) [A41.9]                 Active Insurance as of 9/30/2019     Primary Coverage     Payor Plan Insurance Group Employer/Plan Group    MEDICARE MEDICARE A & B      Payor Plan Address Payor Plan Phone Number Payor Plan Fax Number Effective Dates    [address] [phone]  6/1/2014 - None Entered    [facility] [address]       Subscriber Name Subscriber Birth Date Member ID       DYANA MONTEMAYOR 1950 [id number]           Secondary Coverage     Payor Plan Insurance Group Employer/Plan Group    [facility] SUPP [id number]     Payor Plan Address Payor Plan Phone Number Payor Plan Fax Number Effective Dates    [address]   12/1/2016 - None Entered    [facility] [id number]       Subscriber Name Subscriber Birth Date Member ID       DYANA MONTEMAYOR 1950 [id number]                 Emergency Contacts      (Rel.) Home Phone Work Phone Mobile Phone    ap montemayor (Spouse) -- -- [phone]    jean marie montemayor (Son) -- -- [phone]    ap montemayor jr (Son) -- -- [phone]              "

## 2019-10-10 NOTE — PROGRESS NOTES
Continued Stay Note  VARUN Grubbs     Patient Name: Dyana Montemayor  MRN: [id number]  Today's Date: 10/10/2019    Admit Date: 9/30/2019    Discharge Plan     Row Name 10/10/19 1238       Plan    Plan Comments  nebuliazer ordered for pt through [facility]         Discharge Codes    No documentation.       Expected Discharge Date and Time     Expected Discharge Date Expected Discharge Time    Oct 9, 2019  1:34 PM            Aurora Ahmadi RN

## 2019-10-11 ENCOUNTER — READMISSION MANAGEMENT (OUTPATIENT)
Dept: CALL CENTER | Facility: HOSPITAL | Age: 69
End: 2019-10-11

## 2019-10-11 NOTE — OUTREACH NOTE
Prep Survey      Responses   Facility patient discharged from?  [facility]   Is patient eligible?  Yes   Discharge diagnosis  Sepsis   Does the patient have one of the following disease processes/diagnoses(primary or secondary)?  Sepsis   Does the patient have Home health ordered?  No   Is there a DME ordered?  No   Prep survey completed?  Yes          Fern Cantor RN

## 2019-10-11 NOTE — OUTREACH NOTE
Sepsis Week 1 Survey      Responses   Facility patient discharged from?  [facility]   Does the patient have one of the following disease processes/diagnoses(primary or secondary)?  Sepsis   Is there a successful TCM telephone encounter documented?  No   Week 1 attempt successful?  Yes   Call start time  1052   Call end time  1105   Discharge diagnosis  Sepsis   Meds reviewed with patient/caregiver?  Yes   Is the patient having any side effects they believe may be caused by any medication additions or changes?  No   Does the patient have all medications related to this admission filled (includes all antibiotics, inhalers, nebulizers,steroids,etc.)  No   What is keeping the patient from filling the prescriptions?  Prior authorization Issues   Prescription comments  PATIENT STATES THAT SHE HASN'T BEEN ABLE TO GET HER NEBULIZER FROM [employer], NOR THE NEBULIZER SOLUTION FROM [employer]. THIS NURSE PLACED CALLS TO [facility] AND [employer]. NEBULIZER IS WAITING ON INSURANCE APPROVAL, AND [employer] SOLUTION IS NEEDING DIAGNOSIS CODES. CALL TO DR. CALZADA'S MA AND VOICEMAIL LEFT WITH DR. CALZADA'FAUSTINO CARNEY TO RETURN CALL TO THIS NURSE REGARDING ISSUES WITH DUONE[employer] SOLUTION.   Is the patient taking all medications as directed (includes completed medication regime)?  No   What is preventing the patient from taking all medications as directed?  Other   Does the patient have a primary care provider?   Yes   Comments regarding PCP  PCP FOLLOW UP APPT WITH DR. CALZADA IS 10/16/19   Does the patient have an appointment with their PCP within 7 days of discharge?  Yes   Has the patient kept scheduled appointments due by today?  N/A   Has home health visited the patient within 72 hours of discharge?  N/A   Did the patient receive a copy of their discharge instructions?  Yes   Nursing interventions  Reviewed instructions with patient   What is the patient's perception of their health status since discharge?  Improving   Nursing interventions  Nurse  provided patient education   Is the patient/caregiver able to teach back Sepsis?  S - Shivering,fever or very cold, E - Extreme pain or generalized discomfort (worst ever,especially abdomen), P - Pale or discolored skin, S - Sleepy, difficult to arouse,confused, S - Short of breath, I -   I feel like I might die-a feeling of hopelessness   Is patient/caregiver able to teach back steps to recovery at home?  Set small, achievable goals for return to baseline health, Rest and regain strength, Make a list of questions for PCP appoinment   Is the patient/caregiver able to teach back signs and symptoms of worsening condition:  Fever, Rapid heart rate (>90), Shortness of breath/rapid respiratory rate, Hyperthermia, Edema, Altered mental status(confusion/coma), High blood glucose without diabetes   Is the patient/caregiver able to teach back the hierarchy of who to call/visit for symptoms/problems? PCP, Specialist, Home health nurse, Urgent Care, ED, 911  Yes   Week 1 call completed?  Yes          Kay Arzate LPN

## 2019-10-18 ENCOUNTER — READMISSION MANAGEMENT (OUTPATIENT)
Dept: CALL CENTER | Facility: HOSPITAL | Age: 69
End: 2019-10-18

## 2019-10-18 NOTE — OUTREACH NOTE
"Sepsis Week 2 Survey      Responses   Facility patient discharged from?  [facility]   Does the patient have one of the following disease processes/diagnoses(primary or secondary)?  Sepsis   Week 2 attempt successful?  Yes   Call start time  0950   Call end time  0954   Discharge diagnosis  Sepsis   Meds reviewed with patient/caregiver?  Yes   Is the patient having any side effects they believe may be caused by any medication additions or changes?  No   Does the patient have all medications related to this admission filled (includes all antibiotics, inhalers, nebulizers,steroids,etc.)  Yes   Prescription comments  PATIENT NOW HAS NEBULIZER   Is the patient taking all medications as directed (includes completed medication regime)?  Yes   Comments regarding appointments  PATIENT MISSED HER APPOINTMENT WITH PCP TWO DAYS AGO DUE TO HER  BEING IN THE HOSPITAL. PATIENT STATES, \"THEY TOOK HIM OFF OF [facility] AND HE IS DYING.\" THIS NURSE PLACED CALL TO DR. CALZADA'S OFFICE ON BEHALF OF PATIENT TO EXPLAIN WHY APPOINTMENT WAS MISSED.    Does the patient have a primary care provider?   Yes   Does the patient have an appointment with their PCP within 7 days of discharge?  No [PATIENT HAS BEEN IN THE HOSPITAL AT THE BEDSIDE OF HER DYING . ]   Has home health visited the patient within 72 hours of discharge?  N/A   Did the patient receive a copy of their discharge instructions?  Yes   Nursing interventions  Reviewed instructions with patient   What is the patient's perception of their health status since discharge?  Improving   Is the patient/caregiver able to teach back signs and symptoms of worsening condition:  Fever, Rapid heart rate (>90), Altered mental status(confusion/coma), High blood glucose without diabetes, Hyperthermia, Shortness of breath/rapid respiratory rate, Edema   Is the patient/caregiver able to teach back the hierarchy of who to call/visit for symptoms/problems? PCP, Specialist, Home health nurse, " Urgent Care, ED, 911  Yes   Week 2 call completed?  Yes   Revoked  No further contact(revokes)-requires comment          Kay Arzate LPN

## 2019-10-31 NOTE — PROGRESS NOTES
ICU Daily Progress Note        Non-STEMI (non-ST elevated myocardial infarction) (CMS/[facility])      Assessment/Plan   Dyspnea improving   Urosepsis improving  Shock, septic resolved  Diarrhea, ? Hypovolemia  Possible left lower lobe pneumonia  Elevated troponin rule out acute coronary syndrome  Acute kidney injury elevated creatinine  COPD patient use trelegy inhaler at home  Diabetes   no significant obstructive sleep apnea based on home sleep study  Smoker less than pack a day for for 56 years  History of high altitude pulmonary edema        Plan  Antibiotics meropenem total of 7 days  Bronchodilators  Oxygen titration  DVT prophylaxis  GI prophylaxis  Glycemic control             LOS: 0 days         Vital signs for last 24 hours:  There were no vitals filed for this visit.    Intake/Output last 3 shifts:  No intake/output data recorded.  Intake/Output this shift:  No intake/output data recorded.    Vent settings for last 24 hours:       Hemodynamic parameters for last 24 hours:       Radiology  Imaging Results (last 24 hours)     ** No results found for the last 24 hours. **          Labs:            Invalid input(s): LABALBU, PROT                                    Meds:   SCHEDULE    Infusions    No current facility-administered medications for this encounter.   PRNs      Physical Exam:  Physical Exam   Cardiovascular:   Murmur heard.  Pulmonary/Chest: No respiratory distress. She has wheezes. She has rales. She exhibits no tenderness.   Abdominal: She exhibits no distension. There is no tenderness.   Musculoskeletal: She exhibits edema.       ROS  Review of Systems   HENT: Positive for congestion. Negative for rhinorrhea and sneezing.    Respiratory: Positive for cough, shortness of breath and wheezing. Negative for apnea, choking, chest tightness and stridor.    Cardiovascular: Positive for leg swelling.         Critical Care Time greater than: 1 Hour  Total time spent with patient greater than: 1 Hour

## 2019-11-01 PROBLEM — R20.0 NUMBNESS AND TINGLING SENSATION OF SKIN: Status: ACTIVE | Noted: 2017-03-09

## 2019-11-01 PROBLEM — R20.2 NUMBNESS AND TINGLING SENSATION OF SKIN: Status: ACTIVE | Noted: 2017-03-09

## 2019-11-11 ENCOUNTER — OFFICE VISIT (OUTPATIENT)
Dept: CARDIOLOGY | Facility: CLINIC | Age: 69
End: 2019-11-11

## 2019-11-11 VITALS
DIASTOLIC BLOOD PRESSURE: 76 MMHG | WEIGHT: 159 LBS | HEART RATE: 82 BPM | SYSTOLIC BLOOD PRESSURE: 149 MMHG | BODY MASS INDEX: 29.26 KG/M2 | HEIGHT: 62 IN | OXYGEN SATURATION: 95 %

## 2019-11-11 DIAGNOSIS — E11.9 TYPE 2 DIABETES MELLITUS WITHOUT COMPLICATION, WITHOUT LONG-TERM CURRENT USE OF INSULIN (HCC): ICD-10-CM

## 2019-11-11 DIAGNOSIS — E78.00 PURE HYPERCHOLESTEROLEMIA: ICD-10-CM

## 2019-11-11 DIAGNOSIS — I25.118 CORONARY ARTERY DISEASE OF NATIVE ARTERY OF NATIVE HEART WITH STABLE ANGINA PECTORIS (HCC): Primary | ICD-10-CM

## 2019-11-11 PROCEDURE — 99213 OFFICE O/P EST LOW 20 MIN: CPT | Performed by: INTERNAL MEDICINE

## 2019-11-11 RX ORDER — ZOLPIDEM TARTRATE 10 MG/1
1 TABLET ORAL
COMMUNITY
End: 2021-06-24

## 2019-11-11 NOTE — PROGRESS NOTES
"    Subjective:     Encounter Date:11/11/2019      Patient ID: Dyana Montemayor is a 69 y.o. female.    Chief Complaint:  History of Present Illness 69-year-old white female with history of coronary disease diabetes COPD hyperlipidemia presents to my office for follow-up.  Patient is currently stable without any signs of chest pain or shortness of breath at rest or exertion.  No complaint of any PND orthopnea.  No palpitations dizziness syncope or swelling of the feet.  Patient was in the hospital recently with non-STEMI and had a cardiac catheterization which showed a 50% disease in the ramus intermedius branch of the circumflex artery.  Patient still continues to smoke.    The following portions of the patient's history were reviewed and updated as appropriate: allergies, current medications, past family history, past medical history, past social history, past surgical history and problem list.  Past Medical History:   Diagnosis Date   • COPD (chronic obstructive pulmonary disease) (CMS/HCC)    • Diabetes mellitus (CMS/[facility])    • Hyperlipidemia      Past Surgical History:   Procedure Laterality Date   • CARDIAC CATHETERIZATION Right 10/7/2019    Procedure: Left Heart Cath and coronary angiogram;  Surgeon: Ishan June MD;  Location: [facility] CATH INVASIVE LOCATION;  Service: Cardiovascular     /76   Pulse 82   Ht 157.5 cm (62\")   Wt 72.1 kg (159 lb)   SpO2 95%   BMI 29.08 kg/m²   Family History   Problem Relation Age of Onset   • Heart disease Father        Current Outpatient Medications:   •  amphetamine-dextroamphetamine (ADDERALL) 20 MG tablet, Take 20 mg by mouth 2 (Two) Times a Day., Disp: , Rfl:   •  aspirin 81 MG tablet, Take 1 tablet by mouth Daily., Disp: 30 tablet, Rfl: 5  •  CINNAMON PO, Take 1 capsule by mouth 2 (Two) Times a Day., Disp: , Rfl:   •  cyclobenzaprine (FLEXERIL) 10 MG tablet, Take 10 mg by mouth every night at bedtime., Disp: , Rfl:   •  DULoxetine (CYMBALTA) 30 MG capsule, Take " 30 mg by mouth Daily., Disp: , Rfl:   •  esomeprazole (nexIUM) 40 MG capsule, Take 40 mg by mouth 2 (Two) Times a Day., Disp: , Rfl:   •  estrogens, conjugated, (PREMARIN) 1.25 MG tablet, Take 1.25 mg by mouth Daily., Disp: , Rfl:   •  Fluticasone-Umeclidin-Vilant (TRELEGY ELLIPTA) 100-62.5-25 MCG/INH aerosol powder , Inhale 1 container Daily., Disp: , Rfl:   •  gabapentin (NEURONTIN) 300 MG capsule, Take 300 mg by mouth Every Morning., Disp: , Rfl:   •  gabapentin (NEURONTIN) 300 MG capsule, Take 600 mg by mouth Every Evening., Disp: , Rfl:   •  guaiFENesin (MUCINEX) 600 MG 12 hr tablet, Take 1 tablet by mouth Every 12 (Twelve) Hours., Disp: 60 tablet, Rfl: 5  •  insulin aspart (novoLOG) 100 UNIT/ML injection, Inject 5 Units under the skin into the appropriate area as directed 3 (Three) Times a Day Before Meals., Disp: , Rfl:   •  Insulin Degludec (TRESIBA FLEXTOUCH) 200 UNIT/ML solution pen-injector pen injection, Inject 32-34 Units under the skin into the appropriate area as directed every night at bedtime., Disp: , Rfl:   •  ipratropium-albuterol (DUO-NEB) 0.5-2.5 mg/3 ml nebulizer, Take 3 mL by nebulization Every 6 (Six) Hours As Needed for Wheezing or Shortness of Air., Disp: 360 mL, Rfl: 1  •  losartan (COZAAR) 25 MG tablet, Take 25 mg by mouth Daily., Disp: , Rfl:   •  metoprolol tartrate (LOPRESSOR) 50 MG tablet, Take 50 mg by mouth 2 (Two) Times a Day., Disp: , Rfl:   •  Mirabegron ER (MYRBETRIQ) 50 MG tablet sustained-release 24 hour 24 hr tablet, Take 50 mg by mouth Daily., Disp: , Rfl:   •  rosuvastatin (CRESTOR) 20 MG tablet, Take 20 mg by mouth Daily., Disp: , Rfl:   •  theophylline (SHARAN-24) 300 MG 24 hr capsule, Take 300 mg by mouth Daily., Disp: , Rfl:   •  TURMERIC PO, Take 1 capsule by mouth 2 (Two) Times a Day., Disp: , Rfl:   •  zolpidem (AMBIEN) 10 MG tablet, Take 1 tablet by mouth., Disp: , Rfl:   No Known Allergies  Social History     Socioeconomic History   • Marital status:       Spouse name: Not on file   • Number of children: Not on file   • Years of education: Not on file   • Highest education level: Not on file   Tobacco Use   • Smoking status: Current Every Day Smoker     Types: Cigarettes     Review of Systems   Constitution: Negative for fever and malaise/fatigue.   Cardiovascular: Negative for chest pain, dyspnea on exertion, leg swelling and palpitations.   Respiratory: Negative for cough and shortness of breath.    Skin: Negative for rash.   Gastrointestinal: Negative for abdominal pain, nausea and vomiting.   Neurological: Negative for focal weakness, headaches, light-headedness and numbness.   All other systems reviewed and are negative.             Objective:     Physical Exam   Constitutional: She appears well-developed and well-nourished.   HENT:   Head: Normocephalic and atraumatic.   Eyes: Conjunctivae are normal. No scleral icterus.   Neck: Normal range of motion. Neck supple. No JVD present. Carotid bruit is not present.   Cardiovascular: Normal rate, regular rhythm, S1 normal, S2 normal, normal heart sounds and intact distal pulses. PMI is not displaced.   Pulmonary/Chest: Effort normal and breath sounds normal. She has no wheezes. She has no rales.   Abdominal: Soft. Bowel sounds are normal.   Neurological: She is alert. She has normal strength.   Skin: Skin is warm and dry. No rash noted.     Procedures    Lab Review:       Assessment:          Diagnosis Plan   1. Coronary artery disease of native artery of native heart with stable angina pectoris (CMS/HCC)     2. Pure hypercholesterolemia     3. Type 2 diabetes mellitus without complication, without long-term current use of insulin (CMS/HCC)            Plan:       Patient is a 50% disease in the ramus branch of her circumducts artery  Patient has normal LV function  Patient blood pressure and lipid levels are followed by the primary care doctor  Patient is on insulin for her diabetes  Patient is advised to stop  smoking.

## 2020-02-08 NOTE — PROGRESS NOTES
ICU Daily Progress Note        Sepsis (CMS/[facility])      Assessment/Plan      Urosepsis improving  Shock, septic resolved  Diarrhea, ? Hypovolemia  Possible left lower lobe pneumonia  Elevated troponin rule out acute coronary syndrome  Acute kidney injury elevated creatinine  COPD patient use trelegy inhaler at home  Diabetes   no significant obstructive sleep apnea based on home sleep study  Smoker less than pack a day for for 56 years  History of high altitude pulmonary edema        Plan  Antibiotics meropenem  Cardiac work up  Bronchodilators  Oxygen titration  DVT prophylaxis  GI prophylaxis  Glycemic control             LOS: 3 days         Vital signs for last 24 hours:  Vitals:    10/03/19 1105 10/03/19 1150 10/03/19 1159 10/03/19 1433   BP: 150/66   131/57   Patient Position:       Pulse: 109 117 111 112   Resp: 16 18 18 16   Temp: 98.2 °F (36.8 °C)   98.6 °F (37 °C)   TempSrc: Oral   Oral   SpO2: 93% 93% 99% 95%   Weight:       Height:           Intake/Output last 3 shifts:  I/O last 3 completed shifts:  In: 840 [P.O.:840]  Out: 950 [Urine:950]  Intake/Output this shift:  No intake/output data recorded.    Vent settings for last 24 hours:       Hemodynamic parameters for last 24 hours:       Radiology  Imaging Results (last 24 hours)     ** No results found for the last 24 hours. **          Labs:  Results from last 7 days   Lab Units 10/03/19  0326   WBC 10*3/mm3 13.50*   HEMOGLOBIN g/dL 11.5*   HEMATOCRIT % 33.6*   PLATELETS 10*3/mm3 240     Results from last 7 days   Lab Units 10/03/19  0326   SODIUM mmol/L 136   POTASSIUM mmol/L 4.7   CHLORIDE mmol/L 105   CO2 mmol/L 21.0*   BUN mg/dL 17   CREATININE mg/dL 1.30*   CALCIUM mg/dL 8.6*   BILIRUBIN mg/dL 0.7   ALK PHOS U/L 75   ALT (SGPT) U/L 15   AST (SGOT) U/L 15   GLUCOSE mg/dL 202*     Results from last 7 days   Lab Units 09/30/19  1914   PH, ARTERIAL pH units 7.376   PO2 ART mm Hg 77.8*   PCO2, ARTERIAL mm Hg 36.7   HCO3 ART mmol/L 21.5     Results from  last 7 days   Lab Units 10/03/19  0326 10/02/19  0158 09/30/19  1804   ALBUMIN g/dL 3.50 3.10* 2.50*     Results from last 7 days   Lab Units 10/01/19  0557 10/01/19  0327 09/30/19  1804   CK TOTAL U/L  --  161 187   TROPONIN I ng/mL 0.140*  --  0.140*         Results from last 7 days   Lab Units 10/03/19  0326   MAGNESIUM mg/dL 2.2     Results from last 7 days   Lab Units 09/30/19  1250   INR  1.09   APTT seconds 23.9*     Results from last 7 days   Lab Units 10/01/19  0327   TSH uIU/mL 0.050*           Meds:   SCHEDULE    budesonide 0.5 mg Nebulization BID - RT   DULoxetine 30 mg Oral Daily   enoxaparin 40 mg Subcutaneous Q24H   gabapentin 300 mg Oral Daily   gabapentin 600 mg Oral Nightly   guaiFENesin 600 mg Oral Q12H   insulin glargine 30 Units Subcutaneous Nightly   insulin lispro 5 Units Subcutaneous TID With Meals   ipratropium-albuterol 3 mL Nebulization Q6H - RT   iron sucrose 200 mg Intravenous Q24H   meropenem 500 mg Intravenous Q8H   metoprolol tartrate 50 mg Oral Q12H   pantoprazole 40 mg Oral QAM   rosuvastatin 10 mg Oral Nightly   sodium chloride 30 mL/kg Intravenous Once   sodium chloride 10 mL Intravenous Q12H   theophylline 300 mg Oral Daily   Fluticasone-Umeclidin-Vilant 1 each Inhalation Daily     Infusions     PRNs  ipratropium-albuterol  •  sodium chloride  •  [COMPLETED] Insert peripheral IV **AND** sodium chloride  •  sodium chloride  •  sodium chloride    Physical Exam:  Physical Exam   Cardiovascular:   Murmur heard.  Pulmonary/Chest: No respiratory distress. She has wheezes. She has rales. She exhibits no tenderness.   Abdominal: She exhibits no distension. There is no tenderness.   Musculoskeletal: She exhibits edema.       ROS  Review of Systems   HENT: Positive for congestion. Negative for rhinorrhea and sneezing.    Respiratory: Positive for cough, shortness of breath and wheezing. Negative for apnea, choking, chest tightness and stridor.    Cardiovascular: Positive for leg swelling.          Critical Care Time greater than: 1 Hour  Total time spent with patient greater than: 1 Hour   Oriented - self; Oriented - place; Oriented - time

## 2020-11-16 RX ORDER — THEOPHYLLINE 300 MG/1
300 TABLET, EXTENDED RELEASE ORAL DAILY
COMMUNITY
Start: 2020-11-06 | End: 2020-11-18 | Stop reason: SDUPTHER

## 2020-11-16 RX ORDER — SITAGLIPTIN 100 MG/1
100 TABLET, FILM COATED ORAL DAILY
COMMUNITY
Start: 2020-11-06

## 2020-11-18 ENCOUNTER — OFFICE VISIT (OUTPATIENT)
Dept: CARDIOLOGY | Facility: CLINIC | Age: 70
End: 2020-11-18

## 2020-11-18 VITALS
DIASTOLIC BLOOD PRESSURE: 80 MMHG | TEMPERATURE: 97.7 F | WEIGHT: 136 LBS | HEART RATE: 86 BPM | SYSTOLIC BLOOD PRESSURE: 126 MMHG | BODY MASS INDEX: 25.03 KG/M2 | OXYGEN SATURATION: 96 % | HEIGHT: 62 IN

## 2020-11-18 DIAGNOSIS — I10 ESSENTIAL HYPERTENSION: ICD-10-CM

## 2020-11-18 DIAGNOSIS — I25.118 CORONARY ARTERY DISEASE OF NATIVE ARTERY OF NATIVE HEART WITH STABLE ANGINA PECTORIS (HCC): Primary | ICD-10-CM

## 2020-11-18 DIAGNOSIS — E11.9 TYPE 2 DIABETES MELLITUS WITHOUT COMPLICATION, WITHOUT LONG-TERM CURRENT USE OF INSULIN (HCC): ICD-10-CM

## 2020-11-18 DIAGNOSIS — E78.00 PURE HYPERCHOLESTEROLEMIA: ICD-10-CM

## 2020-11-18 DIAGNOSIS — J44.9 CHRONIC OBSTRUCTIVE PULMONARY DISEASE, UNSPECIFIED COPD TYPE (HCC): ICD-10-CM

## 2020-11-18 PROCEDURE — 99214 OFFICE O/P EST MOD 30 MIN: CPT | Performed by: INTERNAL MEDICINE

## 2020-11-18 NOTE — PROGRESS NOTES
"    Subjective:     Encounter Date:11/18/2020      Patient ID: Dyana Montemayor is a 70 y.o. female.    Chief Complaint:  History of Present Illness 70-year-old white female with history of coronary disease COPD hypertension hyperlipidemia diabetes presents to my office for follow-up.  Patient is currently stable without any signs of chest pain but has shortness of breath with exertion.  No complains of any PND orthopnea.  No palpitation dizziness syncope or swelling of the feet.  Patient has been taking her medicines regularly.  She still continues to smoke.  She is trying to exercise regularly and follow a good diet.    The following portions of the patient's history were reviewed and updated as appropriate: allergies, current medications, past family history, past medical history, past social history, past surgical history and problem list.  Past Medical History:   Diagnosis Date   • COPD (chronic obstructive pulmonary disease) (CMS/HCC)    • Diabetes mellitus (CMS/HCC)    • Hyperlipidemia      Past Surgical History:   Procedure Laterality Date   • CARDIAC CATHETERIZATION Right 10/7/2019    Procedure: Left Heart Cath and coronary angiogram;  Surgeon: Ishan June MD;  Location: [facility] CATH INVASIVE LOCATION;  Service: Cardiovascular     /80   Pulse 86   Temp 97.7 °F (36.5 °C)   Ht 157.5 cm (62\")   Wt 61.7 kg (136 lb)   SpO2 96%   BMI 24.87 kg/m²   Family History   Problem Relation Age of Onset   • Heart disease Father        Current Outpatient Medications:   •  amphetamine-dextroamphetamine (ADDERALL) 20 MG tablet, Take 20 mg by mouth 2 (Two) Times a Day., Disp: , Rfl:   •  aspirin 81 MG tablet, Take 1 tablet by mouth Daily., Disp: 30 tablet, Rfl: 5  •  CINNAMON PO, Take 1 capsule by mouth 2 (Two) Times a Day., Disp: , Rfl:   •  cyclobenzaprine (FLEXERIL) 10 MG tablet, Take 10 mg by mouth every night at bedtime., Disp: , Rfl:   •  DULoxetine (CYMBALTA) 30 MG capsule, Take 30 mg by mouth Daily., " Disp: , Rfl:   •  esomeprazole (nexIUM) 40 MG capsule, Take 40 mg by mouth 2 (Two) Times a Day., Disp: , Rfl:   •  estrogens, conjugated, (PREMARIN) 1.25 MG tablet, Take 1.25 mg by mouth Daily., Disp: , Rfl:   •  Fluticasone-Umeclidin-Vilant (TRELEGY ELLIPTA) 100-62.5-25 MCG/INH aerosol powder , Inhale 1 container Daily., Disp: , Rfl:   •  gabapentin (NEURONTIN) 300 MG capsule, Take 600 mg by mouth Every Evening., Disp: , Rfl:   •  guaiFENesin (MUCINEX) 600 MG 12 hr tablet, Take 1 tablet by mouth Every 12 (Twelve) Hours., Disp: 60 tablet, Rfl: 5  •  insulin aspart (novoLOG) 100 UNIT/ML injection, Inject 5 Units under the skin into the appropriate area as directed 3 (Three) Times a Day Before Meals., Disp: , Rfl:   •  Insulin Degludec (TRESIBA FLEXTOUCH) 200 UNIT/ML solution pen-injector pen injection, Inject 32-34 Units under the skin into the appropriate area as directed every night at bedtime., Disp: , Rfl:   •  ipratropium-albuterol (DUO-NEB) 0.5-2.5 mg/3 ml nebulizer, Take 3 mL by nebulization Every 6 (Six) Hours As Needed for Wheezing or Shortness of Air., Disp: 360 mL, Rfl: 1  •  Januvia 100 MG tablet, Take  by mouth Daily., Disp: , Rfl:   •  losartan (COZAAR) 25 MG tablet, Take 25 mg by mouth Daily., Disp: , Rfl:   •  metoprolol tartrate (LOPRESSOR) 50 MG tablet, Take 50 mg by mouth 2 (Two) Times a Day., Disp: , Rfl:   •  Mirabegron ER (MYRBETRIQ) 50 MG tablet sustained-release 24 hour 24 hr tablet, Take 50 mg by mouth Daily., Disp: , Rfl:   •  rosuvastatin (CRESTOR) 20 MG tablet, Take 20 mg by mouth Daily., Disp: , Rfl:   •  theophylline (SHARAN-24) 300 MG 24 hr capsule, Take 300 mg by mouth Daily., Disp: , Rfl:   •  TURMERIC PO, Take 1 capsule by mouth 2 (Two) Times a Day., Disp: , Rfl:   •  zolpidem (AMBIEN) 10 MG tablet, Take 1 tablet by mouth., Disp: , Rfl:   No Known Allergies  Social History     Socioeconomic History   • Marital status:      Spouse name: Not on file   • Number of children: Not on  file   • Years of education: Not on file   • Highest education level: Not on file   Tobacco Use   • Smoking status: Current Every Day Smoker     Types: Cigarettes   • Smokeless tobacco: Never Used     Review of Systems   Constitution: Negative for fever and malaise/fatigue.   HENT: Negative for ear pain and nosebleeds.    Eyes: Negative for blurred vision and double vision.   Cardiovascular: Negative for chest pain, dyspnea on exertion, leg swelling and palpitations.   Respiratory: Positive for shortness of breath. Negative for cough.    Skin: Negative for rash.   Musculoskeletal: Negative for joint pain.   Gastrointestinal: Negative for abdominal pain, nausea and vomiting.   Neurological: Negative for focal weakness, headaches, light-headedness and numbness.   Psychiatric/Behavioral: Negative for depression. The patient is not nervous/anxious.    All other systems reviewed and are negative.             Objective:     Constitutional:       Appearance: Well-developed.   Eyes:      General: No scleral icterus.     Conjunctiva/sclera: Conjunctivae normal.      Pupils: Pupils are equal, round, and reactive to light.   HENT:      Head: Normocephalic and atraumatic.   Neck:      Musculoskeletal: Normal range of motion and neck supple.      Vascular: No carotid bruit or JVD.   Pulmonary:      Effort: Pulmonary effort is normal.      Breath sounds: Normal breath sounds. No wheezing. No rales.   Cardiovascular:      Normal rate. Regular rhythm.   Pulses:     Intact distal pulses.   Abdominal:      General: Bowel sounds are normal.      Palpations: Abdomen is soft.   Musculoskeletal: Normal range of motion.   Skin:     General: Skin is warm and dry.      Findings: No rash.   Neurological:      Mental Status: Alert.      Comments: No focal deficits       Procedures    Lab Review:       Assessment:          Diagnosis Plan   1. Coronary artery disease of native artery of native heart with stable angina pectoris (CMS/[facility])     2.  Pure hypercholesterolemia     3. Type 2 diabetes mellitus without complication, without long-term current use of insulin (CMS/[facility])     4. Essential hypertension     5. Chronic obstructive pulmonary disease, unspecified COPD type (CMS/[facility])            Plan:     Patient has history of coronary disease with nonobstructive disease and normal LV systolic function is currently stable on medications  Patient's blood pressure heart rate stable  Patient lipid levels are followed by the primary care doctor  Patient has diabetes and is on both insulin and oral medicines  Patient has COPD and is currently still smoking I have discussed with her to stop smoking  Continue current medicines and follow-up in 6 months

## 2021-06-24 ENCOUNTER — OFFICE VISIT (OUTPATIENT)
Dept: CARDIOLOGY | Facility: CLINIC | Age: 71
End: 2021-06-24

## 2021-06-24 VITALS
WEIGHT: 146 LBS | DIASTOLIC BLOOD PRESSURE: 60 MMHG | HEIGHT: 62 IN | OXYGEN SATURATION: 96 % | BODY MASS INDEX: 26.87 KG/M2 | SYSTOLIC BLOOD PRESSURE: 145 MMHG | HEART RATE: 86 BPM

## 2021-06-24 DIAGNOSIS — E11.9 TYPE 2 DIABETES MELLITUS WITHOUT COMPLICATION, WITHOUT LONG-TERM CURRENT USE OF INSULIN (HCC): ICD-10-CM

## 2021-06-24 DIAGNOSIS — E78.00 PURE HYPERCHOLESTEROLEMIA: ICD-10-CM

## 2021-06-24 DIAGNOSIS — I10 ESSENTIAL HYPERTENSION: ICD-10-CM

## 2021-06-24 DIAGNOSIS — I25.118 CORONARY ARTERY DISEASE OF NATIVE ARTERY OF NATIVE HEART WITH STABLE ANGINA PECTORIS (HCC): Primary | ICD-10-CM

## 2021-06-24 DIAGNOSIS — J44.9 CHRONIC OBSTRUCTIVE PULMONARY DISEASE, UNSPECIFIED COPD TYPE (HCC): ICD-10-CM

## 2021-06-24 PROCEDURE — 99214 OFFICE O/P EST MOD 30 MIN: CPT | Performed by: INTERNAL MEDICINE

## 2021-06-24 PROCEDURE — 93000 ELECTROCARDIOGRAM COMPLETE: CPT | Performed by: INTERNAL MEDICINE

## 2021-06-24 RX ORDER — ATORVASTATIN CALCIUM 40 MG/1
40 TABLET, FILM COATED ORAL DAILY
COMMUNITY
End: 2021-07-10

## 2021-06-24 RX ORDER — ALPRAZOLAM 0.5 MG/1
0.5 TABLET ORAL NIGHTLY PRN
COMMUNITY

## 2021-06-24 RX ORDER — ALBUTEROL SULFATE 2.5 MG/.5ML
SOLUTION RESPIRATORY (INHALATION)
COMMUNITY
End: 2021-07-10

## 2021-06-24 NOTE — PROGRESS NOTES
"    Subjective:     Encounter Date:06/24/2021      Patient ID: Dyana Montemayor is a 71 y.o. female.    Chief Complaint:  History of Present Illness 71-year-old white female with history of coronary disease hypertension hyperlipidemia diabetes and COPD presents to my office for follow-up.  Patient is currently stable without presence of chest pain but has some shortness of breath with exertion.  No complains any PND orthopnea.  She has occasional palpitation with dizziness but no syncope or swelling of the feet.  She is taking her medicines regularly.  She continues to she is trying to exercise regularly she follows a good diet    The following portions of the patient's history were reviewed and updated as appropriate: allergies, current medications, past family history, past medical history, past social history, past surgical history and problem list.  Past Medical History:   Diagnosis Date   • COPD (chronic obstructive pulmonary disease) (CMS/[facility])    • Diabetes mellitus (CMS/[facility])    • Hyperlipidemia      Past Surgical History:   Procedure Laterality Date   • CARDIAC CATHETERIZATION Right 10/7/2019    Procedure: Left Heart Cath and coronary angiogram;  Surgeon: Ishan June MD;  Location: [facility] CATH INVASIVE LOCATION;  Service: Cardiovascular     /60 (BP Location: Left arm, Patient Position: Sitting)   Pulse 86   Ht 157.5 cm (62\")   Wt 66.2 kg (146 lb)   SpO2 96%   BMI 26.70 kg/m²   Family History   Problem Relation Age of Onset   • Heart disease Father        Current Outpatient Medications:   •  Albuterol Sulfate 2.5 MG/0.5ML nebulizer solution, Inhale., Disp: , Rfl:   •  ALPRAZolam (XANAX) 0.5 MG tablet, Take 0.5 mg by mouth 2 (Two) Times a Day As Needed for Anxiety., Disp: , Rfl:   •  amphetamine-dextroamphetamine (ADDERALL) 20 MG tablet, Take 20 mg by mouth 2 (Two) Times a Day., Disp: , Rfl:   •  aspirin 81 MG tablet, Take 1 tablet by mouth Daily., Disp: 30 tablet, Rfl: 5  •  atorvastatin " (LIPITOR) 40 MG tablet, Take 40 mg by mouth Daily., Disp: , Rfl:   •  CINNAMON PO, Take 1 capsule by mouth 2 (Two) Times a Day., Disp: , Rfl:   •  cyclobenzaprine (FLEXERIL) 10 MG tablet, Take 10 mg by mouth every night at bedtime., Disp: , Rfl:   •  DULoxetine (CYMBALTA) 30 MG capsule, Take 30 mg by mouth Daily., Disp: , Rfl:   •  esomeprazole (nexIUM) 40 MG capsule, Take 40 mg by mouth 2 (Two) Times a Day., Disp: , Rfl:   •  estrogens, conjugated, (PREMARIN) 1.25 MG tablet, Take 1.25 mg by mouth Daily., Disp: , Rfl:   •  Fluticasone-Umeclidin-Vilant (TRELEGY ELLIPTA) 100-62.5-25 MCG/INH aerosol powder , Inhale 1 container Daily., Disp: , Rfl:   •  gabapentin (NEURONTIN) 300 MG capsule, Take 600 mg by mouth Every Evening., Disp: , Rfl:   •  guaiFENesin (MUCINEX) 600 MG 12 hr tablet, Take 1 tablet by mouth Every 12 (Twelve) Hours., Disp: 60 tablet, Rfl: 5  •  insulin aspart (novoLOG) 100 UNIT/ML injection, Inject 5 Units under the skin into the appropriate area as directed 3 (Three) Times a Day Before Meals., Disp: , Rfl:   •  Insulin Degludec (TRESIBA FLEXTOUCH) 200 UNIT/ML solution pen-injector pen injection, Inject 32-34 Units under the skin into the appropriate area as directed every night at bedtime., Disp: , Rfl:   •  ipratropium-albuterol (DUO-NEB) 0.5-2.5 mg/3 ml nebulizer, Take 3 mL by nebulization Every 6 (Six) Hours As Needed for Wheezing or Shortness of Air., Disp: 360 mL, Rfl: 1  •  Januvia 100 MG tablet, Take  by mouth Daily., Disp: , Rfl:   •  metoprolol tartrate (LOPRESSOR) 50 MG tablet, Take 50 mg by mouth 2 (Two) Times a Day., Disp: , Rfl:   •  Mirabegron ER (MYRBETRIQ) 50 MG tablet sustained-release 24 hour 24 hr tablet, Take 50 mg by mouth Daily., Disp: , Rfl:   •  TURMERIC PO, Take 1 capsule by mouth 2 (Two) Times a Day., Disp: , Rfl:   No Known Allergies  Social History     Socioeconomic History   • Marital status:      Spouse name: Not on file   • Number of children: Not on file   •  Years of education: Not on file   • Highest education level: Not on file   Tobacco Use   • Smoking status: Current Every Day Smoker     Types: Cigarettes   • Smokeless tobacco: Never Used     Review of Systems   Constitutional: Negative for fever and malaise/fatigue.   Cardiovascular: Positive for near-syncope. Negative for chest pain, dyspnea on exertion, leg swelling and palpitations.   Respiratory: Positive for shortness of breath. Negative for cough.    Skin: Negative for rash.   Gastrointestinal: Negative for abdominal pain, nausea and vomiting.   Neurological: Positive for dizziness and light-headedness. Negative for focal weakness, headaches and numbness.   All other systems reviewed and are negative.             Objective:     Constitutional:       Appearance: Well-developed.   Eyes:      General: No scleral icterus.     Conjunctiva/sclera: Conjunctivae normal.   HENT:      Head: Normocephalic and atraumatic.   Neck:      Vascular: No carotid bruit or JVD.   Pulmonary:      Effort: Pulmonary effort is normal.      Breath sounds: Normal breath sounds. No wheezing. No rales.   Cardiovascular:      Normal rate. Regular rhythm.   Pulses:     Intact distal pulses.   Abdominal:      General: Bowel sounds are normal.      Palpations: Abdomen is soft.   Musculoskeletal:      Cervical back: Normal range of motion and neck supple. Skin:     General: Skin is warm and dry.      Findings: No rash.   Neurological:      Mental Status: Alert.         ECG 12 Lead    Date/Time: 6/24/2021 3:57 PM  Performed by: Ishan June MD  Authorized by: Ishan June MD   Comments: Sinus rhythm  Biatrial enlargement  Abnormal EKG  No new changes from previous EKG            Lab Review:         MDM  1.  Coronary disease  Patient has nonobstructive disease in the past with normal function is currently stable on medications  2.  Hypertension  Patient blood pressure stable on metoprolol  3.  Diabetes  Patient is on insulin and followed by  the primary care doctor   #4 hyperlipidemia  Patient is currently on a statin and her lipid levels are followed by the primary care doctor  5.  COPD  Patient has COPD and still continues to smoke and is advised to stop smoking

## 2021-07-09 ENCOUNTER — APPOINTMENT (OUTPATIENT)
Dept: CT IMAGING | Facility: HOSPITAL | Age: 71
End: 2021-07-09

## 2021-07-09 ENCOUNTER — APPOINTMENT (OUTPATIENT)
Dept: GENERAL RADIOLOGY | Facility: HOSPITAL | Age: 71
End: 2021-07-09

## 2021-07-09 ENCOUNTER — HOSPITAL ENCOUNTER (INPATIENT)
Facility: HOSPITAL | Age: 71
LOS: 1 days | Discharge: HOME | End: 2021-07-11
Attending: EMERGENCY MEDICINE | Admitting: FAMILY MEDICINE

## 2021-07-09 DIAGNOSIS — R51.9 CHRONIC INTRACTABLE HEADACHE, UNSPECIFIED HEADACHE TYPE: ICD-10-CM

## 2021-07-09 DIAGNOSIS — G89.29 CHRONIC INTRACTABLE HEADACHE, UNSPECIFIED HEADACHE TYPE: ICD-10-CM

## 2021-07-09 DIAGNOSIS — J44.1 COPD EXACERBATION (HCC): Primary | ICD-10-CM

## 2021-07-09 DIAGNOSIS — D72.829 LEUKOCYTOSIS, UNSPECIFIED TYPE: ICD-10-CM

## 2021-07-09 DIAGNOSIS — R93.89 ABNORMAL CHEST CT: ICD-10-CM

## 2021-07-09 LAB
ALBUMIN SERPL-MCNC: 3.7 G/DL (ref 3.5–5.2)
ALBUMIN/GLOB SERPL: 1.1 G/DL
ALP SERPL-CCNC: 97 U/L (ref 39–117)
ALT SERPL W P-5'-P-CCNC: 8 U/L (ref 1–33)
ANION GAP SERPL CALCULATED.3IONS-SCNC: 12 MMOL/L (ref 5–15)
ARTERIAL PATENCY WRIST A: POSITIVE
AST SERPL-CCNC: 10 U/L (ref 1–32)
ATMOSPHERIC PRESS: ABNORMAL MM[HG]
BASE EXCESS BLDA CALC-SCNC: -0.3 MMOL/L (ref 0–3)
BASOPHILS # BLD AUTO: 0 10*3/MM3 (ref 0–0.2)
BASOPHILS NFR BLD AUTO: 0.1 % (ref 0–1.5)
BDY SITE: ABNORMAL
BILIRUB SERPL-MCNC: 1 MG/DL (ref 0–1.2)
BUN SERPL-MCNC: 16 MG/DL (ref 8–23)
BUN/CREAT SERPL: 15.7 (ref 7–25)
CALCIUM SPEC-SCNC: 9.5 MG/DL (ref 8.6–10.5)
CHLORIDE SERPL-SCNC: 97 MMOL/L (ref 98–107)
CO2 BLDA-SCNC: 24.9 MMOL/L (ref 22–29)
CO2 SERPL-SCNC: 26 MMOL/L (ref 22–29)
CREAT SERPL-MCNC: 1.02 MG/DL (ref 0.57–1)
D DIMER PPP FEU-MCNC: 1.77 MG/L (FEU) (ref 0–0.59)
DEPRECATED RDW RBC AUTO: 42 FL (ref 37–54)
EOSINOPHIL # BLD AUTO: 0 10*3/MM3 (ref 0–0.4)
EOSINOPHIL NFR BLD AUTO: 0.2 % (ref 0.3–6.2)
ERYTHROCYTE [DISTWIDTH] IN BLOOD BY AUTOMATED COUNT: 13.4 % (ref 12.3–15.4)
ERYTHROCYTE [SEDIMENTATION RATE] IN BLOOD: 67 MM/HR (ref 0–30)
GFR SERPL CREATININE-BSD FRML MDRD: 53 ML/MIN/1.73
GLOBULIN UR ELPH-MCNC: 3.4 GM/DL
GLUCOSE SERPL-MCNC: 175 MG/DL (ref 65–99)
HCO3 BLDA-SCNC: 23.8 MMOL/L (ref 21–28)
HCT VFR BLD AUTO: 40.2 % (ref 34–46.6)
HEMODILUTION: NO
HGB BLD-MCNC: 13.5 G/DL (ref 12–15.9)
HOLD SPECIMEN: NORMAL
INHALED O2 CONCENTRATION: 21 %
LYMPHOCYTES # BLD AUTO: 2.6 10*3/MM3 (ref 0.7–3.1)
LYMPHOCYTES NFR BLD AUTO: 12.3 % (ref 19.6–45.3)
MCH RBC QN AUTO: 30.2 PG (ref 26.6–33)
MCHC RBC AUTO-ENTMCNC: 33.4 G/DL (ref 31.5–35.7)
MCV RBC AUTO: 90.2 FL (ref 79–97)
MODALITY: ABNORMAL
MONOCYTES # BLD AUTO: 1.8 10*3/MM3 (ref 0.1–0.9)
MONOCYTES NFR BLD AUTO: 8.5 % (ref 5–12)
NEUTROPHILS NFR BLD AUTO: 16.8 10*3/MM3 (ref 1.7–7)
NEUTROPHILS NFR BLD AUTO: 78.9 % (ref 42.7–76)
NRBC BLD AUTO-RTO: 0 /100 WBC (ref 0–0.2)
PCO2 BLDA: 36.5 MM HG (ref 35–48)
PH BLDA: 7.42 PH UNITS (ref 7.35–7.45)
PLATELET # BLD AUTO: 278 10*3/MM3 (ref 140–450)
PMV BLD AUTO: 7 FL (ref 6–12)
PO2 BLDA: 39.8 MM HG (ref 83–108)
POTASSIUM SERPL-SCNC: 4.6 MMOL/L (ref 3.5–5.2)
PROT SERPL-MCNC: 7.1 G/DL (ref 6–8.5)
RBC # BLD AUTO: 4.46 10*6/MM3 (ref 3.77–5.28)
SAO2 % BLDCOA: 76.2 % (ref 94–98)
SODIUM SERPL-SCNC: 135 MMOL/L (ref 136–145)
TROPONIN T SERPL-MCNC: 0.02 NG/ML (ref 0–0.03)
WBC # BLD AUTO: 21.3 10*3/MM3 (ref 3.4–10.8)

## 2021-07-09 PROCEDURE — 36600 WITHDRAWAL OF ARTERIAL BLOOD: CPT

## 2021-07-09 PROCEDURE — 93005 ELECTROCARDIOGRAM TRACING: CPT | Performed by: EMERGENCY MEDICINE

## 2021-07-09 PROCEDURE — 93005 ELECTROCARDIOGRAM TRACING: CPT

## 2021-07-09 PROCEDURE — 85652 RBC SED RATE AUTOMATED: CPT | Performed by: EMERGENCY MEDICINE

## 2021-07-09 PROCEDURE — 85025 COMPLETE CBC W/AUTO DIFF WBC: CPT | Performed by: EMERGENCY MEDICINE

## 2021-07-09 PROCEDURE — 0 IOPAMIDOL PER 1 ML: Performed by: EMERGENCY MEDICINE

## 2021-07-09 PROCEDURE — 99285 EMERGENCY DEPT VISIT HI MDM: CPT

## 2021-07-09 PROCEDURE — 94640 AIRWAY INHALATION TREATMENT: CPT

## 2021-07-09 PROCEDURE — 71275 CT ANGIOGRAPHY CHEST: CPT

## 2021-07-09 PROCEDURE — 85379 FIBRIN DEGRADATION QUANT: CPT | Performed by: EMERGENCY MEDICINE

## 2021-07-09 PROCEDURE — 82803 BLOOD GASES ANY COMBINATION: CPT

## 2021-07-09 PROCEDURE — 25010000002 METHYLPREDNISOLONE PER 125 MG: Performed by: EMERGENCY MEDICINE

## 2021-07-09 PROCEDURE — 71045 X-RAY EXAM CHEST 1 VIEW: CPT

## 2021-07-09 PROCEDURE — 84484 ASSAY OF TROPONIN QUANT: CPT | Performed by: EMERGENCY MEDICINE

## 2021-07-09 PROCEDURE — 70450 CT HEAD/BRAIN W/O DYE: CPT

## 2021-07-09 PROCEDURE — 94799 UNLISTED PULMONARY SVC/PX: CPT

## 2021-07-09 PROCEDURE — 80053 COMPREHEN METABOLIC PANEL: CPT | Performed by: EMERGENCY MEDICINE

## 2021-07-09 RX ORDER — ACETAMINOPHEN 500 MG
1000 TABLET ORAL ONCE
Status: COMPLETED | OUTPATIENT
Start: 2021-07-09 | End: 2021-07-09

## 2021-07-09 RX ORDER — METHYLPREDNISOLONE SODIUM SUCCINATE 125 MG/2ML
60 INJECTION, POWDER, LYOPHILIZED, FOR SOLUTION INTRAMUSCULAR; INTRAVENOUS ONCE
Status: COMPLETED | OUTPATIENT
Start: 2021-07-09 | End: 2021-07-09

## 2021-07-09 RX ORDER — IPRATROPIUM BROMIDE AND ALBUTEROL SULFATE 2.5; .5 MG/3ML; MG/3ML
3 SOLUTION RESPIRATORY (INHALATION) ONCE
Status: COMPLETED | OUTPATIENT
Start: 2021-07-09 | End: 2021-07-09

## 2021-07-09 RX ORDER — SODIUM CHLORIDE 0.9 % (FLUSH) 0.9 %
10 SYRINGE (ML) INJECTION AS NEEDED
Status: DISCONTINUED | OUTPATIENT
Start: 2021-07-09 | End: 2021-07-11 | Stop reason: HOSPADM

## 2021-07-09 RX ORDER — METHYLPREDNISOLONE SODIUM SUCCINATE 125 MG/2ML
125 INJECTION, POWDER, LYOPHILIZED, FOR SOLUTION INTRAMUSCULAR; INTRAVENOUS ONCE
Status: DISCONTINUED | OUTPATIENT
Start: 2021-07-09 | End: 2021-07-09

## 2021-07-09 RX ADMIN — IOPAMIDOL 68 ML: 755 INJECTION, SOLUTION INTRAVENOUS at 23:09

## 2021-07-09 RX ADMIN — IPRATROPIUM BROMIDE AND ALBUTEROL SULFATE 3 ML: 2.5; .5 SOLUTION RESPIRATORY (INHALATION) at 20:15

## 2021-07-09 RX ADMIN — METHYLPREDNISOLONE SODIUM SUCCINATE 60 MG: 125 INJECTION, POWDER, FOR SOLUTION INTRAMUSCULAR; INTRAVENOUS at 20:12

## 2021-07-09 RX ADMIN — ACETAMINOPHEN 1000 MG: 500 TABLET, FILM COATED ORAL at 21:47

## 2021-07-09 RX ADMIN — IPRATROPIUM BROMIDE AND ALBUTEROL SULFATE 3 ML: 2.5; .5 SOLUTION RESPIRATORY (INHALATION) at 23:30

## 2021-07-09 NOTE — ED NOTES
Pt reports SOA, lightheadedness, slight nausea, cough, and headache for several months. Pt also reports seeing floaters in R eye. Pt smokes cigarettes. Pt does not take Duonebs at home as prescribed, and is on rescue inhalor and COPD meds (Treligy). Pt denies machine to breathe with at night. Pt denies chest pain. Pt denies daily O2 use at home. Pt reports hx of diabetic neuropathy, kidney disease, and COPD.      Poli Ba, HAYLEE  07/09/21 1951

## 2021-07-09 NOTE — ED PROVIDER NOTES
Subjective   History of Present Illness  Context: 71-year-old female presents with shortness of breath lightheaded nausea.  She has had some cough.  She has had no vomiting or diarrhea.  She has had no fever.  She has had headache.  She has been having headaches for a month or more.  She complains of floaters in her eye.  She had appoint with eye doctor today but canceled.  She has been having floaters for about 6 months.  She has history of diabetic retinopathy.  She is on medication for COPD.  She still smokes occasionally she states about a third of a cigarette at a time.  Location: Head and chest  Quality: Pain and shortness of breath  Duration: Several weeks but shortness of breath getting worse last couple of days  Timing: Constant  Severity: Moderate   Modifying factors: She is on several medications for COPD still smokes.  She has seen her doctor last week for the symptoms  Associated signs and symptoms: Low back pain, dizziness    Review of Systems   Constitutional: Positive for fatigue. Negative for fever and unexpected weight change.   HENT: Negative for congestion and sore throat.         No jaw claudication   Eyes: Positive for visual disturbance. Negative for pain.   Respiratory: Positive for cough and shortness of breath.    Cardiovascular: Negative for chest pain and leg swelling.   Gastrointestinal: Positive for nausea. Negative for abdominal pain, diarrhea and vomiting.   Genitourinary: Negative for dysuria and urgency.   Musculoskeletal: Positive for back pain.   Skin: Negative for rash.   Neurological: Positive for dizziness and headaches. Negative for weakness.   Psychiatric/Behavioral: Negative for confusion.       Past Medical History:   Diagnosis Date   • COPD (chronic obstructive pulmonary disease) (CMS/[facility])    • Diabetes mellitus (CMS/[facility])    • Hyperlipidemia        No Known Allergies    Past Surgical History:   Procedure Laterality Date   • CARDIAC CATHETERIZATION Right 10/7/2019     Procedure: Left Heart Cath and coronary angiogram;  Surgeon: Ishan June MD;  Location: [facility] CATH INVASIVE LOCATION;  Service: Cardiovascular       Family History   Problem Relation Age of Onset   • Heart disease Father        Social History     Socioeconomic History   • Marital status:      Spouse name: Not on file   • Number of children: Not on file   • Years of education: Not on file   • Highest education level: Not on file   Tobacco Use   • Smoking status: Current Every Day Smoker     Types: Cigarettes   • Smokeless tobacco: Never Used     Prior to Admission medications    Medication Sig Start Date End Date Taking? Authorizing Provider   Albuterol Sulfate 2.5 MG/0.5ML nebulizer solution Inhale.    Dennys Reyes MD   ALPRAZolam (XANAX) 0.5 MG tablet Take 0.5 mg by mouth 2 (Two) Times a Day As Needed for Anxiety.    Dennys Reyes MD   amphetamine-dextroamphetamine (ADDERALL) 20 MG tablet Take 20 mg by mouth 2 (Two) Times a Day.    Dennys Reyes MD   aspirin 81 MG tablet Take 1 tablet by mouth Daily. 10/9/19   Merna Fam MD   atorvastatin (LIPITOR) 40 MG tablet Take 40 mg by mouth Daily.    Dennys Reyes MD   CINNAMON PO Take 1 capsule by mouth 2 (Two) Times a Day.    Dennys Reyes MD   cyclobenzaprine (FLEXERIL) 10 MG tablet Take 10 mg by mouth every night at bedtime.    Dennys Reyes MD   DULoxetine (CYMBALTA) 30 MG capsule Take 30 mg by mouth Daily.    Dennys Reyes MD   esomeprazole (nexIUM) 40 MG capsule Take 40 mg by mouth 2 (Two) Times a Day.    Dennys Reyes MD   estrogens, conjugated, (PREMARIN) 1.25 MG tablet Take 1.25 mg by mouth Daily.    Dennys Reyes MD   Fluticasone-Umeclidin-Vilant (TRELEGY ELLIPTA) 100-62.5-25 MCG/INH aerosol powder  Inhale 1 container Daily.    Dennys Reyes MD   gabapentin (NEURONTIN) 300 MG capsule Take 600 mg by mouth Every Evening.    Dennys Reyes MD   guaiFENesin (MUCINEX)  600 MG 12 hr tablet Take 1 tablet by mouth Every 12 (Twelve) Hours. 10/9/19   Merna Fam MD   insulin aspart (novoLOG) 100 UNIT/ML injection Inject 5 Units under the skin into the appropriate area as directed 3 (Three) Times a Day Before Meals.    Dennys Reyes MD   Insulin Degludec (TRESIBA FLEXTOUCH) 200 UNIT/ML solution pen-injector pen injection Inject 32-34 Units under the skin into the appropriate area as directed every night at bedtime.    Dennys Reyes MD   ipratropium-albuterol (DUO-NEB) 0.5-2.5 mg/3 ml nebulizer Take 3 mL by nebulization Every 6 (Six) Hours As Needed for Wheezing or Shortness of Air. 10/9/19   Merna Fam MD   Januvia 100 MG tablet Take  by mouth Daily. 11/6/20   Dennys Reyes MD   metoprolol tartrate (LOPRESSOR) 50 MG tablet Take 50 mg by mouth 2 (Two) Times a Day.    Dennys Reyes MD   Mirabegron ER (MYRBETRIQ) 50 MG tablet sustained-release 24 hour 24 hr tablet Take 50 mg by mouth Daily.    Dennys Reyes MD   TURMERIC PO Take 1 capsule by mouth 2 (Two) Times a Day.    Dennys Reyes MD           Objective   Physical Exam  71-year-old female awake alert.  Generally well-developed well-nourished.  Pupils equal round react light production of muscles intact oropharynx mildly dry neck supple chest reveals bilateral wheezes.  Cardiovascular regular rate and rhythm.  Abdomen soft nontender.  Summation of extremities motor sensory grossly intact with out deficit.  There is no edema.  Neurologic exam [place] Coma Scale 15 hands without pronator drift or nose intact speech clear visual fields full  Procedures           ED Course      Results for orders placed or performed during the hospital encounter of 07/09/21   Comprehensive Metabolic Panel    Specimen: Blood   Result Value Ref Range    Glucose 175 (H) 65 - 99 mg/dL    BUN 16 8 - 23 mg/dL    Creatinine 1.02 (H) 0.57 - 1.00 mg/dL    Sodium 135 (L) 136 - 145 mmol/L    Potassium 4.6 3.5 - 5.2  mmol/L    Chloride 97 (L) 98 - 107 mmol/L    CO2 26.0 22.0 - 29.0 mmol/L    Calcium 9.5 8.6 - 10.5 mg/dL    Total Protein 7.1 6.0 - 8.5 g/dL    Albumin 3.70 3.50 - 5.20 g/dL    ALT (SGPT) 8 1 - 33 U/L    AST (SGOT) 10 1 - 32 U/L    Alkaline Phosphatase 97 39 - 117 U/L    Total Bilirubin 1.0 0.0 - 1.2 mg/dL    eGFR Non African Amer 53 (L) >60 mL/min/1.73    Globulin 3.4 gm/dL    A/G Ratio 1.1 g/dL    BUN/Creatinine Ratio 15.7 7.0 - 25.0    Anion Gap 12.0 5.0 - 15.0 mmol/L   Sedimentation Rate    Specimen: Blood   Result Value Ref Range    Sed Rate 67 (H) 0 - 30 mm/hr   D-dimer, Quantitative    Specimen: Blood   Result Value Ref Range    D-Dimer, Quantitative 1.77 (H) 0.00 - 0.59 mg/L (FEU)   Troponin    Specimen: Blood   Result Value Ref Range    Troponin T 0.016 0.000 - 0.030 ng/mL   CBC Auto Differential    Specimen: Blood   Result Value Ref Range    WBC 21.30 (H) 3.40 - 10.80 10*3/mm3    RBC 4.46 3.77 - 5.28 10*6/mm3    Hemoglobin 13.5 12.0 - 15.9 g/dL    Hematocrit 40.2 34.0 - 46.6 %    MCV 90.2 79.0 - 97.0 fL    MCH 30.2 26.6 - 33.0 pg    MCHC 33.4 31.5 - 35.7 g/dL    RDW 13.4 12.3 - 15.4 %    RDW-SD 42.0 37.0 - 54.0 fl    MPV 7.0 6.0 - 12.0 fL    Platelets 278 140 - 450 10*3/mm3    Neutrophil % 78.9 (H) 42.7 - 76.0 %    Lymphocyte % 12.3 (L) 19.6 - 45.3 %    Monocyte % 8.5 5.0 - 12.0 %    Eosinophil % 0.2 (L) 0.3 - 6.2 %    Basophil % 0.1 0.0 - 1.5 %    Neutrophils, Absolute 16.80 (H) 1.70 - 7.00 10*3/mm3    Lymphocytes, Absolute 2.60 0.70 - 3.10 10*3/mm3    Monocytes, Absolute 1.80 (H) 0.10 - 0.90 10*3/mm3    Eosinophils, Absolute 0.00 0.00 - 0.40 10*3/mm3    Basophils, Absolute 0.00 0.00 - 0.20 10*3/mm3    nRBC 0.0 0.0 - 0.2 /100 WBC   Blood Gas, Arterial -    Specimen: Arterial Blood   Result Value Ref Range    Site Right Radial     Torey's Test Positive     pH, Arterial 7.422 7.350 - 7.450 pH units    pCO2, Arterial 36.5 35.0 - 48.0 mm Hg    pO2, Arterial 39.8 (C) 83.0 - 108.0 mm Hg    HCO3, Arterial 23.8  "21.0 - 28.0 mmol/L    Base Excess, Arterial -0.3 (L) 0.0 - 3.0 mmol/L    O2 Saturation, Arterial 76.2 (L) 94.0 - 98.0 %    CO2 Content 24.9 22 - 29 mmol/L    Barometric Pressure for Blood Gas      Modality Room Air     FIO2 21 %    Hemodilution No    ECG 12 Lead   Result Value Ref Range    QT Interval 318 ms   [facility] - [facility]   Result Value Ref Range    Extra Tube Hold for add-ons.      CT Head Without Contrast    Result Date: 7/9/2021  1.No acute intracranial abnormality. 2.Mild chronic small vessel ischemic change and chronic lacunar infarct in the right caudate head.  Electronically Signed By-Samir Villasenor MD On:7/9/2021 9:14 PM This report was finalized on [id number] by  Samir Villasenor MD.    XR Chest 1 View    Result Date: 7/9/2021  No acute cardiopulmonary abnormality.  Electronically Signed By-Samir Villasenor MD On:7/9/2021 8:44 PM This report was finalized on [id number] by  Samir Villasenor MD.    Medications   sodium chloride 0.9 % flush 10 mL (has no administration in time range)   lactated ringers bolus 1,944 mL (has no administration in time range)   sodium chloride 0.9 % flush 10 mL (has no administration in time range)   cefTRIAXone (ROCEPHIN) in SWFI 1 gram/10ml IV PUSH syringe (has no administration in time range)   doxycycline (VIBRAMYCIN) 100 mg in sodium chloride 0.9 % 100 mL IVPB (has no administration in time range)   ipratropium-albuterol (DUO-NEB) nebulizer solution 3 mL (3 mL Nebulization Given 7/9/21 2015)   methylPREDNISolone sodium succinate (SOLU-Medrol) injection 60 mg (60 mg Intravenous Given 7/9/21 2012)   acetaminophen (TYLENOL) tablet 1,000 mg (1,000 mg Oral Given 7/9/21 2147)   ipratropium-albuterol (DUO-NEB) nebulizer solution 3 mL (3 mL Nebulization Given 7/9/21 2330)   iopamidol (ISOVUE-370) 76 % injection 100 mL (68 mL Intravenous Given 7/9/21 2309)     /49   Pulse 102   Temp 99.6 °F (37.6 °C) (Oral)   Resp 20   Ht 157.5 cm (62\")   Wt 64.8 kg (142 lb 13.7 oz)   " SpO2 100%   BMI 26.13 kg/m²                                        [facility]  Chart review: Patient had recent cardiology evaluation for coronary artery disease with stable angina.  She had admission 2019 for COPD exacerbation and sepsis  Comorbidity: As per past history  Differential: Giant cell arteritis, COPD exacerbation, pneumonia, pulmonary embolus,  My EKG interpretation: Sinus tachycardia biatrial enlargement.  Compared to previous no significant change noted  Lab: Comprehensive metabolic panel glucose 175 sodium 135 creatinine 1.02 troponin 0 0.016, sed rate elevated at 67 D-dimer elevated at 1.77 white count 21,000 with 78 segs no bands, arterial blood gas was attempted however venous sample was obtained however pH and PCO2 are noted to be normal.  Lactic acid normal at 1.7  Radiology: I reviewed x-rays.  No acute cardiopulmonary abnormality noted.  CT scan of head no acute intracranial abnormality mild chronic small vessel microvascular disease and chronic lacunar infarct in the right caudate head.  CT chest PE protocol no evidence of pulmonary embolus.  There is patchy tree-in-bud airspace disease in the right upper and lower lobes.  There are precarinal and borderline bilateral hilar lymphadenopathy this may be reactive but a neoplastic process cannot be entirely excluded.  There is change of emphysema.  Discussion/treatment: Patient IV placed.  Was given DuoNeb.  Was given Solu-Medrol.  Was given Tylenol for headache.  She was given additional DuoNeb.  Review of her chart shows that she has history of elevated white counts on previous evaluations.  She had elevated white blood count but did not screen positive for sepsis as initially there was not a known source of infection.  When her CT returned with tree-in-bud opacifications this did indicate a possible infectious source.   she did have some low diastolic blood pressures causing MAP less than 65 and was given 30 cc/kg fluid bolus.  Blood cultures will  be obtained.  She was given Rocephin and doxycycline.  Repeat temperature is still afebrile.  With her elevated sed rate and headaches it is possible that this could  be related to giant cell arteritis along with COPD exacerbation and bronchial infection, patient will also have respiratory virus panel obtained.  Patient was discussed with Dr. Portillo.  She will be admitted for continued care further evaluation as needed  Patient was evaluated using appropriate PPE      Final diagnoses:   COPD exacerbation (CMS/[facility])   Chronic intractable headache, unspecified headache type   Abnormal chest CT   Leukocytosis, unspecified type       ED Disposition  ED Disposition     ED Disposition Condition Comment    Decision to Admit            No follow-up provider specified.       Medication List      No changes were made to your prescriptions during this visit.          Miles Tony MD  07/10/21 0026

## 2021-07-10 PROBLEM — J44.1 COPD EXACERBATION: Status: ACTIVE | Noted: 2021-07-10

## 2021-07-10 LAB
B PARAPERT DNA SPEC QL NAA+PROBE: NOT DETECTED
B PERT DNA SPEC QL NAA+PROBE: NOT DETECTED
BASOPHILS # BLD AUTO: 0 10*3/MM3 (ref 0–0.2)
BASOPHILS NFR BLD AUTO: 0.2 % (ref 0–1.5)
BILIRUB UR QL STRIP: NEGATIVE
C PNEUM DNA NPH QL NAA+NON-PROBE: NOT DETECTED
CLARITY UR: CLEAR
COLOR UR: ABNORMAL
D-LACTATE SERPL-SCNC: 1.7 MMOL/L (ref 0.5–2)
DEPRECATED RDW RBC AUTO: 42 FL (ref 37–54)
EOSINOPHIL # BLD AUTO: 0 10*3/MM3 (ref 0–0.4)
EOSINOPHIL NFR BLD AUTO: 0 % (ref 0.3–6.2)
ERYTHROCYTE [DISTWIDTH] IN BLOOD BY AUTOMATED COUNT: 13.5 % (ref 12.3–15.4)
FLUAV SUBTYP SPEC NAA+PROBE: NOT DETECTED
FLUBV RNA ISLT QL NAA+PROBE: NOT DETECTED
GLUCOSE BLDC GLUCOMTR-MCNC: 315 MG/DL (ref 70–105)
GLUCOSE BLDC GLUCOMTR-MCNC: 409 MG/DL (ref 70–105)
GLUCOSE BLDC GLUCOMTR-MCNC: 452 MG/DL (ref 70–105)
GLUCOSE BLDC GLUCOMTR-MCNC: 482 MG/DL (ref 70–105)
GLUCOSE UR STRIP-MCNC: NEGATIVE MG/DL
HADV DNA SPEC NAA+PROBE: NOT DETECTED
HCOV 229E RNA SPEC QL NAA+PROBE: NOT DETECTED
HCOV HKU1 RNA SPEC QL NAA+PROBE: NOT DETECTED
HCOV NL63 RNA SPEC QL NAA+PROBE: NOT DETECTED
HCOV OC43 RNA SPEC QL NAA+PROBE: NOT DETECTED
HCT VFR BLD AUTO: 38.4 % (ref 34–46.6)
HGB BLD-MCNC: 12.6 G/DL (ref 12–15.9)
HGB UR QL STRIP.AUTO: NEGATIVE
HMPV RNA NPH QL NAA+NON-PROBE: NOT DETECTED
HOLD SPECIMEN: NORMAL
HOLD SPECIMEN: NORMAL
HPIV1 RNA SPEC QL NAA+PROBE: NOT DETECTED
HPIV2 RNA SPEC QL NAA+PROBE: NOT DETECTED
HPIV3 RNA NPH QL NAA+PROBE: NOT DETECTED
HPIV4 P GENE NPH QL NAA+PROBE: NOT DETECTED
KETONES UR QL STRIP: ABNORMAL
LEUKOCYTE ESTERASE UR QL STRIP.AUTO: NEGATIVE
LYMPHOCYTES # BLD AUTO: 1.1 10*3/MM3 (ref 0.7–3.1)
LYMPHOCYTES NFR BLD AUTO: 6.6 % (ref 19.6–45.3)
M PNEUMO IGG SER IA-ACNC: NOT DETECTED
MCH RBC QN AUTO: 29.8 PG (ref 26.6–33)
MCHC RBC AUTO-ENTMCNC: 32.9 G/DL (ref 31.5–35.7)
MCV RBC AUTO: 90.6 FL (ref 79–97)
MONOCYTES # BLD AUTO: 0.4 10*3/MM3 (ref 0.1–0.9)
MONOCYTES NFR BLD AUTO: 2.2 % (ref 5–12)
NEUTROPHILS NFR BLD AUTO: 15.7 10*3/MM3 (ref 1.7–7)
NEUTROPHILS NFR BLD AUTO: 91 % (ref 42.7–76)
NITRITE UR QL STRIP: NEGATIVE
NRBC BLD AUTO-RTO: 0 /100 WBC (ref 0–0.2)
PH UR STRIP.AUTO: 6 [PH] (ref 5–8)
PLATELET # BLD AUTO: 252 10*3/MM3 (ref 140–450)
PMV BLD AUTO: 7.2 FL (ref 6–12)
PROCALCITONIN SERPL-MCNC: 0.15 NG/ML (ref 0–0.25)
PROT UR QL STRIP: ABNORMAL
RBC # BLD AUTO: 4.24 10*6/MM3 (ref 3.77–5.28)
RHINOVIRUS RNA SPEC NAA+PROBE: NOT DETECTED
RSV RNA NPH QL NAA+NON-PROBE: NOT DETECTED
SARS-COV-2 RNA NPH QL NAA+NON-PROBE: NOT DETECTED
SP GR UR STRIP: >=1.03 (ref 1–1.03)
UROBILINOGEN UR QL STRIP: ABNORMAL
WBC # BLD AUTO: 17.3 10*3/MM3 (ref 3.4–10.8)
WHOLE BLOOD HOLD SPECIMEN: NORMAL

## 2021-07-10 PROCEDURE — 25010000002 ENOXAPARIN PER 10 MG: Performed by: INTERNAL MEDICINE

## 2021-07-10 PROCEDURE — 94799 UNLISTED PULMONARY SVC/PX: CPT

## 2021-07-10 PROCEDURE — 25010000002 CEFTRIAXONE PER 250 MG: Performed by: INTERNAL MEDICINE

## 2021-07-10 PROCEDURE — 0202U NFCT DS 22 TRGT SARS-COV-2: CPT | Performed by: EMERGENCY MEDICINE

## 2021-07-10 PROCEDURE — 83605 ASSAY OF LACTIC ACID: CPT

## 2021-07-10 PROCEDURE — 25010000002 METHYLPREDNISOLONE PER 40 MG: Performed by: INTERNAL MEDICINE

## 2021-07-10 PROCEDURE — 85025 COMPLETE CBC W/AUTO DIFF WBC: CPT | Performed by: EMERGENCY MEDICINE

## 2021-07-10 PROCEDURE — 25010000002 CEFTRIAXONE PER 250 MG: Performed by: EMERGENCY MEDICINE

## 2021-07-10 PROCEDURE — 82962 GLUCOSE BLOOD TEST: CPT

## 2021-07-10 PROCEDURE — 63710000001 INSULIN LISPRO (HUMAN) PER 5 UNITS: Performed by: INTERNAL MEDICINE

## 2021-07-10 PROCEDURE — 87040 BLOOD CULTURE FOR BACTERIA: CPT | Performed by: EMERGENCY MEDICINE

## 2021-07-10 PROCEDURE — 84145 PROCALCITONIN (PCT): CPT | Performed by: EMERGENCY MEDICINE

## 2021-07-10 PROCEDURE — 87205 SMEAR GRAM STAIN: CPT | Performed by: FAMILY MEDICINE

## 2021-07-10 PROCEDURE — 87070 CULTURE OTHR SPECIMN AEROBIC: CPT | Performed by: FAMILY MEDICINE

## 2021-07-10 PROCEDURE — 63710000001 INSULIN LISPRO (HUMAN) PER 5 UNITS: Performed by: FAMILY MEDICINE

## 2021-07-10 PROCEDURE — 63710000001 INSULIN GLARGINE PER 5 UNITS: Performed by: INTERNAL MEDICINE

## 2021-07-10 PROCEDURE — 25010000002 METHYLPREDNISOLONE PER 40 MG: Performed by: FAMILY MEDICINE

## 2021-07-10 PROCEDURE — 81003 URINALYSIS AUTO W/O SCOPE: CPT | Performed by: EMERGENCY MEDICINE

## 2021-07-10 RX ORDER — BUTALBITAL, ACETAMINOPHEN AND CAFFEINE 50; 325; 40 MG/1; MG/1; MG/1
1 TABLET ORAL EVERY 4 HOURS PRN
Status: DISCONTINUED | OUTPATIENT
Start: 2021-07-10 | End: 2021-07-11 | Stop reason: HOSPADM

## 2021-07-10 RX ORDER — GABAPENTIN 300 MG/1
300 CAPSULE ORAL 3 TIMES DAILY
COMMUNITY

## 2021-07-10 RX ORDER — SODIUM CHLORIDE 0.9 % (FLUSH) 0.9 %
3 SYRINGE (ML) INJECTION EVERY 12 HOURS SCHEDULED
Status: DISCONTINUED | OUTPATIENT
Start: 2021-07-10 | End: 2021-07-11 | Stop reason: HOSPADM

## 2021-07-10 RX ORDER — GABAPENTIN 300 MG/1
600 CAPSULE ORAL EVERY EVENING
COMMUNITY
End: 2022-01-24 | Stop reason: SDUPTHER

## 2021-07-10 RX ORDER — INSULIN GLARGINE 100 [IU]/ML
35 INJECTION, SOLUTION SUBCUTANEOUS NIGHTLY
Status: DISCONTINUED | OUTPATIENT
Start: 2021-07-10 | End: 2021-07-11 | Stop reason: HOSPADM

## 2021-07-10 RX ORDER — INSULIN LISPRO 100 [IU]/ML
0-14 INJECTION, SOLUTION INTRAVENOUS; SUBCUTANEOUS AS NEEDED
Status: DISCONTINUED | OUTPATIENT
Start: 2021-07-10 | End: 2021-07-10

## 2021-07-10 RX ORDER — INSULIN LISPRO 100 [IU]/ML
5 INJECTION, SOLUTION INTRAVENOUS; SUBCUTANEOUS
Status: DISCONTINUED | OUTPATIENT
Start: 2021-07-10 | End: 2021-07-10

## 2021-07-10 RX ORDER — METOPROLOL TARTRATE 50 MG/1
50 TABLET, FILM COATED ORAL 2 TIMES DAILY
Status: DISCONTINUED | OUTPATIENT
Start: 2021-07-10 | End: 2021-07-11 | Stop reason: HOSPADM

## 2021-07-10 RX ORDER — INSULIN LISPRO 100 [IU]/ML
0-24 INJECTION, SOLUTION INTRAVENOUS; SUBCUTANEOUS AS NEEDED
Status: DISCONTINUED | OUTPATIENT
Start: 2021-07-10 | End: 2021-07-11

## 2021-07-10 RX ORDER — AZITHROMYCIN 250 MG/1
250 TABLET, FILM COATED ORAL DAILY
Status: DISCONTINUED | OUTPATIENT
Start: 2021-07-11 | End: 2021-07-10

## 2021-07-10 RX ORDER — GABAPENTIN 300 MG/1
300 CAPSULE ORAL EVERY MORNING
Status: DISCONTINUED | OUTPATIENT
Start: 2021-07-11 | End: 2021-07-11 | Stop reason: HOSPADM

## 2021-07-10 RX ORDER — INSULIN LISPRO 100 [IU]/ML
10 INJECTION, SOLUTION INTRAVENOUS; SUBCUTANEOUS
Status: DISCONTINUED | OUTPATIENT
Start: 2021-07-10 | End: 2021-07-11 | Stop reason: HOSPADM

## 2021-07-10 RX ORDER — NICOTINE POLACRILEX 4 MG
15 LOZENGE BUCCAL
Status: DISCONTINUED | OUTPATIENT
Start: 2021-07-10 | End: 2021-07-11 | Stop reason: HOSPADM

## 2021-07-10 RX ORDER — DEXTROSE MONOHYDRATE 25 G/50ML
25 INJECTION, SOLUTION INTRAVENOUS
Status: DISCONTINUED | OUTPATIENT
Start: 2021-07-10 | End: 2021-07-11 | Stop reason: HOSPADM

## 2021-07-10 RX ORDER — DOXYCYCLINE 100 MG/1
100 TABLET ORAL 2 TIMES DAILY
Status: DISCONTINUED | OUTPATIENT
Start: 2021-07-10 | End: 2021-07-11 | Stop reason: HOSPADM

## 2021-07-10 RX ORDER — ACETAMINOPHEN 325 MG/1
650 TABLET ORAL EVERY 4 HOURS PRN
Status: DISCONTINUED | OUTPATIENT
Start: 2021-07-10 | End: 2021-07-11 | Stop reason: HOSPADM

## 2021-07-10 RX ORDER — INSULIN LISPRO 100 [IU]/ML
0-14 INJECTION, SOLUTION INTRAVENOUS; SUBCUTANEOUS
Status: DISCONTINUED | OUTPATIENT
Start: 2021-07-10 | End: 2021-07-10

## 2021-07-10 RX ORDER — IPRATROPIUM BROMIDE AND ALBUTEROL SULFATE 2.5; .5 MG/3ML; MG/3ML
3 SOLUTION RESPIRATORY (INHALATION)
Status: DISCONTINUED | OUTPATIENT
Start: 2021-07-10 | End: 2021-07-11 | Stop reason: HOSPADM

## 2021-07-10 RX ORDER — PANTOPRAZOLE SODIUM 40 MG/1
40 TABLET, DELAYED RELEASE ORAL
Status: DISCONTINUED | OUTPATIENT
Start: 2021-07-11 | End: 2021-07-11 | Stop reason: HOSPADM

## 2021-07-10 RX ORDER — AZITHROMYCIN 250 MG/1
500 TABLET, FILM COATED ORAL DAILY
Status: DISCONTINUED | OUTPATIENT
Start: 2021-07-10 | End: 2021-07-10

## 2021-07-10 RX ORDER — ASPIRIN 81 MG/1
81 TABLET ORAL DAILY
Status: DISCONTINUED | OUTPATIENT
Start: 2021-07-10 | End: 2021-07-11 | Stop reason: HOSPADM

## 2021-07-10 RX ORDER — INSULIN LISPRO 100 [IU]/ML
0-24 INJECTION, SOLUTION INTRAVENOUS; SUBCUTANEOUS
Status: DISCONTINUED | OUTPATIENT
Start: 2021-07-10 | End: 2021-07-11

## 2021-07-10 RX ORDER — SODIUM CHLORIDE 0.9 % (FLUSH) 0.9 %
3-10 SYRINGE (ML) INJECTION AS NEEDED
Status: DISCONTINUED | OUTPATIENT
Start: 2021-07-10 | End: 2021-07-11 | Stop reason: HOSPADM

## 2021-07-10 RX ORDER — METHYLPREDNISOLONE SODIUM SUCCINATE 40 MG/ML
20 INJECTION, POWDER, LYOPHILIZED, FOR SOLUTION INTRAMUSCULAR; INTRAVENOUS EVERY 12 HOURS
Status: DISCONTINUED | OUTPATIENT
Start: 2021-07-10 | End: 2021-07-11 | Stop reason: HOSPADM

## 2021-07-10 RX ORDER — CYCLOBENZAPRINE HCL 10 MG
10 TABLET ORAL NIGHTLY
Status: DISCONTINUED | OUTPATIENT
Start: 2021-07-10 | End: 2021-07-11 | Stop reason: HOSPADM

## 2021-07-10 RX ORDER — SODIUM CHLORIDE 9 MG/ML
50 INJECTION, SOLUTION INTRAVENOUS CONTINUOUS
Status: DISCONTINUED | OUTPATIENT
Start: 2021-07-10 | End: 2021-07-11 | Stop reason: HOSPADM

## 2021-07-10 RX ORDER — GABAPENTIN 300 MG/1
600 CAPSULE ORAL EVERY EVENING
Status: DISCONTINUED | OUTPATIENT
Start: 2021-07-10 | End: 2021-07-11 | Stop reason: HOSPADM

## 2021-07-10 RX ORDER — ALPRAZOLAM 0.5 MG/1
0.5 TABLET ORAL 2 TIMES DAILY PRN
Status: DISCONTINUED | OUTPATIENT
Start: 2021-07-10 | End: 2021-07-11 | Stop reason: HOSPADM

## 2021-07-10 RX ADMIN — Medication 3 ML: at 14:14

## 2021-07-10 RX ADMIN — GABAPENTIN 600 MG: 300 CAPSULE ORAL at 17:33

## 2021-07-10 RX ADMIN — INSULIN LISPRO 14 UNITS: 100 INJECTION, SOLUTION INTRAVENOUS; SUBCUTANEOUS at 08:28

## 2021-07-10 RX ADMIN — METHYLPREDNISOLONE SODIUM SUCCINATE 20 MG: 40 INJECTION, POWDER, FOR SOLUTION INTRAMUSCULAR; INTRAVENOUS at 08:27

## 2021-07-10 RX ADMIN — WATER 1 G: 100 INJECTION, SOLUTION INTRAVENOUS at 00:52

## 2021-07-10 RX ADMIN — INSULIN LISPRO 5 UNITS: 100 INJECTION, SOLUTION INTRAVENOUS; SUBCUTANEOUS at 10:50

## 2021-07-10 RX ADMIN — SODIUM CHLORIDE 50 ML/HR: 9 INJECTION, SOLUTION INTRAVENOUS at 08:27

## 2021-07-10 RX ADMIN — METOPROLOL TARTRATE 50 MG: 50 TABLET, FILM COATED ORAL at 20:47

## 2021-07-10 RX ADMIN — IPRATROPIUM BROMIDE AND ALBUTEROL SULFATE 3 ML: 2.5; .5 SOLUTION RESPIRATORY (INHALATION) at 10:58

## 2021-07-10 RX ADMIN — LINAGLIPTIN 5 MG: 5 TABLET, FILM COATED ORAL at 14:14

## 2021-07-10 RX ADMIN — INSULIN LISPRO 14 UNITS: 100 INJECTION, SOLUTION INTRAVENOUS; SUBCUTANEOUS at 10:51

## 2021-07-10 RX ADMIN — INSULIN GLARGINE 35 UNITS: 100 INJECTION, SOLUTION SUBCUTANEOUS at 20:50

## 2021-07-10 RX ADMIN — ALPRAZOLAM 0.5 MG: 0.5 TABLET ORAL at 20:47

## 2021-07-10 RX ADMIN — SODIUM CHLORIDE, POTASSIUM CHLORIDE, SODIUM LACTATE AND CALCIUM CHLORIDE 1000 ML: 600; 310; 30; 20 INJECTION, SOLUTION INTRAVENOUS at 01:02

## 2021-07-10 RX ADMIN — Medication 3 ML: at 20:48

## 2021-07-10 RX ADMIN — ENOXAPARIN SODIUM 40 MG: 40 INJECTION SUBCUTANEOUS at 17:33

## 2021-07-10 RX ADMIN — DOXYCYCLINE 100 MG: 100 INJECTION, POWDER, LYOPHILIZED, FOR SOLUTION INTRAVENOUS at 00:59

## 2021-07-10 RX ADMIN — Medication 10 ML: at 08:27

## 2021-07-10 RX ADMIN — INSULIN LISPRO 10 UNITS: 100 INJECTION, SOLUTION INTRAVENOUS; SUBCUTANEOUS at 17:34

## 2021-07-10 RX ADMIN — INSULIN LISPRO 16 UNITS: 100 INJECTION, SOLUTION INTRAVENOUS; SUBCUTANEOUS at 17:33

## 2021-07-10 RX ADMIN — CEFTRIAXONE SODIUM 1 G: 1 INJECTION, POWDER, FOR SOLUTION INTRAMUSCULAR; INTRAVENOUS at 17:34

## 2021-07-10 RX ADMIN — DOXYCYCLINE 100 MG: 100 TABLET, FILM COATED ORAL at 20:46

## 2021-07-10 RX ADMIN — INSULIN LISPRO 5 UNITS: 100 INJECTION, SOLUTION INTRAVENOUS; SUBCUTANEOUS at 08:27

## 2021-07-10 RX ADMIN — METHYLPREDNISOLONE SODIUM SUCCINATE 20 MG: 40 INJECTION, POWDER, FOR SOLUTION INTRAMUSCULAR; INTRAVENOUS at 20:46

## 2021-07-10 RX ADMIN — IPRATROPIUM BROMIDE AND ALBUTEROL SULFATE 3 ML: 2.5; .5 SOLUTION RESPIRATORY (INHALATION) at 19:58

## 2021-07-10 RX ADMIN — CYCLOBENZAPRINE HYDROCHLORIDE 10 MG: 10 TABLET, FILM COATED ORAL at 20:47

## 2021-07-10 RX ADMIN — ASPIRIN 81 MG: 81 TABLET, COATED ORAL at 14:14

## 2021-07-10 NOTE — PLAN OF CARE
Goal Outcome Evaluation:  Plan of Care Reviewed With: patient        Progress: no change  Outcome Summary: Patient settled on the unit with no concerns or complaints.

## 2021-07-10 NOTE — H&P
Patient Care Team:  Deborah Ochoa MD as PCP - General  Ishan June MD as Consulting Physician (Cardiology)    Chief complaint generalized fatigue    Subjective     Patient is a 71 y.o. female with history of type 2 diabetes and advanced COPD who presents with progressive fatigue and shortness of breath over the last week. She denied any fever or chills. She says she just began feeling fatigued and was unable to do her usual level of activity.  On the day of admission she was barely able to walk, so her family brought her to the emergency room. She has had very poor appetite. Since admission she has developed a productive cough. Blood sugars have been high but she missed her insulin dosing yesterday and has been started on steroid. She complains of a headache has been going on for couple of weeks.     Review of Systems   Constitutional: Positive for activity change, appetite change and fatigue. Negative for chills, diaphoresis, fever and unexpected weight change.   HENT: Negative for drooling and mouth sores.    Eyes: Positive for visual disturbance.   Respiratory: Positive for cough and shortness of breath. Negative for wheezing and stridor.    Cardiovascular: Negative for chest pain, palpitations and leg swelling.   Gastrointestinal: Negative for constipation, diarrhea, nausea and vomiting.   Endocrine: Negative for polyuria.   Genitourinary: Negative for dysuria and urgency.   Musculoskeletal: Positive for arthralgias.   Skin: Negative for rash.   Neurological: Positive for headaches. Negative for dizziness and seizures.   Psychiatric/Behavioral: Negative for confusion.          History  Past Medical History:   Diagnosis Date   • COPD (chronic obstructive pulmonary disease) (CMS/[facility])    • Diabetes mellitus (CMS/[facility])    • Hyperlipidemia      Past Surgical History:   Procedure Laterality Date   • CARDIAC CATHETERIZATION Right 10/7/2019    Procedure: Left Heart Cath and coronary angiogram;  Surgeon: Shaylee  MD Ishan;  Location: [facility] LOCATION;  Service: Cardiovascular     Family History   Problem Relation Age of Onset   • Heart disease Father      Social History     Tobacco Use   • Smoking status: Current Every Day Smoker     Types: Cigarettes   • Smokeless tobacco: Never Used   Substance Use Topics   • Alcohol use: Not Currently   • Drug use: Never     Medications Prior to Admission   Medication Sig Dispense Refill Last Dose   • ALPRAZolam (XANAX) 0.5 MG tablet Take 0.5 mg by mouth 2 (Two) Times a Day As Needed for Anxiety.      • amphetamine-dextroamphetamine (ADDERALL) 20 MG tablet Take 20 mg by mouth 2 (Two) Times a Day.      • aspirin 81 MG tablet Take 1 tablet by mouth Daily. 30 tablet 5    • cyclobenzaprine (FLEXERIL) 10 MG tablet Take 10 mg by mouth every night at bedtime.      • esomeprazole (nexIUM) 40 MG capsule Take 40 mg by mouth 2 (Two) Times a Day.      • Fluticasone-Umeclidin-Vilant (TRELEGY ELLIPTA) 100-62.5-25 MCG/INH aerosol powder  Inhale 1 container Daily.      • gabapentin (NEURONTIN) 300 MG capsule Take 300 mg by mouth Every Morning.   Patient Taking Differently at Unknown time   • gabapentin (NEURONTIN) 300 MG capsule Take 600 mg by mouth Every Evening.   Patient Taking Differently at Unknown time   • Insulin Degludec (TRESIBA FLEXTOUCH) 200 UNIT/ML solution pen-injector pen injection Inject 34 Units under the skin into the appropriate area as directed every night at bedtime.      • Januvia 100 MG tablet Take 100 mg by mouth Daily.      • metoprolol tartrate (LOPRESSOR) 50 MG tablet Take 50 mg by mouth 2 (Two) Times a Day.      • Mirabegron ER (MYRBETRIQ) 50 MG tablet sustained-release 24 hour 24 hr tablet Take 50 mg by mouth Daily.      • insulin aspart (novoLOG) 100 UNIT/ML injection Inject 5 Units under the skin into the appropriate area as directed 3 (Three) Times a Day Before Meals.        Allergies:  Patient has no known allergies.    Objective     Vital Signs  Temp:  [97.6  °F (36.4 °C)-99.6 °F (37.6 °C)] 97.6 °F (36.4 °C)  Heart Rate:  [102-142] 122  Resp:  [18-38] 18  BP: ()/(33-68) 116/64     Physical Exam:      General Appearance:    Alert, cooperative, in no acute distress, comfortable in hospital bed, fully oriented   Head:    Normocephalic, without obvious abnormality, atraumatic   Eyes:            Lids and lashes normal, conjunctivae and sclerae normal, no   icterus, no pallor, corneas clear, PERRLA   Ears:    Ears appear intact with no abnormalities noted   Throat:   No oral lesions, no thrush, oral mucosa moist   Neck:   No adenopathy, supple, trachea midline, no thyromegaly, no   carotid bruit, no JVD   Lungs:    Diffuse wheezing    Heart:    Regular rhythm and normal rate, normal S1 and S2, no            murmur, no gallop, no rub, no click   Chest Wall:    No abnormalities observed   Abdomen:     Normal bowel sounds, no masses, no organomegaly, soft        non-tender, non-distended, no guarding, no rebound                tenderness   Extremities:   Moves all extremities well, no edema, no cyanosis, no             redness   Pulses:   Pulses palpable and equal bilaterally   Skin:   No bleeding, bruising or rash   Lymph nodes:   No palpable adenopathy   Neurologic:   Cranial nerves 2 - 12 grossly intact, sensation intact, DTR       present and equal bilaterally       Results Review:     Imaging Results (Last 24 Hours)     Procedure Component Value Units Date/Time    CT Chest Pulmonary Embolism [[id number]] Collected: 07/09/21 2345     Updated: 07/09/21 2351    Narrative:      CT ANGIOGRAM OF THE CHEST    INDICATION: Cough, shortness of breath, elevated d-dimer    TECHNIQUE: CT scan of the chest was performed following the administration of 68 mL Isovue-370 intravenous contrast. Imaging was performed to optimize evaluation of the pulmonary arterial system. CT dose lowering techniques were used, to include:   automated exposure control, adjustment for patient size, and / or  use of iterative reconstruction. MIP images were also generated.    COMPARISON: None    FINDINGS:  Vasculature: There are no filling defects in the central, lobar, proximal segmental or distal segmental pulmonary arteries. There is no evidence of right heart strain or pulmonary infarction.    Mediastinum / Pleura: There is no pericardial or pleural effusion. An enlarged precarinal lymph node measures 1.4 cm in short axis (series 5, image 63). Borderline hilar lymph nodes are also present.    Airways / Lungs: The central airways are patent.  There is no pneumothorax. There are patchy regions of tree-in-bud airspace disease in the right upper and lower lobes. Findings are superimposed upon centrilobular emphysema.    Bones: No destructive osseous lesion is identified.    Upper Abdomen: Limited images below the diaphragm demonstrate no acute abnormality.      Impression:        1. No evidence of pulmonary embolism.  2. Patchy tree-in-bud airspace disease in the right upper and lower lobes, compatible with an infectious process.  3. Precarinal and borderline bilateral hilar lymphadenopathy. This is nonspecific but may be reactive. A neoplastic process cannot be entirely excluded.  4. Emphysema.    Electronically signed by:  Herb Leone M.D.    7/9/2021 9:50 PM    CT Head Without Contrast [[id number]] Collected: 07/09/21 2113     Updated: 07/09/21 2133    Narrative:      Examination: CT HEAD WO CONTRAST-     Date of Exam: 7/9/2021 9:03 PM     Indication: Headache.     Comparison: None available.     Technique: Axial noncontrast CT imaging of the head was performed.  Automated exposure control and iterative reconstruction methods were  used.     Findings:  Superficial soft tissues appear within normal limits. The calvarium is  intact.  Paranasal sinuses and mastoid air cells appear well aerated.   There is no acute intracranial hemorrhage.  No mass effect or midline  shift.  No abnormal extra-axial collections.   Gray-white differentiation  is within normal limits.  There is a chronic lacunar infarct in the  right caudate head. There is minimal patchy periventricular white matter  hypoattenuation. There are mild intracranial vascular calcifications.  There is thinning of the orbital lenses bilaterally suggesting prior  cataract surgery or cataracts. Ventricular size and configuration is  normal for age.          Impression:      1.No acute intracranial abnormality.  2.Mild chronic small vessel ischemic change and chronic lacunar infarct  in the right caudate head.     Electronically Signed By-Samir Villasenor MD On:7/9/2021 9:14 PM  This report was finalized on [id number] by  Samir Villasenor MD.    XR Chest 1 View [[id number]] Collected: 07/09/21 2044     Updated: 07/09/21 2055    Narrative:      Examination: XR CHEST 1 VW-     Date of Exam: 7/9/2021 8:38 PM     Indication: soa.     Comparison: 09/30/2019.     Technique: Single radiographic view of the chest was obtained.     Findings:  There is no pneumothorax, pleural effusion or focal airspace  consolidation. Cardiomediastinal silhouette is unremarkable. Pulmonary  vasculature appears within normal limits. Regional bones appear grossly  intact.        Impression:      No acute cardiopulmonary abnormality.     Electronically Signed By-Samir Villasenor MD On:7/9/2021 8:44 PM  This report was finalized on [id number] by  Samir Villasenor MD.           Lab Results (last 24 hours)     Procedure Component Value Units Date/Time    POC Glucose Once [[id number]]  (Abnormal) Collected: 07/10/21 1042    Specimen: Blood Updated: 07/10/21 1043     Glucose 452 mg/dL      Comment: Serial Number: [id number]:  [id number]       POC Glucose Once [[id number]]  (Abnormal) Collected: 07/10/21 0727    Specimen: Blood Updated: 07/10/21 0729     Glucose 482 mg/dL      Comment: Serial Number: [id number]:  [id number]       [place] Draw [[id number]] Collected: 07/10/21 0032    Specimen:  Blood from Arm, Right Updated: 07/10/21 0145    Narrative:      The following orders were created for panel order [place] Draw.  Procedure                               Abnormality         Status                     ---------                               -----------         ------                     Green Top (Gel)[[id number]]                                  Final result               Lavender Top[[id number]]                                     Final result               Gold Top - SST[[id number]]                                   Final result                 Please view results for these tests on the individual orders.    Green Top (Gel) [[id number]] Collected: 07/10/21 0032    Specimen: Blood from Arm, Right Updated: 07/10/21 0145     Extra Tube Hold for add-ons.     Comment: Auto resulted.       Lavender Top [[id number]] Collected: 07/10/21 0032    Specimen: Blood from Arm, Right Updated: 07/10/21 0145     Extra Tube hold for add-on     Comment: Auto resulted       Gold Top - SST [[id number]] Collected: 07/10/21 0032    Specimen: Blood from Arm, Right Updated: 07/10/21 0145     Extra Tube Hold for add-ons.     Comment: Auto resulted.       Respiratory Panel PCR w/COVID-19(SARS-CoV-2) [facility]O/[facility]A/PAD/[facility]ABRIELLE In-House, NP Swab in UTM/VTM, 3-4 HR TAT - Swab, Nasopharynx [[id number]]  (Normal) Collected: 07/10/21 0037    Specimen: Swab from Nasopharynx Updated: 07/10/21 0131     ADENOVIRUS, PCR Not Detected     Coronavirus 229E Not Detected     Coronavirus HKU1 Not Detected     Coronavirus NL63 Not Detected     Coronavirus OC43 Not Detected     COVID19 Not Detected     Human Metapneumovirus Not Detected     Human Rhinovirus/Enterovirus Not Detected     Influenza A PCR Not Detected     Influenza B PCR Not Detected     Parainfluenza Virus 1 Not Detected     Parainfluenza Virus 2 Not Detected     Parainfluenza Virus 3 Not Detected     Parainfluenza Virus 4 Not Detected     RSV, PCR Not Detected     Bordetella pertussis  "pcr Not Detected     Bordetella parapertussis PCR Not Detected     Chlamydophila pneumoniae PCR Not Detected     Mycoplasma pneumo by PCR Not Detected    Narrative:      In the setting of a positive respiratory panel with a viral infection PLUS a negative procalcitonin without other underlying concern for bacterial infection, consider observing off antibiotics or discontinuation of antibiotics and continue supportive care. If the respiratory panel is positive for atypical bacterial infection (Bordetella pertussis, Chlamydophila pneumoniae, or Mycoplasma pneumoniae), consider antibiotic de-escalation to target atypical bacterial infection.    Procalcitonin [[id number]]  (Normal) Collected: 07/10/21 0032    Specimen: Blood from Arm, Right Updated: 07/10/21 0108     Procalcitonin 0.15 ng/mL     Narrative:      As a Marker for Sepsis (Non-Neonates):     1. <0.5 ng/mL represents a low risk of severe sepsis and/or septic shock.  2. >2 ng/mL represents a high risk of severe sepsis and/or septic shock.    As a Marker for Lower Respiratory Tract Infections that require antibiotic therapy:  PCT on Admission     Antibiotic Therapy             6-12 Hrs later  >0.5                          Strongly Recommended            >0.25 - <0.5             Recommended  0.1 - 0.25                  Discouraged                       Remeasure/reassess PCT  <0.1                         Strongly Discouraged         Remeasure/reassess PCT      As 28 day mortality risk marker: \"Change in Procalcitonin Result\" (>80% or <=80%) if Day 0 (or Day 1) and Day 4 values are available. Refer to http://www.[employer]-pct-calculator.com/    Change in PCT <=80 %   A decrease of PCT levels below or equal to 80% defines a positive change in PCT test result representing a higher risk for 28-day all-cause mortality of patients diagnosed with severe sepsis or septic shock.    Change in PCT >80 %   A decrease of PCT levels of more than 80% defines a negative change in " PCT result representing a lower risk for 28-day all-cause mortality of patients diagnosed with severe sepsis or septic shock.              Results may be falsely decreased if patient taking Biotin.     Urinalysis With Culture If Indicated - Urine, Clean Catch [[id number]]  (Abnormal) Collected: 07/10/21 0010    Specimen: Urine, Clean Catch Updated: 07/10/21 0045     Color, UA Dark Yellow     Appearance, UA Clear     pH, UA 6.0     Specific Gravity, UA >=1.030     Glucose, UA Negative     Ketones, UA 15 mg/dL (1+)     Bilirubin, UA Negative     Blood, UA Negative     Protein, UA Trace     Leuk Esterase, UA Negative     Nitrite, UA Negative     Urobilinogen, UA 1.0 E.U./dL    Narrative:      Urine microscopic not indicated.    CBC & Differential [[id number]]  (Abnormal) Collected: 07/10/21 0032    Specimen: Blood from Arm, Right Updated: 07/10/21 0043    Narrative:      The following orders were created for panel order CBC & Differential.  Procedure                               Abnormality         Status                     ---------                               -----------         ------                     CBC Auto Differential[[id number]]        Abnormal            Final result                 Please view results for these tests on the individual orders.    CBC Auto Differential [[id number]]  (Abnormal) Collected: 07/10/21 0032    Specimen: Blood from Arm, Right Updated: 07/10/21 0043     WBC 17.30 10*3/mm3      RBC 4.24 10*6/mm3      Hemoglobin 12.6 g/dL      Hematocrit 38.4 %      MCV 90.6 fL      MCH 29.8 pg      MCHC 32.9 g/dL      RDW 13.5 %      RDW-SD 42.0 fl      MPV 7.2 fL      Platelets 252 10*3/mm3      Neutrophil % 91.0 %      Lymphocyte % 6.6 %      Monocyte % 2.2 %      Eosinophil % 0.0 %      Basophil % 0.2 %      Neutrophils, Absolute 15.70 10*3/mm3      Lymphocytes, Absolute 1.10 10*3/mm3      Monocytes, Absolute 0.40 10*3/mm3      Eosinophils, Absolute 0.00 10*3/mm3      Basophils, Absolute 0.00  10*3/mm3      nRBC 0.0 /100 WBC     Blood Culture - Blood, Arm, Right [[id number]] Collected: 07/10/21 0032    Specimen: Blood from Arm, Right Updated: 07/10/21 0039    Blood Culture - Blood, Arm, Left [[id number]] Collected: 07/10/21 0022    Specimen: Blood from Arm, Left Updated: 07/10/21 0038    POC Lactate [[id number]]  (Normal) Collected: 07/10/21 0022    Specimen: Blood Updated: 07/10/21 0024     Lactate 1.7 mmol/L      Comment: Serial Number: [id number]:  [id number]       Sedimentation Rate [[id number]]  (Abnormal) Collected: 07/09/21 2004    Specimen: Blood Updated: 07/09/21 2124     Sed Rate 67 mm/hr     Extra Tubes [[id number]] Collected: 07/09/21 2004    Specimen: Blood, Venous Line Updated: 07/09/21 2116    Narrative:      The following orders were created for panel order Extra Tubes.  Procedure                               Abnormality         Status                     ---------                               -----------         ------                     Gold Top - SST[[id number]]                                   Final result                 Please view results for these tests on the individual orders.    Gold Top - SST [[id number]] Collected: 07/09/21 2004    Specimen: Blood Updated: 07/09/21 2116     Extra Tube Hold for add-ons.     Comment: Auto resulted.       Blood Gas, Arterial - [[id number]]  (Abnormal) Collected: 07/09/21 2024    Specimen: Arterial Blood Updated: 07/09/21 2045     Site Right Radial     Torey's Test Positive     pH, Arterial 7.422 pH units      pCO2, Arterial 36.5 mm Hg      pO2, Arterial 39.8 mm Hg      HCO3, Arterial 23.8 mmol/L      Base Excess, Arterial -0.3 mmol/L      Comment: Serial Number: [id number]:  [id number]        O2 Saturation, Arterial 76.2 %      CO2 Content 24.9 mmol/L      Barometric Pressure for Blood Gas --     Comment: N/A        Modality Room Air     FIO2 21 %      Hemodilution No    Comprehensive Metabolic Panel [[id number]]  (Abnormal) Collected: 07/09/21  2005    Specimen: Blood Updated: 07/09/21 2033     Glucose 175 mg/dL      BUN 16 mg/dL      Creatinine 1.02 mg/dL      Sodium 135 mmol/L      Potassium 4.6 mmol/L      Chloride 97 mmol/L      CO2 26.0 mmol/L      Calcium 9.5 mg/dL      Total Protein 7.1 g/dL      Albumin 3.70 g/dL      ALT (SGPT) 8 U/L      AST (SGOT) 10 U/L      Alkaline Phosphatase 97 U/L      Total Bilirubin 1.0 mg/dL      eGFR Non African Amer 53 mL/min/1.73      Globulin 3.4 gm/dL      A/G Ratio 1.1 g/dL      BUN/Creatinine Ratio 15.7     Anion Gap 12.0 mmol/L     Narrative:      GFR Normal >60  Chronic Kidney Disease <60  Kidney Failure <15      Troponin [[id number]]  (Normal) Collected: 07/09/21 2005    Specimen: Blood Updated: 07/09/21 2033     Troponin T 0.016 ng/mL     Narrative:      Troponin T Reference Range:  <= 0.03 ng/mL-   Negative for AMI  >0.03 ng/mL-     Abnormal for myocardial necrosis.  Clinicians would have to utilize clinical acumen, EKG, Troponin and serial changes to determine if it is an Acute Myocardial Infarction or myocardial injury due to an underlying chronic condition.       Results may be falsely decreased if patient taking Biotin.      D-dimer, Quantitative [[id number]]  (Abnormal) Collected: 07/09/21 2004    Specimen: Blood Updated: 07/09/21 2032     D-Dimer, Quantitative 1.77 mg/L (FEU)     Narrative:      Reference Range  --------------------------------------------------------------------     < 0.50   Negative Predictive Value  0.50-0.59   Indeterminate    >= 0.60   Probable VTE             A very low percentage of patients with DVT may yield D-Dimer results   below the cut-off of 0.50 mg/L FEU.  This is known to be more   prevalent in patients with distal DVT.             Results of this test should always be interpreted in conjunction with   the patient's medical history, clinical presentation and other   findings.  Clinical diagnosis should not be based on the result of   [place] D-Dimer alone.    CBC &  Differential [[id number]]  (Abnormal) Collected: 07/09/21 2004    Specimen: Blood Updated: 07/09/21 2012    Narrative:      The following orders were created for panel order CBC & Differential.  Procedure                               Abnormality         Status                     ---------                               -----------         ------                     CBC Auto Differential[[id number]]        Abnormal            Final result                 Please view results for these tests on the individual orders.    CBC Auto Differential [[id number]]  (Abnormal) Collected: 07/09/21 2004    Specimen: Blood Updated: 07/09/21 2012     WBC 21.30 10*3/mm3      RBC 4.46 10*6/mm3      Hemoglobin 13.5 g/dL      Hematocrit 40.2 %      MCV 90.2 fL      MCH 30.2 pg      MCHC 33.4 g/dL      RDW 13.4 %      RDW-SD 42.0 fl      MPV 7.0 fL      Platelets 278 10*3/mm3      Neutrophil % 78.9 %      Lymphocyte % 12.3 %      Monocyte % 8.5 %      Eosinophil % 0.2 %      Basophil % 0.1 %      Neutrophils, Absolute 16.80 10*3/mm3      Lymphocytes, Absolute 2.60 10*3/mm3      Monocytes, Absolute 1.80 10*3/mm3      Eosinophils, Absolute 0.00 10*3/mm3      Basophils, Absolute 0.00 10*3/mm3      nRBC 0.0 /100 WBC            I reviewed the patient's new clinical results.    Assessment/Plan       COPD exacerbation (CMS/HCC)  Community-acquired pneumonia -Rocephin and Zithromax; attempt to collect sputum  Leukocytosis -related to pneumonia  Acute hypoxemia -supplemental oxygen  Type 2 diabetes with severe hyperglycemia -restart basal insulin, mealtime insulin, sliding scale insulin  Steroid-induced hyperglycemia -increasing insulin regimen  Previous CVA -continue antiplatelet therapy  Panlobular emphysema -inhaled steroids, bronchodilators, systemic steroids  Generalized anxiety disorder -Xanax as needed  Adult ADD -holding stimulants while in the hospital    I discussed the patient's findings and my recommendations with patient.     Merna  MD Sarwat  07/10/21  12:51 EDT

## 2021-07-10 NOTE — PLAN OF CARE
Goal Outcome Evaluation:  Plan of Care Reviewed With: patient, grandchild(rita)        Progress: no change   Patient is feeling somewhat better, however blood sugars have been super koki, Dr. Fam came in and said she would get all of her medications started and set up and try and get her blood sugar under control.  Grand-daughter is at her side and she has no known issues at this time.  Continue to monitor.

## 2021-07-11 VITALS
WEIGHT: 143.52 LBS | BODY MASS INDEX: 26.41 KG/M2 | TEMPERATURE: 98.3 F | HEART RATE: 100 BPM | HEIGHT: 62 IN | SYSTOLIC BLOOD PRESSURE: 114 MMHG | OXYGEN SATURATION: 98 % | RESPIRATION RATE: 17 BRPM | DIASTOLIC BLOOD PRESSURE: 67 MMHG

## 2021-07-11 PROBLEM — R93.89 ABNORMAL CHEST CT: Status: ACTIVE | Noted: 2021-07-11

## 2021-07-11 PROBLEM — G89.29 CHRONIC INTRACTABLE HEADACHE: Status: ACTIVE | Noted: 2021-07-11

## 2021-07-11 PROBLEM — E11.9 DIABETES MELLITUS TYPE II, CONTROLLED (HCC): Status: ACTIVE | Noted: 2021-07-11

## 2021-07-11 PROBLEM — R51.9 CHRONIC INTRACTABLE HEADACHE: Status: ACTIVE | Noted: 2021-07-11

## 2021-07-11 LAB
ANION GAP SERPL CALCULATED.3IONS-SCNC: 11 MMOL/L (ref 5–15)
BASOPHILS # BLD AUTO: 0 10*3/MM3 (ref 0–0.2)
BASOPHILS NFR BLD AUTO: 0 % (ref 0–1.5)
BUN SERPL-MCNC: 26 MG/DL (ref 8–23)
BUN/CREAT SERPL: 28.9 (ref 7–25)
CALCIUM SPEC-SCNC: 9.7 MG/DL (ref 8.6–10.5)
CHLORIDE SERPL-SCNC: 104 MMOL/L (ref 98–107)
CO2 SERPL-SCNC: 24 MMOL/L (ref 22–29)
CREAT SERPL-MCNC: 0.9 MG/DL (ref 0.57–1)
DEPRECATED RDW RBC AUTO: 41.1 FL (ref 37–54)
EOSINOPHIL # BLD AUTO: 0 10*3/MM3 (ref 0–0.4)
EOSINOPHIL NFR BLD AUTO: 0 % (ref 0.3–6.2)
ERYTHROCYTE [DISTWIDTH] IN BLOOD BY AUTOMATED COUNT: 13.3 % (ref 12.3–15.4)
GFR SERPL CREATININE-BSD FRML MDRD: 62 ML/MIN/1.73
GLUCOSE BLDC GLUCOMTR-MCNC: 172 MG/DL (ref 70–105)
GLUCOSE BLDC GLUCOMTR-MCNC: 388 MG/DL (ref 70–105)
GLUCOSE SERPL-MCNC: 328 MG/DL (ref 65–99)
HCT VFR BLD AUTO: 35.2 % (ref 34–46.6)
HGB BLD-MCNC: 11.7 G/DL (ref 12–15.9)
LYMPHOCYTES # BLD AUTO: 1.8 10*3/MM3 (ref 0.7–3.1)
LYMPHOCYTES NFR BLD AUTO: 9.2 % (ref 19.6–45.3)
MCH RBC QN AUTO: 29.9 PG (ref 26.6–33)
MCHC RBC AUTO-ENTMCNC: 33.4 G/DL (ref 31.5–35.7)
MCV RBC AUTO: 89.7 FL (ref 79–97)
MONOCYTES # BLD AUTO: 0.7 10*3/MM3 (ref 0.1–0.9)
MONOCYTES NFR BLD AUTO: 3.6 % (ref 5–12)
NEUTROPHILS NFR BLD AUTO: 16.6 10*3/MM3 (ref 1.7–7)
NEUTROPHILS NFR BLD AUTO: 87.2 % (ref 42.7–76)
NRBC BLD AUTO-RTO: 0 /100 WBC (ref 0–0.2)
PLATELET # BLD AUTO: 274 10*3/MM3 (ref 140–450)
PMV BLD AUTO: 7.7 FL (ref 6–12)
POTASSIUM SERPL-SCNC: 4.1 MMOL/L (ref 3.5–5.2)
RBC # BLD AUTO: 3.92 10*6/MM3 (ref 3.77–5.28)
SODIUM SERPL-SCNC: 139 MMOL/L (ref 136–145)
WBC # BLD AUTO: 19.1 10*3/MM3 (ref 3.4–10.8)

## 2021-07-11 PROCEDURE — 25010000002 CEFTRIAXONE PER 250 MG: Performed by: INTERNAL MEDICINE

## 2021-07-11 PROCEDURE — 63710000001 INSULIN LISPRO (HUMAN) PER 5 UNITS: Performed by: INTERNAL MEDICINE

## 2021-07-11 PROCEDURE — 63710000001 INSULIN LISPRO (HUMAN) PER 5 UNITS: Performed by: FAMILY MEDICINE

## 2021-07-11 PROCEDURE — 94799 UNLISTED PULMONARY SVC/PX: CPT

## 2021-07-11 PROCEDURE — 82962 GLUCOSE BLOOD TEST: CPT

## 2021-07-11 PROCEDURE — 80048 BASIC METABOLIC PNL TOTAL CA: CPT | Performed by: INTERNAL MEDICINE

## 2021-07-11 PROCEDURE — 85025 COMPLETE CBC W/AUTO DIFF WBC: CPT | Performed by: INTERNAL MEDICINE

## 2021-07-11 PROCEDURE — 25010000002 METHYLPREDNISOLONE PER 40 MG: Performed by: INTERNAL MEDICINE

## 2021-07-11 RX ORDER — INSULIN LISPRO 100 [IU]/ML
0-9 INJECTION, SOLUTION INTRAVENOUS; SUBCUTANEOUS
Refills: 12
Start: 2021-07-11 | End: 2022-05-18

## 2021-07-11 RX ORDER — INSULIN LISPRO 100 [IU]/ML
0-9 INJECTION, SOLUTION INTRAVENOUS; SUBCUTANEOUS AS NEEDED
Status: DISCONTINUED | OUTPATIENT
Start: 2021-07-11 | End: 2021-07-11 | Stop reason: HOSPADM

## 2021-07-11 RX ORDER — CEFDINIR 300 MG/1
300 CAPSULE ORAL 2 TIMES DAILY
Qty: 10 CAPSULE | Refills: 0 | Status: SHIPPED | OUTPATIENT
Start: 2021-07-11 | End: 2021-07-16

## 2021-07-11 RX ORDER — ACETAMINOPHEN 325 MG/1
650 TABLET ORAL EVERY 4 HOURS PRN
Start: 2021-07-11

## 2021-07-11 RX ORDER — DOXYCYCLINE 100 MG/1
100 TABLET ORAL 2 TIMES DAILY
Qty: 12 TABLET | Refills: 0 | Status: SHIPPED | OUTPATIENT
Start: 2021-07-11 | End: 2021-07-17

## 2021-07-11 RX ORDER — INSULIN LISPRO 100 [IU]/ML
0-9 INJECTION, SOLUTION INTRAVENOUS; SUBCUTANEOUS
Status: DISCONTINUED | OUTPATIENT
Start: 2021-07-11 | End: 2021-07-11 | Stop reason: HOSPADM

## 2021-07-11 RX ORDER — PREDNISONE 10 MG/1
TABLET ORAL
Qty: 18 TABLET | Refills: 0 | Status: SHIPPED | OUTPATIENT
Start: 2021-07-11 | End: 2022-01-24

## 2021-07-11 RX ORDER — INSULIN LISPRO 100 [IU]/ML
0-9 INJECTION, SOLUTION INTRAVENOUS; SUBCUTANEOUS AS NEEDED
Refills: 12
Start: 2021-07-11 | End: 2022-05-18

## 2021-07-11 RX ADMIN — IPRATROPIUM BROMIDE AND ALBUTEROL SULFATE 3 ML: 2.5; .5 SOLUTION RESPIRATORY (INHALATION) at 11:18

## 2021-07-11 RX ADMIN — DOXYCYCLINE 100 MG: 100 TABLET, FILM COATED ORAL at 08:40

## 2021-07-11 RX ADMIN — INSULIN LISPRO 10 UNITS: 100 INJECTION, SOLUTION INTRAVENOUS; SUBCUTANEOUS at 08:40

## 2021-07-11 RX ADMIN — CEFTRIAXONE SODIUM 1 G: 1 INJECTION, POWDER, FOR SOLUTION INTRAMUSCULAR; INTRAVENOUS at 16:04

## 2021-07-11 RX ADMIN — METOPROLOL TARTRATE 50 MG: 50 TABLET, FILM COATED ORAL at 08:40

## 2021-07-11 RX ADMIN — IPRATROPIUM BROMIDE AND ALBUTEROL SULFATE 3 ML: 2.5; .5 SOLUTION RESPIRATORY (INHALATION) at 15:17

## 2021-07-11 RX ADMIN — GABAPENTIN 300 MG: 300 CAPSULE ORAL at 06:14

## 2021-07-11 RX ADMIN — SODIUM CHLORIDE 50 ML/HR: 9 INJECTION, SOLUTION INTRAVENOUS at 06:14

## 2021-07-11 RX ADMIN — GABAPENTIN 600 MG: 300 CAPSULE ORAL at 16:04

## 2021-07-11 RX ADMIN — INSULIN LISPRO 10 UNITS: 100 INJECTION, SOLUTION INTRAVENOUS; SUBCUTANEOUS at 12:15

## 2021-07-11 RX ADMIN — PANTOPRAZOLE SODIUM 40 MG: 40 TABLET, DELAYED RELEASE ORAL at 06:14

## 2021-07-11 RX ADMIN — ASPIRIN 81 MG: 81 TABLET, COATED ORAL at 08:40

## 2021-07-11 RX ADMIN — Medication 3 ML: at 08:41

## 2021-07-11 RX ADMIN — METHYLPREDNISOLONE SODIUM SUCCINATE 20 MG: 40 INJECTION, POWDER, FOR SOLUTION INTRAMUSCULAR; INTRAVENOUS at 08:40

## 2021-07-11 RX ADMIN — INSULIN LISPRO 20 UNITS: 100 INJECTION, SOLUTION INTRAVENOUS; SUBCUTANEOUS at 08:39

## 2021-07-11 RX ADMIN — LINAGLIPTIN 5 MG: 5 TABLET, FILM COATED ORAL at 08:40

## 2021-07-11 RX ADMIN — INSULIN LISPRO 4 UNITS: 100 INJECTION, SOLUTION INTRAVENOUS; SUBCUTANEOUS at 12:15

## 2021-07-11 RX ADMIN — IPRATROPIUM BROMIDE AND ALBUTEROL SULFATE 3 ML: 2.5; .5 SOLUTION RESPIRATORY (INHALATION) at 07:21

## 2021-07-11 NOTE — PLAN OF CARE
Goal Outcome Evaluation:  Plan of Care Reviewed With: patient           Outcome Summary: Patient in bed eyes closed resting at this time.  Awake off and on throughout the night.  Patient on continuous IV fluids.  No c/o noted.  Will continue to monitor.

## 2021-07-11 NOTE — DISCHARGE INSTRUCTIONS
Blood glucose 150-199 mg/dL - 2 units  Blood glucose 200-249 mg/dL - 4 units  Blood glucose 250-299 mg/dL - 6 units  Blood glucose 300-349 mg/dL - 7 units  Blood glucose 350-400 mg/dL - 8 units    Blood glucose greater than 400 mg/dL - 9 units and repeat glucose test in 2 hours, also re-apply sliding scale insulin. If repeat blood glucose is 400 mg/dL or less, resume ordered blood glucose testing. If repeat blood glucose is greater than 400 mg/dL, give 9 units and call MD. If blood glucose is less than 70 mg/dL, use hypoglycemia protocol.

## 2021-07-12 ENCOUNTER — READMISSION MANAGEMENT (OUTPATIENT)
Dept: CALL CENTER | Facility: HOSPITAL | Age: 71
End: 2021-07-12

## 2021-07-12 LAB — QT INTERVAL: 318 MS

## 2021-07-12 NOTE — OUTREACH NOTE
Prep Survey      Responses   [facility] facility patient discharged from?  [facility]   Is LACE score < 7 ?  No   Emergency Room discharge w/ pulse ox?  No   Eligibility  Readm Mgmt   Discharge diagnosis  COPD Exacerbation,  Community acquired pneumonia   Does the patient have one of the following disease processes/diagnoses(primary or secondary)?  COPD/Pneumonia   Does the patient have Home health ordered?  No   Is there a DME ordered?  No   Prep survey completed?  Yes          Ekta Paz RN

## 2021-07-12 NOTE — CASE MANAGEMENT/SOCIAL WORK
Case Management Discharge Note      Final Note: Discharged home prior to CM assessment         Selected Continued Care - Discharged on 7/11/2021 Admission date: 7/9/2021 - Discharge disposition: Home or Self Care                 Final Discharge Disposition Code: 01 - home or self-care

## 2021-07-13 ENCOUNTER — READMISSION MANAGEMENT (OUTPATIENT)
Dept: CALL CENTER | Facility: HOSPITAL | Age: 71
End: 2021-07-13

## 2021-07-13 LAB
BACTERIA SPEC RESP CULT: NORMAL
GRAM STN SPEC: NORMAL

## 2021-07-13 NOTE — OUTREACH NOTE
COPD/PN Week 1 Survey      Responses   [facility] patient discharged from?  [facility]   Does the patient have one of the following disease processes/diagnoses(primary or secondary)?  COPD/Pneumonia   Was the primary reason for admission:  COPD exacerbation   Week 1 attempt successful?  No   Unsuccessful attempts  Attempt 1          Karen Hernandez LPN

## 2021-07-15 ENCOUNTER — READMISSION MANAGEMENT (OUTPATIENT)
Dept: CALL CENTER | Facility: HOSPITAL | Age: 71
End: 2021-07-15

## 2021-07-15 LAB
BACTERIA SPEC AEROBE CULT: NORMAL
BACTERIA SPEC AEROBE CULT: NORMAL

## 2021-07-15 NOTE — OUTREACH NOTE
Medical Week 1 Survey      Responses   [facility] patient discharged from?  [facility]   Does the patient have one of the following disease processes/diagnoses(primary or secondary)?  Other   Call start time  1611   Call end time  1615   Meds reviewed with patient/caregiver?  Yes   Is the patient having any side effects they believe may be caused by any medication additions or changes?  No   Does the patient have all medications ordered at discharge?  Yes   Has home health visited the patient within 72 hours of discharge?  N/A   What is the patient's perception of their health status since discharge?  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

## 2021-07-23 ENCOUNTER — READMISSION MANAGEMENT (OUTPATIENT)
Dept: CALL CENTER | Facility: HOSPITAL | Age: 71
End: 2021-07-23

## 2021-07-23 NOTE — OUTREACH NOTE
COPD/PN Week 2 Survey      Responses   [facility] patient discharged from?  [facility]   Does the patient have one of the following disease processes/diagnoses(primary or secondary)?  Other   Was the primary reason for admission:  COPD exacerbation   Week 2 attempt successful?  No   Unsuccessful attempts  Attempt 1          Shelly Blas RN

## 2021-07-28 ENCOUNTER — READMISSION MANAGEMENT (OUTPATIENT)
Dept: CALL CENTER | Facility: HOSPITAL | Age: 71
End: 2021-07-28

## 2021-07-28 NOTE — OUTREACH NOTE
COPD/PN Week 2 Survey      Responses   [facility] [place] patient discharged from?  [facility]   Does the patient have one of the following disease processes/diagnoses(primary or secondary)?  Other   Was the primary reason for admission:  COPD exacerbation   Week 2 attempt successful?  No   Unsuccessful attempts  Attempt 2          Ángel Crump RN

## 2021-08-06 ENCOUNTER — READMISSION MANAGEMENT (OUTPATIENT)
Dept: CALL CENTER | Facility: HOSPITAL | Age: 71
End: 2021-08-06

## 2021-08-06 NOTE — OUTREACH NOTE
COPD/PN Week 3 Survey      Responses   [facility] patient discharged from?  [facility]   Does the patient have one of the following disease processes/diagnoses(primary or secondary)?  Other   Was the primary reason for admission:  COPD exacerbation   Week 3 attempt successful?  No   Unsuccessful attempts  Attempt 1          Adeola Chin, RN

## 2021-08-11 ENCOUNTER — READMISSION MANAGEMENT (OUTPATIENT)
Dept: CALL CENTER | Facility: HOSPITAL | Age: 71
End: 2021-08-11

## 2021-08-11 NOTE — OUTREACH NOTE
COPD/PN Week 3 Survey      Responses   [facility] patient discharged from?  [facility]   Does the patient have one of the following disease processes/diagnoses(primary or secondary)?  Other   Was the primary reason for admission:  COPD exacerbation   Week 3 attempt successful?  Yes   Call start time  1135   Call end time  1137   Is the patient taking all medications as directed (includes completed medication regime)?  Yes   Has the patient kept scheduled appointments due by today?  Yes   Has home health visited the patient within 72 hours of discharge?  N/A   Pulse Ox monitoring  None   What is the patient's perception of their health status since discharge?  Returned to baseline/stable   Week 3 call completed?  Yes   Revoked  No further contact(revokes)-requires comment   Is the patient interested in additional calls from an ambulatory ?  NOTE:  applies to high risk patients requiring additional follow-up.  No   Graduated/Revoked comments  Pt reports feeling well. Pt states she feels back to baseline.          Ifeoma Urbano RN

## 2022-01-24 ENCOUNTER — OFFICE VISIT (OUTPATIENT)
Dept: CARDIOLOGY | Facility: CLINIC | Age: 72
End: 2022-01-24

## 2022-01-24 VITALS
HEIGHT: 62 IN | SYSTOLIC BLOOD PRESSURE: 142 MMHG | OXYGEN SATURATION: 91 % | WEIGHT: 146 LBS | HEART RATE: 119 BPM | DIASTOLIC BLOOD PRESSURE: 82 MMHG | BODY MASS INDEX: 26.87 KG/M2

## 2022-01-24 DIAGNOSIS — I25.118 CORONARY ARTERY DISEASE OF NATIVE ARTERY OF NATIVE HEART WITH STABLE ANGINA PECTORIS: Primary | ICD-10-CM

## 2022-01-24 DIAGNOSIS — J44.9 CHRONIC OBSTRUCTIVE PULMONARY DISEASE, UNSPECIFIED COPD TYPE: ICD-10-CM

## 2022-01-24 DIAGNOSIS — I10 ESSENTIAL HYPERTENSION: ICD-10-CM

## 2022-01-24 DIAGNOSIS — E11.9 TYPE 2 DIABETES MELLITUS WITHOUT COMPLICATION, WITHOUT LONG-TERM CURRENT USE OF INSULIN: ICD-10-CM

## 2022-01-24 DIAGNOSIS — E78.00 PURE HYPERCHOLESTEROLEMIA: ICD-10-CM

## 2022-01-24 PROCEDURE — 99214 OFFICE O/P EST MOD 30 MIN: CPT | Performed by: INTERNAL MEDICINE

## 2022-01-24 NOTE — PROGRESS NOTES
"    Subjective:     Encounter Date:01/24/2022      Patient ID: Dyana Montemayor is a 71 y.o. female.    Chief Complaint:  History of Present Illness 71-year-old white female with history of coronary disease history of diabetes hypertension hyperlipidemia and COPD presents to my office for follow-up.  Patient is currently stable without any signs of chest pain but has some shortness of breath with exertion.  No comes any PND orthopnea.  No palpitation but some dizziness.  No syncope or swelling of the feet.  Patient is trying exercise regularly she follows a good diet.  She still continues to smoke.    The following portions of the patient's history were reviewed and updated as appropriate: allergies, current medications, past family history, past medical history, past social history, past surgical history and problem list.  Past Medical History:   Diagnosis Date   • COPD (chronic obstructive pulmonary disease) (HCC)    • Diabetes mellitus (HCC)    • Hyperlipidemia      Past Surgical History:   Procedure Laterality Date   • CARDIAC CATHETERIZATION Right 10/7/2019    Procedure: Left Heart Cath and coronary angiogram;  Surgeon: Ishan June MD;  Location: [facility] CATH INVASIVE LOCATION;  Service: Cardiovascular     /82 (BP Location: Left arm, Patient Position: Sitting)   Pulse 119   Ht 157.5 cm (62\")   Wt 66.2 kg (146 lb)   SpO2 91%   BMI 26.70 kg/m²   Family History   Problem Relation Age of Onset   • Heart disease Father        Current Outpatient Medications:   •  acetaminophen (TYLENOL) 325 MG tablet, Take 2 tablets by mouth Every 4 (Four) Hours As Needed for Mild Pain ., Disp: , Rfl:   •  ALPRAZolam (XANAX) 0.5 MG tablet, Take 0.5 mg by mouth 2 (Two) Times a Day As Needed for Anxiety., Disp: , Rfl:   •  amphetamine-dextroamphetamine (ADDERALL) 20 MG tablet, Take 20 mg by mouth 2 (Two) Times a Day., Disp: , Rfl:   •  aspirin 81 MG tablet, Take 1 tablet by mouth Daily., Disp: 30 tablet, Rfl: 5  •  " cyclobenzaprine (FLEXERIL) 10 MG tablet, Take 10 mg by mouth every night at bedtime., Disp: , Rfl:   •  esomeprazole (nexIUM) 40 MG capsule, Take 40 mg by mouth 2 (Two) Times a Day., Disp: , Rfl:   •  Fluticasone-Umeclidin-Vilant (TRELEGY ELLIPTA) 100-62.5-25 MCG/INH aerosol powder , Inhale 1 container Daily., Disp: , Rfl:   •  gabapentin (NEURONTIN) 300 MG capsule, Take 300 mg by mouth Every Morning., Disp: , Rfl:   •  insulin aspart (novoLOG) 100 UNIT/ML injection, Inject 10 Units under the skin into the appropriate area as directed 3 (Three) Times a Day Before Meals., Disp: , Rfl: 12  •  Insulin Degludec (TRESIBA FLEXTOUCH) 200 UNIT/ML solution pen-injector pen injection, Inject 34 Units under the skin into the appropriate area as directed every night at bedtime., Disp: , Rfl:   •  insulin lispro (ADMELOG) 100 UNIT/ML injection, Inject 0-9 Units under the skin into the appropriate area as directed 3 (Three) Times a Day Before Meals., Disp: , Rfl: 12  •  insulin lispro (ADMELOG) 100 UNIT/ML injection, Inject 0-9 Units under the skin into the appropriate area as directed As Needed for High Blood Sugar (Per the administration instructions)., Disp: , Rfl: 12  •  Januvia 100 MG tablet, Take 100 mg by mouth Daily., Disp: , Rfl:   •  metoprolol tartrate (LOPRESSOR) 50 MG tablet, Take 50 mg by mouth 2 (Two) Times a Day., Disp: , Rfl:   •  Mirabegron ER (MYRBETRIQ) 50 MG tablet sustained-release 24 hour 24 hr tablet, Take 50 mg by mouth Daily., Disp: , Rfl:   No Known Allergies  Social History     Socioeconomic History   • Marital status:    Tobacco Use   • Smoking status: Current Every Day Smoker     Types: Cigarettes   • Smokeless tobacco: Never Used   Substance and Sexual Activity   • Alcohol use: Not Currently   • Drug use: Never     Review of Systems   Constitutional: Negative for fever and malaise/fatigue.   Cardiovascular: Negative for chest pain, dyspnea on exertion and palpitations.   Respiratory: Positive  for shortness of breath. Negative for cough.    Skin: Negative for rash.   Gastrointestinal: Negative for abdominal pain, nausea and vomiting.   Neurological: Negative for focal weakness and headaches.   All other systems reviewed and are negative.             Objective:     Constitutional:       Appearance: Well-developed.   Eyes:      General: No scleral icterus.     Conjunctiva/sclera: Conjunctivae normal.   HENT:      Head: Normocephalic and atraumatic.   Neck:      Vascular: No carotid bruit or JVD.   Pulmonary:      Effort: Pulmonary effort is normal.      Breath sounds: Normal breath sounds. No wheezing. No rales.   Cardiovascular:      Normal rate. Regular rhythm.   Pulses:     Intact distal pulses.   Abdominal:      General: Bowel sounds are normal.      Palpations: Abdomen is soft.   Musculoskeletal:      Cervical back: Normal range of motion and neck supple. Skin:     General: Skin is warm and dry.      Findings: No rash.   Neurological:      Mental Status: Alert.       Procedures    Lab Review:         [facility]  1.  Coronary disease  Patient has nonobstructive disease now and is currently stable on medications  2.  Hypertension  Patient blood pressure currently stable on beta-blockers with metoprolol  3.  Hyperlipidemia  Patient is currently on statins and the lipid levels are well within normal limits  4.  Diabetes  Patient is on insulin and followed by the primary care doctor  5.  COPD  Patient still continues to smoke and is advised to stop smoke      Patient's previous medical records, labs, and EKG were reviewed and discussed with the patient at today's visit.

## 2022-04-13 ENCOUNTER — APPOINTMENT (OUTPATIENT)
Dept: GENERAL RADIOLOGY | Facility: HOSPITAL | Age: 72
End: 2022-04-13

## 2022-04-13 ENCOUNTER — HOSPITAL ENCOUNTER (OUTPATIENT)
Facility: HOSPITAL | Age: 72
Setting detail: OBSERVATION
Discharge: HOME | End: 2022-04-15
Attending: EMERGENCY MEDICINE | Admitting: INTERNAL MEDICINE

## 2022-04-13 DIAGNOSIS — N17.9 AKI (ACUTE KIDNEY INJURY): ICD-10-CM

## 2022-04-13 DIAGNOSIS — R79.89 LOW TSH LEVEL: ICD-10-CM

## 2022-04-13 DIAGNOSIS — I95.9 HYPOTENSION, UNSPECIFIED HYPOTENSION TYPE: ICD-10-CM

## 2022-04-13 DIAGNOSIS — R53.1 GENERALIZED WEAKNESS: Primary | ICD-10-CM

## 2022-04-13 DIAGNOSIS — N39.0 ACUTE UTI: ICD-10-CM

## 2022-04-13 LAB
ALBUMIN SERPL-MCNC: 4 G/DL (ref 3.5–5.2)
ALBUMIN/GLOB SERPL: 1.5 G/DL
ALP SERPL-CCNC: 84 U/L (ref 39–117)
ALT SERPL W P-5'-P-CCNC: 16 U/L (ref 1–33)
ANION GAP SERPL CALCULATED.3IONS-SCNC: 12 MMOL/L (ref 5–15)
APTT PPP: 24.8 SECONDS (ref 24–31)
AST SERPL-CCNC: 22 U/L (ref 1–32)
B PARAPERT DNA SPEC QL NAA+PROBE: NOT DETECTED
B PERT DNA SPEC QL NAA+PROBE: NOT DETECTED
BACTERIA UR QL AUTO: ABNORMAL /HPF
BASOPHILS # BLD AUTO: 0 10*3/MM3 (ref 0–0.2)
BASOPHILS NFR BLD AUTO: 0.3 % (ref 0–1.5)
BILIRUB SERPL-MCNC: 0.7 MG/DL (ref 0–1.2)
BILIRUB UR QL STRIP: ABNORMAL
BUN SERPL-MCNC: 25 MG/DL (ref 8–23)
BUN/CREAT SERPL: 19.7 (ref 7–25)
C PNEUM DNA NPH QL NAA+NON-PROBE: NOT DETECTED
CALCIUM SPEC-SCNC: 9.7 MG/DL (ref 8.6–10.5)
CHLORIDE SERPL-SCNC: 103 MMOL/L (ref 98–107)
CLARITY UR: ABNORMAL
CO2 SERPL-SCNC: 25 MMOL/L (ref 22–29)
COLOR UR: ABNORMAL
CREAT SERPL-MCNC: 1.27 MG/DL (ref 0.57–1)
D-LACTATE SERPL-SCNC: 0.8 MMOL/L (ref 0.5–2)
DEPRECATED RDW RBC AUTO: 42 FL (ref 37–54)
EGFRCR SERPLBLD CKD-EPI 2021: 45 ML/MIN/1.73
EOSINOPHIL # BLD AUTO: 0.2 10*3/MM3 (ref 0–0.4)
EOSINOPHIL NFR BLD AUTO: 1.8 % (ref 0.3–6.2)
ERYTHROCYTE [DISTWIDTH] IN BLOOD BY AUTOMATED COUNT: 13.6 % (ref 12.3–15.4)
FLUAV SUBTYP SPEC NAA+PROBE: NOT DETECTED
FLUBV RNA ISLT QL NAA+PROBE: NOT DETECTED
GLOBULIN UR ELPH-MCNC: 2.7 GM/DL
GLUCOSE BLDC GLUCOMTR-MCNC: 115 MG/DL (ref 70–105)
GLUCOSE BLDC GLUCOMTR-MCNC: 94 MG/DL (ref 70–105)
GLUCOSE SERPL-MCNC: 105 MG/DL (ref 65–99)
GLUCOSE UR STRIP-MCNC: NEGATIVE MG/DL
HADV DNA SPEC NAA+PROBE: NOT DETECTED
HCOV 229E RNA SPEC QL NAA+PROBE: NOT DETECTED
HCOV HKU1 RNA SPEC QL NAA+PROBE: NOT DETECTED
HCOV NL63 RNA SPEC QL NAA+PROBE: NOT DETECTED
HCOV OC43 RNA SPEC QL NAA+PROBE: NOT DETECTED
HCT VFR BLD AUTO: 43.7 % (ref 34–46.6)
HGB BLD-MCNC: 15.2 G/DL (ref 12–15.9)
HGB UR QL STRIP.AUTO: NEGATIVE
HMPV RNA NPH QL NAA+NON-PROBE: NOT DETECTED
HOLD SPECIMEN: NORMAL
HPIV1 RNA ISLT QL NAA+PROBE: NOT DETECTED
HPIV2 RNA SPEC QL NAA+PROBE: NOT DETECTED
HPIV3 RNA NPH QL NAA+PROBE: NOT DETECTED
HPIV4 P GENE NPH QL NAA+PROBE: NOT DETECTED
HYALINE CASTS UR QL AUTO: ABNORMAL /LPF
INR PPP: 1.06 (ref 0.93–1.1)
KETONES UR QL STRIP: ABNORMAL
LEUKOCYTE ESTERASE UR QL STRIP.AUTO: ABNORMAL
LYMPHOCYTES # BLD AUTO: 4.3 10*3/MM3 (ref 0.7–3.1)
LYMPHOCYTES NFR BLD AUTO: 39.5 % (ref 19.6–45.3)
M PNEUMO IGG SER IA-ACNC: NOT DETECTED
MAGNESIUM SERPL-MCNC: 1.9 MG/DL (ref 1.6–2.4)
MCH RBC QN AUTO: 30.9 PG (ref 26.6–33)
MCHC RBC AUTO-ENTMCNC: 34.7 G/DL (ref 31.5–35.7)
MCV RBC AUTO: 88.9 FL (ref 79–97)
MONOCYTES # BLD AUTO: 0.7 10*3/MM3 (ref 0.1–0.9)
MONOCYTES NFR BLD AUTO: 6.7 % (ref 5–12)
MUCOUS THREADS URNS QL MICRO: ABNORMAL /HPF
NEUTROPHILS NFR BLD AUTO: 5.6 10*3/MM3 (ref 1.7–7)
NEUTROPHILS NFR BLD AUTO: 51.7 % (ref 42.7–76)
NITRITE UR QL STRIP: POSITIVE
NRBC BLD AUTO-RTO: 0.1 /100 WBC (ref 0–0.2)
PH UR STRIP.AUTO: <=5 [PH] (ref 5–8)
PLATELET # BLD AUTO: 297 10*3/MM3 (ref 140–450)
PMV BLD AUTO: 7.2 FL (ref 6–12)
POTASSIUM SERPL-SCNC: 4.7 MMOL/L (ref 3.5–5.2)
PROT SERPL-MCNC: 6.7 G/DL (ref 6–8.5)
PROT UR QL STRIP: ABNORMAL
PROTHROMBIN TIME: 10.9 SECONDS (ref 9.6–11.7)
RBC # BLD AUTO: 4.92 10*6/MM3 (ref 3.77–5.28)
RBC # UR STRIP: ABNORMAL /HPF
REF LAB TEST METHOD: ABNORMAL
RHINOVIRUS RNA SPEC NAA+PROBE: NOT DETECTED
RSV RNA NPH QL NAA+NON-PROBE: NOT DETECTED
SARS-COV-2 RNA NPH QL NAA+NON-PROBE: NOT DETECTED
SODIUM SERPL-SCNC: 140 MMOL/L (ref 136–145)
SP GR UR STRIP: 1.03 (ref 1–1.03)
SQUAMOUS #/AREA URNS HPF: ABNORMAL /HPF
T4 FREE SERPL-MCNC: 2.53 NG/DL (ref 0.93–1.7)
TROPONIN T SERPL-MCNC: <0.01 NG/ML (ref 0–0.03)
TROPONIN T SERPL-MCNC: <0.01 NG/ML (ref 0–0.03)
TSH SERPL DL<=0.05 MIU/L-ACNC: <0.005 UIU/ML (ref 0.27–4.2)
UROBILINOGEN UR QL STRIP: ABNORMAL
WBC # UR STRIP: ABNORMAL /HPF
WBC NRBC COR # BLD: 10.9 10*3/MM3 (ref 3.4–10.8)

## 2022-04-13 PROCEDURE — G0378 HOSPITAL OBSERVATION PER HR: HCPCS

## 2022-04-13 PROCEDURE — 84439 ASSAY OF FREE THYROXINE: CPT | Performed by: FAMILY MEDICINE

## 2022-04-13 PROCEDURE — 84481 FREE ASSAY (FT-3): CPT | Performed by: FAMILY MEDICINE

## 2022-04-13 PROCEDURE — 82962 GLUCOSE BLOOD TEST: CPT

## 2022-04-13 PROCEDURE — 87040 BLOOD CULTURE FOR BACTERIA: CPT | Performed by: PHYSICIAN ASSISTANT

## 2022-04-13 PROCEDURE — 25010000002 ENOXAPARIN PER 10 MG: Performed by: FAMILY MEDICINE

## 2022-04-13 PROCEDURE — 84484 ASSAY OF TROPONIN QUANT: CPT | Performed by: PHYSICIAN ASSISTANT

## 2022-04-13 PROCEDURE — 0202U NFCT DS 22 TRGT SARS-COV-2: CPT | Performed by: PHYSICIAN ASSISTANT

## 2022-04-13 PROCEDURE — 25010000002 CEFTRIAXONE PER 250 MG: Performed by: PHYSICIAN ASSISTANT

## 2022-04-13 PROCEDURE — 96374 THER/PROPH/DIAG INJ IV PUSH: CPT

## 2022-04-13 PROCEDURE — 85730 THROMBOPLASTIN TIME PARTIAL: CPT | Performed by: PHYSICIAN ASSISTANT

## 2022-04-13 PROCEDURE — 85610 PROTHROMBIN TIME: CPT | Performed by: PHYSICIAN ASSISTANT

## 2022-04-13 PROCEDURE — 87088 URINE BACTERIA CULTURE: CPT | Performed by: PHYSICIAN ASSISTANT

## 2022-04-13 PROCEDURE — 87086 URINE CULTURE/COLONY COUNT: CPT | Performed by: PHYSICIAN ASSISTANT

## 2022-04-13 PROCEDURE — 81001 URINALYSIS AUTO W/SCOPE: CPT | Performed by: PHYSICIAN ASSISTANT

## 2022-04-13 PROCEDURE — 36415 COLL VENOUS BLD VENIPUNCTURE: CPT | Performed by: PHYSICIAN ASSISTANT

## 2022-04-13 PROCEDURE — 71045 X-RAY EXAM CHEST 1 VIEW: CPT

## 2022-04-13 PROCEDURE — 99284 EMERGENCY DEPT VISIT MOD MDM: CPT

## 2022-04-13 PROCEDURE — 83605 ASSAY OF LACTIC ACID: CPT

## 2022-04-13 PROCEDURE — 85025 COMPLETE CBC W/AUTO DIFF WBC: CPT | Performed by: PHYSICIAN ASSISTANT

## 2022-04-13 PROCEDURE — 87186 SC STD MICRODIL/AGAR DIL: CPT | Performed by: PHYSICIAN ASSISTANT

## 2022-04-13 PROCEDURE — 93005 ELECTROCARDIOGRAM TRACING: CPT | Performed by: PHYSICIAN ASSISTANT

## 2022-04-13 PROCEDURE — 84443 ASSAY THYROID STIM HORMONE: CPT | Performed by: PHYSICIAN ASSISTANT

## 2022-04-13 PROCEDURE — 96372 THER/PROPH/DIAG INJ SC/IM: CPT

## 2022-04-13 PROCEDURE — 63710000001 INSULIN GLARGINE PER 5 UNITS: Performed by: FAMILY MEDICINE

## 2022-04-13 PROCEDURE — 83735 ASSAY OF MAGNESIUM: CPT | Performed by: PHYSICIAN ASSISTANT

## 2022-04-13 PROCEDURE — 80053 COMPREHEN METABOLIC PANEL: CPT | Performed by: PHYSICIAN ASSISTANT

## 2022-04-13 RX ORDER — PANTOPRAZOLE SODIUM 40 MG/1
40 TABLET, DELAYED RELEASE ORAL
Status: DISCONTINUED | OUTPATIENT
Start: 2022-04-14 | End: 2022-04-15 | Stop reason: HOSPADM

## 2022-04-13 RX ORDER — SODIUM CHLORIDE 0.9 % (FLUSH) 0.9 %
10 SYRINGE (ML) INJECTION AS NEEDED
Status: DISCONTINUED | OUTPATIENT
Start: 2022-04-13 | End: 2022-04-15 | Stop reason: HOSPADM

## 2022-04-13 RX ORDER — INSULIN LISPRO 100 [IU]/ML
10 INJECTION, SOLUTION INTRAVENOUS; SUBCUTANEOUS
Refills: 12 | Status: DISCONTINUED | OUTPATIENT
Start: 2022-04-14 | End: 2022-04-13

## 2022-04-13 RX ORDER — CYCLOBENZAPRINE HCL 10 MG
10 TABLET ORAL NIGHTLY
Status: DISCONTINUED | OUTPATIENT
Start: 2022-04-13 | End: 2022-04-15 | Stop reason: HOSPADM

## 2022-04-13 RX ORDER — METOPROLOL TARTRATE 50 MG/1
50 TABLET, FILM COATED ORAL 2 TIMES DAILY
Status: DISCONTINUED | OUTPATIENT
Start: 2022-04-13 | End: 2022-04-15 | Stop reason: HOSPADM

## 2022-04-13 RX ORDER — GABAPENTIN 300 MG/1
300 CAPSULE ORAL EVERY MORNING
Status: DISCONTINUED | OUTPATIENT
Start: 2022-04-14 | End: 2022-04-15 | Stop reason: HOSPADM

## 2022-04-13 RX ORDER — ACETAMINOPHEN 325 MG/1
650 TABLET ORAL EVERY 4 HOURS PRN
Status: DISCONTINUED | OUTPATIENT
Start: 2022-04-13 | End: 2022-04-15 | Stop reason: HOSPADM

## 2022-04-13 RX ORDER — GABAPENTIN 300 MG/1
600 CAPSULE ORAL ONCE
Status: COMPLETED | OUTPATIENT
Start: 2022-04-14 | End: 2022-04-13

## 2022-04-13 RX ORDER — ASPIRIN 81 MG/1
81 TABLET ORAL DAILY
Refills: 5 | Status: DISCONTINUED | OUTPATIENT
Start: 2022-04-14 | End: 2022-04-15 | Stop reason: HOSPADM

## 2022-04-13 RX ORDER — INSULIN LISPRO 100 [IU]/ML
10 INJECTION, SOLUTION INTRAVENOUS; SUBCUTANEOUS
Status: DISCONTINUED | OUTPATIENT
Start: 2022-04-14 | End: 2022-04-14 | Stop reason: ALTCHOICE

## 2022-04-13 RX ORDER — ALPRAZOLAM 0.5 MG/1
0.5 TABLET ORAL 2 TIMES DAILY PRN
Status: DISCONTINUED | OUTPATIENT
Start: 2022-04-13 | End: 2022-04-15 | Stop reason: HOSPADM

## 2022-04-13 RX ORDER — SODIUM CHLORIDE 9 MG/ML
75 INJECTION, SOLUTION INTRAVENOUS CONTINUOUS
Status: DISCONTINUED | OUTPATIENT
Start: 2022-04-13 | End: 2022-04-15 | Stop reason: HOSPADM

## 2022-04-13 RX ADMIN — METOPROLOL TARTRATE 50 MG: 50 TABLET, FILM COATED ORAL at 23:41

## 2022-04-13 RX ADMIN — SODIUM CHLORIDE 75 ML/HR: 9 INJECTION, SOLUTION INTRAVENOUS at 23:49

## 2022-04-13 RX ADMIN — SODIUM CHLORIDE 1000 ML: 9 INJECTION, SOLUTION INTRAVENOUS at 18:41

## 2022-04-13 RX ADMIN — CEFTRIAXONE 1 G: 10 INJECTION, POWDER, FOR SOLUTION INTRAVENOUS at 18:41

## 2022-04-13 RX ADMIN — ENOXAPARIN SODIUM 40 MG: 40 INJECTION SUBCUTANEOUS at 23:37

## 2022-04-13 RX ADMIN — CYCLOBENZAPRINE 10 MG: 10 TABLET, FILM COATED ORAL at 23:42

## 2022-04-13 RX ADMIN — INSULIN GLARGINE 34 UNITS: 100 INJECTION, SOLUTION SUBCUTANEOUS at 23:36

## 2022-04-13 RX ADMIN — GABAPENTIN 600 MG: 300 CAPSULE ORAL at 23:41

## 2022-04-13 RX ADMIN — Medication 10 ML: at 15:40

## 2022-04-13 NOTE — ED PROVIDER NOTES
"PULMONARY PROGRESS NOTE          Interval History:      No overnight issues, on baseline O2, feels well enough to go home         Physical Exam:      Blood pressure 137/65, pulse 114, temperature 98.1  F (36.7  C), temperature source Oral, resp. rate 16, height 1.626 m (5' 4\"), weight 60.2 kg (132 lb 11.2 oz), SpO2 98 %, not currently breastfeeding.  Vitals:    01/02/20 0838 01/03/20 0500 01/04/20 0714   Weight: 63.5 kg (140 lb) 60 kg (132 lb 3.2 oz) 60.2 kg (132 lb 11.2 oz)     Vital Signs with Ranges  Temp:  [97.7  F (36.5  C)-98.1  F (36.7  C)] 98.1  F (36.7  C)  Heart Rate:  [101-114] 101  Resp:  [16-18] 16  BP: (136-161)/(65-93) 137/65  SpO2:  [93 %-98 %] 98 %  I/O's Last 24 hours  I/O last 3 completed shifts:  In: 400 [P.O.:400]  Out: 400 [Urine:400]    Lungs: diminshed   Cardiovascular: RRR   Other: NAD               Medications:          azithromycin  250 mg Oral Daily     cefTRIAXone  2 g Intravenous Q24H     insulin aspart  1-7 Units Subcutaneous TID AC     insulin aspart  1-5 Units Subcutaneous At Bedtime     ipratropium - albuterol 0.5 mg/2.5 mg/3 mL  3 mL Nebulization 4x daily     losartan  50 mg Oral Daily     predniSONE  40 mg Oral Daily     sodium chloride (PF)  3 mL Intracatheter Q8H            Data:      All new lab and imaging data was reviewed.   Recent Labs   Lab Test 01/03/20 0921 01/02/20 0918 09/03/19  0750 09/01/19  0225  09/14/12  0750   WBC 21.0* 19.8*  --  10.3   < >  --    HGB 11.9 12.0  --  11.5*   < >  --    MCV 92 92  --  84   < >  --     335 326 308   < > 287   INR  --   --   --   --   --  0.93    < > = values in this interval not displayed.      Recent Labs   Lab Test 01/03/20 0921 01/02/20 0918 09/25/19  1424  09/04/19  1410    136 137   < > 135   POTASSIUM 4.4 3.9 4.4   < > 5.9*   CHLORIDE 102 100 93*   < > 99   CO2 33* 32  --   --  35*   BUN 23 15 10  16   < > 28   CR 0.63 0.61 0.63   < > 0.68   ANIONGAP 3 4  --   --  1*   MARQUEZ 9.2 9.1 9.5   < > 9.8   * " Subjective       Patient comes in complaining of generalized weakness for the past 4 days.  Patient states that she feels generally weak especially upon ambulating.  Patient states she has not had any focal weakness on one side of the body or the other or speech difficulty.  Patient states she has a chronic cough with clear sputum that is somewhat gotten worse last few days.  Patient states that she went to the urgent care earlier today for her complaints and was told that her blood pressure was low in the 80s systolic.  Patient was then referred to the ER for further work-up.  Patient does report some urinary frequency the last few days as well but otherwise denies any pain with urination, abdominal pain, diarrhea, nausea, vomiting, fever, chills, chest pain or shortness of breath.        Review of Systems   Constitutional: Positive for fatigue. Negative for chills and fever.   HENT: Negative for congestion, sore throat, tinnitus and trouble swallowing.    Eyes: Negative for photophobia, discharge and visual disturbance.   Respiratory: Negative for cough, shortness of breath and wheezing.    Cardiovascular: Negative for chest pain, palpitations and leg swelling.   Gastrointestinal: Negative for abdominal pain, blood in stool, diarrhea, nausea and vomiting.   Genitourinary: Positive for frequency. Negative for dysuria, flank pain and urgency.   Musculoskeletal: Negative for arthralgias and myalgias.   Skin: Negative for rash.   Neurological: Negative for dizziness, syncope, speech difficulty, weakness, light-headedness, numbness and headaches.   Psychiatric/Behavioral: Negative for confusion.       Past Medical History:   Diagnosis Date   • COPD (chronic obstructive pulmonary disease) (HCC)    • Diabetes mellitus (HCC)    • Hyperlipidemia        No Known Allergies    Past Surgical History:   Procedure Laterality Date   • CARDIAC CATHETERIZATION Right 10/7/2019    Procedure: Left Heart Cath and coronary angiogram;   114* 116*   < > 123*    < > = values in this interval not displayed.            Active Problems:    CAP (community acquired pneumonia)           Assessment and Plan:      AECOPD  Pulmonary nodules     Plan  -prednisone 40mg daily. Taper 10mg every 4 days until back to baseline of 10mg daily  -change to PO doxycycline and complete 7 day course today  -back on baseline O2 levels  -ambulate, out of bed if able  -if clinically stable, okay to discharge home today from pulmonary standpoint  -follow up in 6-8 weeks with Dr. Cardoso.  -follow up CT chest in 12 months for pulmonary nodules.      Thank you for the consult, please call with any questions. Will sign off otherwise as no further recommendations.     Raji Phipps MD  [facility] / [facility]  [phone] (pager)  [phone] (office)     Surgeon: Ishan June MD;  Location: [facility] INVASIVE LOCATION;  Service: Cardiovascular       Family History   Problem Relation Age of Onset   • Heart disease Father        Social History     Socioeconomic History   • Marital status:    Tobacco Use   • Smoking status: Current Every Day Smoker     Types: Cigarettes   • Smokeless tobacco: Never Used   Substance and Sexual Activity   • Alcohol use: Not Currently   • Drug use: Never           Objective   Physical Exam  Vitals and nursing note reviewed.   Constitutional:       General: She is not in acute distress.     Appearance: She is well-developed. She is not diaphoretic.   HENT:      Head: Normocephalic and atraumatic.      Right Ear: External ear normal.      Left Ear: External ear normal.      Nose: Nose normal.      Mouth/Throat:      Pharynx: No oropharyngeal exudate.   Eyes:      Extraocular Movements: Extraocular movements intact.      Conjunctiva/sclera: Conjunctivae normal.      Pupils: Pupils are equal, round, and reactive to light.   Cardiovascular:      Rate and Rhythm: Normal rate and regular rhythm.      Pulses: Normal pulses.      Heart sounds: Normal heart sounds.      Comments: S1, S2 audible.  Pulmonary:      Effort: Pulmonary effort is normal. No respiratory distress.      Breath sounds: Normal breath sounds. No wheezing, rhonchi or rales.      Comments: On room air.  Abdominal:      General: Bowel sounds are normal. There is no distension.      Palpations: Abdomen is soft.      Tenderness: There is no abdominal tenderness. There is no guarding or rebound.   Musculoskeletal:         General: No tenderness or deformity. Normal range of motion.      Cervical back: Normal range of motion.      Right lower leg: No edema.      Left lower leg: No edema.   Skin:     General: Skin is warm.      Capillary Refill: Capillary refill takes less than 2 seconds.      Findings: No erythema or rash.   Neurological:      General: No focal deficit  "present.      Mental Status: She is alert and oriented to person, place, and time.      Cranial Nerves: No cranial nerve deficit.      Sensory: No sensory deficit.      Motor: No weakness.      Coordination: Coordination normal.      Comments: Patient alert and oriented times 3 out of 3.  Patient moving all 4 extremities appropriately.  No slurred speech.   Psychiatric:         Mood and Affect: Mood normal.         Behavior: Behavior normal.         Procedures           ED Course      /65 (BP Location: Left arm, Patient Position: Lying)   Pulse 74   Temp 98 °F (36.7 °C) (Infrared)   Resp 16   Ht 157.5 cm (62\")   Wt 60.9 kg (134 lb 4.2 oz)   SpO2 94%   BMI 24.56 kg/m²   Labs Reviewed   COMPREHENSIVE METABOLIC PANEL - Abnormal; Notable for the following components:       Result Value    Glucose 105 (*)     BUN 25 (*)     Creatinine 1.27 (*)     eGFR 45.0 (*)     All other components within normal limits    Narrative:     GFR Normal >60  Chronic Kidney Disease <60  Kidney Failure <15     TSH - Abnormal; Notable for the following components:    TSH <0.005 (*)     All other components within normal limits   CBC WITH AUTO DIFFERENTIAL - Abnormal; Notable for the following components:    WBC 10.90 (*)     Lymphocytes, Absolute 4.30 (*)     All other components within normal limits   URINALYSIS W/ CULTURE IF INDICATED - Abnormal; Notable for the following components:    Color, UA Dark Yellow (*)     Appearance, UA Cloudy (*)     Ketones, UA Trace (*)     Bilirubin, UA Small (1+) (*)     Protein, UA 30 mg/dL (1+) (*)     Leuk Esterase, UA Moderate (2+) (*)     Nitrite, UA Positive (*)     All other components within normal limits   URINALYSIS, MICROSCOPIC ONLY - Abnormal; Notable for the following components:    RBC, UA 3-5 (*)     WBC, UA Too Numerous to Count (*)     Bacteria, UA 4+ (*)     Squamous Epithelial Cells, UA 3-6 (*)     All other components within normal limits   RESPIRATORY PANEL PCR W/ COVID-19 " (SARS-COV-2) LUZ/SANTIAGO/EVA/PAD/COR/MAD/GABRIELLE IN-HOUSE, NP SWAB IN [facility], 3-4 HR TAT - Normal    Narrative:     In the setting of a positive respiratory panel with a viral infection PLUS a negative procalcitonin without other underlying concern for bacterial infection, consider observing off antibiotics or discontinuation of antibiotics and continue supportive care. If the respiratory panel is positive for atypical bacterial infection (Bordetella pertussis, Chlamydophila pneumoniae, or Mycoplasma pneumoniae), consider antibiotic de-escalation to target atypical bacterial infection.   TROPONIN (IN-HOUSE) - Normal    Narrative:     Troponin T Reference Range:  <= 0.03 ng/mL-   Negative for AMI  >0.03 ng/mL-     Abnormal for myocardial necrosis.  Clinicians would have to utilize clinical acumen, EKG, Troponin and serial changes to determine if it is an Acute Myocardial Infarction or myocardial injury due to an underlying chronic condition.       Results may be falsely decreased if patient taking Biotin.     TROPONIN (IN-HOUSE) - Normal    Narrative:     Troponin T Reference Range:  <= 0.03 ng/mL-   Negative for AMI  >0.03 ng/mL-     Abnormal for myocardial necrosis.  Clinicians would have to utilize clinical acumen, EKG, Troponin and serial changes to determine if it is an Acute Myocardial Infarction or myocardial injury due to an underlying chronic condition.       Results may be falsely decreased if patient taking Biotin.     PROTIME-INR - Normal   APTT - Normal   MAGNESIUM - Normal   POCT GLUCOSE FINGERSTICK - Normal   POC LACTATE - Normal   URINE CULTURE   BLOOD CULTURE   BLOOD CULTURE   RAINBOW DRAW    Narrative:     The following orders were created for panel order [facility] Draw.  Procedure                               Abnormality         Status                     ---------                               -----------         ------                     Green Top (Gel)[[id number]]                                  Final  result               Lavender Top[[id number]]                                     Final result                 Please view results for these tests on the individual orders.   POCT GLUCOSE FINGERSTICK   POC LACTATE   GREEN TOP   LAVENDER TOP   CBC AND DIFFERENTIAL    Narrative:     The following orders were created for panel order CBC & Differential.  Procedure                               Abnormality         Status                     ---------                               -----------         ------                     CBC Auto Differential[[id number]]        Abnormal            Final result                 Please view results for these tests on the individual orders.     XR Chest 1 View    Result Date: 4/13/2022   Small amount of left lower lobe opacity compared to be atelectasis, less likely pneumonia. No evidence of acute process elsewhere.  Electronically Signed By-Norbert Jaeger On:4/13/2022 4:02 PM This report was finalized on [id number] by  Norbert Jaeger, .                                               [facility]     Chart review: Patient's primary care provider is Dr. Ochoa.  EKG: EKG reviewed by myself interpreted by Dr. Humphries, shows sinus rhythm 70 bpm, no ST elevation. Old left atrial enlargement.    Imaging:    XR Chest 1 View   Final Result       Small amount of left lower lobe opacity compared to be atelectasis, less   likely pneumonia. No evidence of acute process elsewhere.       Electronically Signed ByMegan Jaeger On:4/13/2022 4:02 PM   This report was finalized on [id number] by  oNrbert Jaeger, .          Labs: Respiratory viral panel COVID-19 swab negative.  UA shows 4+ bacteria, 2 to count WBCs and nitrite positive.  Urine culture pending.  Initial troponin negative.  Initial lactic normal at 0.8.  Blood cultures x2 pending.  Coags unremarkable.  Magnesium normal.  TSH less than 0.005.  White blood cell count of 10.9 otherwise unremarkable CBC.  Serum creatinine slightly bumped at 1.27 with baseline  "of 0.9 and BUN of 25 otherwise unremarkable CMP.    Vitals:  /65 (BP Location: Left arm, Patient Position: Lying)   Pulse 74   Temp 98 °F (36.7 °C) (Infrared)   Resp 16   Ht 157.5 cm (62\")   Wt 60.9 kg (134 lb 4.2 oz)   SpO2 94%   BMI 24.56 kg/m²     Medications given:    Medications   sodium chloride 0.9 % flush 10 mL (10 mL Intravenous Given 4/13/22 7740)   cefTRIAXone (ROCEPHIN) in SWFI 1 gram/10ml IV PUSH syringe (1 g Intravenous Given 4/13/22 1841)   sodium chloride 0.9 % bolus 1,000 mL (1,000 mL Intravenous New Bag 4/13/22 1841)       Procedures:    MDM: Patient is a 71-year-old female comes in complaining of generalized weakness and apparent hypotension at urgent care earlier today.  Respiratory viral panel COVID-19 swab negative.  UA shows 4+ bacteria, 2 to count WBCs and nitrite positive.  Urine culture pending.  Initial troponin negative.  Initial lactic normal at 0.8.  Blood cultures x2 pending.  Coags unremarkable.  Magnesium normal.  TSH less than 0.005.  White blood cell count of 10.9 otherwise unremarkable CBC.  Serum creatinine slightly bumped at 1.27 with baseline of 0.9 and BUN of 25 otherwise unremarkable CMP.  Patient given 1 L normal saline bolus for apparent mild ADAMARIS and dehydration.  Patient started on Rocephin for nitrite positive UTI.  This is likely the cause of patient's generalized weakness however patient's TSH is less than 0.005.  Patient had no acute distress on initial and reevaluation.  Spoke with Dr. Ochoa, who accepted patient upon admission to hospital for further work-up generalized weakness.  Patient also voiced that she would be uncomfortable with going to help given her weakness as well.  Patient was not hypotensive during ER stay.  Patient not triggering sepsis at this time.  Results and plan discussed with patient and is agreeable with plan.    Final diagnoses:   Generalized weakness   Low TSH level   Hypotension, unspecified hypotension type   Acute UTI   ADAMARIS " (acute kidney injury) (HCC)       ED Disposition  ED Disposition     ED Disposition   Decision to Admit    Condition   --    Comment   Level of Care: Med/Surg [1]   Diagnosis: Generalized weakness [535500]   Admitting Physician: HERNAN CALZADA [[id number]]   Attending Physician: HERNAN CALZADA [[id number]]               No follow-up provider specified.       Medication List      No changes were made to your prescriptions during this visit.          Gustavo Gramajo PA  04/13/22 1918

## 2022-04-14 PROBLEM — E78.5 TYPE 2 DIABETES MELLITUS WITH HYPERLIPIDEMIA: Status: ACTIVE | Noted: 2022-04-14

## 2022-04-14 PROBLEM — N17.9 AKI (ACUTE KIDNEY INJURY): Status: ACTIVE | Noted: 2022-04-14

## 2022-04-14 PROBLEM — J44.9 COPD (CHRONIC OBSTRUCTIVE PULMONARY DISEASE) (HCC): Status: ACTIVE | Noted: 2021-07-10

## 2022-04-14 PROBLEM — E11.9 DIABETES MELLITUS WITH COINCIDENT HYPERTENSION: Status: ACTIVE | Noted: 2022-04-14

## 2022-04-14 PROBLEM — N39.0 ACUTE UTI: Status: ACTIVE | Noted: 2022-04-14

## 2022-04-14 PROBLEM — R79.89 LOW TSH LEVEL: Status: ACTIVE | Noted: 2022-04-14

## 2022-04-14 PROBLEM — F39 MOOD DISORDER (HCC): Status: ACTIVE | Noted: 2022-04-14

## 2022-04-14 PROBLEM — E11.40 DIABETES MELLITUS WITH NEUROPATHY (HCC): Status: ACTIVE | Noted: 2021-07-11

## 2022-04-14 PROBLEM — E11.69 TYPE 2 DIABETES MELLITUS WITH HYPERLIPIDEMIA (HCC): Status: ACTIVE | Noted: 2022-04-14

## 2022-04-14 PROBLEM — I10 DIABETES MELLITUS WITH COINCIDENT HYPERTENSION: Status: ACTIVE | Noted: 2022-04-14

## 2022-04-14 LAB
ANION GAP SERPL CALCULATED.3IONS-SCNC: 7 MMOL/L (ref 5–15)
BUN SERPL-MCNC: 22 MG/DL (ref 8–23)
BUN/CREAT SERPL: 22 (ref 7–25)
CALCIUM SPEC-SCNC: 8.7 MG/DL (ref 8.6–10.5)
CHLORIDE SERPL-SCNC: 106 MMOL/L (ref 98–107)
CO2 SERPL-SCNC: 25 MMOL/L (ref 22–29)
CREAT SERPL-MCNC: 1 MG/DL (ref 0.57–1)
DEPRECATED RDW RBC AUTO: 41.6 FL (ref 37–54)
EGFRCR SERPLBLD CKD-EPI 2021: 60 ML/MIN/1.73
EOSINOPHIL # BLD MANUAL: 0.31 10*3/MM3 (ref 0–0.4)
EOSINOPHIL NFR BLD MANUAL: 3 % (ref 0.3–6.2)
ERYTHROCYTE [DISTWIDTH] IN BLOOD BY AUTOMATED COUNT: 13.3 % (ref 12.3–15.4)
GLUCOSE BLDC GLUCOMTR-MCNC: 103 MG/DL (ref 70–105)
GLUCOSE BLDC GLUCOMTR-MCNC: 139 MG/DL (ref 70–105)
GLUCOSE BLDC GLUCOMTR-MCNC: 190 MG/DL (ref 70–105)
GLUCOSE BLDC GLUCOMTR-MCNC: 97 MG/DL (ref 70–105)
GLUCOSE SERPL-MCNC: 152 MG/DL (ref 65–99)
HCT VFR BLD AUTO: 36 % (ref 34–46.6)
HGB BLD-MCNC: 12.4 G/DL (ref 12–15.9)
LYMPHOCYTES # BLD MANUAL: 4.79 10*3/MM3 (ref 0.7–3.1)
LYMPHOCYTES NFR BLD MANUAL: 9 % (ref 5–12)
MCH RBC QN AUTO: 30.7 PG (ref 26.6–33)
MCHC RBC AUTO-ENTMCNC: 34.6 G/DL (ref 31.5–35.7)
MCV RBC AUTO: 88.8 FL (ref 79–97)
MONOCYTES # BLD: 0.92 10*3/MM3 (ref 0.1–0.9)
MYELOCYTES NFR BLD MANUAL: 1 % (ref 0–0)
NEUTROPHILS # BLD AUTO: 4.08 10*3/MM3 (ref 1.7–7)
NEUTROPHILS NFR BLD MANUAL: 39 % (ref 42.7–76)
NEUTS BAND NFR BLD MANUAL: 1 % (ref 0–5)
PLAT MORPH BLD: NORMAL
PLATELET # BLD AUTO: 207 10*3/MM3 (ref 140–450)
PMV BLD AUTO: 7.2 FL (ref 6–12)
POTASSIUM SERPL-SCNC: 4.4 MMOL/L (ref 3.5–5.2)
RBC # BLD AUTO: 4.05 10*6/MM3 (ref 3.77–5.28)
RBC MORPH BLD: NORMAL
SCAN SLIDE: NORMAL
SODIUM SERPL-SCNC: 138 MMOL/L (ref 136–145)
T3FREE SERPL-MCNC: 6.05 PG/ML (ref 2–4.4)
VARIANT LYMPHS NFR BLD MANUAL: 47 % (ref 19.6–45.3)
WBC MORPH BLD: NORMAL
WBC NRBC COR # BLD: 10.2 10*3/MM3 (ref 3.4–10.8)

## 2022-04-14 PROCEDURE — 85007 BL SMEAR W/DIFF WBC COUNT: CPT | Performed by: FAMILY MEDICINE

## 2022-04-14 PROCEDURE — 25010000002 CEFTRIAXONE PER 250 MG: Performed by: FAMILY MEDICINE

## 2022-04-14 PROCEDURE — 25010000002 ENOXAPARIN PER 10 MG: Performed by: FAMILY MEDICINE

## 2022-04-14 PROCEDURE — 97161 PT EVAL LOW COMPLEX 20 MIN: CPT

## 2022-04-14 PROCEDURE — 94799 UNLISTED PULMONARY SVC/PX: CPT

## 2022-04-14 PROCEDURE — 96372 THER/PROPH/DIAG INJ SC/IM: CPT

## 2022-04-14 PROCEDURE — 63710000001 INSULIN GLARGINE PER 5 UNITS: Performed by: FAMILY MEDICINE

## 2022-04-14 PROCEDURE — 85025 COMPLETE CBC W/AUTO DIFF WBC: CPT | Performed by: FAMILY MEDICINE

## 2022-04-14 PROCEDURE — 63710000001 INSULIN LISPRO (HUMAN) PER 5 UNITS: Performed by: INTERNAL MEDICINE

## 2022-04-14 PROCEDURE — 82962 GLUCOSE BLOOD TEST: CPT

## 2022-04-14 PROCEDURE — G0378 HOSPITAL OBSERVATION PER HR: HCPCS

## 2022-04-14 PROCEDURE — 80048 BASIC METABOLIC PNL TOTAL CA: CPT | Performed by: FAMILY MEDICINE

## 2022-04-14 RX ORDER — NICOTINE 21 MG/24HR
1 PATCH, TRANSDERMAL 24 HOURS TRANSDERMAL
Status: DISCONTINUED | OUTPATIENT
Start: 2022-04-14 | End: 2022-04-15

## 2022-04-14 RX ORDER — BUDESONIDE AND FORMOTEROL FUMARATE DIHYDRATE 160; 4.5 UG/1; UG/1
2 AEROSOL RESPIRATORY (INHALATION)
Status: DISCONTINUED | OUTPATIENT
Start: 2022-04-14 | End: 2022-04-15 | Stop reason: HOSPADM

## 2022-04-14 RX ORDER — ROSUVASTATIN CALCIUM 10 MG/1
20 TABLET, COATED ORAL DAILY
Status: DISCONTINUED | OUTPATIENT
Start: 2022-04-14 | End: 2022-04-15 | Stop reason: HOSPADM

## 2022-04-14 RX ORDER — NICOTINE POLACRILEX 4 MG
15 LOZENGE BUCCAL
Status: DISCONTINUED | OUTPATIENT
Start: 2022-04-14 | End: 2022-04-15 | Stop reason: HOSPADM

## 2022-04-14 RX ORDER — INSULIN LISPRO 100 [IU]/ML
0-14 INJECTION, SOLUTION INTRAVENOUS; SUBCUTANEOUS AS NEEDED
Status: DISCONTINUED | OUTPATIENT
Start: 2022-04-14 | End: 2022-04-15 | Stop reason: HOSPADM

## 2022-04-14 RX ORDER — INSULIN LISPRO 100 [IU]/ML
0-14 INJECTION, SOLUTION INTRAVENOUS; SUBCUTANEOUS
Status: DISCONTINUED | OUTPATIENT
Start: 2022-04-14 | End: 2022-04-15 | Stop reason: HOSPADM

## 2022-04-14 RX ORDER — IPRATROPIUM BROMIDE AND ALBUTEROL SULFATE 2.5; .5 MG/3ML; MG/3ML
3 SOLUTION RESPIRATORY (INHALATION) EVERY 6 HOURS PRN
Status: DISCONTINUED | OUTPATIENT
Start: 2022-04-14 | End: 2022-04-15 | Stop reason: HOSPADM

## 2022-04-14 RX ORDER — DULOXETIN HYDROCHLORIDE 30 MG/1
30 CAPSULE, DELAYED RELEASE ORAL DAILY
COMMUNITY

## 2022-04-14 RX ORDER — OLANZAPINE 10 MG/2ML
1 INJECTION, POWDER, LYOPHILIZED, FOR SOLUTION INTRAMUSCULAR
Status: DISCONTINUED | OUTPATIENT
Start: 2022-04-14 | End: 2022-04-15 | Stop reason: HOSPADM

## 2022-04-14 RX ORDER — ROSUVASTATIN CALCIUM 20 MG/1
20 TABLET, COATED ORAL DAILY
COMMUNITY

## 2022-04-14 RX ORDER — DEXTROSE MONOHYDRATE 25 G/50ML
25 INJECTION, SOLUTION INTRAVENOUS
Status: DISCONTINUED | OUTPATIENT
Start: 2022-04-14 | End: 2022-04-15 | Stop reason: HOSPADM

## 2022-04-14 RX ORDER — DULOXETIN HYDROCHLORIDE 30 MG/1
30 CAPSULE, DELAYED RELEASE ORAL DAILY
Status: DISCONTINUED | OUTPATIENT
Start: 2022-04-14 | End: 2022-04-15 | Stop reason: HOSPADM

## 2022-04-14 RX ORDER — LOSARTAN POTASSIUM 25 MG/1
25 TABLET ORAL 2 TIMES DAILY
COMMUNITY
End: 2022-07-27 | Stop reason: SINTOL

## 2022-04-14 RX ADMIN — CYCLOBENZAPRINE 10 MG: 10 TABLET, FILM COATED ORAL at 20:51

## 2022-04-14 RX ADMIN — Medication 10 ML: at 08:30

## 2022-04-14 RX ADMIN — Medication 10 ML: at 20:51

## 2022-04-14 RX ADMIN — ENOXAPARIN SODIUM 40 MG: 40 INJECTION SUBCUTANEOUS at 17:07

## 2022-04-14 RX ADMIN — METOPROLOL TARTRATE 50 MG: 50 TABLET, FILM COATED ORAL at 20:51

## 2022-04-14 RX ADMIN — SODIUM CHLORIDE 75 ML/HR: 9 INJECTION, SOLUTION INTRAVENOUS at 13:37

## 2022-04-14 RX ADMIN — ROSUVASTATIN 20 MG: 10 TABLET, FILM COATED ORAL at 11:37

## 2022-04-14 RX ADMIN — MIRABEGRON 50 MG: 25 TABLET, FILM COATED, EXTENDED RELEASE ORAL at 08:30

## 2022-04-14 RX ADMIN — PANTOPRAZOLE SODIUM 40 MG: 40 TABLET, DELAYED RELEASE ORAL at 17:07

## 2022-04-14 RX ADMIN — INSULIN LISPRO 3 UNITS: 100 INJECTION, SOLUTION INTRAVENOUS; SUBCUTANEOUS at 12:22

## 2022-04-14 RX ADMIN — DULOXETINE HYDROCHLORIDE 30 MG: 30 CAPSULE, DELAYED RELEASE ORAL at 11:37

## 2022-04-14 RX ADMIN — NICOTINE 1 PATCH: 14 PATCH, EXTENDED RELEASE TRANSDERMAL at 17:07

## 2022-04-14 RX ADMIN — INSULIN GLARGINE 34 UNITS: 100 INJECTION, SOLUTION SUBCUTANEOUS at 20:52

## 2022-04-14 RX ADMIN — CEFTRIAXONE 1 G: 1 INJECTION, POWDER, FOR SOLUTION INTRAMUSCULAR; INTRAVENOUS at 17:07

## 2022-04-14 RX ADMIN — PANTOPRAZOLE SODIUM 40 MG: 40 TABLET, DELAYED RELEASE ORAL at 08:30

## 2022-04-14 RX ADMIN — METOPROLOL TARTRATE 50 MG: 50 TABLET, FILM COATED ORAL at 08:30

## 2022-04-14 RX ADMIN — GABAPENTIN 300 MG: 300 CAPSULE ORAL at 06:06

## 2022-04-14 RX ADMIN — ASPIRIN 81 MG: 81 TABLET, COATED ORAL at 08:30

## 2022-04-14 RX ADMIN — BUDESONIDE AND FORMOTEROL FUMARATE DIHYDRATE 2 PUFF: 160; 4.5 AEROSOL RESPIRATORY (INHALATION) at 21:35

## 2022-04-14 NOTE — CASE MANAGEMENT/SOCIAL WORK
Discharge Planning Assessment   Romie     Patient Name: Dyana Montemayor  MRN: [id number]  Today's Date: 4/14/2022    Admit Date: 4/13/2022     Discharge Needs Assessment     Row Name 04/14/22 1216       Living Environment    People in Home alone    Current Living Arrangements home    Primary Care Provided by self    Provides Primary Care For no one    Family Caregiver if Needed sibling(s)    Quality of Family Relationships helpful    Able to Return to Prior Arrangements yes       Resource/Environmental Concerns    Resource/Environmental Concerns none    Transportation Concerns none       Transition Planning    Patient/Family Anticipates Transition to home    Patient/Family Anticipated Services at Transition none    Transportation Anticipated car, drives self       Discharge Needs Assessment    Readmission Within the Last 30 Days no previous admission in last 30 days    Concerns to be Addressed denies needs/concerns at this time;no discharge needs identified    Anticipated Changes Related to Illness none    Equipment Needed After Discharge none    Provided Post Acute Provider List? N/A    Current Discharge Risk lives alone               Discharge Plan     Row Name 04/14/22 1217       Plan    Plan Anticipate routine home    Patient/Family in Agreement with Plan yes    Plan Comments Met with patient at bedside, reports she lives at home alone. Brother stays ther at times. PCP and pharmacy confirmed. No issues affording food or medications. Patient still drives, plans to drive self home on d/c. Currently denies any d/c needs or concerns. CM to follow.              Expected Discharge Date and Time     Expected Discharge Date Expected Discharge Time    Apr 15, 2022          Demographic Summary     Row Name 04/14/22 1216       General Information    Admission Type observation    Arrived From emergency department    Required Notices Provided Observation Status Notice    Referral Source admission list    Reason for  Consult discharge planning    Preferred Language English               Functional Status     Row Name 04/14/22 1216       Functional Status    Usual Activity Tolerance good    Current Activity Tolerance good       Functional Status, IADL    Medications independent    Meal Preparation independent    Housekeeping independent    Laundry independent    Shopping independent               Patient Forms     Row Name 04/14/22 1218       Patient Forms    Important Message from Medicare ([facility]) --  Chawla 4/13              Met with patient in room wearing PPE: mask    Maintained distance greater than six feet and spent less than 15 minutes in the room.          Emma Kulkarni RN

## 2022-04-14 NOTE — H&P
Patient Care Team:  Deborah Ochoa MD as PCP - General  Ishan June MD as Consulting Physician (Cardiology)    Chief complaint  Feels better still with some weakness    Subjective     Patient is a 71 y.o. female presents with weakness. She reported to the ER some recent  cough . She reports to me urinary frequency for several days with no dysuria. Onset of symptoms was 4-5 days. She was seen in Urgent care 4/13 and had SBP of 80 and was sent to the ER.   ER found negative resp panel, UA pos for UTI findings, BC where drawn, WBC 10.9, Cr with elevation at 1.27 ( baseline 0.9) She was given 1 L NS, started on rocephin and admitted.     Review of Systems   Constitutional: Positive for activity change, appetite change, fatigue and fever.   HENT: Negative for trouble swallowing.    Eyes: Negative for visual disturbance.   Respiratory: Positive for cough (chronic no worse - per pt this am ). Negative for shortness of breath.    Cardiovascular: Negative for chest pain, palpitations and leg swelling.   Gastrointestinal: Negative for abdominal pain.   Genitourinary: Positive for frequency. Negative for difficulty urinating.   Musculoskeletal: Negative for gait problem.   Skin: Negative for pallor.   Neurological: Positive for weakness. Negative for dizziness and headaches.   Psychiatric/Behavioral: Negative for agitation and confusion.   All other systems reviewed and are negative.         History  Past Medical History:   Diagnosis Date   • COPD (chronic obstructive pulmonary disease) (HCC)    • Diabetes mellitus (HCC)    • Hyperlipidemia      Past Surgical History:   Procedure Laterality Date   • CARDIAC CATHETERIZATION Right 10/7/2019    Procedure: Left Heart Cath and coronary angiogram;  Surgeon: Ishan June MD;  Location: [facility] CATH INVASIVE LOCATION;  Service: Cardiovascular     Family History   Problem Relation Age of Onset   • Heart disease Father      Social History     Tobacco Use   • Smoking status:  Current Every Day Smoker     Types: Cigarettes   • Smokeless tobacco: Never Used   Substance Use Topics   • Alcohol use: Not Currently   • Drug use: Never     Medications Prior to Admission   Medication Sig Dispense Refill Last Dose   • acetaminophen (TYLENOL) 325 MG tablet Take 2 tablets by mouth Every 4 (Four) Hours As Needed for Mild Pain .      • ALPRAZolam (XANAX) 0.5 MG tablet Take 0.5 mg by mouth 2 (Two) Times a Day As Needed for Anxiety.      • amphetamine-dextroamphetamine (ADDERALL) 20 MG tablet Take 20 mg by mouth 2 (Two) Times a Day.      • aspirin 81 MG tablet Take 1 tablet by mouth Daily. 30 tablet 5    • cyclobenzaprine (FLEXERIL) 10 MG tablet Take 10 mg by mouth every night at bedtime.      • esomeprazole (nexIUM) 40 MG capsule Take 40 mg by mouth 2 (Two) Times a Day.      • Fluticasone-Umeclidin-Vilant (TRELEGY ELLIPTA) 100-62.5-25 MCG/INH aerosol powder  Inhale 1 container Daily.      • gabapentin (NEURONTIN) 300 MG capsule Take 300 mg by mouth Every Morning.      • insulin aspart (novoLOG) 100 UNIT/ML injection Inject 10 Units under the skin into the appropriate area as directed 3 (Three) Times a Day Before Meals.  12    • Insulin Degludec (TRESIBA FLEXTOUCH) 200 UNIT/ML solution pen-injector pen injection Inject 34 Units under the skin into the appropriate area as directed every night at bedtime.      • insulin lispro (ADMELOG) 100 UNIT/ML injection Inject 0-9 Units under the skin into the appropriate area as directed 3 (Three) Times a Day Before Meals.  12    • insulin lispro (ADMELOG) 100 UNIT/ML injection Inject 0-9 Units under the skin into the appropriate area as directed As Needed for High Blood Sugar (Per the administration instructions).  12    • Januvia 100 MG tablet Take 100 mg by mouth Daily.      • metoprolol tartrate (LOPRESSOR) 50 MG tablet Take 50 mg by mouth 2 (Two) Times a Day.      • Mirabegron ER (MYRBETRIQ) 50 MG tablet sustained-release 24 hour 24 hr tablet Take 50 mg by  mouth Daily.        Allergies:  Patient has no known allergies.    Objective     Vital Signs  Temp:  [97.8 °F (36.6 °C)-98.3 °F (36.8 °C)] 97.8 °F (36.6 °C)  Heart Rate:  [74-91] 74  Resp:  [16-20] 16  BP: ()/(48-71) 108/64     Physical Exam:      General Appearance:    Alert, cooperative, in no acute distress   Head:    Normocephalic, without obvious abnormality, atraumatic   Eyes:            Lids and lashes normal, conjunctivae and sclerae normal, no   icterus, no pallor, corneas clear, PERRLA   Ears:    Ears appear intact with no abnormalities noted   Throat:   No oral lesions, no thrush, oral mucosa moist   Neck:   No adenopathy, supple, trachea midline, no thyromegaly, no   carotid bruit, no JVD   Lungs:      Dim bases otherwise clear. respirations regular, even and                  unlabored    Heart:    Regular rhythm and normal rate, normal S1 and S2, no            murmur, no gallop, no rub, no click   Chest Wall:    No abnormalities observed   Abdomen:     Normal bowel sounds, no masses, no organomegaly, soft        non-tender, non-distended, no guarding, no rebound                tenderness   Extremities:   Moves all extremities well, no edema, no cyanosis, no             redness   Pulses:   Pulses palpable and equal bilaterally   Skin:   No bleeding, bruising or rash   Lymph nodes:   No palpable adenopathy   Neurologic:   Cranial nerves 2 - 12 grossly intact, sensation intact, DTR       present and equal bilaterally       Results Review:     Imaging Results (Last 24 Hours)     Procedure Component Value Units Date/Time    XR Chest 1 View [[id number]] Collected: 04/13/22 1601     Updated: 04/13/22 1604    Narrative:      EXAM: XR CHEST 1 VW-     DATE OF EXAM: 4/13/2022 3:48 PM     INDICATION: Chest pain protocol.       COMPARISONS: 07/09/2021      FINDINGS:     Small amount of strandy irregular opacity left lower lobe obscured by  the heart shadow similar to the comparisons. No evidence of pneumonia  or  other acute findings elsewhere. No pneumothorax or pleural effusion.  Normal heart size. No evidence of acute process of the mediastinum..       Impression:         Small amount of left lower lobe opacity compared to be atelectasis, less  likely pneumonia. No evidence of acute process elsewhere.     Electronically Signed By-Norbert Jaeger On:4/13/2022 4:02 PM  This report was finalized on [id number] by  Norbert Jaeger, .           Lab Results (last 24 hours)     Procedure Component Value Units Date/Time    POC Glucose Once [[id number]]  (Normal) Collected: 04/14/22 0716    Specimen: Blood Updated: 04/14/22 0718     Glucose 103 mg/dL      Comment: Serial Number: [id number]:  [id number]       Manual Differential [[id number]]  (Abnormal) Collected: 04/14/22 0322    Specimen: Blood Updated: 04/14/22 0605     Neutrophil % 39.0 %      Lymphocyte % 47.0 %      Monocyte % 9.0 %      Eosinophil % 3.0 %      Bands %  1.0 %      Myelocyte % 1.0 %      Neutrophils Absolute 4.08 10*3/mm3      Lymphocytes Absolute 4.79 10*3/mm3      Monocytes Absolute 0.92 10*3/mm3      Eosinophils Absolute 0.31 10*3/mm3      RBC Morphology Normal     WBC Morphology Normal     Platelet Morphology Normal    CBC & Differential [[id number]] Collected: 04/14/22 0322    Specimen: Blood Updated: 04/14/22 0605    Narrative:      The following orders were created for panel order CBC & Differential.  Procedure                               Abnormality         Status                     ---------                               -----------         ------                     CBC Auto Differential[[id number]]        Normal              Final result               Scan Slide[[id number]]                                       Final result                 Please view results for these tests on the individual orders.    Scan Slide [[id number]] Collected: 04/14/22 0322    Specimen: Blood Updated: 04/14/22 0605     Scan Slide --     Comment: See Manual Differential  Results       CBC Auto Differential [[id number]]  (Normal) Collected: 04/14/22 0322    Specimen: Blood Updated: 04/14/22 0605     WBC 10.20 10*3/mm3      RBC 4.05 10*6/mm3      Hemoglobin 12.4 g/dL      Comment: Result checked         Hematocrit 36.0 %      MCV 88.8 fL      MCH 30.7 pg      MCHC 34.6 g/dL      RDW 13.3 %      RDW-SD 41.6 fl      MPV 7.2 fL      Platelets 207 10*3/mm3     Narrative:      The previously reported component NRBC is no longer being reported. Previous result was 0.1 /100 WBC (Reference Range: 0.0-0.2 /100 WBC) on 4/14/2022 at 0437 EDT.    Basic Metabolic Panel [[id number]]  (Abnormal) Collected: 04/14/22 0322    Specimen: Blood Updated: 04/14/22 0436     Glucose 152 mg/dL      BUN 22 mg/dL      Creatinine 1.00 mg/dL      Sodium 138 mmol/L      Potassium 4.4 mmol/L      Chloride 106 mmol/L      CO2 25.0 mmol/L      Calcium 8.7 mg/dL      BUN/Creatinine Ratio 22.0     Anion Gap 7.0 mmol/L      eGFR 60.0 mL/min/1.73      Comment: National Kidney Foundation and American Society of Nephrology (ASN) Task Force recommended calculation based on the Chronic Kidney Disease Epidemiology Collaboration (CKD-EPI) equation refit without adjustment for race.       Narrative:      GFR Normal >60  Chronic Kidney Disease <60  Kidney Failure <15      POC Glucose Once [[id number]]  (Abnormal) Collected: 04/13/22 2327    Specimen: Blood Updated: 04/13/22 2328     Glucose 115 mg/dL      Comment: Serial Number: [id number]:  [id number]       T4, Free [[id number]]  (Abnormal) Collected: 04/13/22 1705    Specimen: Blood Updated: 04/13/22 2001     Free T4 2.53 ng/dL     Narrative:      Results may be falsely increased if patient taking Biotin.      T3, Free [[id number]] Collected: 04/13/22 1705    Specimen: Blood Updated: 04/13/22 1929    Blood Culture - Blood, Arm, Left [[id number]] Collected: 04/13/22 1839    Specimen: Blood from Arm, Left Updated: 04/13/22 1842    Troponin [[id number]]  (Normal) Collected:  04/13/22 1705    Specimen: Blood Updated: 04/13/22 1738     Troponin T <0.010 ng/mL     Narrative:      Troponin T Reference Range:  <= 0.03 ng/mL-   Negative for AMI  >0.03 ng/mL-     Abnormal for myocardial necrosis.  Clinicians would have to utilize clinical acumen, EKG, Troponin and serial changes to determine if it is an Acute Myocardial Infarction or myocardial injury due to an underlying chronic condition.       Results may be falsely decreased if patient taking Biotin.      Blood Culture - Blood, Arm, Right [[id number]] Collected: 04/13/22 1705    Specimen: Blood from Arm, Right Updated: 04/13/22 1711    POC Lactate [[id number]]  (Normal) Collected: 04/13/22 1709    Specimen: Blood Updated: 04/13/22 1710     Lactate 0.8 mmol/L      Comment: Serial Number: [id number]:  [id number]       Respiratory Panel PCR w/COVID-19(SARS-CoV-2) [facility]Z/SANTIAGO/EVA/PAD/COR/MAD/GABRIELLE In-House, NP Swab in UTM/VTM, 3-4 HR TAT - Swab, Nasopharynx [[id number]]  (Normal) Collected: 04/13/22 1538    Specimen: Swab from Nasopharynx Updated: 04/13/22 1635     ADENOVIRUS, PCR Not Detected     Coronavirus 229E Not Detected     Coronavirus HKU1 Not Detected     Coronavirus NL63 Not Detected     Coronavirus OC43 Not Detected     COVID19 Not Detected     Human Metapneumovirus Not Detected     Human Rhinovirus/Enterovirus Not Detected     Influenza A PCR Not Detected     Influenza B PCR Not Detected     Parainfluenza Virus 1 Not Detected     Parainfluenza Virus 2 Not Detected     Parainfluenza Virus 3 Not Detected     Parainfluenza Virus 4 Not Detected     RSV, PCR Not Detected     Bordetella pertussis pcr Not Detected     Bordetella parapertussis PCR Not Detected     Chlamydophila pneumoniae PCR Not Detected     Mycoplasma pneumo by PCR Not Detected    Narrative:      In the setting of a positive respiratory panel with a viral infection PLUS a negative procalcitonin without other underlying concern for bacterial infection, consider observing off  antibiotics or discontinuation of antibiotics and continue supportive care. If the respiratory panel is positive for atypical bacterial infection (Bordetella pertussis, Chlamydophila pneumoniae, or Mycoplasma pneumoniae), consider antibiotic de-escalation to target atypical bacterial infection.    Urinalysis, Microscopic Only - Urine, Clean Catch [[id number]]  (Abnormal) Collected: 04/13/22 1550    Specimen: Urine, Clean Catch Updated: 04/13/22 1614     RBC, UA 3-5 /HPF      WBC, UA Too Numerous to Count /HPF      Bacteria, UA 4+ /HPF      Squamous Epithelial Cells, UA 3-6 /HPF      Hyaline Casts, UA 3-6 /LPF      Mucus, UA Trace /HPF      Methodology Manual Light Microscopy    Urine Culture - Urine, Urine, Clean Catch [[id number]] Collected: 04/13/22 1550    Specimen: Urine, Clean Catch Updated: 04/13/22 1614    Urinalysis With Culture If Indicated - Urine, Clean Catch [[id number]]  (Abnormal) Collected: 04/13/22 1550    Specimen: Urine, Clean Catch Updated: 04/13/22 1605     Color, UA Dark Yellow     Appearance, UA Cloudy     pH, UA <=5.0     Specific Gravity, UA 1.027     Glucose, UA Negative     Ketones, UA Trace     Bilirubin, UA Small (1+)     Comment: Confirmation testing is unavailable.  A serum bilirubin is recommended for further assessment.        Blood, UA Negative     Protein, UA 30 mg/dL (1+)     Leuk Esterase, UA Moderate (2+)     Nitrite, UA Positive     Urobilinogen, UA 1.0 E.U./dL    [facility] Draw [[id number]] Collected: 04/13/22 1501    Specimen: Blood Updated: 04/13/22 1604    Narrative:      The following orders were created for panel order [facility] Draw.  Procedure                               Abnormality         Status                     ---------                               -----------         ------                     Green Top (Gel)[[id number]]                                  Final result               Lavender Top[[id number]]                                     Final result                  Please view results for these tests on the individual orders.    Green Top (Gel) [[id number]] Collected: 04/13/22 1501    Specimen: Blood Updated: 04/13/22 1604     Extra Tube Hold for add-ons.     Comment: Auto resulted.       TSH [[id number]]  (Abnormal) Collected: 04/13/22 1501    Specimen: Blood Updated: 04/13/22 1602     TSH <0.005 uIU/mL     Protime-INR [[id number]]  (Normal) Collected: 04/13/22 1538    Specimen: Blood Updated: 04/13/22 1601     Protime 10.9 Seconds      INR 1.06    aPTT [[id number]]  (Normal) Collected: 04/13/22 1538    Specimen: Blood Updated: 04/13/22 1601     PTT 24.8 seconds     Troponin [[id number]]  (Normal) Collected: 04/13/22 1501    Specimen: Blood Updated: 04/13/22 1548     Troponin T <0.010 ng/mL     Narrative:      Troponin T Reference Range:  <= 0.03 ng/mL-   Negative for AMI  >0.03 ng/mL-     Abnormal for myocardial necrosis.  Clinicians would have to utilize clinical acumen, EKG, Troponin and serial changes to determine if it is an Acute Myocardial Infarction or myocardial injury due to an underlying chronic condition.       Results may be falsely decreased if patient taking Biotin.      Lavender Top [[id number]] Collected: 04/13/22 1501    Specimen: Blood Updated: 04/13/22 1546    Comprehensive Metabolic Panel [[id number]]  (Abnormal) Collected: 04/13/22 1501    Specimen: Blood Updated: 04/13/22 1542     Glucose 105 mg/dL      BUN 25 mg/dL      Creatinine 1.27 mg/dL      Sodium 140 mmol/L      Potassium 4.7 mmol/L      Chloride 103 mmol/L      CO2 25.0 mmol/L      Calcium 9.7 mg/dL      Total Protein 6.7 g/dL      Albumin 4.00 g/dL      ALT (SGPT) 16 U/L      AST (SGOT) 22 U/L      Alkaline Phosphatase 84 U/L      Total Bilirubin 0.7 mg/dL      Globulin 2.7 gm/dL      A/G Ratio 1.5 g/dL      BUN/Creatinine Ratio 19.7     Anion Gap 12.0 mmol/L      eGFR 45.0 mL/min/1.73      Comment: National Kidney Foundation and American Society of Nephrology (ASN) Task Force recommended  calculation based on the Chronic Kidney Disease Epidemiology Collaboration (CKD-EPI) equation refit without adjustment for race.       Narrative:      GFR Normal >60  Chronic Kidney Disease <60  Kidney Failure <15      Magnesium [[id number]]  (Normal) Collected: 04/13/22 1501    Specimen: Blood Updated: 04/13/22 1542     Magnesium 1.9 mg/dL     POC Glucose Once [[id number]]  (Normal) Collected: 04/13/22 1535    Specimen: Blood Updated: 04/13/22 1537     Glucose 94 mg/dL      Comment: Serial Number: [id number]:  [id number]       CBC & Differential [[id number]]  (Abnormal) Collected: 04/13/22 1501    Specimen: Blood Updated: 04/13/22 1526    Narrative:      The following orders were created for panel order CBC & Differential.  Procedure                               Abnormality         Status                     ---------                               -----------         ------                     CBC Auto Differential[[id number]]        Abnormal            Final result                 Please view results for these tests on the individual orders.    CBC Auto Differential [[id number]]  (Abnormal) Collected: 04/13/22 1501    Specimen: Blood Updated: 04/13/22 1526     WBC 10.90 10*3/mm3      RBC 4.92 10*6/mm3      Hemoglobin 15.2 g/dL      Hematocrit 43.7 %      MCV 88.9 fL      MCH 30.9 pg      MCHC 34.7 g/dL      RDW 13.6 %      RDW-SD 42.0 fl      MPV 7.2 fL      Platelets 297 10*3/mm3      Neutrophil % 51.7 %      Lymphocyte % 39.5 %      Monocyte % 6.7 %      Eosinophil % 1.8 %      Basophil % 0.3 %      Neutrophils, Absolute 5.60 10*3/mm3      Lymphocytes, Absolute 4.30 10*3/mm3      Monocytes, Absolute 0.70 10*3/mm3      Eosinophils, Absolute 0.20 10*3/mm3      Basophils, Absolute 0.00 10*3/mm3      nRBC 0.1 /100 WBC            I reviewed the patient's new clinical results.    Assessment/Plan       Generalized weakness       UTI- culture pending-continue rocephin    Weakness- PT , This is likley secondary to UTI  . This am pt is improved  Diabetes with neuropathy - continue Neurontin, SSI while inpt   COPD- on trelegy at home, will get symbicort here , nebs prn   Mood Disorder- xanax prn and cymbalta  DM with HTN- continue lopressor - holding losartan for now with ADAMARIS and bp readings low.   ADAMARIS- baseline Cr 0.9-  Was 1.27 in ER- this am after fluids in ER Cr is 1.0. Lytes stable   Low TSH- will work up as outpatient  DM with HLD- continue statin     I discussed the patients findings and my recommendations with patient.     Serene Walsh, APRN  04/14/22  09:57 EDT

## 2022-04-14 NOTE — PLAN OF CARE
Goal Outcome Evaluation:  72 y/o F presents from home with c/o generalized weakness along with chronic cough. PMH includes COPD, DM, and hyperlipidemia. Pt is diagnosed with generalized weakness and acute UTI. At baseline, pt is active individual who has a full time job. Pt lives alone and independent with all ADLs including driving. This date, pt completed all functional mobility independently with no difficulty. Dynamic gait assessed with pt displaying no instability. Anticipate pt to return home when medically appropriate.

## 2022-04-14 NOTE — THERAPY EVALUATION
Patient Name: Dyana Montemayor  : 1950    MRN: [id number]                              Today's Date: 2022       Admit Date: 2022    Visit Dx:     ICD-10-CM ICD-9-CM   1. Generalized weakness  R53.1 780.79   2. Low TSH level  R79.89 794.5   3. Hypotension, unspecified hypotension type  I95.9 458.9   4. Acute UTI  N39.0 599.0   5. ADAMARIS (acute kidney injury) (HCC)  N17.9 584.9     Patient Active Problem List   Diagnosis   • Sepsis (HCC)   • Non-STEMI (non-ST elevated myocardial infarction) ([facility])   • Encounter for long-term (current) use of other medications   • Hyperlipidemia   • Numbness and tingling sensation of skin   • Peripheral vascular disease (HCC)   • COPD (chronic obstructive pulmonary disease) (HCC)   • Abnormal chest CT   • Chronic intractable headache   • Diabetes mellitus with neuropathy (HCC)   • Generalized weakness   • Acute UTI   • ADAMARIS (acute kidney injury) (HCC)   • Mood disorder (HCC)   • Diabetes mellitus with coincident hypertension (HCC)   • Low TSH level   • Type 2 diabetes mellitus with hyperlipidemia (HCC)     Past Medical History:   Diagnosis Date   • COPD (chronic obstructive pulmonary disease) (HCC)    • Diabetes mellitus (HCC)    • Hyperlipidemia      Past Surgical History:   Procedure Laterality Date   • CARDIAC CATHETERIZATION Right 10/7/2019    Procedure: Left Heart Cath and coronary angiogram;  Surgeon: Ishan June MD;  Location: [facility] CATH INVASIVE LOCATION;  Service: Cardiovascular      General Information     Row Name 22 1653          Physical Therapy Time and Intention    Document Type evaluation  -SS (r) SK (t) SS (c)     Mode of Treatment individual therapy;physical therapy  -SS (r) SK (t) SS (c)     Row Name 22 1653          General Information    Patient Profile Reviewed yes  -SS (r) SK (t) SS (c)     Prior Level of Function independent:;ADL's;work;gait;driving;community mobility  Pt is active and independent with all ADLs including driving. Pt  has a full time job as a .  -SS (r) SK (t) SS (c)     Existing Precautions/Restrictions no known precautions/restrictions  -SS (r) SK (t) SS (c)     Row Name 04/14/22 1653          Living Environment    People in Home alone  -SS (r) SK (t) SS (c)     Row Name 04/14/22 1653          Home Main Entrance    Number of Stairs, Main Entrance six  -SS (r) SK (t) SS (c)     Stair Railings, Main Entrance railings safe and in good condition  -SS (r) SK (t) SS (c)     Row Name 04/14/22 1653          Stairs Within Home, Primary    Number of Stairs, Within Home, Primary none  -SS (r) SK (t) SS (c)     Row Name 04/14/22 1653          Cognition    Orientation Status (Cognition) oriented x 4  -SS (r) SK (t) SS (c)           User Key  (r) = Recorded By, (t) = Taken By, (c) = Cosigned By    Initials Name Provider Type    SS Cecille Arzate, PT Physical Therapist    Judti Arauz, PT Student PT Student               Mobility     Row Name 04/14/22 1654          Bed Mobility    Bed Mobility bed mobility (all) activities  -SS (r) SK (t) SS (c)     All Activities, [place] (Bed Mobility) modified independence  -SS (r) SK (t) SS (c)     Assistive Device (Bed Mobility) bed rails;head of bed elevated  -SS (r) SK (t) SS (c)     Row Name 04/14/22 1654          Bed-Chair Transfer    Bed-Chair [place] (Transfers) independent;1 person to manage equipment  -SS (r) SK (t) SS (c)     Row Name 04/14/22 1654          Sit-Stand Transfer    Sit-Stand [place] (Transfers) independent  -SS (r) SK (t) SS (c)     Row Name 04/14/22 1654          Gait/Stairs (Locomotion)    [place] Level (Gait) independent;1 person to manage equipment  Dynamic gait assessed and pt demonstrated good balance throughout.  -SS (r) SK (t) SS (c)     Distance in Feet (Gait) 100  -SS (r) SK (t) SS (c)           User Key  (r) = Recorded By, (t) = Taken By, (c) = Cosigned By    Initials Name Provider Type    SS Cecille Arzate, PT Physical  Therapist    Judit Arauz, PT Student PT Student               Obj/Interventions     Row Name 04/14/22 1655          Range of Motion Comprehensive    General Range of Motion no range of motion deficits identified  -SS (r) SK (t) SS (c)     Row Name 04/14/22 1655          Strength Comprehensive (MMT)    General Manual Muscle Testing (MMT) Assessment no strength deficits identified  -SS (r) SK (t) SS (c)     Row Name 04/14/22 1655          Balance    Balance Assessment sitting static balance;sitting dynamic balance;standing static balance;standing dynamic balance  -SS (r) SK (t) SS (c)     Static Sitting Balance independent  -SS (r) SK (t) SS (c)     Dynamic Sitting Balance independent  -SS (r) SK (t) SS (c)     Static Standing Balance independent  -SS (r) SK (t) SS (c)     Dynamic Standing Balance independent  -SS (r) SK (t) SS (c)     Row Name 04/14/22 1655          Sensory Assessment (Somatosensory)    Sensory Assessment (Somatosensory) sensation intact  -SS (r) SK (t) SS (c)           User Key  (r) = Recorded By, (t) = Taken By, (c) = Cosigned By    Initials Name Provider Type    SS Cecille Arzate, PT Physical Therapist    Judit Arauz, PT Student PT Student               Goals/Plan    No documentation.                Clinical Impression     Row Name 04/14/22 1655          Pain    Pretreatment Pain Rating 0/10 - no pain  -SS (r) SK (t) SS (c)     Posttreatment Pain Rating 0/10 - no pain  -SS (r) SK (t) SS (c)     Row Name 04/14/22 1655          Plan of Care Review    Plan of Care Reviewed With patient  -SS (r) SK (t) SS (c)     Progress improving  -SS (r) SK (t) SS (c)     Outcome Evaluation 70 y/o F presents from home with c/o generalized weakness along with chronic cough. PMH includes COPD, DM, and hyperlipidemia. Pt is diagnosed with generalized weakness and acute UTI. At baseline, pt is active individual who has a full time job. Pt lives alone and independent with all ADLs including driving.  This date, pt completed all functional mobility independently with no difficulty. Dynamic gait assessed with pt displaying no instability. Anticipate pt to return home when medically appropriate.  -SS (r) SK (t) SS (c)     Row Name 04/14/22 1655          Therapy Assessment/Plan (PT)    Therapy Frequency (PT) evaluation only  -SS (r) SK (t) SS (c)     Row Name 04/14/22 1655          Vital Signs    Pre Systolic BP Rehab 137  -SS (r) SK (t) SS (c)     Pre Treatment Diastolic BP 75  -SS (r) SK (t) SS (c)     Pre SpO2 (%) 94  -SS (r) SK (t) SS (c)     O2 Delivery Pre Treatment room air  -SS (r) SK (t) SS (c)     O2 Delivery Intra Treatment room air  -SS (r) SK (t) SS (c)     O2 Delivery Post Treatment room air  -SS (r) SK (t) SS (c)     Pre Patient Position Supine  -SS (r) SK (t) SS (c)     Intra Patient Position Standing  -SS (r) SK (t) SS (c)     Post Patient Position Supine  -SS (r) SK (t) SS (c)     Row Name 04/14/22 1655          Positioning and Restraints    Pre-Treatment Position in bed  -SS (r) SK (t) SS (c)     Post Treatment Position bed  -SS (r) SK (t) SS (c)     In Bed notified nsg;supine;with family/caregiver;encouraged to call for assist;call light within reach  -SS (r) SK (t) SS (c)           User Key  (r) = Recorded By, (t) = Taken By, (c) = Cosigned By    Initials Name Provider Type     Cecille Arzate, PT Physical Therapist    Judit Arauz, PT Student PT Student               Outcome Measures    No documentation.                              Physical Therapy Education                 Title: PT OT SLP Therapies (Done)     Topic: Physical Therapy (Done)     Point: Mobility training (Done)     Learning Progress Summary           Patient Acceptance, E, VU by SK at 4/14/2022 1657                               User Key     Initials Effective Dates Name Provider Type Discipline    SK 01/25/22 -  Judit Schaefer, PT Student PT Student PT              PT Recommendation and Plan     Plan of Care  Reviewed With: patient  Progress: improving  Outcome Evaluation: 70 y/o F presents from home with c/o generalized weakness along with chronic cough. PMH includes COPD, DM, and hyperlipidemia. Pt is diagnosed with generalized weakness and acute UTI. At baseline, pt is active individual who has a full time job. Pt lives alone and independent with all ADLs including driving. This date, pt completed all functional mobility independently with no difficulty. Dynamic gait assessed with pt displaying no instability. Anticipate pt to return home when medically appropriate.     Time Calculation:    PT Charges     Row Name 04/14/22 1652             Time Calculation    Start Time 1606  -SS (r) SK (t) SS (c)      Stop Time 1624  -SS (r) SK (t) SS (c)      Time Calculation (min) 18 min  -SS (r) SK (t)      PT Received On 04/14/22  -SS (r) SK (t) SS (c)            User Key  (r) = Recorded By, (t) = Taken By, (c) = Cosigned By    Initials Name Provider Type     Cecille Arzate, PT Physical Therapist    Judit Arauz, PT Student PT Student              Therapy Charges for Today     Code Description Service Date Service Provider Modifiers Qty    [id number] HC PT EVAL LOW COMPLEXITY 3 4/14/2022 Judit Schaefer PT Student GP 1               GREGOR Gonzalez  4/14/2022

## 2022-04-14 NOTE — PLAN OF CARE
Goal Outcome Evaluation:  Plan of Care Reviewed With: patient        Progress: no change  Outcome Evaluation: Pt reports she was told to come to the ER after having low blood pressure during an urgent care visit. Pt also reports increased weakness the past few weeks. Vitals have been stable since arriving on the floor, pt currently getting IV fluids vitals have been stable, on room air. Will continue to monitor.

## 2022-04-14 NOTE — PLAN OF CARE
Goal Outcome Evaluation:              Outcome Evaluation: Patient pleasant and cooperative. Fluids infusing. No complaints. States she is feeling better.

## 2022-04-15 VITALS
HEART RATE: 88 BPM | TEMPERATURE: 97.6 F | BODY MASS INDEX: 26.41 KG/M2 | WEIGHT: 143.52 LBS | OXYGEN SATURATION: 99 % | SYSTOLIC BLOOD PRESSURE: 164 MMHG | RESPIRATION RATE: 18 BRPM | DIASTOLIC BLOOD PRESSURE: 72 MMHG | HEIGHT: 62 IN

## 2022-04-15 LAB
ANION GAP SERPL CALCULATED.3IONS-SCNC: 8 MMOL/L (ref 5–15)
BACTERIA SPEC AEROBE CULT: ABNORMAL
BUN SERPL-MCNC: 19 MG/DL (ref 8–23)
BUN/CREAT SERPL: 22.1 (ref 7–25)
CALCIUM SPEC-SCNC: 9.3 MG/DL (ref 8.6–10.5)
CHLORIDE SERPL-SCNC: 106 MMOL/L (ref 98–107)
CO2 SERPL-SCNC: 27 MMOL/L (ref 22–29)
CREAT SERPL-MCNC: 0.86 MG/DL (ref 0.57–1)
EGFRCR SERPLBLD CKD-EPI 2021: 71.9 ML/MIN/1.73
GLUCOSE BLDC GLUCOMTR-MCNC: 126 MG/DL (ref 70–105)
GLUCOSE BLDC GLUCOMTR-MCNC: 171 MG/DL (ref 70–105)
GLUCOSE BLDC GLUCOMTR-MCNC: 60 MG/DL (ref 70–105)
GLUCOSE SERPL-MCNC: 78 MG/DL (ref 65–99)
POTASSIUM SERPL-SCNC: 4.4 MMOL/L (ref 3.5–5.2)
SODIUM SERPL-SCNC: 141 MMOL/L (ref 136–145)
WHOLE BLOOD HOLD SPECIMEN: NORMAL

## 2022-04-15 PROCEDURE — 63710000001 INSULIN LISPRO (HUMAN) PER 5 UNITS: Performed by: INTERNAL MEDICINE

## 2022-04-15 PROCEDURE — 82962 GLUCOSE BLOOD TEST: CPT

## 2022-04-15 PROCEDURE — 94799 UNLISTED PULMONARY SVC/PX: CPT

## 2022-04-15 PROCEDURE — 80048 BASIC METABOLIC PNL TOTAL CA: CPT | Performed by: INTERNAL MEDICINE

## 2022-04-15 PROCEDURE — 94640 AIRWAY INHALATION TREATMENT: CPT

## 2022-04-15 PROCEDURE — G0378 HOSPITAL OBSERVATION PER HR: HCPCS

## 2022-04-15 PROCEDURE — 86800 THYROGLOBULIN ANTIBODY: CPT | Performed by: INTERNAL MEDICINE

## 2022-04-15 RX ORDER — FLUCONAZOLE 150 MG/1
150 TABLET ORAL ONCE
Qty: 1 TABLET | Refills: 0 | Status: SHIPPED | OUTPATIENT
Start: 2022-04-15 | End: 2022-04-15

## 2022-04-15 RX ORDER — NICOTINE 21 MG/24HR
1 PATCH, TRANSDERMAL 24 HOURS TRANSDERMAL
Status: DISCONTINUED | OUTPATIENT
Start: 2022-04-15 | End: 2022-04-15 | Stop reason: HOSPADM

## 2022-04-15 RX ORDER — AMOXICILLIN 500 MG/1
500 CAPSULE ORAL 2 TIMES DAILY
Qty: 14 CAPSULE | Refills: 0 | Status: SHIPPED | OUTPATIENT
Start: 2022-04-15 | End: 2022-04-22

## 2022-04-15 RX ADMIN — NICOTINE 1 PATCH: 14 PATCH, EXTENDED RELEASE TRANSDERMAL at 08:36

## 2022-04-15 RX ADMIN — LINAGLIPTIN 5 MG: 5 TABLET, FILM COATED ORAL at 08:35

## 2022-04-15 RX ADMIN — INSULIN LISPRO 3 UNITS: 100 INJECTION, SOLUTION INTRAVENOUS; SUBCUTANEOUS at 12:03

## 2022-04-15 RX ADMIN — MIRABEGRON 50 MG: 25 TABLET, FILM COATED, EXTENDED RELEASE ORAL at 08:34

## 2022-04-15 RX ADMIN — METOPROLOL TARTRATE 50 MG: 50 TABLET, FILM COATED ORAL at 08:35

## 2022-04-15 RX ADMIN — PANTOPRAZOLE SODIUM 40 MG: 40 TABLET, DELAYED RELEASE ORAL at 06:06

## 2022-04-15 RX ADMIN — NICOTINE 1 PATCH: 21 PATCH, EXTENDED RELEASE TRANSDERMAL at 09:38

## 2022-04-15 RX ADMIN — GABAPENTIN 300 MG: 300 CAPSULE ORAL at 06:06

## 2022-04-15 RX ADMIN — ROSUVASTATIN 20 MG: 10 TABLET, FILM COATED ORAL at 08:34

## 2022-04-15 RX ADMIN — BUDESONIDE AND FORMOTEROL FUMARATE DIHYDRATE 2 PUFF: 160; 4.5 AEROSOL RESPIRATORY (INHALATION) at 07:29

## 2022-04-15 RX ADMIN — SODIUM CHLORIDE 75 ML/HR: 9 INJECTION, SOLUTION INTRAVENOUS at 04:18

## 2022-04-15 RX ADMIN — IPRATROPIUM BROMIDE AND ALBUTEROL SULFATE 3 ML: .5; 3 SOLUTION RESPIRATORY (INHALATION) at 08:50

## 2022-04-15 RX ADMIN — DULOXETINE HYDROCHLORIDE 30 MG: 30 CAPSULE, DELAYED RELEASE ORAL at 08:35

## 2022-04-15 RX ADMIN — ASPIRIN 81 MG: 81 TABLET, COATED ORAL at 08:34

## 2022-04-15 NOTE — CASE MANAGEMENT/SOCIAL WORK
Continued Stay Note  VARUN Grubbs     Patient Name: Dyana Montemayor  MRN: [id number]  Today's Date: 4/15/2022    Admit Date: 4/13/2022     Discharge Plan     Row Name 04/15/22 1143       Plan    Plan Comments Discharge orders noted    Final Discharge Disposition Code 01 - home or self-care    Final Note Home                                          Emma Kulkarni RN

## 2022-04-15 NOTE — DISCHARGE SUMMARY
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]   Low TSH level [R79.89]   Type 2 diabetes mellitus with hyperlipidemia (HCC) [E11.69, E78.5]   Generalized weakness [R53.1]   Diabetes mellitus with neuropathy (HCC) [E11.40]   COPD (chronic obstructive pulmonary disease) (HCC) [J44.9]       Presenting Problem/History of Present Illness  Active Hospital Problems    Diagnosis  POA   • **Acute UTI [N39.0]  Yes   • ADAMARIS (acute kidney injury) (HCC) [N17.9]  Yes   • Mood disorder (HCC) [F39]  Yes   • 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          Hospital Course  Patient is a 71 y.o. female with history of diabetes and COPD who presented with fever, weakness, confusion x1 day.  She was found to have urinary tract infection. She improved promptly with IV antibiotics.  Mental status returned to baseline.  She was able to ambulate about the room independently.  She is being discharged home on oral antibiotics to the care of her family.  Patient was noted to have low TSH with elevated free T4 and T3.  Thyroglobulin antibody is pending at the time of discharge.  Thyroid ultrasound is being scheduled as an outpatient.  She will need outpatient follow-up of her thyroid issues.  She is asymptomatic and denies palpitations, diarrhea, weight loss, difficulty swallowing.    Procedures Performed         Consults:   Consults       Date and Time Order Name Status Description    4/13/2022  5:10 PM Family  Medicine Consult              Pertinent Test Results:    Lab Results (most recent)       Procedure Component Value Units Date/Time    POC Glucose Once [[id number]]  (Abnormal) Collected: 04/15/22 1124    Specimen: Blood Updated: 04/15/22 1125     Glucose 171 mg/dL      Comment: Serial Number: [id number]:  [id number]       Extra Tubes [[id number]] Collected: 04/15/22 0925    Specimen: Blood, Venous Line Updated: 04/15/22 1033    Narrative:      The following orders were created for panel order Extra Tubes.  Procedure                               Abnormality         Status                     ---------                               -----------         ------                     Lavender Top[[id number]]                                     Final result                 Please view results for these tests on the individual orders.    Lavender Top [[id number]] Collected: 04/15/22 0925    Specimen: Blood Updated: 04/15/22 1033     Extra Tube hold for add-on     Comment: Auto resulted       Basic Metabolic Panel [[id number]]  (Normal) Collected: 04/15/22 0925    Specimen: Blood Updated: 04/15/22 0959     Glucose 78 mg/dL      BUN 19 mg/dL      Creatinine 0.86 mg/dL      Sodium 141 mmol/L      Potassium 4.4 mmol/L      Chloride 106 mmol/L      CO2 27.0 mmol/L      Calcium 9.3 mg/dL      BUN/Creatinine Ratio 22.1     Anion Gap 8.0 mmol/L      eGFR 71.9 mL/min/1.73      Comment: National Kidney Foundation and American Society of Nephrology (ASN) Task Force recommended calculation based on the Chronic Kidney Disease Epidemiology Collaboration (CKD-EPI) equation refit without adjustment for race.       Narrative:      GFR Normal >60  Chronic Kidney Disease <60  Kidney Failure <15      Urine Culture - Urine, Urine, Clean Catch [[id number]]  (Abnormal)  (Susceptibility) Collected: 04/13/22 1550    Specimen: Urine, Clean Catch Updated: 04/15/22 0941     Urine Culture >100,000 CFU/mL Escherichia coli    Susceptibility         Escherichia coli      BERNIE      Ampicillin Susceptible      Ampicillin + Sulbactam Susceptible      Cefazolin Susceptible      Cefepime Susceptible      Ceftazidime Susceptible      Ceftriaxone Susceptible      Gentamicin Susceptible      Levofloxacin Susceptible      Nitrofurantoin Susceptible      Piperacillin + Tazobactam Susceptible      Trimethoprim + Sulfamethoxazole Susceptible                    Linear View                       POC Glucose Once [[id number]]  (Abnormal) Collected: 04/15/22 0831    Specimen: Blood Updated: 04/15/22 0832     Glucose 126 mg/dL      Comment: Serial Number: [id number]:  [id number]       Blood Culture - Blood, Arm, Left [[id number]]  (Normal) Collected: 04/13/22 1839    Specimen: Blood from Arm, Left Updated: 04/14/22 1847     Blood Culture No growth at 24 hours    Blood Culture - Blood, Arm, Right [[id number]]  (Normal) Collected: 04/13/22 1705    Specimen: Blood from Arm, Right Updated: 04/14/22 1717     Blood Culture No growth at 24 hours    T3, Free [[id number]]  (Abnormal) Collected: 04/13/22 1705    Specimen: Blood Updated: 04/14/22 1123     T3, Free 6.05 pg/mL     Narrative:      Results may be falsely increased if patient taking Biotin.      Manual Differential [[id number]]  (Abnormal) Collected: 04/14/22 0322    Specimen: Blood Updated: 04/14/22 0605     Neutrophil % 39.0 %      Lymphocyte % 47.0 %      Monocyte % 9.0 %      Eosinophil % 3.0 %      Bands %  1.0 %      Myelocyte % 1.0 %      Neutrophils Absolute 4.08 10*3/mm3      Lymphocytes Absolute 4.79 10*3/mm3      Monocytes Absolute 0.92 10*3/mm3      Eosinophils Absolute 0.31 10*3/mm3      RBC Morphology Normal     WBC Morphology Normal     Platelet Morphology Normal    CBC & Differential [[id number]] Collected: 04/14/22 0322    Specimen: Blood Updated: 04/14/22 0605    Narrative:      The following orders were created for panel order CBC & Differential.  Procedure                               Abnormality          Status                     ---------                               -----------         ------                     CBC Auto Differential[[id number]]        Normal              Final result               Scan Slide[[id number]]                                       Final result                 Please view results for these tests on the individual orders.    Scan Slide [[id number]] Collected: 04/14/22 0322    Specimen: Blood Updated: 04/14/22 0605     Scan Slide --     Comment: See Manual Differential Results       CBC Auto Differential [[id number]]  (Normal) Collected: 04/14/22 0322    Specimen: Blood Updated: 04/14/22 0605     WBC 10.20 10*3/mm3      RBC 4.05 10*6/mm3      Hemoglobin 12.4 g/dL      Comment: Result checked         Hematocrit 36.0 %      MCV 88.8 fL      MCH 30.7 pg      MCHC 34.6 g/dL      RDW 13.3 %      RDW-SD 41.6 fl      MPV 7.2 fL      Platelets 207 10*3/mm3     Narrative:      The previously reported component NRBC is no longer being reported. Previous result was 0.1 /100 WBC (Reference Range: 0.0-0.2 /100 WBC) on 4/14/2022 at 0437 EDT.    Basic Metabolic Panel [[id number]]  (Abnormal) Collected: 04/14/22 0322    Specimen: Blood Updated: 04/14/22 0436     Glucose 152 mg/dL      BUN 22 mg/dL      Creatinine 1.00 mg/dL      Sodium 138 mmol/L      Potassium 4.4 mmol/L      Chloride 106 mmol/L      CO2 25.0 mmol/L      Calcium 8.7 mg/dL      BUN/Creatinine Ratio 22.0     Anion Gap 7.0 mmol/L      eGFR 60.0 mL/min/1.73      Comment: National Kidney Foundation and American Society of Nephrology (ASN) Task Force recommended calculation based on the Chronic Kidney Disease Epidemiology Collaboration (CKD-EPI) equation refit without adjustment for race.       Narrative:      GFR Normal >60  Chronic Kidney Disease <60  Kidney Failure <15      T4, Free [[id number]]  (Abnormal) Collected: 04/13/22 1705    Specimen: Blood Updated: 04/13/22 2001     Free T4 2.53 ng/dL     Narrative:      Results may be falsely  increased if patient taking Biotin.      Troponin [[id number]]  (Normal) Collected: 04/13/22 1705    Specimen: Blood Updated: 04/13/22 1738     Troponin T <0.010 ng/mL     Narrative:      Troponin T Reference Range:  <= 0.03 ng/mL-   Negative for AMI  >0.03 ng/mL-     Abnormal for myocardial necrosis.  Clinicians would have to utilize clinical acumen, EKG, Troponin and serial changes to determine if it is an Acute Myocardial Infarction or myocardial injury due to an underlying chronic condition.       Results may be falsely decreased if patient taking Biotin.      POC Lactate [[id number]]  (Normal) Collected: 04/13/22 1709    Specimen: Blood Updated: 04/13/22 1710     Lactate 0.8 mmol/L      Comment: Serial Number: [id number]:  [id number]       Respiratory Panel PCR w/COVID-19(SARS-CoV-2) [facility]ANTIAGO/EVA/PAD/COR/MAD/GABRIELLE In-House, NP Swab in UTM/VTM, 3-4 HR TAT - Swab, Nasopharynx [[id number]]  (Normal) Collected: 04/13/22 1538    Specimen: Swab from Nasopharynx Updated: 04/13/22 1635     ADENOVIRUS, PCR Not Detected     Coronavirus 229E Not Detected     Coronavirus HKU1 Not Detected     Coronavirus NL63 Not Detected     Coronavirus OC43 Not Detected     COVID19 Not Detected     Human Metapneumovirus Not Detected     Human Rhinovirus/Enterovirus Not Detected     Influenza A PCR Not Detected     Influenza B PCR Not Detected     Parainfluenza Virus 1 Not Detected     Parainfluenza Virus 2 Not Detected     Parainfluenza Virus 3 Not Detected     Parainfluenza Virus 4 Not Detected     RSV, PCR Not Detected     Bordetella pertussis pcr Not Detected     Bordetella parapertussis PCR Not Detected     Chlamydophila pneumoniae PCR Not Detected     Mycoplasma pneumo by PCR Not Detected    Narrative:      In the setting of a positive respiratory panel with a viral infection PLUS a negative procalcitonin without other underlying concern for bacterial infection, consider observing off antibiotics or discontinuation of antibiotics  and continue supportive care. If the respiratory panel is positive for atypical bacterial infection (Bordetella pertussis, Chlamydophila pneumoniae, or Mycoplasma pneumoniae), consider antibiotic de-escalation to target atypical bacterial infection.    Urinalysis, Microscopic Only - Urine, Clean Catch [[id number]]  (Abnormal) Collected: 04/13/22 1550    Specimen: Urine, Clean Catch Updated: 04/13/22 1614     RBC, UA 3-5 /HPF      WBC, UA Too Numerous to Count /HPF      Bacteria, UA 4+ /HPF      Squamous Epithelial Cells, UA 3-6 /HPF      Hyaline Casts, UA 3-6 /LPF      Mucus, UA Trace /HPF      Methodology Manual Light Microscopy    Urinalysis With Culture If Indicated - Urine, Clean Catch [[id number]]  (Abnormal) Collected: 04/13/22 1550    Specimen: Urine, Clean Catch Updated: 04/13/22 1605     Color, UA Dark Yellow     Appearance, UA Cloudy     pH, UA <=5.0     Specific Gravity, UA 1.027     Glucose, UA Negative     Ketones, UA Trace     Bilirubin, UA Small (1+)     Comment: Confirmation testing is unavailable.  A serum bilirubin is recommended for further assessment.        Blood, UA Negative     Protein, UA 30 mg/dL (1+)     Leuk Esterase, UA Moderate (2+)     Nitrite, UA Positive     Urobilinogen, UA 1.0 E.U./dL    [place] Draw [[id number]] Collected: 04/13/22 1501    Specimen: Blood Updated: 04/13/22 1604    Narrative:      The following orders were created for panel order [place] Draw.  Procedure                               Abnormality         Status                     ---------                               -----------         ------                     Green Top (Gel)[[id number]]                                  Final result               Lavender Top[[id number]]                                     Final result                 Please view results for these tests on the individual orders.    Green Top (Gel) [[id number]] Collected: 04/13/22 1501    Specimen: Blood Updated: 04/13/22 1604     Extra Tube Hold for  add-ons.     Comment: Auto resulted.       TSH [[id number]]  (Abnormal) Collected: 04/13/22 1501    Specimen: Blood Updated: 04/13/22 1602     TSH <0.005 uIU/mL     Protime-INR [[id number]]  (Normal) Collected: 04/13/22 1538    Specimen: Blood Updated: 04/13/22 1601     Protime 10.9 Seconds      INR 1.06    aPTT [[id number]]  (Normal) Collected: 04/13/22 1538    Specimen: Blood Updated: 04/13/22 1601     PTT 24.8 seconds     Troponin [[id number]]  (Normal) Collected: 04/13/22 1501    Specimen: Blood Updated: 04/13/22 1548     Troponin T <0.010 ng/mL     Narrative:      Troponin T Reference Range:  <= 0.03 ng/mL-   Negative for AMI  >0.03 ng/mL-     Abnormal for myocardial necrosis.  Clinicians would have to utilize clinical acumen, EKG, Troponin and serial changes to determine if it is an Acute Myocardial Infarction or myocardial injury due to an underlying chronic condition.       Results may be falsely decreased if patient taking Biotin.      Lavender Top [[id number]] Collected: 04/13/22 1501    Specimen: Blood Updated: 04/13/22 1546    Comprehensive Metabolic Panel [[id number]]  (Abnormal) Collected: 04/13/22 1501    Specimen: Blood Updated: 04/13/22 1542     Glucose 105 mg/dL      BUN 25 mg/dL      Creatinine 1.27 mg/dL      Sodium 140 mmol/L      Potassium 4.7 mmol/L      Chloride 103 mmol/L      CO2 25.0 mmol/L      Calcium 9.7 mg/dL      Total Protein 6.7 g/dL      Albumin 4.00 g/dL      ALT (SGPT) 16 U/L      AST (SGOT) 22 U/L      Alkaline Phosphatase 84 U/L      Total Bilirubin 0.7 mg/dL      Globulin 2.7 gm/dL      A/G Ratio 1.5 g/dL      BUN/Creatinine Ratio 19.7     Anion Gap 12.0 mmol/L      eGFR 45.0 mL/min/1.73      Comment: National Kidney Foundation and American Society of Nephrology (ASN) Task Force recommended calculation based on the Chronic Kidney Disease Epidemiology Collaboration (CKD-EPI) equation refit without adjustment for race.       Narrative:      GFR Normal >60  Chronic Kidney Disease  <60  Kidney Failure <15      Magnesium [[id number]]  (Normal) Collected: 04/13/22 1501    Specimen: Blood Updated: 04/13/22 1542     Magnesium 1.9 mg/dL     CBC & Differential [[id number]]  (Abnormal) Collected: 04/13/22 1501    Specimen: Blood Updated: 04/13/22 1526    Narrative:      The following orders were created for panel order CBC & Differential.  Procedure                               Abnormality         Status                     ---------                               -----------         ------                     CBC Auto Differential[[id number]]        Abnormal            Final result                 Please view results for these tests on the individual orders.    CBC Auto Differential [[id number]]  (Abnormal) Collected: 04/13/22 1501    Specimen: Blood Updated: 04/13/22 1526     WBC 10.90 10*3/mm3      RBC 4.92 10*6/mm3      Hemoglobin 15.2 g/dL      Hematocrit 43.7 %      MCV 88.9 fL      MCH 30.9 pg      MCHC 34.7 g/dL      RDW 13.6 %      RDW-SD 42.0 fl      MPV 7.2 fL      Platelets 297 10*3/mm3      Neutrophil % 51.7 %      Lymphocyte % 39.5 %      Monocyte % 6.7 %      Eosinophil % 1.8 %      Basophil % 0.3 %      Neutrophils, Absolute 5.60 10*3/mm3      Lymphocytes, Absolute 4.30 10*3/mm3      Monocytes, Absolute 0.70 10*3/mm3      Eosinophils, Absolute 0.20 10*3/mm3      Basophils, Absolute 0.00 10*3/mm3      nRBC 0.1 /100 WBC              Results for orders placed during the hospital encounter of 09/30/19    Adult Transthoracic Echo Complete W/ Cont if Necessary Per Protocol    Interpretation Summary  · Left ventricular systolic function is normal.  · Moderate mitral valve regurgitation is present  · Mild to moderate tricuspid valve regurgitation is present.  · LV ejection fraction about 60 to 65%  · No pericardial effusion noted              Condition on Discharge: Improved, stable    Vital Signs  Temp:  [97.6 °F (36.4 °C)-98.7 °F (37.1 °C)] 97.6 °F (36.4 °C)  Heart Rate:  [70-88] 88  Resp:   [16-18] 18  BP: (137-164)/(68-75) 164/72    Physical Exam:     General Appearance:    Alert, cooperative, in no acute distress   Head:    Normocephalic, without obvious abnormality, atraumatic   Eyes:            Lids and lashes normal, conjunctivae and sclerae normal, no   icterus, no pallor, corneas clear, PERRLA   Ears:    Ears appear intact with no abnormalities noted   Throat:   No oral lesions, no thrush, oral mucosa moist   Neck:   No adenopathy, supple, trachea midline, no thyromegaly, no   carotid bruit, no JVD   Lungs:     Clear to auscultation,respirations regular, even and                  unlabored    Heart:    Regular rhythm and normal rate, normal S1 and S2, no            murmur, no gallop, no rub, no click   Chest Wall:    No abnormalities observed   Abdomen:     Normal bowel sounds, no masses, no organomegaly, soft        non-tender, non-distended, no guarding, no rebound                tenderness   Extremities:   Moves all extremities well, no edema, no cyanosis, no             redness   Pulses:   Pulses palpable and equal bilaterally   Skin:   No bleeding, bruising or rash   Lymph nodes:   No palpable adenopathy   Neurologic:   Cranial nerves 2 - 12 grossly intact, sensation intact, DTR       present and equal bilaterally       Discharge Disposition  Home or Self Care    Discharge Medications     Discharge Medications        New Medications        Instructions Start Date   amoxicillin 500 MG capsule  Commonly known as: AMOXIL   500 mg, Oral, 2 Times Daily      fluconazole 150 MG tablet  Commonly known as: DIFLUCAN   150 mg, Oral, Once             Continue These Medications        Instructions Start Date   acetaminophen 325 MG tablet  Commonly known as: TYLENOL   650 mg, Oral, Every 4 Hours PRN      ALPRAZolam 0.5 MG tablet  Commonly known as: XANAX   0.5 mg, Oral, Nightly PRN      amphetamine-dextroamphetamine 20 MG tablet  Commonly known as: ADDERALL   20 mg, Oral, 2 Times Daily, Only getting  filled every 6 months per [facility]      aspirin 81 MG tablet   81 mg, Oral, Daily      cyclobenzaprine 10 MG tablet  Commonly known as: FLEXERIL   10 mg, Oral, Every Night at Bedtime      DULoxetine 30 MG capsule  Commonly known as: CYMBALTA   30 mg, Oral, Daily      esomeprazole 40 MG capsule  Commonly known as: nexIUM   40 mg, Oral, 2 Times Daily      Fluticasone-Umeclidin-Vilant 100-62.5-25 MCG/INH inhaler  Commonly known as: TRELEGY   1 container, Inhalation, Daily      gabapentin 300 MG capsule  Commonly known as: NEURONTIN   300 mg, Oral, 3 Times Daily      insulin aspart 100 UNIT/ML injection  Commonly known as: novoLOG   10 Units, Subcutaneous, 3 Times Daily Before Meals      Insulin Degludec 200 UNIT/ML solution pen-injector pen injection  Commonly known as: TRESIBA FLEXTOUCH   34 Units, Subcutaneous, Every Night at Bedtime      insulin lispro 100 UNIT/ML injection  Commonly known as: ADMELOG   0-9 Units, Subcutaneous, 3 Times Daily Before Meals      insulin lispro 100 UNIT/ML injection  Commonly known as: ADMELOG   0-9 Units, Subcutaneous, As Needed      Januvia 100 MG tablet  Generic drug: SITagliptin   100 mg, Oral, Daily      losartan 25 MG tablet  Commonly known as: COZAAR   25 mg, Oral, 2 Times Daily      metoprolol tartrate 50 MG tablet  Commonly known as: LOPRESSOR   50 mg, Oral, 2 Times Daily      Mirabegron ER 50 MG tablet sustained-release 24 hour 24 hr tablet  Commonly known as: MYRBETRIQ   50 mg, Oral, Daily      rosuvastatin 20 MG tablet  Commonly known as: CRESTOR   20 mg, Oral, Daily               Discharge Diet: Low concentrated sweets    Activity at Discharge: Okay to resume previous activity level    Follow-up Appointments  Future Appointments   Date Time Provider Department Center   8/29/2022  1:20 PM Ishan June MD [facility]     Additional Instructions for the Follow-ups that You Need to Schedule       Discharge Follow-up with PCP   As directed       Currently  Documented PCP:    Deborah Ochoa MD    PCP Phone Number:    [phone]     Follow Up Details: Keep appointment scheduled with Dyan Santiago.          thyroid   Apr 20, 2022      Exam reason: hyperthyroidism    Release to patient: Immediate                 Test Results Pending at Discharge  Pending Labs       Order Current Status    Anti-Thyroglobulin Antibody Collected (04/15/22 1205)    Blood Culture - Blood, Arm, Left Preliminary result    Blood Culture - Blood, Arm, Right Preliminary result             Merna Fam MD  04/15/22  12:15 EDT    Time: Discharge 25 min

## 2022-04-15 NOTE — CASE MANAGEMENT/SOCIAL WORK
Continued Stay Note  VARUN Grubbs     Patient Name: Dyana Montemayor  MRN: [id number]  Today's Date: 4/15/2022    Admit Date: 4/13/2022     Discharge Plan     Row Name 04/15/22 1102       Plan    Plan Anticipate routine home    Plan Comments Discharge anticipated today, awaiting MD rounds.              Phone communication or documentation only - no physical contact with patient or family.      Emma Kulkarni RN

## 2022-04-16 ENCOUNTER — READMISSION MANAGEMENT (OUTPATIENT)
Dept: CALL CENTER | Facility: HOSPITAL | Age: 72
End: 2022-04-16

## 2022-04-16 NOTE — OUTREACH NOTE
Prep Survey    Flowsheet Row Responses   [facility] facility patient discharged from? [facility]   Is LACE score < 7 ? No   Emergency Room discharge w/ pulse ox? No   Eligibility Readm Mgmt   Discharge diagnosis Acute UTI   Does the patient have one of the following disease processes/diagnoses(primary or secondary)? Other   Does the patient have Home health ordered? No   Is there a DME ordered? No   Comments regarding appointments Follow up with Deborah Ochoa MD   Prep survey completed? Yes          SARAH SKINNER - Registered Nurse

## 2022-04-17 LAB — QT INTERVAL: 374 MS

## 2022-04-18 LAB
BACTERIA SPEC AEROBE CULT: NORMAL
BACTERIA SPEC AEROBE CULT: NORMAL

## 2022-04-19 LAB — THYROGLOB AB SERPL-ACNC: <1 IU/ML (ref 0–0.9)

## 2022-04-20 ENCOUNTER — READMISSION MANAGEMENT (OUTPATIENT)
Dept: CALL CENTER | Facility: HOSPITAL | Age: 72
End: 2022-04-20

## 2022-04-20 NOTE — OUTREACH NOTE
Medical Week 1 Survey    Flowsheet Row Responses   [facility] patient discharged from? [facility]   Does the patient have one of the following disease processes/diagnoses(primary or secondary)? Other   Week 1 attempt successful? Yes   Call start time 1929   Call end time 1932   Discharge diagnosis Acute UTI   Person spoke with today (if not patient) and relationship daughter   Meds reviewed with patient/caregiver? Yes   Is the patient having any side effects they believe may be caused by any medication additions or changes? No   Does the patient have all medications ordered at discharge? Yes   Is the patient taking all medications as directed (includes completed medication regime)? Yes   Does the patient have a primary care provider?  Yes   Does the patient have an appointment with their PCP within 7 days of discharge? Greater than 7 days   Has the patient kept scheduled appointments due by today? N/A   Comments Has appt for thyroid ultrasound and will see PCP on 5/3. Also has eye appt.   Psychosocial issues? No   Did the patient receive a copy of their discharge instructions? Yes   Nursing interventions Reviewed instructions with patient   What is the patient's perception of their health status since discharge? Improving   Is the patient/caregiver able to teach back signs and symptoms related to disease process for when to call PCP? Yes   Is the patient/caregiver able to teach back signs and symptoms related to disease process for when to call 911? Yes   Is the patient/caregiver able to teach back the hierarchy of who to call/visit for symptoms/problems? PCP, Specialist, Home health nurse, Urgent Care, ED, 911 Yes   Additional teach back comments States she is doing good.  She is eating well and drinking plenty of fluids.    Week 1 call completed? Yes   Wrap up additional comments Denies questions or needs at this time.          ANNA TRUNOG - Licensed Nurse

## 2022-04-21 ENCOUNTER — HOSPITAL ENCOUNTER (OUTPATIENT)
Dept: ULTRASOUND IMAGING | Facility: HOSPITAL | Age: 72
Discharge: HOME | End: 2022-04-21
Admitting: INTERNAL MEDICINE

## 2022-04-21 DIAGNOSIS — R79.89 LOW TSH LEVEL: ICD-10-CM

## 2022-04-21 PROCEDURE — 76536 US EXAM OF HEAD AND NECK: CPT

## 2022-04-28 ENCOUNTER — READMISSION MANAGEMENT (OUTPATIENT)
Dept: CALL CENTER | Facility: HOSPITAL | Age: 72
End: 2022-04-28

## 2022-04-28 NOTE — OUTREACH NOTE
Medical Week 2 Survey    Flowsheet Row Responses   [facility] patient discharged from? [facility]   Does the patient have one of the following disease processes/diagnoses(primary or secondary)? Other   Week 2 attempt successful? Yes   Call start time 1056   Call end time 1100   Meds reviewed with patient/caregiver? Yes   Is the patient having any side effects they believe may be caused by any medication additions or changes? No   Does the patient have all medications ordered at discharge? Yes   Is the patient taking all medications as directed (includes completed medication regime)? Yes   Does the patient have a primary care provider?  Yes   Has the patient kept scheduled appointments due by today? N/A   Has home health visited the patient within 72 hours of discharge? N/A   Psychosocial issues? No   What is the patient's perception of their health status since discharge? Improving   Is the patient/caregiver able to teach back signs and symptoms related to disease process for when to call PCP? Yes   Is the patient/caregiver able to teach back signs and symptoms related to disease process for when to call 911? Yes   Is the patient/caregiver able to teach back the hierarchy of who to call/visit for symptoms/problems? PCP, Specialist, Home health nurse, Urgent Care, ED, 911 Yes   Week 2 Call Completed? Yes   Graduated Yes   Is the patient interested in additional calls from an ambulatory ?  NOTE:  applies to high risk patients requiring additional follow-up. No   Graduated/Revoked comments States is feeling well-denies any UTI s/s or any problems. States BS 89 this morning. Denies any needs or concerns.          FLAKO ELIAS - Registered Nurse

## 2022-05-18 ENCOUNTER — OFFICE VISIT (OUTPATIENT)
Dept: ENDOCRINOLOGY | Facility: CLINIC | Age: 72
End: 2022-05-18

## 2022-05-18 VITALS
DIASTOLIC BLOOD PRESSURE: 80 MMHG | HEIGHT: 62 IN | SYSTOLIC BLOOD PRESSURE: 130 MMHG | HEART RATE: 73 BPM | WEIGHT: 143 LBS | BODY MASS INDEX: 26.31 KG/M2

## 2022-05-18 DIAGNOSIS — R94.6 ABNORMAL THYROID FUNCTION TEST: Primary | ICD-10-CM

## 2022-05-18 PROCEDURE — 99203 OFFICE O/P NEW LOW 30 MIN: CPT | Performed by: INTERNAL MEDICINE

## 2022-05-22 PROBLEM — R94.6 ABNORMAL THYROID FUNCTION TEST: Status: ACTIVE | Noted: 2022-04-13

## 2022-05-22 NOTE — PROGRESS NOTES
"[facility]    Referring Provider: Deborah Ochoa MD  Reason for Consultation: Thyroid evaluation & management.    Patient Care Team:  Deborah Ochoa MD as PCP - General  Ishan June MD as Consulting Physician (Cardiology)    Chief complaint Hyperthyroidism (New Patient)      Subjective .     History of present illness:    This is a  71 y.o. female was admitted in April with dehydration & UTI.  Thyroid tests showed hyperthyroid levels & she was referred.  No personal nor family history of thyroid problems.  Not seen by endocrinology while in hospital.  Not on anti thyroid meds. On metoprolol for \"fast heart rate\" since at least 2019.  Also has diabvetes type 2 since 1999. Last eye exam 1 month ago.  Walks at work.  Take turmeric.    Review of Systems  Review of Systems   Constitutional: Negative for unexpected weight loss.   HENT: Negative for trouble swallowing.    Eyes: Negative for blurred vision.   Cardiovascular: Negative for palpitations.   Gastrointestinal: Negative for nausea.   Endocrine: Positive for polydipsia and polyuria.   Neurological: Positive for dizziness and headache. Negative for tremors.       History  Past Medical History:   Diagnosis Date   • Cataracts, bilateral    • COPD (chronic obstructive pulmonary disease) ([facility])    • Diabetes mellitus ([facility]) 1999    type 2   • Hyperlipidemia      Past Surgical History:   Procedure Laterality Date   • CARDIAC CATHETERIZATION Right 10/07/2019    Procedure: Left Heart Cath and coronary angiogram;  Surgeon: Ishan June MD;  Location: [facility] INVASIVE LOCATION;  Service: Cardiovascular   • HYSTERECTOMY  1972   • ROTATOR CUFF REPAIR  2018     Family History   Problem Relation Age of Onset   • Heart disease Father      Social History     Tobacco Use   • Smoking status: Current Every Day Smoker     Packs/day: 0.75     Years: 60.00     Pack years: 45.00     Types: Cigarettes   • Smokeless tobacco: Never Used   Vaping Use   • " Vaping Use: Never used   Substance Use Topics   • Alcohol use: Not Currently   • Drug use: Never       Allergies:  Patient has no known allergies.    Objective     Vital Signs       Vitals:    05/18/22 0911   BP: 130/80   Pulse: 73         Physical Exam:     General Appearance:    Alert, cooperative, in no acute distress   Head:    Normocephalic, without obvious abnormality, atraumatic   Eyes:            No lid lag, conjunctivae and sclerae normal, no   icterus, no pallor, corneas clear, PERRLA   Throat:   No oral lesions,  oral mucosa moist   Neck:   36 cm in circumference, thyroid soft, no carotid bruit   Lungs:     Clear     Heart:    Regular rhythm and normal rate   Chest Wall:    No abnormalities observed   Abdomen:     Normal bowel sounds, soft                 Extremities:   Moves all extremities well, no edema or tremors              Pulses:   Pulses palpable and equal bilaterally   Skin:   Dry   Neurologic:  DTR absent in ankles, vibratory sense 25% decreased in toes, able to feel the 10g monofilament       Results Review  I have reviewed the patient's new clinical results, labs & imaging.    Lab Results (last 24 hours)     ** No results found for the last 24 hours. **        Lab Results   Component Value Date    TSH <0.005 (L) 04/13/2022     FreeT4 2.53, FreeT3 6.05; Thyroglobulin Ab <1.0 on 4/13/2022.    Thyroid U/S on 4/21/2022: Normal size and vascularity. Multiple tiny nodules. No features of these to recommend FNA or follow-up.    TSH 0.005, FreeT4 1.65, FreeT3 3.75; WBC 10.9, Hb 13.1; cr 1.04, BUN 19, ALT 11, gl 157;   A1C 6.4%; microalb/cr ratio <1; Chol 141, Trigl 122, LDL 69, HDL 47 on 5/3/2022.     Assessment & Plan     1. Abnormal thyroid tests, improving    Will repeat thyroid tests 1 month from the last set & call results.  Continue metoprolol.    I discussed the patients findings and my recommendations with patient.    Pierre Tripp MD  05/22/22  19:14 EDT

## 2022-05-24 ENCOUNTER — TELEPHONE (OUTPATIENT)
Dept: ENDOCRINOLOGY | Facility: CLINIC | Age: 72
End: 2022-05-24

## 2022-06-07 ENCOUNTER — LAB (OUTPATIENT)
Dept: LAB | Facility: HOSPITAL | Age: 72
End: 2022-06-07

## 2022-06-07 DIAGNOSIS — R94.6 ABNORMAL THYROID FUNCTION TEST: ICD-10-CM

## 2022-06-07 LAB
T3FREE SERPL-MCNC: 3.74 PG/ML (ref 2–4.4)
T4 FREE SERPL-MCNC: 1.85 NG/DL (ref 0.93–1.7)
TSH SERPL DL<=0.05 MIU/L-ACNC: <0.005 UIU/ML (ref 0.27–4.2)

## 2022-06-07 PROCEDURE — 36415 COLL VENOUS BLD VENIPUNCTURE: CPT

## 2022-06-07 PROCEDURE — 84443 ASSAY THYROID STIM HORMONE: CPT

## 2022-06-07 PROCEDURE — 84481 FREE ASSAY (FT-3): CPT

## 2022-06-07 PROCEDURE — 84439 ASSAY OF FREE THYROXINE: CPT

## 2022-06-19 DIAGNOSIS — E05.90 HYPERTHYROIDISM: Primary | ICD-10-CM

## 2022-07-05 ENCOUNTER — HOSPITAL ENCOUNTER (OUTPATIENT)
Dept: NUCLEAR MEDICINE | Facility: HOSPITAL | Age: 72
Discharge: HOME | End: 2022-07-05

## 2022-07-05 DIAGNOSIS — E05.90 HYPERTHYROIDISM: ICD-10-CM

## 2022-07-05 PROCEDURE — 0 SODIUM IODIDE 3.7 MBQ CAPSULE: Performed by: INTERNAL MEDICINE

## 2022-07-05 PROCEDURE — A9516 IODINE I-123 SOD IODIDE MIC: HCPCS | Performed by: INTERNAL MEDICINE

## 2022-07-05 PROCEDURE — 78014 THYROID IMAGING W/BLOOD FLOW: CPT

## 2022-07-05 RX ORDER — SODIUM IODIDE I 123 100 UCI/1
1 CAPSULE, GELATIN COATED ORAL
Status: COMPLETED | OUTPATIENT
Start: 2022-07-05 | End: 2022-07-05

## 2022-07-05 RX ADMIN — SODIUM IODIDE I 123 1 CAPSULE: 100 CAPSULE, GELATIN COATED ORAL at 13:00

## 2022-07-06 ENCOUNTER — HOSPITAL ENCOUNTER (OUTPATIENT)
Dept: NUCLEAR MEDICINE | Facility: HOSPITAL | Age: 72
Discharge: HOME | End: 2022-07-06

## 2022-07-24 DIAGNOSIS — E55.9 VITAMIN D DEFICIENCY: ICD-10-CM

## 2022-07-24 DIAGNOSIS — R94.6 ABNORMAL THYROID FUNCTION TEST: Primary | ICD-10-CM

## 2022-07-25 ENCOUNTER — HOSPITAL ENCOUNTER (EMERGENCY)
Facility: HOSPITAL | Age: 72
Discharge: LEFT BEFORE DISCHARGE | End: 2022-07-26

## 2022-07-25 VITALS
RESPIRATION RATE: 19 BRPM | BODY MASS INDEX: 26.09 KG/M2 | HEART RATE: 94 BPM | HEIGHT: 62 IN | WEIGHT: 141.76 LBS | DIASTOLIC BLOOD PRESSURE: 63 MMHG | OXYGEN SATURATION: 94 % | SYSTOLIC BLOOD PRESSURE: 131 MMHG | TEMPERATURE: 98 F

## 2022-07-25 LAB — QT INTERVAL: 349 MS

## 2022-07-25 PROCEDURE — 93005 ELECTROCARDIOGRAM TRACING: CPT

## 2022-07-25 PROCEDURE — 99211 OFF/OP EST MAY X REQ PHY/QHP: CPT

## 2022-07-26 ENCOUNTER — TELEPHONE (OUTPATIENT)
Dept: CARDIOLOGY | Facility: CLINIC | Age: 72
End: 2022-07-26

## 2022-07-26 NOTE — TELEPHONE ENCOUNTER
Provider: MEHRDAD   Caller: EDEN   Relationship to Patient: SELF   Phone Number: [phone]  Reason for Call: LOW BP   When was the patient last seen: 1.24.22  When did it start: 1 MONTH AGO    PT CALLED INTO THE [facility] TO SEE IF SHE CAN GET IN TO SEE DR. CRONIN SOONER THAN 8.29.22. PT SAID HER BP HAS BEEN LOW. THE LOWEST IT HAS BEEN WAS 77/44. UNABLE TO WT PT TO OFFICE. PLEASE GIVE PT A CALL ASAP.

## 2022-07-27 NOTE — TELEPHONE ENCOUNTER
Called patient, patient states blood pressure has been running low, 77/44, 77/54's. Patient only takes Metoprolol 25mg BID. She states she is weak, and has been staying hydrated.

## 2022-07-28 NOTE — TELEPHONE ENCOUNTER
Called patient along with her granddaughter and her blood pressure readings were around.  In fact her blood pressure was little high and hence we started metoprolol at a lower dose and she will watch her blood pressure for the next few days and call me back

## 2022-08-04 ENCOUNTER — LAB (OUTPATIENT)
Dept: LAB | Facility: HOSPITAL | Age: 72
End: 2022-08-04

## 2022-08-04 DIAGNOSIS — R94.6 ABNORMAL THYROID FUNCTION TEST: ICD-10-CM

## 2022-08-04 DIAGNOSIS — E55.9 VITAMIN D DEFICIENCY: ICD-10-CM

## 2022-08-04 LAB
25(OH)D3 SERPL-MCNC: 55.7 NG/ML (ref 30–100)
T3FREE SERPL-MCNC: 4.8 PG/ML (ref 2–4.4)
T4 FREE SERPL-MCNC: 2.06 NG/DL (ref 0.93–1.7)
TSH SERPL DL<=0.05 MIU/L-ACNC: <0.005 UIU/ML (ref 0.27–4.2)

## 2022-08-04 PROCEDURE — 84439 ASSAY OF FREE THYROXINE: CPT

## 2022-08-04 PROCEDURE — 84481 FREE ASSAY (FT-3): CPT

## 2022-08-04 PROCEDURE — 36415 COLL VENOUS BLD VENIPUNCTURE: CPT

## 2022-08-04 PROCEDURE — 84443 ASSAY THYROID STIM HORMONE: CPT

## 2022-08-04 PROCEDURE — 82306 VITAMIN D 25 HYDROXY: CPT

## 2022-08-21 DIAGNOSIS — E05.90 HYPERTHYROIDISM: Primary | ICD-10-CM

## 2022-08-21 DIAGNOSIS — Z51.81 ENCOUNTER FOR MEDICATION MONITORING: ICD-10-CM

## 2022-08-21 RX ORDER — METHIMAZOLE 5 MG/1
TABLET ORAL
Qty: 60 TABLET | Refills: 1 | Status: SHIPPED | OUTPATIENT
Start: 2022-08-21 | End: 2022-09-27

## 2022-08-22 DIAGNOSIS — E05.90 HYPERTHYROIDISM: ICD-10-CM

## 2022-08-22 RX ORDER — METHIMAZOLE 5 MG/1
TABLET ORAL
Qty: 180 TABLET | OUTPATIENT
Start: 2022-08-22

## 2022-08-29 ENCOUNTER — OFFICE VISIT (OUTPATIENT)
Dept: CARDIOLOGY | Facility: CLINIC | Age: 72
End: 2022-08-29

## 2022-08-29 VITALS
WEIGHT: 141 LBS | HEIGHT: 62 IN | HEART RATE: 97 BPM | DIASTOLIC BLOOD PRESSURE: 73 MMHG | SYSTOLIC BLOOD PRESSURE: 122 MMHG | OXYGEN SATURATION: 95 % | BODY MASS INDEX: 25.95 KG/M2

## 2022-08-29 DIAGNOSIS — E11.9 TYPE 2 DIABETES MELLITUS WITHOUT COMPLICATION, WITHOUT LONG-TERM CURRENT USE OF INSULIN: ICD-10-CM

## 2022-08-29 DIAGNOSIS — I25.118 CORONARY ARTERY DISEASE OF NATIVE ARTERY OF NATIVE HEART WITH STABLE ANGINA PECTORIS: Primary | ICD-10-CM

## 2022-08-29 DIAGNOSIS — I10 ESSENTIAL HYPERTENSION: ICD-10-CM

## 2022-08-29 DIAGNOSIS — E78.00 PURE HYPERCHOLESTEROLEMIA: ICD-10-CM

## 2022-08-29 DIAGNOSIS — J44.9 CHRONIC OBSTRUCTIVE PULMONARY DISEASE, UNSPECIFIED COPD TYPE: ICD-10-CM

## 2022-08-29 PROCEDURE — 99214 OFFICE O/P EST MOD 30 MIN: CPT | Performed by: INTERNAL MEDICINE

## 2022-08-29 NOTE — PROGRESS NOTES
"    Subjective:     Encounter Date:08/29/2022      Patient ID: Dyana Montemayor is a 72 y.o. female.    Chief Complaint:  History of Present Illness 72-year-old white female with history of coronary disease hypertension hyperlipidemia diabetes and COPD presents to my office for follow-up.  Patient is currently stable without any signs of chest pain but has some shortness of breath with exertion radiograms any PND orthopnea.  No palpitation but occasional dizziness.  No syncope or swelling of the feet.  She is taking her medicines regularly.  She still continues to smoke    The following portions of the patient's history were reviewed and updated as appropriate: allergies, current medications, past family history, past medical history, past social history, past surgical history and problem list.  Past Medical History:   Diagnosis Date   • Cataracts, bilateral    • COPD (chronic obstructive pulmonary disease) (HCC)    • Diabetes mellitus (HCC) 1999    type 2   • Hyperlipidemia      Past Surgical History:   Procedure Laterality Date   • CARDIAC CATHETERIZATION Right 10/07/2019    Procedure: Left Heart Cath and coronary angiogram;  Surgeon: Ishan June MD;  Location: [facility] INVASIVE LOCATION;  Service: Cardiovascular   • HYSTERECTOMY  1972   • ROTATOR CUFF REPAIR  2018     /73 (BP Location: Left arm, Patient Position: Sitting, Cuff Size: Adult)   Pulse 97   Ht 157.5 cm (62\")   Wt 64 kg (141 lb)   SpO2 95%   BMI 25.79 kg/m²   Family History   Problem Relation Age of Onset   • Heart disease Father        Current Outpatient Medications:   •  acetaminophen (TYLENOL) 325 MG tablet, Take 2 tablets by mouth Every 4 (Four) Hours As Needed for Mild Pain ., Disp: , Rfl:   •  ALPRAZolam (XANAX) 0.5 MG tablet, Take 0.5 mg by mouth At Night As Needed for Anxiety., Disp: , Rfl:   •  amphetamine-dextroamphetamine (ADDERALL) 20 MG tablet, Take 20 mg by mouth 2 (Two) Times a Day. Only getting filled every 6 months per " [facility], Disp: , Rfl:   •  aspirin 81 MG tablet, Take 1 tablet by mouth Daily., Disp: 30 tablet, Rfl: 5  •  cyclobenzaprine (FLEXERIL) 10 MG tablet, Take 10 mg by mouth every night at bedtime., Disp: , Rfl:   •  DULoxetine (CYMBALTA) 30 MG capsule, Take 30 mg by mouth Daily., Disp: , Rfl:   •  esomeprazole (nexIUM) 40 MG capsule, Take 40 mg by mouth 2 (Two) Times a Day., Disp: , Rfl:   •  Fluticasone-Umeclidin-Vilant (TRELEGY) 100-62.5-25 MCG/INH inhaler, Inhale 1 container Daily., Disp: , Rfl:   •  gabapentin (NEURONTIN) 300 MG capsule, Take 300 mg by mouth 3 (Three) Times a Day., Disp: , Rfl:   •  insulin aspart (novoLOG) 100 UNIT/ML injection, Inject 10 Units under the skin into the appropriate area as directed 3 (Three) Times a Day Before Meals., Disp: , Rfl: 12  •  Insulin Degludec (TRESIBA FLEXTOUCH) 200 UNIT/ML solution pen-injector pen injection, Inject 34 Units under the skin into the appropriate area as directed every night at bedtime., Disp: , Rfl:   •  Januvia 100 MG tablet, Take 100 mg by mouth Daily., Disp: , Rfl:   •  methIMAzole (TAPAZOLE) 5 MG tablet, Take one tab bid., Disp: 60 tablet, Rfl: 1  •  metoprolol tartrate (LOPRESSOR) 25 MG tablet, Take 12.5 mg by mouth 2 (Two) Times a Day As Needed., Disp: , Rfl:   •  Mirabegron ER (MYRBETRIQ) 50 MG tablet sustained-release 24 hour 24 hr tablet, Take 50 mg by mouth Daily., Disp: , Rfl:   •  rosuvastatin (CRESTOR) 20 MG tablet, Take 20 mg by mouth Daily., Disp: , Rfl:   No Known Allergies  Social History     Socioeconomic History   • Marital status:    Tobacco Use   • Smoking status: Current Every Day Smoker     Packs/day: 0.75     Years: 60.00     Pack years: 45.00     Types: Cigarettes   • Smokeless tobacco: Never Used   Vaping Use   • Vaping Use: Never used   Substance and Sexual Activity   • Alcohol use: Not Currently   • Drug use: Never   • Sexual activity: Defer     Review of Systems   Constitutional: Negative for fever and  malaise/fatigue.   Cardiovascular: Negative for chest pain, dyspnea on exertion and palpitations.   Respiratory: Negative for cough and shortness of breath.    Skin: Negative for rash.   Gastrointestinal: Negative for abdominal pain, nausea and vomiting.   Neurological: Positive for light-headedness. Negative for focal weakness and headaches.   All other systems reviewed and are negative.             Objective:     Constitutional:       Appearance: Well-developed.   Eyes:      General: No scleral icterus.     Conjunctiva/sclera: Conjunctivae normal.   HENT:      Head: Normocephalic and atraumatic.   Neck:      Vascular: No carotid bruit or JVD.   Pulmonary:      Effort: Pulmonary effort is normal.      Breath sounds: Normal breath sounds. No wheezing. No rales.   Cardiovascular:      Normal rate. Regular rhythm.   Pulses:     Intact distal pulses.   Abdominal:      General: Bowel sounds are normal.      Palpations: Abdomen is soft.   Musculoskeletal:      Cervical back: Normal range of motion and neck supple. Skin:     General: Skin is warm and dry.      Findings: No rash.   Neurological:      Mental Status: Alert.       Procedures    Lab Review:         MDM  1.  Coronary disease  Patient has nonobstructive disease and is currently stable on medications with normal V function  2.  Hypertension  Patient blood pressure currently stable on metoprolol she was on clonidine but her blood pressure was low and hence she stopped it  3.  Diabetes  Patient is on insulin and followed by the primary care doctor  4.  Hyperlipidemia  Patient is on Crestor and the lipid levels are well within normal meds  5.  COPD  Patient has COPD and still continues to smoke and is advised to stop smoking      Patient's previous medical records, labs, and EKG were reviewed and discussed with the patient at today's visit.

## 2022-09-06 ENCOUNTER — LAB (OUTPATIENT)
Dept: LAB | Facility: HOSPITAL | Age: 72
End: 2022-09-06

## 2022-09-06 DIAGNOSIS — E05.90 HYPERTHYROIDISM: ICD-10-CM

## 2022-09-06 DIAGNOSIS — Z51.81 ENCOUNTER FOR MEDICATION MONITORING: ICD-10-CM

## 2022-09-06 LAB
ALBUMIN SERPL-MCNC: 3.7 G/DL (ref 3.5–5.2)
ALBUMIN/GLOB SERPL: 1.4 G/DL
ALP SERPL-CCNC: 76 U/L (ref 39–117)
ALT SERPL W P-5'-P-CCNC: 10 U/L (ref 1–33)
ANION GAP SERPL CALCULATED.3IONS-SCNC: 9.6 MMOL/L (ref 5–15)
AST SERPL-CCNC: 14 U/L (ref 1–32)
BASOPHILS # BLD AUTO: 0.04 10*3/MM3 (ref 0–0.2)
BASOPHILS NFR BLD AUTO: 0.4 % (ref 0–1.5)
BILIRUB SERPL-MCNC: 0.4 MG/DL (ref 0–1.2)
BUN SERPL-MCNC: 27 MG/DL (ref 8–23)
BUN/CREAT SERPL: 23.1 (ref 7–25)
CALCIUM SPEC-SCNC: 9.5 MG/DL (ref 8.6–10.5)
CHLORIDE SERPL-SCNC: 107 MMOL/L (ref 98–107)
CO2 SERPL-SCNC: 26.4 MMOL/L (ref 22–29)
CREAT SERPL-MCNC: 1.17 MG/DL (ref 0.57–1)
DEPRECATED RDW RBC AUTO: 40.3 FL (ref 37–54)
EGFRCR SERPLBLD CKD-EPI 2021: 49.4 ML/MIN/1.73
EOSINOPHIL # BLD AUTO: 0.21 10*3/MM3 (ref 0–0.4)
EOSINOPHIL NFR BLD AUTO: 2.4 % (ref 0.3–6.2)
ERYTHROCYTE [DISTWIDTH] IN BLOOD BY AUTOMATED COUNT: 12.2 % (ref 12.3–15.4)
GLOBULIN UR ELPH-MCNC: 2.7 GM/DL
GLUCOSE SERPL-MCNC: 123 MG/DL (ref 65–99)
HCT VFR BLD AUTO: 40.1 % (ref 34–46.6)
HGB BLD-MCNC: 13.1 G/DL (ref 12–15.9)
IMM GRANULOCYTES # BLD AUTO: 0.02 10*3/MM3 (ref 0–0.05)
IMM GRANULOCYTES NFR BLD AUTO: 0.2 % (ref 0–0.5)
LYMPHOCYTES # BLD AUTO: 3.65 10*3/MM3 (ref 0.7–3.1)
LYMPHOCYTES NFR BLD AUTO: 40.9 % (ref 19.6–45.3)
MCH RBC QN AUTO: 29.4 PG (ref 26.6–33)
MCHC RBC AUTO-ENTMCNC: 32.7 G/DL (ref 31.5–35.7)
MCV RBC AUTO: 90.1 FL (ref 79–97)
MONOCYTES # BLD AUTO: 0.66 10*3/MM3 (ref 0.1–0.9)
MONOCYTES NFR BLD AUTO: 7.4 % (ref 5–12)
NEUTROPHILS NFR BLD AUTO: 4.35 10*3/MM3 (ref 1.7–7)
NEUTROPHILS NFR BLD AUTO: 48.7 % (ref 42.7–76)
NRBC BLD AUTO-RTO: 0 /100 WBC (ref 0–0.2)
PLATELET # BLD AUTO: 242 10*3/MM3 (ref 140–450)
PMV BLD AUTO: 9.2 FL (ref 6–12)
POTASSIUM SERPL-SCNC: 4.3 MMOL/L (ref 3.5–5.2)
PROT SERPL-MCNC: 6.4 G/DL (ref 6–8.5)
RBC # BLD AUTO: 4.45 10*6/MM3 (ref 3.77–5.28)
SODIUM SERPL-SCNC: 143 MMOL/L (ref 136–145)
T4 FREE SERPL-MCNC: 1.46 NG/DL (ref 0.93–1.7)
TSH SERPL DL<=0.05 MIU/L-ACNC: <0.005 UIU/ML (ref 0.27–4.2)
WBC NRBC COR # BLD: 8.93 10*3/MM3 (ref 3.4–10.8)

## 2022-09-06 PROCEDURE — 80053 COMPREHEN METABOLIC PANEL: CPT

## 2022-09-06 PROCEDURE — 85025 COMPLETE CBC W/AUTO DIFF WBC: CPT

## 2022-09-06 PROCEDURE — 36415 COLL VENOUS BLD VENIPUNCTURE: CPT

## 2022-09-06 PROCEDURE — 84443 ASSAY THYROID STIM HORMONE: CPT

## 2022-09-06 PROCEDURE — 84439 ASSAY OF FREE THYROXINE: CPT

## 2022-09-11 DIAGNOSIS — Z51.81 ENCOUNTER FOR MEDICATION MONITORING: ICD-10-CM

## 2022-09-11 DIAGNOSIS — E05.90 HYPERTHYROIDISM: Primary | ICD-10-CM

## 2022-09-27 DIAGNOSIS — E05.90 HYPERTHYROIDISM: ICD-10-CM

## 2022-09-27 RX ORDER — METHIMAZOLE 5 MG/1
TABLET ORAL
Qty: 180 TABLET | Refills: 1 | Status: SHIPPED | OUTPATIENT
Start: 2022-09-27 | End: 2023-03-13

## 2023-01-19 ENCOUNTER — TRANSCRIBE ORDERS (OUTPATIENT)
Dept: ADMINISTRATIVE | Facility: HOSPITAL | Age: 73
End: 2023-01-19
Payer: MEDICARE

## 2023-01-19 DIAGNOSIS — Z78.0 POST-MENOPAUSAL: ICD-10-CM

## 2023-01-19 DIAGNOSIS — Z12.31 SCREENING MAMMOGRAM, ENCOUNTER FOR: Primary | ICD-10-CM

## 2023-01-30 ENCOUNTER — HOSPITAL ENCOUNTER (OUTPATIENT)
Dept: BONE DENSITY | Facility: HOSPITAL | Age: 73
Discharge: HOME | End: 2023-01-30
Payer: MEDICARE

## 2023-01-30 ENCOUNTER — HOSPITAL ENCOUNTER (OUTPATIENT)
Dept: MAMMOGRAPHY | Facility: HOSPITAL | Age: 73
Discharge: HOME | End: 2023-01-30
Payer: MEDICARE

## 2023-01-30 DIAGNOSIS — Z12.31 SCREENING MAMMOGRAM, ENCOUNTER FOR: ICD-10-CM

## 2023-01-30 DIAGNOSIS — Z78.0 POST-MENOPAUSAL: ICD-10-CM

## 2023-01-30 PROCEDURE — 77067 SCR MAMMO BI INCL CAD: CPT

## 2023-01-30 PROCEDURE — 77080 DXA BONE DENSITY AXIAL: CPT

## 2023-01-30 PROCEDURE — 77063 BREAST TOMOSYNTHESIS BI: CPT

## 2023-03-07 ENCOUNTER — LAB (OUTPATIENT)
Dept: LAB | Facility: HOSPITAL | Age: 73
End: 2023-03-07
Payer: MEDICARE

## 2023-03-07 ENCOUNTER — OFFICE VISIT (OUTPATIENT)
Dept: CARDIOLOGY | Facility: CLINIC | Age: 73
End: 2023-03-07
Payer: MEDICARE

## 2023-03-07 VITALS
SYSTOLIC BLOOD PRESSURE: 146 MMHG | BODY MASS INDEX: 29.53 KG/M2 | DIASTOLIC BLOOD PRESSURE: 64 MMHG | WEIGHT: 160.5 LBS | OXYGEN SATURATION: 95 % | HEIGHT: 62 IN | HEART RATE: 110 BPM

## 2023-03-07 DIAGNOSIS — I10 ESSENTIAL HYPERTENSION: ICD-10-CM

## 2023-03-07 DIAGNOSIS — E05.90 HYPERTHYROIDISM: ICD-10-CM

## 2023-03-07 DIAGNOSIS — E11.9 TYPE 2 DIABETES MELLITUS WITHOUT COMPLICATION, WITHOUT LONG-TERM CURRENT USE OF INSULIN: ICD-10-CM

## 2023-03-07 DIAGNOSIS — Z51.81 ENCOUNTER FOR MEDICATION MONITORING: ICD-10-CM

## 2023-03-07 DIAGNOSIS — J44.9 CHRONIC OBSTRUCTIVE PULMONARY DISEASE, UNSPECIFIED COPD TYPE: ICD-10-CM

## 2023-03-07 DIAGNOSIS — I25.118 CORONARY ARTERY DISEASE OF NATIVE ARTERY OF NATIVE HEART WITH STABLE ANGINA PECTORIS: Primary | ICD-10-CM

## 2023-03-07 DIAGNOSIS — E78.00 PURE HYPERCHOLESTEROLEMIA: ICD-10-CM

## 2023-03-07 LAB
ALBUMIN SERPL-MCNC: 4 G/DL (ref 3.5–5.2)
ALBUMIN/GLOB SERPL: 1.4 G/DL
ALP SERPL-CCNC: 109 U/L (ref 39–117)
ALT SERPL W P-5'-P-CCNC: 13 U/L (ref 1–33)
ANION GAP SERPL CALCULATED.3IONS-SCNC: 9 MMOL/L (ref 5–15)
AST SERPL-CCNC: 15 U/L (ref 1–32)
BASOPHILS # BLD AUTO: 0.05 10*3/MM3 (ref 0–0.2)
BASOPHILS NFR BLD AUTO: 0.5 % (ref 0–1.5)
BILIRUB SERPL-MCNC: 0.2 MG/DL (ref 0–1.2)
BUN SERPL-MCNC: 26 MG/DL (ref 8–23)
BUN/CREAT SERPL: 22.8 (ref 7–25)
CALCIUM SPEC-SCNC: 9.5 MG/DL (ref 8.6–10.5)
CHLORIDE SERPL-SCNC: 104 MMOL/L (ref 98–107)
CO2 SERPL-SCNC: 28 MMOL/L (ref 22–29)
CREAT SERPL-MCNC: 1.14 MG/DL (ref 0.57–1)
DEPRECATED RDW RBC AUTO: 41.1 FL (ref 37–54)
EGFRCR SERPLBLD CKD-EPI 2021: 50.9 ML/MIN/1.73
EOSINOPHIL # BLD AUTO: 0.25 10*3/MM3 (ref 0–0.4)
EOSINOPHIL NFR BLD AUTO: 2.6 % (ref 0.3–6.2)
ERYTHROCYTE [DISTWIDTH] IN BLOOD BY AUTOMATED COUNT: 12.5 % (ref 12.3–15.4)
GLOBULIN UR ELPH-MCNC: 2.8 GM/DL
GLUCOSE SERPL-MCNC: 116 MG/DL (ref 65–99)
HCT VFR BLD AUTO: 42.1 % (ref 34–46.6)
HGB BLD-MCNC: 14 G/DL (ref 12–15.9)
IMM GRANULOCYTES # BLD AUTO: 0.02 10*3/MM3 (ref 0–0.05)
IMM GRANULOCYTES NFR BLD AUTO: 0.2 % (ref 0–0.5)
LYMPHOCYTES # BLD AUTO: 3.74 10*3/MM3 (ref 0.7–3.1)
LYMPHOCYTES NFR BLD AUTO: 38.5 % (ref 19.6–45.3)
MCH RBC QN AUTO: 30.5 PG (ref 26.6–33)
MCHC RBC AUTO-ENTMCNC: 33.3 G/DL (ref 31.5–35.7)
MCV RBC AUTO: 91.7 FL (ref 79–97)
MONOCYTES # BLD AUTO: 0.52 10*3/MM3 (ref 0.1–0.9)
MONOCYTES NFR BLD AUTO: 5.4 % (ref 5–12)
NEUTROPHILS NFR BLD AUTO: 5.13 10*3/MM3 (ref 1.7–7)
NEUTROPHILS NFR BLD AUTO: 52.8 % (ref 42.7–76)
NRBC BLD AUTO-RTO: 0 /100 WBC (ref 0–0.2)
PLATELET # BLD AUTO: 265 10*3/MM3 (ref 140–450)
PMV BLD AUTO: 9.2 FL (ref 6–12)
POTASSIUM SERPL-SCNC: 5.3 MMOL/L (ref 3.5–5.2)
PROT SERPL-MCNC: 6.8 G/DL (ref 6–8.5)
RBC # BLD AUTO: 4.59 10*6/MM3 (ref 3.77–5.28)
SODIUM SERPL-SCNC: 141 MMOL/L (ref 136–145)
T4 FREE SERPL-MCNC: 1.01 NG/DL (ref 0.93–1.7)
TSH SERPL DL<=0.05 MIU/L-ACNC: 1.92 UIU/ML (ref 0.27–4.2)
WBC NRBC COR # BLD: 9.71 10*3/MM3 (ref 3.4–10.8)

## 2023-03-07 PROCEDURE — 84443 ASSAY THYROID STIM HORMONE: CPT

## 2023-03-07 PROCEDURE — 99214 OFFICE O/P EST MOD 30 MIN: CPT | Performed by: INTERNAL MEDICINE

## 2023-03-07 PROCEDURE — 85025 COMPLETE CBC W/AUTO DIFF WBC: CPT

## 2023-03-07 PROCEDURE — 80053 COMPREHEN METABOLIC PANEL: CPT

## 2023-03-07 PROCEDURE — 84439 ASSAY OF FREE THYROXINE: CPT

## 2023-03-07 PROCEDURE — 36415 COLL VENOUS BLD VENIPUNCTURE: CPT

## 2023-03-07 NOTE — PROGRESS NOTES
"    Subjective:     Encounter Date:03/07/2023      Patient ID: Dyana Montemayor is a 72 y.o. female.    Chief Complaint:  History of Present Illness 72-year-old white female with history of coronary disease hypertension hyperlipidemia diabetes and COPD presents to my office for follow-up.  Patient is currently stable without any symptoms of chest pain but has some shortness of breath with exertion but no complains any PND orthopnea.  No palpitation but has occasional dizziness.  No syncope or swelling of the feet.  He is taking her medicines regular.  She still continues to smoke.  The following portions of the patient's history were reviewed and updated as appropriate: allergies, current medications, past family history, past medical history, past social history, past surgical history and problem list.  Past Medical History:   Diagnosis Date   • Cataracts, bilateral    • COPD (chronic obstructive pulmonary disease) (HCC)    • Diabetes mellitus ([facility]CC) 1999    type 2   • Hyperlipidemia      Past Surgical History:   Procedure Laterality Date   • CARDIAC CATHETERIZATION Right 10/07/2019    Procedure: Left Heart Cath and coronary angiogram;  Surgeon: Ishan June MD;  Location: [facility] CATH INVASIVE LOCATION;  Service: Cardiovascular   • HYSTERECTOMY  1972   • ROTATOR CUFF REPAIR  2018     /64 Comment: recheck  Pulse 110   Ht 157.5 cm (62\")   Wt 72.8 kg (160 lb 8 oz)   SpO2 95%   BMI 29.36 kg/m²   Family History   Problem Relation Age of Onset   • Heart disease Father        Current Outpatient Medications:   •  acetaminophen (TYLENOL) 325 MG tablet, Take 2 tablets by mouth Every 4 (Four) Hours As Needed for Mild Pain ., Disp: , Rfl:   •  ALPRAZolam (XANAX) 0.5 MG tablet, Take 1 tablet by mouth At Night As Needed for Anxiety., Disp: , Rfl:   •  amphetamine-dextroamphetamine (ADDERALL) 20 MG tablet, Take 1 tablet by mouth 2 (Two) Times a Day. Only getting filled every 6 months per [facility], Disp: , " Rfl:   •  aspirin 81 MG tablet, Take 1 tablet by mouth Daily., Disp: 30 tablet, Rfl: 5  •  cyclobenzaprine (FLEXERIL) 10 MG tablet, Take 1 tablet by mouth every night at bedtime., Disp: , Rfl:   •  DULoxetine (CYMBALTA) 30 MG capsule, Take 1 capsule by mouth Daily., Disp: , Rfl:   •  esomeprazole (nexIUM) 40 MG capsule, Take 1 capsule by mouth 2 (Two) Times a Day., Disp: , Rfl:   •  Fluticasone-Umeclidin-Vilant (TRELEGY) 100-62.5-25 MCG/INH inhaler, Inhale 1 container Daily., Disp: , Rfl:   •  gabapentin (NEURONTIN) 300 MG capsule, Take 1 capsule by mouth 3 (Three) Times a Day., Disp: , Rfl:   •  insulin aspart (novoLOG) 100 UNIT/ML injection, Inject 10 Units under the skin into the appropriate area as directed 3 (Three) Times a Day Before Meals., Disp: , Rfl: 12  •  Insulin Degludec (TRESIBA FLEXTOUCH) 200 UNIT/ML solution pen-injector pen injection, Inject 34 Units under the skin into the appropriate area as directed every night at bedtime., Disp: , Rfl:   •  Januvia 100 MG tablet, Take 1 tablet by mouth Daily., Disp: , Rfl:   •  methIMAzole (TAPAZOLE) 5 MG tablet, TAKE 1 TABLET BY MOUTH TWICE DAILY, Disp: 180 tablet, Rfl: 1  •  metoprolol tartrate (LOPRESSOR) 25 MG tablet, Take 12.5 mg by mouth 2 (Two) Times a Day As Needed., Disp: , Rfl:   •  Mirabegron ER (MYRBETRIQ) 50 MG tablet sustained-release 24 hour 24 hr tablet, Take 50 mg by mouth Daily., Disp: , Rfl:   •  rosuvastatin (CRESTOR) 20 MG tablet, Take 1 tablet by mouth Daily., Disp: , Rfl:   No Known Allergies  Social History     Socioeconomic History   • Marital status:    Tobacco Use   • Smoking status: Every Day     Packs/day: 0.75     Years: 60.00     Pack years: 45.00     Types: Cigarettes   • Smokeless tobacco: Never   Vaping Use   • Vaping Use: Never used   Substance and Sexual Activity   • Alcohol use: Not Currently   • Drug use: Never   • Sexual activity: Not Currently     Birth control/protection: Hysterectomy     Comment: 1972     Review  of Systems   Constitutional: Positive for malaise/fatigue.   Cardiovascular: Negative for chest pain, dyspnea on exertion, leg swelling and palpitations.   Respiratory: Positive for shortness of breath. Negative for cough.    Gastrointestinal: Negative for abdominal pain, nausea and vomiting.   Neurological: Positive for dizziness and light-headedness. Negative for focal weakness, headaches and numbness.   All other systems reviewed and are negative.             Objective:     Constitutional:       Appearance: Well-developed.   Eyes:      General: No scleral icterus.     Conjunctiva/sclera: Conjunctivae normal.   HENT:      Head: Normocephalic and atraumatic.   Neck:      Vascular: No carotid bruit or JVD.   Pulmonary:      Effort: Pulmonary effort is normal.      Breath sounds: Normal breath sounds. No wheezing. No rales.   Cardiovascular:      Normal rate. Regular rhythm.   Pulses:     Intact distal pulses.   Abdominal:      General: Bowel sounds are normal.      Palpations: Abdomen is soft.   Musculoskeletal:      Cervical back: Normal range of motion and neck supple. Skin:     General: Skin is warm and dry.      Findings: No rash.   Neurological:      Mental Status: Alert.       Procedures    Lab Review:         MDM  1.  Coronary disease  Patient has nonobstructive disease in the past and is currently stable on medications with normal function  2.  Hypertension  Patient blood pressure [place] is slightly high and she is not taking metoprolol regularly and using it as needed only but have told her to take 12.5 g twice daily and will adjust medicines accordingly  3.  Hyperlipidemia  Patient is on Crestor and the lipid levels are well within normal limits  4.  Diabetes  Patient is on insulin and followed by the primary care doctor  5.  COPD  Patient still continues to smoke and is advised to stop    Patient's previous medical records, labs, and EKG were reviewed and discussed with the patient at today's visit.

## 2023-03-13 ENCOUNTER — OFFICE VISIT (OUTPATIENT)
Dept: ENDOCRINOLOGY | Facility: CLINIC | Age: 73
End: 2023-03-13
Payer: MEDICARE

## 2023-03-13 VITALS
DIASTOLIC BLOOD PRESSURE: 74 MMHG | HEART RATE: 63 BPM | WEIGHT: 158 LBS | HEIGHT: 62 IN | OXYGEN SATURATION: 99 % | SYSTOLIC BLOOD PRESSURE: 132 MMHG | BODY MASS INDEX: 29.08 KG/M2

## 2023-03-13 DIAGNOSIS — E05.90 HYPERTHYROIDISM: Primary | ICD-10-CM

## 2023-03-13 PROCEDURE — 1159F MED LIST DOCD IN RCRD: CPT | Performed by: INTERNAL MEDICINE

## 2023-03-13 PROCEDURE — 3078F DIAST BP <80 MM HG: CPT | Performed by: INTERNAL MEDICINE

## 2023-03-13 PROCEDURE — 3075F SYST BP GE 130 - 139MM HG: CPT | Performed by: INTERNAL MEDICINE

## 2023-03-13 PROCEDURE — 1160F RVW MEDS BY RX/DR IN RCRD: CPT | Performed by: INTERNAL MEDICINE

## 2023-03-13 PROCEDURE — 99213 OFFICE O/P EST LOW 20 MIN: CPT | Performed by: INTERNAL MEDICINE

## 2023-03-13 RX ORDER — METHIMAZOLE 5 MG/1
TABLET ORAL
Start: 2023-03-13

## 2023-03-13 NOTE — PATIENT INSTRUCTIONS
Continue exercise.  Decrease methimazole to 6d/week.  Have labs rechecked in 3 months.  Will call you with the lab results.  F/u in 6 months, with labs prior.

## 2023-03-26 NOTE — PROGRESS NOTES
[facility]        Patient Care Team:  Deborah Ochoa MD as PCP - General  Ishan June MD as Consulting Physician (Cardiology)    Chief Complaint:    Chief Complaint   Patient presents with   • Thyroid Problem     FU ABNORMAL THYROID FUNCTION TEST         Subjective     Here for thyroid f/u  Taking methimazole 2 tabs daily  Exercise program: walking @ work    Interval History:     Patient Complaints: wt gain  Patient Denies:  Palpitations   History taken from: patient    Review of Systems:   Review of Systems   Constitutional: Positive for unexpected weight gain.   HENT: Positive for trouble swallowing.    Eyes: Negative for blurred vision.   Cardiovascular: Negative for palpitations.   Gastrointestinal: Negative for nausea.   Endocrine: Negative for polyuria.   Neurological: Negative for tremors and headache.     Gained 15 lb since last visit  Objective     Vital Signs      Vitals:    03/13/23 1338   BP: 132/74   Pulse: 63   SpO2: 99%         Physical Exam:     General Appearance:    Alert, cooperative, in no acute distress   Head:    Normocephalic, without obvious abnormality, atraumatic   Eyes:       Mouth:  Neck:       No periorbital edema. No lid lag    No lesions    36 cm in circumference, thyroid soft   Lungs:     Clear     Heart:    Regular rhythm and normal rate   Abdomen:     Normal bowel sounds, soft                 Extremities:   Moves all extremities well, no edema or tremors               Pulses:   Pulses palpable and equal bilaterally   Skin:   Dry   Neurologic:  DTR 1+          Results Review:    I have reviewed the patient's new clinical results, labs & imaging.    Medication Review:   Prior to Admission medications    Medication Sig Start Date End Date Taking? Authorizing Provider   acetaminophen (TYLENOL) 325 MG tablet Take 2 tablets by mouth Every 4 (Four) Hours As Needed for Mild Pain . 7/11/21  Yes Merna Fam MD   ALPRAZolam (XANAX) 0.5 MG tablet Take 1 tablet by  mouth At Night As Needed for Anxiety.   Yes Dennys Reyes MD   amphetamine-dextroamphetamine (ADDERALL) 20 MG tablet Take 1 tablet by mouth 2 (Two) Times a Day. Only getting filled every 6 months per [facility]   Yes Dennys Reyes MD   aspirin 81 MG tablet Take 1 tablet by mouth Daily. 10/9/19  Yes Merna Fam MD   cyclobenzaprine (FLEXERIL) 10 MG tablet Take 1 tablet by mouth every night at bedtime.   Yes Dennys Reyes MD   DULoxetine (CYMBALTA) 30 MG capsule Take 1 capsule by mouth Daily.   Yes Dennys Reyes MD   esomeprazole (nexIUM) 40 MG capsule Take 1 capsule by mouth 2 (Two) Times a Day.   Yes Dennys Reyes MD   Fluticasone-Umeclidin-Vilant (TRELEGY) 100-62.5-25 MCG/INH inhaler Inhale 1 container Daily.   Yes Dennys Reyes MD   gabapentin (NEURONTIN) 300 MG capsule Take 1 capsule by mouth 3 (Three) Times a Day.   Yes Dennys Reyes MD   insulin aspart (novoLOG) 100 UNIT/ML injection Inject 10 Units under the skin into the appropriate area as directed 3 (Three) Times a Day Before Meals. 7/11/21  Yes Merna Fam MD   Insulin Degludec (TRESIBA FLEXTOUCH) 200 UNIT/ML solution pen-injector pen injection Inject 34 Units under the skin into the appropriate area as directed every night at bedtime.   Yes Dennys Reyes MD   Januvia 100 MG tablet Take 1 tablet by mouth Daily. TAKING AS NEEDED 11/6/20  Yes Dennys Reyes MD   methIMAzole (TAPAZOLE) 5 MG tablet TAKE 1 TABLET BY MOUTH TWICE DAILY 6 D/ WK 3/13/23  Yes Pierre Tripp MD   metoprolol tartrate (LOPRESSOR) 25 MG tablet Take 12.5 mg by mouth 2 (Two) Times a Day As Needed.   Yes Dennys Reyes MD   Mirabegron ER (MYRBETRIQ) 50 MG tablet sustained-release 24 hour 24 hr tablet Take 50 mg by mouth Daily.   Yes Dennys Reyes MD   rosuvastatin (CRESTOR) 20 MG tablet Take 1 tablet by mouth Daily.   Yes Dennys Reyes MD       Lab Results (most recent)     None          Lab Results   Component Value Date    GLUCOSE 116 (H) 03/07/2023    BUN 26 (H) 03/07/2023    CREATININE 1.14 (H) 03/07/2023    EGFRIFNONA 62 07/11/2021    BCR 22.8 03/07/2023    K 5.3 (H) 03/07/2023    CO2 28.0 03/07/2023    CALCIUM 9.5 03/07/2023    ALBUMIN 4.0 03/07/2023    AST 15 03/07/2023    ALT 13 03/07/2023     Lab Results   Component Value Date    TSH 1.920 03/07/2023    FREET4 1.01 03/07/2023     WBC 9.71, Hb 14    Assessment & Plan     Diagnoses and all orders for this visit:    1. Hyperthyroidism (Primary)  -     TSH; Future  -     T4, Free; Future  -     methIMAzole (TAPAZOLE) 5 MG tablet; TAKE 1 TABLET BY MOUTH TWICE DAILY 6 D/ WK    Thyroid over blocked.    Continue exercise.  Decrease methimazole to 6d/week.  Have labs rechecked in 3 months.  Will call you with the lab results.        Pierre Tripp MD  03/25/23  21:11 EDT

## 2023-04-14 DIAGNOSIS — E05.90 HYPERTHYROIDISM: ICD-10-CM

## 2023-04-14 RX ORDER — METHIMAZOLE 5 MG/1
TABLET ORAL
Qty: 146 TABLET | Refills: 2 | Status: SHIPPED | OUTPATIENT
Start: 2023-04-14 | End: 2023-04-17

## 2023-04-18 RX ORDER — METHIMAZOLE 5 MG/1
TABLET ORAL
Qty: 180 TABLET | Refills: 0 | Status: SHIPPED | OUTPATIENT
Start: 2023-04-18

## 2023-06-13 ENCOUNTER — LAB (OUTPATIENT)
Dept: LAB | Facility: HOSPITAL | Age: 73
End: 2023-06-13
Payer: MEDICARE

## 2023-06-13 DIAGNOSIS — E05.90 HYPERTHYROIDISM: ICD-10-CM

## 2023-06-13 LAB
T4 FREE SERPL-MCNC: 0.93 NG/DL (ref 0.93–1.7)
TSH SERPL DL<=0.05 MIU/L-ACNC: 2.32 UIU/ML (ref 0.27–4.2)

## 2023-06-13 PROCEDURE — 84443 ASSAY THYROID STIM HORMONE: CPT

## 2023-06-13 PROCEDURE — 84439 ASSAY OF FREE THYROXINE: CPT

## 2023-06-13 PROCEDURE — 36415 COLL VENOUS BLD VENIPUNCTURE: CPT

## 2023-09-21 ENCOUNTER — OFFICE VISIT (OUTPATIENT)
Dept: CARDIOLOGY | Facility: CLINIC | Age: 73
End: 2023-09-21
Payer: MEDICARE

## 2023-09-21 VITALS
OXYGEN SATURATION: 99 % | HEART RATE: 99 BPM | BODY MASS INDEX: 28.71 KG/M2 | DIASTOLIC BLOOD PRESSURE: 75 MMHG | SYSTOLIC BLOOD PRESSURE: 131 MMHG | HEIGHT: 62 IN | WEIGHT: 156 LBS

## 2023-09-21 DIAGNOSIS — J44.9 CHRONIC OBSTRUCTIVE PULMONARY DISEASE, UNSPECIFIED COPD TYPE: ICD-10-CM

## 2023-09-21 DIAGNOSIS — E11.9 TYPE 2 DIABETES MELLITUS WITHOUT COMPLICATION, WITHOUT LONG-TERM CURRENT USE OF INSULIN: ICD-10-CM

## 2023-09-21 DIAGNOSIS — I10 ESSENTIAL HYPERTENSION: ICD-10-CM

## 2023-09-21 DIAGNOSIS — I25.118 CORONARY ARTERY DISEASE OF NATIVE ARTERY OF NATIVE HEART WITH STABLE ANGINA PECTORIS: Primary | ICD-10-CM

## 2023-09-21 DIAGNOSIS — E78.00 PURE HYPERCHOLESTEROLEMIA: ICD-10-CM

## 2023-09-21 RX ORDER — MIDODRINE HYDROCHLORIDE 2.5 MG/1
2.5 TABLET ORAL AS NEEDED
COMMUNITY
Start: 2023-07-10

## 2023-09-21 NOTE — PROGRESS NOTES
"    Subjective:     Encounter Date:09/21/2023      Patient ID: Dyana Montemayor is a 73 y.o. female.    Chief Complaint:  Coronary Artery Disease  Symptoms include shortness of breath. Pertinent negatives include no chest pain, dizziness, leg swelling or palpitations.     73-year-old white female with history of coronary disease history of COPD hypertension hyperlipidemia and diabetes presents to the office for follow-up.  Patient is currently stable without isms of chest pain but has some shortness of breath at rest.  No complaint of any PND orthopnea.  No palpitation dizziness syncope or swelling of the feet.  Patient is taking all her medicines regularly.  Patient still continues to smoke.    The following portions of the patient's history were reviewed and updated as appropriate: allergies, current medications, past family history, past medical history, past social history, past surgical history, and problem list.  Past Medical History:   Diagnosis Date    Cataracts, bilateral     COPD (chronic obstructive pulmonary disease)     Diabetes mellitus 1999    type 2    Hyperlipidemia      Past Surgical History:   Procedure Laterality Date    CARDIAC CATHETERIZATION Right 10/07/2019    Procedure: Left Heart Cath and coronary angiogram;  Surgeon: Ishan June MD;  Location: [facility] CATH INVASIVE LOCATION;  Service: Cardiovascular    HYSTERECTOMY  1972    ROTATOR CUFF REPAIR  2018     /75   Pulse 99   Ht 157.5 cm (62\")   Wt 70.8 kg (156 lb)   SpO2 99%   BMI 28.53 kg/m²   Family History   Problem Relation Age of Onset    Heart disease Father        Current Outpatient Medications:     acetaminophen (TYLENOL) 325 MG tablet, Take 2 tablets by mouth Every 4 (Four) Hours As Needed for Mild Pain ., Disp: , Rfl:     ALPRAZolam (XANAX) 0.5 MG tablet, Take 1 tablet by mouth At Night As Needed for Anxiety., Disp: , Rfl:     amphetamine-dextroamphetamine (ADDERALL) 20 MG tablet, Take 1 tablet by mouth 2 (Two) Times a " Day. Only getting filled every 6 months per [facility], Disp: , Rfl:     aspirin 81 MG tablet, Take 1 tablet by mouth Daily., Disp: 30 tablet, Rfl: 5    cyclobenzaprine (FLEXERIL) 10 MG tablet, Take 1 tablet by mouth every night at bedtime., Disp: , Rfl:     DULoxetine (CYMBALTA) 30 MG capsule, Take 1 capsule by mouth Daily., Disp: , Rfl:     esomeprazole (nexIUM) 40 MG capsule, Take 1 capsule by mouth 2 (Two) Times a Day., Disp: , Rfl:     Fluticasone-Umeclidin-Vilant (TRELEGY) 100-62.5-25 MCG/INH inhaler, Inhale 1 container Daily., Disp: , Rfl:     gabapentin (NEURONTIN) 300 MG capsule, Take 1 capsule by mouth 3 (Three) Times a Day., Disp: , Rfl:     insulin aspart (novoLOG) 100 UNIT/ML injection, Inject 10 Units under the skin into the appropriate area as directed 3 (Three) Times a Day Before Meals., Disp: , Rfl: 12    Insulin Degludec (TRESIBA FLEXTOUCH) 200 UNIT/ML solution pen-injector pen injection, Inject 34 Units under the skin into the appropriate area as directed every night at bedtime., Disp: , Rfl:     Januvia 100 MG tablet, Take 1 tablet by mouth Daily. TAKING AS NEEDED, Disp: , Rfl:     methIMAzole (TAPAZOLE) 5 MG tablet, TAKE ONE TABLET daily, Disp: 180 tablet, Rfl: 0    midodrine (PROAMATINE) 2.5 MG tablet, Take 1 tablet by mouth As Needed., Disp: , Rfl:     Mirabegron ER (MYRBETRIQ) 50 MG tablet sustained-release 24 hour 24 hr tablet, Take 50 mg by mouth Daily., Disp: , Rfl:     rosuvastatin (CRESTOR) 20 MG tablet, Take 1 tablet by mouth Daily., Disp: , Rfl:     metoprolol tartrate (LOPRESSOR) 25 MG tablet, Take 0.5 tablets by mouth 2 (Two) Times a Day As Needed., Disp: , Rfl:   No Known Allergies  Social History     Socioeconomic History    Marital status:    Tobacco Use    Smoking status: Every Day     Packs/day: 0.75     Years: 60.00     Pack years: 45.00     Types: Cigarettes    Smokeless tobacco: Never   Vaping Use    Vaping Use: Never used   Substance and Sexual Activity     Alcohol use: Not Currently     Comment: RARELY    Drug use: Never    Sexual activity: Not Currently     Birth control/protection: Hysterectomy     Comment: 1972     Review of Systems   Constitutional: Positive for malaise/fatigue.   Cardiovascular:  Negative for chest pain, dyspnea on exertion, leg swelling and palpitations.   Respiratory:  Positive for shortness of breath. Negative for cough.    Gastrointestinal:  Negative for abdominal pain, nausea and vomiting.   Neurological:  Negative for dizziness, focal weakness, headaches, light-headedness and numbness.   All other systems reviewed and are negative.           Objective:     Constitutional:       Appearance: Well-developed.   Eyes:      General: No scleral icterus.     Conjunctiva/sclera: Conjunctivae normal.   HENT:      Head: Normocephalic and atraumatic.   Neck:      Vascular: No carotid bruit or JVD.   Pulmonary:      Effort: Pulmonary effort is normal.      Breath sounds: Normal breath sounds. No wheezing. No rales.   Cardiovascular:      Normal rate. Regular rhythm.   Pulses:     Intact distal pulses.   Abdominal:      General: Bowel sounds are normal.      Palpations: Abdomen is soft.   Musculoskeletal:      Cervical back: Normal range of motion and neck supple. Skin:     General: Skin is warm and dry.      Findings: No rash.   Neurological:      Mental Status: Alert.     Procedures    Lab Review:         MDM    #1 coronary disease  Patient has nonobstructive disease in the past and is currently stable on medications with normal function  2.  Hypertension  Patient blood pressure currently stable on metoprolol  3.  Diabetes  Patient is on insulin and followed by the primary care doctor  4.  Hyperlipidemia  Patient is on statins and the lipid levels are well within normal limits  5.  COPD  Patient still continues to smoke and is advised to stop smoking  Patient's previous medical records, labs, and EKG were reviewed and discussed with the patient at  today's visit.

## 2023-10-12 ENCOUNTER — LAB (OUTPATIENT)
Dept: LAB | Facility: HOSPITAL | Age: 73
End: 2023-10-12
Payer: MEDICARE

## 2023-10-12 DIAGNOSIS — Z51.81 ENCOUNTER FOR MEDICATION MONITORING: ICD-10-CM

## 2023-10-12 DIAGNOSIS — E05.90 HYPERTHYROIDISM: ICD-10-CM

## 2023-10-12 LAB
ALBUMIN SERPL-MCNC: 4.1 G/DL (ref 3.5–5.2)
ALBUMIN/GLOB SERPL: 1.6 G/DL
ALP SERPL-CCNC: 118 U/L (ref 39–117)
ALT SERPL W P-5'-P-CCNC: 14 U/L (ref 1–33)
ANION GAP SERPL CALCULATED.3IONS-SCNC: 9 MMOL/L (ref 5–15)
AST SERPL-CCNC: 20 U/L (ref 1–32)
BASOPHILS # BLD AUTO: 0.04 10*3/MM3 (ref 0–0.2)
BASOPHILS NFR BLD AUTO: 0.4 % (ref 0–1.5)
BILIRUB SERPL-MCNC: 0.4 MG/DL (ref 0–1.2)
BUN SERPL-MCNC: 34 MG/DL (ref 8–23)
BUN/CREAT SERPL: 29.1 (ref 7–25)
CALCIUM SPEC-SCNC: 9.4 MG/DL (ref 8.6–10.5)
CHLORIDE SERPL-SCNC: 107 MMOL/L (ref 98–107)
CO2 SERPL-SCNC: 26 MMOL/L (ref 22–29)
CREAT SERPL-MCNC: 1.17 MG/DL (ref 0.57–1)
DEPRECATED RDW RBC AUTO: 38.8 FL (ref 37–54)
EGFRCR SERPLBLD CKD-EPI 2021: 49.1 ML/MIN/1.73
EOSINOPHIL # BLD AUTO: 0.35 10*3/MM3 (ref 0–0.4)
EOSINOPHIL NFR BLD AUTO: 3.3 % (ref 0.3–6.2)
ERYTHROCYTE [DISTWIDTH] IN BLOOD BY AUTOMATED COUNT: 12 % (ref 12.3–15.4)
GLOBULIN UR ELPH-MCNC: 2.6 GM/DL
GLUCOSE SERPL-MCNC: 137 MG/DL (ref 65–99)
HCT VFR BLD AUTO: 40.3 % (ref 34–46.6)
HGB BLD-MCNC: 13.7 G/DL (ref 12–15.9)
IMM GRANULOCYTES # BLD AUTO: 0.03 10*3/MM3 (ref 0–0.05)
IMM GRANULOCYTES NFR BLD AUTO: 0.3 % (ref 0–0.5)
LYMPHOCYTES # BLD AUTO: 4.64 10*3/MM3 (ref 0.7–3.1)
LYMPHOCYTES NFR BLD AUTO: 43.5 % (ref 19.6–45.3)
MCH RBC QN AUTO: 30.6 PG (ref 26.6–33)
MCHC RBC AUTO-ENTMCNC: 34 G/DL (ref 31.5–35.7)
MCV RBC AUTO: 90.2 FL (ref 79–97)
MONOCYTES # BLD AUTO: 0.65 10*3/MM3 (ref 0.1–0.9)
MONOCYTES NFR BLD AUTO: 6.1 % (ref 5–12)
NEUTROPHILS NFR BLD AUTO: 4.96 10*3/MM3 (ref 1.7–7)
NEUTROPHILS NFR BLD AUTO: 46.4 % (ref 42.7–76)
NRBC BLD AUTO-RTO: 0 /100 WBC (ref 0–0.2)
PLATELET # BLD AUTO: 232 10*3/MM3 (ref 140–450)
PMV BLD AUTO: 9.4 FL (ref 6–12)
POTASSIUM SERPL-SCNC: 4.6 MMOL/L (ref 3.5–5.2)
PROT SERPL-MCNC: 6.7 G/DL (ref 6–8.5)
RBC # BLD AUTO: 4.47 10*6/MM3 (ref 3.77–5.28)
SODIUM SERPL-SCNC: 142 MMOL/L (ref 136–145)
T4 FREE SERPL-MCNC: 1.39 NG/DL (ref 0.93–1.7)
TSH SERPL DL<=0.05 MIU/L-ACNC: 0.27 UIU/ML (ref 0.27–4.2)
WBC NRBC COR # BLD: 10.67 10*3/MM3 (ref 3.4–10.8)

## 2023-10-12 PROCEDURE — 84439 ASSAY OF FREE THYROXINE: CPT

## 2023-10-12 PROCEDURE — 85025 COMPLETE CBC W/AUTO DIFF WBC: CPT

## 2023-10-12 PROCEDURE — 80053 COMPREHEN METABOLIC PANEL: CPT

## 2023-10-12 PROCEDURE — 84443 ASSAY THYROID STIM HORMONE: CPT

## 2023-10-12 PROCEDURE — 36415 COLL VENOUS BLD VENIPUNCTURE: CPT

## 2023-10-17 ENCOUNTER — OFFICE VISIT (OUTPATIENT)
Dept: ENDOCRINOLOGY | Facility: CLINIC | Age: 73
End: 2023-10-17
Payer: MEDICARE

## 2023-10-17 VITALS
BODY MASS INDEX: 28.16 KG/M2 | WEIGHT: 153 LBS | HEART RATE: 120 BPM | DIASTOLIC BLOOD PRESSURE: 78 MMHG | SYSTOLIC BLOOD PRESSURE: 140 MMHG | OXYGEN SATURATION: 97 % | HEIGHT: 62 IN

## 2023-10-17 DIAGNOSIS — Z79.899 ENCOUNTER FOR LONG-TERM (CURRENT) USE OF OTHER MEDICATIONS: ICD-10-CM

## 2023-10-17 DIAGNOSIS — E05.90 HYPERTHYROIDISM: Primary | ICD-10-CM

## 2023-10-17 PROCEDURE — 1160F RVW MEDS BY RX/DR IN RCRD: CPT | Performed by: INTERNAL MEDICINE

## 2023-10-17 PROCEDURE — 3077F SYST BP >= 140 MM HG: CPT | Performed by: INTERNAL MEDICINE

## 2023-10-17 PROCEDURE — 99213 OFFICE O/P EST LOW 20 MIN: CPT | Performed by: INTERNAL MEDICINE

## 2023-10-17 PROCEDURE — 1159F MED LIST DOCD IN RCRD: CPT | Performed by: INTERNAL MEDICINE

## 2023-10-17 PROCEDURE — 3078F DIAST BP <80 MM HG: CPT | Performed by: INTERNAL MEDICINE

## 2023-10-18 NOTE — PROGRESS NOTES
[facility]        Patient Care Team:  Deborah Ochoa MD as PCP - General  Ishan June MD as Consulting Physician (Cardiology)    Chief Complaint:    Chief Complaint   Patient presents with    Hyperthyroidism     FU / Hyperthyroidism          Subjective     Here for thyroid f/u  Taking methimazole 2 tabs 5 d / wk  Exercise program: walking @ work    Interval History:     Patient Complaints: hand tremors  Patient Denies:  Palpitations   History taken from: patient    Review of Systems:   Review of Systems   HENT:  Negative for trouble swallowing.    Eyes:  Negative for blurred vision.   Cardiovascular:  Negative for palpitations.   Gastrointestinal:  Negative for nausea.   Endocrine: Negative for polyuria.   Neurological:  Positive for tremors. Negative for headache.     Lost  7 lb since last visit  Objective     Vital Signs  Heart Rate:  [120] 120  BP: (140)/(78) 140/78    Physical Exam:     General Appearance:    Alert, cooperative, in no acute distress   Head:    Normocephalic, without obvious abnormality, atraumatic   Eyes:       Mouth:  Neck:       No periorbital edema. No lid lag    No lesions    36 cm in circumference, thyroid soft   Lungs:     Clear    Heart:    Regular rhythm and normal rate   Abdomen:     Normal bowel sounds, soft                 Extremities:   Coarse hand tremors, no edema               Pulses:   Pulses palpable and equal bilaterally   Skin:   Dry   Neurologic:  DTR 1+          Results Review:    I have reviewed the patient's new clinical results, labs & imaging.    Medication Review:   Prior to Admission medications    Medication Sig Start Date End Date Taking? Authorizing Provider   acetaminophen (TYLENOL) 325 MG tablet Take 2 tablets by mouth Every 4 (Four) Hours As Needed for Mild Pain . 7/11/21  Yes Merna Fam MD   ALPRAZolam (XANAX) 0.5 MG tablet Take 1 tablet by mouth At Night As Needed for Anxiety.   Yes Provider, MD Dennys    amphetamine-dextroamphetamine (ADDERALL) 20 MG tablet Take 1 tablet by mouth 2 (Two) Times a Day. Only getting filled every 6 months per [facility]   Yes Dennys Reyes MD   aspirin 81 MG tablet Take 1 tablet by mouth Daily. 10/9/19  Yes Merna Fam MD   cyclobenzaprine (FLEXERIL) 10 MG tablet Take 1 tablet by mouth every night at bedtime.   Yes Dennys Reyes MD   DULoxetine (CYMBALTA) 30 MG capsule Take 1 capsule by mouth Daily.   Yes Dennys Reyes MD   esomeprazole (nexIUM) 40 MG capsule Take 1 capsule by mouth 2 (Two) Times a Day.   Yes Dennys Reyes MD   Fluticasone-Umeclidin-Vilant (TRELEGY) 100-62.5-25 MCG/INH inhaler Inhale 1 container Daily.   Yes Dennys Reyes MD   gabapentin (NEURONTIN) 300 MG capsule Take 1 capsule by mouth 3 (Three) Times a Day.   Yes Dennys Reyes MD   insulin aspart (novoLOG) 100 UNIT/ML injection Inject 10 Units under the skin into the appropriate area as directed 3 (Three) Times a Day Before Meals. 7/11/21  Yes Merna Fam MD   Insulin Degludec (TRESIBA FLEXTOUCH) 200 UNIT/ML solution pen-injector pen injection Inject 34 Units under the skin into the appropriate area as directed every night at bedtime.   Yes Dennys Reyes MD   methIMAzole (TAPAZOLE) 5 MG tablet TAKE ONE TABLET daily 7/9/23  Yes Pierre Tripp MD   midodrine (PROAMATINE) 2.5 MG tablet Take 1 tablet by mouth As Needed. 7/10/23  Yes Dennys Reyes MD   Mirabegron ER (MYRBETRIQ) 50 MG tablet sustained-release 24 hour 24 hr tablet Take 50 mg by mouth Daily.   Yes Dennys Reyes MD   rosuvastatin (CRESTOR) 20 MG tablet Take 1 tablet by mouth Daily.   Yes Dennys Reyes MD   Januvia 100 MG tablet Take 1 tablet by mouth Daily. TAKING AS NEEDED  Patient not taking: Reported on 10/17/2023 11/6/20 10/17/23  Dennys Reyes MD   metoprolol tartrate (LOPRESSOR) 25 MG tablet Take 0.5 tablets by mouth 2 (Two) Times a Day As  Needed.  Patient not taking: Reported on 10/17/2023  10/17/23  Provider, MD Dennys          Lab Results   Component Value Date    GLUCOSE 137 (H) 10/12/2023    BUN 34 (H) 10/12/2023    CREATININE 1.17 (H) 10/12/2023    EGFRIFNONA 62 07/11/2021    BCR 29.1 (H) 10/12/2023    K 4.6 10/12/2023    CO2 26.0 10/12/2023    CALCIUM 9.4 10/12/2023    ALBUMIN 4.1 10/12/2023    AST 20 10/12/2023    ALT 14 10/12/2023     Lab Results   Component Value Date    TSH 0.271 10/12/2023    FREET4 1.39 10/12/2023     WBC 10.67, Hb 13.7    Assessment & Plan     Diagnoses and all orders for this visit:    1. Hyperthyroidism (Primary)  -     TSH; Future  -     T4, Free; Future    2. Encounter for long-term (current) use of other medications  -     CBC & Differential; Future  -     Comprehensive Metabolic Panel; Future    Thyroid stable.    Continue exercise.  Take methimazole one tab daily.        Pierre Tripp MD  10/17/23  23:26 EDT

## 2024-01-07 DIAGNOSIS — E05.90 HYPERTHYROIDISM: ICD-10-CM

## 2024-01-08 RX ORDER — METHIMAZOLE 5 MG/1
TABLET ORAL
Qty: 90 TABLET | Refills: 2 | Status: SHIPPED | OUTPATIENT
Start: 2024-01-08

## 2024-03-31 ENCOUNTER — HOSPITAL ENCOUNTER (OUTPATIENT)
Facility: HOSPITAL | Age: 74
Discharge: HOME | End: 2024-03-31
Attending: EMERGENCY MEDICINE | Admitting: EMERGENCY MEDICINE
Payer: MEDICARE

## 2024-03-31 ENCOUNTER — APPOINTMENT (OUTPATIENT)
Dept: GENERAL RADIOLOGY | Facility: HOSPITAL | Age: 74
End: 2024-03-31
Payer: MEDICARE

## 2024-03-31 VITALS
HEART RATE: 106 BPM | DIASTOLIC BLOOD PRESSURE: 64 MMHG | TEMPERATURE: 98.2 F | HEIGHT: 62 IN | WEIGHT: 135 LBS | RESPIRATION RATE: 20 BRPM | BODY MASS INDEX: 24.84 KG/M2 | OXYGEN SATURATION: 96 % | SYSTOLIC BLOOD PRESSURE: 123 MMHG

## 2024-03-31 DIAGNOSIS — R05.1 ACUTE COUGH: Primary | ICD-10-CM

## 2024-03-31 DIAGNOSIS — R06.02 SHORTNESS OF BREATH: ICD-10-CM

## 2024-03-31 LAB
FLUAV SUBTYP SPEC NAA+PROBE: NOT DETECTED
FLUBV RNA ISLT QL NAA+PROBE: NOT DETECTED
SARS-COV-2 RNA RESP QL NAA+PROBE: NOT DETECTED

## 2024-03-31 PROCEDURE — 25010000002 DEXAMETHASONE SODIUM PHOSPHATE 10 MG/ML SOLUTION: Performed by: PHYSICIAN ASSISTANT

## 2024-03-31 PROCEDURE — 87636 SARSCOV2 & INF A&B AMP PRB: CPT | Performed by: EMERGENCY MEDICINE

## 2024-03-31 PROCEDURE — 71046 X-RAY EXAM CHEST 2 VIEWS: CPT

## 2024-03-31 PROCEDURE — G0463 HOSPITAL OUTPT CLINIC VISIT: HCPCS | Performed by: PHYSICIAN ASSISTANT

## 2024-03-31 RX ORDER — DEXAMETHASONE SODIUM PHOSPHATE 10 MG/ML
10 INJECTION, SOLUTION INTRAMUSCULAR; INTRAVENOUS ONCE
Status: COMPLETED | OUTPATIENT
Start: 2024-03-31 | End: 2024-03-31

## 2024-03-31 RX ORDER — IPRATROPIUM BROMIDE AND ALBUTEROL SULFATE 2.5; .5 MG/3ML; MG/3ML
3 SOLUTION RESPIRATORY (INHALATION) ONCE
Status: COMPLETED | OUTPATIENT
Start: 2024-03-31 | End: 2024-03-31

## 2024-03-31 RX ORDER — METHYLPREDNISOLONE 4 MG/1
TABLET ORAL
Qty: 21 TABLET | Refills: 0 | Status: SHIPPED | OUTPATIENT
Start: 2024-03-31

## 2024-03-31 RX ADMIN — DEXAMETHASONE SODIUM PHOSPHATE 10 MG: 10 INJECTION, SOLUTION INTRAMUSCULAR; INTRAVENOUS at 19:27

## 2024-03-31 RX ADMIN — IPRATROPIUM BROMIDE AND ALBUTEROL SULFATE 3 ML: .5; 3 SOLUTION RESPIRATORY (INHALATION) at 19:27

## 2024-04-01 NOTE — FSED PROVIDER NOTE
Subjective   History of Present Illness  Patient presents to the clinic today complaining of shortness of breath and cough.  Patient symptoms started couple days ago.  Patient has a significant history of COPD and she still smokes.  Patient does have a nebulizer at home but has not used it in a couple of days.  Patient denies any chest pain, abdominal pain, nausea, vomiting or fever.      Review of Systems   Constitutional:  Negative for chills and fever.   HENT:  Negative for ear pain, postnasal drip, rhinorrhea, sinus pressure, sinus pain and sore throat.    Respiratory:  Positive for cough and shortness of breath.    Cardiovascular:  Negative for chest pain.   Gastrointestinal:  Negative for abdominal pain, constipation, diarrhea, nausea and vomiting.   Musculoskeletal:  Negative for arthralgias, back pain, myalgias and neck pain.   Skin:  Negative for rash and wound.   Neurological:  Negative for dizziness, weakness, light-headedness and headaches.   All other systems reviewed and are negative.      Past Medical History:   Diagnosis Date    Cataracts, bilateral     COPD (chronic obstructive pulmonary disease)     Diabetes mellitus 1999    type 2    Hyperlipidemia        No Known Allergies    Past Surgical History:   Procedure Laterality Date    CARDIAC CATHETERIZATION Right 10/07/2019    Procedure: Left Heart Cath and coronary angiogram;  Surgeon: Ishan June MD;  Location: [facility] INVASIVE LOCATION;  Service: Cardiovascular    HYSTERECTOMY  1972    ROTATOR CUFF REPAIR  2018       Family History   Problem Relation Age of Onset    Heart disease Father        Social History     Socioeconomic History    Marital status:    Tobacco Use    Smoking status: Every Day     Current packs/day: 0.75     Average packs/day: 0.8 packs/day for 60.0 years (45.0 ttl pk-yrs)     Types: Cigarettes    Smokeless tobacco: Never   Vaping Use    Vaping status: Never Used   Substance and Sexual Activity    Alcohol use: Not  Currently     Comment: RARELY    Drug use: Never    Sexual activity: Not Currently     Birth control/protection: Hysterectomy     Comment: 1972           Objective   Physical Exam  Vitals and nursing note reviewed.   Constitutional:       General: She is not in acute distress.     Appearance: She is well-developed and normal weight. She is not ill-appearing or toxic-appearing.   HENT:      Head: Normocephalic and atraumatic.   Eyes:      Extraocular Movements: Extraocular movements intact.      Pupils: Pupils are equal, round, and reactive to light.   Neck:      Vascular: No JVD.      Trachea: No tracheal deviation.   Cardiovascular:      Rate and Rhythm: Normal rate and regular rhythm.      Pulses: Normal pulses.      Heart sounds: Normal heart sounds. No murmur heard.     No friction rub. No gallop.   Pulmonary:      Effort: Pulmonary effort is normal. No respiratory distress.      Breath sounds: Wheezing present. No decreased breath sounds, rhonchi or rales.   Chest:      Chest wall: No mass or tenderness.   Musculoskeletal:         General: Normal range of motion.      Cervical back: Normal range of motion.      Right lower leg: No tenderness. No edema.      Left lower leg: No tenderness. No edema.   Skin:     General: Skin is warm and dry.      Coloration: Skin is not pale.      Findings: No erythema or rash.   Neurological:      General: No focal deficit present.      Mental Status: She is alert and oriented to person, place, and time.      Cranial Nerves: No cranial nerve deficit.      Motor: No weakness.   Psychiatric:         Mood and Affect: Mood normal. Mood is not anxious.         Behavior: Behavior normal. Behavior is not agitated.         Procedures           ED Course                                           Medical Decision Making  Patient was counseled on smoking cessation.  Patient was not in any respiratory distress and she was able to talk in complete sentences.  Patient was not significantly  hypoxic in the clinic today.  Patient was negative for COVID-19 and influenza.  Chest x-ray is negative for any acute cardiopulmonary disease processes.  Patient was given Decadron IM and a DuoNeb treatment in the clinic today.  On reevaluation the patient was feeling much improved and her lung sounds improved as well on auscultation.  Patient will be discharged home with a prescription for Medrol Dosepak.  Patient was counseled to use her nebulizer at home to help her when she is short of breath/wheezing.  Patient understood these instructions.  Patient can follow-up with primary care provider.  Patient return to clinic if symptoms persist or worsen.    Problems Addressed:  Acute cough: complicated acute illness or injury  Shortness of breath: complicated acute illness or injury    Amount and/or Complexity of Data Reviewed  Radiology: ordered.    Risk  Prescription drug management.        Final diagnoses:   Acute cough   Shortness of breath       ED Disposition  ED Disposition       ED Disposition   Discharge    Condition   Stable    Comment   --               Deborah Ochoa MD  [address] [facility] [place] [address] [phone]    Go to   As needed         Medication List        New Prescriptions      methylPREDNISolone 4 MG dose pack  Commonly known as: MEDROL  Take as directed on package instructions.               Where to Get Your Medications        These medications were sent to [id number] - [place], [place] - [address] AT SEC OF [place] [address] & [address] - [phone]  - [phone] FX  [address] [place] [address]      Phone: [phone]   methylPREDNISolone 4 MG dose pack

## 2024-04-02 ENCOUNTER — OFFICE VISIT (OUTPATIENT)
Dept: CARDIOLOGY | Facility: CLINIC | Age: 74
End: 2024-04-02
Payer: MEDICARE

## 2024-04-02 VITALS
SYSTOLIC BLOOD PRESSURE: 120 MMHG | HEIGHT: 62 IN | WEIGHT: 137 LBS | DIASTOLIC BLOOD PRESSURE: 68 MMHG | OXYGEN SATURATION: 97 % | BODY MASS INDEX: 25.21 KG/M2 | HEART RATE: 89 BPM

## 2024-04-02 DIAGNOSIS — I10 PRIMARY HYPERTENSION: ICD-10-CM

## 2024-04-02 DIAGNOSIS — J44.9 CHRONIC OBSTRUCTIVE PULMONARY DISEASE, UNSPECIFIED COPD TYPE: ICD-10-CM

## 2024-04-02 DIAGNOSIS — E78.00 PURE HYPERCHOLESTEROLEMIA: ICD-10-CM

## 2024-04-02 DIAGNOSIS — G47.33 OBSTRUCTIVE SLEEP APNEA: ICD-10-CM

## 2024-04-02 DIAGNOSIS — Z79.4 TYPE 2 DIABETES MELLITUS WITHOUT COMPLICATION, WITH LONG-TERM CURRENT USE OF INSULIN: ICD-10-CM

## 2024-04-02 DIAGNOSIS — I25.10 CORONARY ARTERY DISEASE INVOLVING NATIVE CORONARY ARTERY OF NATIVE HEART WITHOUT ANGINA PECTORIS: Primary | ICD-10-CM

## 2024-04-02 DIAGNOSIS — E11.9 TYPE 2 DIABETES MELLITUS WITHOUT COMPLICATION, WITH LONG-TERM CURRENT USE OF INSULIN: ICD-10-CM

## 2024-04-02 NOTE — PROGRESS NOTES
"    Subjective:     Encounter Date:04/02/2024      Patient ID: Dyana Montemayor is a 73 y.o. female.    Chief Complaint:  Coronary Artery Disease  Symptoms include shortness of breath. Pertinent negatives include no chest pain, dizziness, leg swelling or palpitations.   73-year-old white female with history of coronary disease history of COPD sleep apnea hypertension hyperlipidemia diabetes presents to the office for a follow-up.  Patient is currently stable without any symptoms of chest pain but has some shortness of breath with exertion.  No complaint of any PND orthopnea.  No palpitation dizziness syncope or swelling of the feet.  Patient is taking all her medicines daily.  Patient still continues to smoke.    The following portions of the patient's history were reviewed and updated as appropriate: allergies, current medications, past family history, past medical history, past social history, past surgical history, and problem list.  Past Medical History:   Diagnosis Date    Cataracts, bilateral     COPD (chronic obstructive pulmonary disease)     Diabetes mellitus 1999    type 2    Hyperlipidemia      Past Surgical History:   Procedure Laterality Date    CARDIAC CATHETERIZATION Right 10/07/2019    Procedure: Left Heart Cath and coronary angiogram;  Surgeon: Ishan June MD;  Location: [facility] CATH INVASIVE LOCATION;  Service: Cardiovascular    HYSTERECTOMY  1972    ROTATOR CUFF REPAIR  2018     /68   Pulse 89   Ht 157.5 cm (62.01\")   Wt 62.1 kg (137 lb)   SpO2 97%   BMI 25.05 kg/m²   Family History   Problem Relation Age of Onset    Heart disease Father        Current Outpatient Medications:     acetaminophen (TYLENOL) 325 MG tablet, Take 2 tablets by mouth Every 4 (Four) Hours As Needed for Mild Pain ., Disp: , Rfl:     ALPRAZolam (XANAX) 0.5 MG tablet, Take 1 tablet by mouth At Night As Needed for Anxiety., Disp: , Rfl:     amphetamine-dextroamphetamine (ADDERALL) 20 MG tablet, Take 1 tablet by " mouth 2 (Two) Times a Day. Only getting filled every 6 months per [facility], Disp: , Rfl:     aspirin 81 MG tablet, Take 1 tablet by mouth Daily., Disp: 30 tablet, Rfl: 5    cyclobenzaprine (FLEXERIL) 10 MG tablet, Take 1 tablet by mouth every night at bedtime., Disp: , Rfl:     DULoxetine (CYMBALTA) 30 MG capsule, Take 1 capsule by mouth Daily., Disp: , Rfl:     esomeprazole (nexIUM) 40 MG capsule, Take 1 capsule by mouth 2 (Two) Times a Day., Disp: , Rfl:     Fluticasone-Umeclidin-Vilant (TRELEGY) 100-62.5-25 MCG/INH inhaler, Inhale 1 container Daily., Disp: , Rfl:     gabapentin (NEURONTIN) 300 MG capsule, Take 1 capsule by mouth 3 (Three) Times a Day., Disp: , Rfl:     insulin aspart (novoLOG) 100 UNIT/ML injection, Inject 10 Units under the skin into the appropriate area as directed 3 (Three) Times a Day Before Meals., Disp: , Rfl: 12    Insulin Degludec (TRESIBA FLEXTOUCH) 200 UNIT/ML solution pen-injector pen injection, Inject 34 Units under the skin into the appropriate area as directed every night at bedtime., Disp: , Rfl:     methIMAzole (TAPAZOLE) 5 MG tablet, TAKE ONE TABLET BY MOUTH daily, Disp: 90 tablet, Rfl: 2    methylPREDNISolone (MEDROL) 4 MG dose pack, Take as directed on package instructions., Disp: 21 tablet, Rfl: 0    midodrine (PROAMATINE) 2.5 MG tablet, Take 1 tablet by mouth As Needed., Disp: , Rfl:     Mirabegron ER (MYRBETRIQ) 50 MG tablet sustained-release 24 hour 24 hr tablet, Take 50 mg by mouth Daily., Disp: , Rfl:     rosuvastatin (CRESTOR) 20 MG tablet, Take 1 tablet by mouth Daily., Disp: , Rfl:   No Known Allergies  Social History     Socioeconomic History    Marital status:    Tobacco Use    Smoking status: Every Day     Current packs/day: 0.75     Average packs/day: 0.8 packs/day for 60.0 years (45.0 ttl pk-yrs)     Types: Cigarettes    Smokeless tobacco: Never   Vaping Use    Vaping status: Never Used   Substance and Sexual Activity    Alcohol use: Not Currently      Comment: RARELY    Drug use: Never    Sexual activity: Not Currently     Birth control/protection: Hysterectomy     Comment: 1972     Review of Systems   Constitutional: Negative for malaise/fatigue.   Cardiovascular:  Negative for chest pain, dyspnea on exertion, leg swelling and palpitations.   Respiratory:  Positive for shortness of breath. Negative for cough.    Gastrointestinal:  Negative for abdominal pain, nausea and vomiting.   Neurological:  Negative for dizziness, focal weakness, headaches, light-headedness and numbness.   All other systems reviewed and are negative.             Objective:     Constitutional:       Appearance: Well-developed.   Eyes:      General: No scleral icterus.     Conjunctiva/sclera: Conjunctivae normal.   HENT:      Head: Normocephalic and atraumatic.   Neck:      Vascular: No carotid bruit or JVD.   Pulmonary:      Effort: Pulmonary effort is normal.      Breath sounds: Normal breath sounds. No wheezing. No rales.   Cardiovascular:      Normal rate. Regular rhythm.   Pulses:     Intact distal pulses.   Abdominal:      General: Bowel sounds are normal.      Palpations: Abdomen is soft.   Musculoskeletal:      Cervical back: Normal range of motion and neck supple. Skin:     General: Skin is warm and dry.      Findings: No rash.   Neurological:      Mental Status: Alert.       Procedures    Lab Review:         MDM    #1 coronary disease  Patient has nonobstructive disease in the past with normal V function is currently stable without any angina  2.  Diabetes  Patient is on insulin and followed by primary care doctor  3.  Hypertension  Patient blood pressure is actually on the lower side and hence she is using midodrine.  4.  Hyperlipidemia  Patient is on Crestor and the lipid levels are well within normal limits  5.  COPD and sleep apnea  Patient still continues to smoke and is advised to stop smoking  Patient also uses a CPAP machine.    Patient's previous medical records, labs,  and EKG were reviewed and discussed with the patient at today's visit.

## 2024-04-09 ENCOUNTER — APPOINTMENT (OUTPATIENT)
Dept: GENERAL RADIOLOGY | Facility: HOSPITAL | Age: 74
End: 2024-04-09
Payer: MEDICARE

## 2024-04-09 ENCOUNTER — HOSPITAL ENCOUNTER (EMERGENCY)
Facility: HOSPITAL | Age: 74
Discharge: HOME | End: 2024-04-10
Attending: EMERGENCY MEDICINE
Payer: MEDICARE

## 2024-04-09 DIAGNOSIS — J40 BRONCHITIS: ICD-10-CM

## 2024-04-09 DIAGNOSIS — J44.1 COPD EXACERBATION: ICD-10-CM

## 2024-04-09 DIAGNOSIS — R06.00 DYSPNEA, UNSPECIFIED TYPE: Primary | ICD-10-CM

## 2024-04-09 LAB
ACETONE BLD QL: NEGATIVE
ANION GAP SERPL CALCULATED.3IONS-SCNC: 11 MMOL/L (ref 5–15)
ARTERIAL PATENCY WRIST A: POSITIVE
ATMOSPHERIC PRESS: ABNORMAL MM[HG]
B PARAPERT DNA SPEC QL NAA+PROBE: NOT DETECTED
B PERT DNA SPEC QL NAA+PROBE: NOT DETECTED
BASE EXCESS BLDA CALC-SCNC: -1.2 MMOL/L (ref 0–3)
BASOPHILS # BLD AUTO: 0.03 10*3/MM3 (ref 0–0.2)
BASOPHILS NFR BLD AUTO: 0.3 % (ref 0–1.5)
BDY SITE: ABNORMAL
BILIRUB UR QL STRIP: NEGATIVE
BUN SERPL-MCNC: 32 MG/DL (ref 8–23)
BUN/CREAT SERPL: 20.9 (ref 7–25)
C PNEUM DNA NPH QL NAA+NON-PROBE: NOT DETECTED
CALCIUM SPEC-SCNC: 9.8 MG/DL (ref 8.6–10.5)
CHLORIDE SERPL-SCNC: 97 MMOL/L (ref 98–107)
CLARITY UR: CLEAR
CO2 BLDA-SCNC: 23.3 MMOL/L (ref 22–29)
CO2 SERPL-SCNC: 26 MMOL/L (ref 22–29)
COLOR UR: YELLOW
CREAT SERPL-MCNC: 1.53 MG/DL (ref 0.57–1)
D DIMER PPP FEU-MCNC: 0.72 MG/L (FEU) (ref 0–0.74)
DEPRECATED RDW RBC AUTO: 45.7 FL (ref 37–54)
EGFRCR SERPLBLD CKD-EPI 2021: 35.6 ML/MIN/1.73
EOSINOPHIL # BLD AUTO: 0.17 10*3/MM3 (ref 0–0.4)
EOSINOPHIL NFR BLD AUTO: 1.6 % (ref 0.3–6.2)
ERYTHROCYTE [DISTWIDTH] IN BLOOD BY AUTOMATED COUNT: 13.4 % (ref 12.3–15.4)
FLUAV SUBTYP SPEC NAA+PROBE: NOT DETECTED
FLUBV RNA ISLT QL NAA+PROBE: NOT DETECTED
GLUCOSE SERPL-MCNC: 496 MG/DL (ref 65–99)
GLUCOSE UR STRIP-MCNC: ABNORMAL MG/DL
HADV DNA SPEC NAA+PROBE: NOT DETECTED
HCO3 BLDA-SCNC: 22.3 MMOL/L (ref 21–28)
HCOV 229E RNA SPEC QL NAA+PROBE: NOT DETECTED
HCOV HKU1 RNA SPEC QL NAA+PROBE: NOT DETECTED
HCOV NL63 RNA SPEC QL NAA+PROBE: NOT DETECTED
HCOV OC43 RNA SPEC QL NAA+PROBE: NOT DETECTED
HCT VFR BLD AUTO: 46.8 % (ref 34–46.6)
HEMODILUTION: NO
HGB BLD-MCNC: 15 G/DL (ref 12–15.9)
HGB UR QL STRIP.AUTO: NEGATIVE
HMPV RNA NPH QL NAA+NON-PROBE: NOT DETECTED
HPIV1 RNA ISLT QL NAA+PROBE: NOT DETECTED
HPIV2 RNA SPEC QL NAA+PROBE: NOT DETECTED
HPIV3 RNA NPH QL NAA+PROBE: NOT DETECTED
HPIV4 P GENE NPH QL NAA+PROBE: NOT DETECTED
IMM GRANULOCYTES # BLD AUTO: 0.03 10*3/MM3 (ref 0–0.05)
IMM GRANULOCYTES NFR BLD AUTO: 0.3 % (ref 0–0.5)
INHALED O2 CONCENTRATION: 21 %
KETONES UR QL STRIP: NEGATIVE
LEUKOCYTE ESTERASE UR QL STRIP.AUTO: NEGATIVE
LYMPHOCYTES # BLD AUTO: 3.75 10*3/MM3 (ref 0.7–3.1)
LYMPHOCYTES NFR BLD AUTO: 34.6 % (ref 19.6–45.3)
M PNEUMO IGG SER IA-ACNC: NOT DETECTED
MCH RBC QN AUTO: 29.9 PG (ref 26.6–33)
MCHC RBC AUTO-ENTMCNC: 32.1 G/DL (ref 31.5–35.7)
MCV RBC AUTO: 93.4 FL (ref 79–97)
MODALITY: ABNORMAL
MONOCYTES # BLD AUTO: 0.88 10*3/MM3 (ref 0.1–0.9)
MONOCYTES NFR BLD AUTO: 8.1 % (ref 5–12)
NEUTROPHILS NFR BLD AUTO: 5.99 10*3/MM3 (ref 1.7–7)
NEUTROPHILS NFR BLD AUTO: 55.1 % (ref 42.7–76)
NITRITE UR QL STRIP: NEGATIVE
NRBC BLD AUTO-RTO: 0 /100 WBC (ref 0–0.2)
NT-PROBNP SERPL-MCNC: 137.2 PG/ML (ref 0–900)
PCO2 BLDA: 33.4 MM HG (ref 35–48)
PH BLDA: 7.43 PH UNITS (ref 7.35–7.45)
PH UR STRIP.AUTO: <=5 [PH] (ref 5–8)
PLATELET # BLD AUTO: 261 10*3/MM3 (ref 140–450)
PMV BLD AUTO: 8.5 FL (ref 6–12)
PO2 BLDA: 68.7 MM HG (ref 83–108)
POTASSIUM SERPL-SCNC: 5 MMOL/L (ref 3.5–5.2)
PROT UR QL STRIP: NEGATIVE
RBC # BLD AUTO: 5.01 10*6/MM3 (ref 3.77–5.28)
RHINOVIRUS RNA SPEC NAA+PROBE: DETECTED
RSV RNA NPH QL NAA+NON-PROBE: NOT DETECTED
SAO2 % BLDCOA: 94.2 % (ref 94–98)
SARS-COV-2 RNA NPH QL NAA+NON-PROBE: NOT DETECTED
SODIUM SERPL-SCNC: 134 MMOL/L (ref 136–145)
SP GR UR STRIP: 1.03 (ref 1–1.03)
TROPONIN T SERPL HS-MCNC: 21 NG/L
UROBILINOGEN UR QL STRIP: ABNORMAL
WBC NRBC COR # BLD AUTO: 10.85 10*3/MM3 (ref 3.4–10.8)

## 2024-04-09 PROCEDURE — 85025 COMPLETE CBC W/AUTO DIFF WBC: CPT | Performed by: NURSE PRACTITIONER

## 2024-04-09 PROCEDURE — 82009 KETONE BODYS QUAL: CPT | Performed by: NURSE PRACTITIONER

## 2024-04-09 PROCEDURE — 0202U NFCT DS 22 TRGT SARS-COV-2: CPT | Performed by: NURSE PRACTITIONER

## 2024-04-09 PROCEDURE — 93005 ELECTROCARDIOGRAM TRACING: CPT | Performed by: EMERGENCY MEDICINE

## 2024-04-09 PROCEDURE — 80048 BASIC METABOLIC PNL TOTAL CA: CPT | Performed by: NURSE PRACTITIONER

## 2024-04-09 PROCEDURE — 93005 ELECTROCARDIOGRAM TRACING: CPT

## 2024-04-09 PROCEDURE — 81003 URINALYSIS AUTO W/O SCOPE: CPT | Performed by: NURSE PRACTITIONER

## 2024-04-09 PROCEDURE — 82803 BLOOD GASES ANY COMBINATION: CPT | Performed by: NURSE PRACTITIONER

## 2024-04-09 PROCEDURE — 84484 ASSAY OF TROPONIN QUANT: CPT | Performed by: NURSE PRACTITIONER

## 2024-04-09 PROCEDURE — 36415 COLL VENOUS BLD VENIPUNCTURE: CPT

## 2024-04-09 PROCEDURE — 83880 ASSAY OF NATRIURETIC PEPTIDE: CPT | Performed by: NURSE PRACTITIONER

## 2024-04-09 PROCEDURE — 36600 WITHDRAWAL OF ARTERIAL BLOOD: CPT | Performed by: NURSE PRACTITIONER

## 2024-04-09 PROCEDURE — 85379 FIBRIN DEGRADATION QUANT: CPT | Performed by: NURSE PRACTITIONER

## 2024-04-09 PROCEDURE — 71045 X-RAY EXAM CHEST 1 VIEW: CPT

## 2024-04-09 PROCEDURE — 99284 EMERGENCY DEPT VISIT MOD MDM: CPT

## 2024-04-09 RX ORDER — SODIUM CHLORIDE 0.9 % (FLUSH) 0.9 %
10 SYRINGE (ML) INJECTION AS NEEDED
Status: DISCONTINUED | OUTPATIENT
Start: 2024-04-09 | End: 2024-04-10 | Stop reason: HOSPADM

## 2024-04-10 VITALS
SYSTOLIC BLOOD PRESSURE: 112 MMHG | RESPIRATION RATE: 18 BRPM | WEIGHT: 132.5 LBS | BODY MASS INDEX: 24.38 KG/M2 | TEMPERATURE: 98.3 F | OXYGEN SATURATION: 96 % | HEIGHT: 62 IN | DIASTOLIC BLOOD PRESSURE: 54 MMHG | HEART RATE: 90 BPM

## 2024-04-10 LAB
GEN 5 2HR TROPONIN T REFLEX: 19 NG/L
GLUCOSE BLDC GLUCOMTR-MCNC: 190 MG/DL (ref 70–105)
QT INTERVAL: 325 MS
QTC INTERVAL: 452 MS
TROPONIN T DELTA: -2 NG/L

## 2024-04-10 PROCEDURE — 25810000003 SODIUM CHLORIDE 0.9 % SOLUTION: Performed by: NURSE PRACTITIONER

## 2024-04-10 PROCEDURE — 84484 ASSAY OF TROPONIN QUANT: CPT | Performed by: NURSE PRACTITIONER

## 2024-04-10 PROCEDURE — 82948 REAGENT STRIP/BLOOD GLUCOSE: CPT | Performed by: NURSE PRACTITIONER

## 2024-04-10 RX ORDER — IPRATROPIUM BROMIDE AND ALBUTEROL SULFATE 2.5; .5 MG/3ML; MG/3ML
3 SOLUTION RESPIRATORY (INHALATION)
Status: DISCONTINUED | OUTPATIENT
Start: 2024-04-10 | End: 2024-04-10 | Stop reason: HOSPADM

## 2024-04-10 RX ADMIN — SODIUM CHLORIDE 1000 ML: 9 INJECTION, SOLUTION INTRAVENOUS at 00:06

## 2024-04-10 NOTE — DISCHARGE INSTRUCTIONS
Follow up with PCP, call for an appointment in 1-2 days, if you do not have a primary care provider please use patient connection [phone] to help establish care  Follow up with any specialist as indicated and discussed  Return to the ED for new or worsening symptoms  Take any medications as prescribed

## 2024-04-10 NOTE — ED PROVIDER NOTES
"Subjective   Chief Complaint   Patient presents with    Shortness of Breath     Pt reports SOA that started on Friday and has worsened.  Pt reports nasal congestion and states she cannot breathe right.        History of Present Illness  Patient is a 73-year-old female with a history of COPD, diabetes, presents emergency department with complaint of worsening shortness of breath.  Patient reports she feels as though she is \"dry\".  She has not had any nausea vomiting diarrhea.  No chest pain.  No abnormal leg pain or swelling.  She reports she just saw cardiology and everything was \"fine\".  Patient does have a history of COPD, she does not wear oxygen, she does smoke.  She just completed medication at home, she was placed on a Z-Jaron and steroid pack per her report.  Review of Systems   Constitutional:  Negative for chills and fever.   HENT:  Positive for congestion, postnasal drip, rhinorrhea, sinus pressure, sinus pain and sneezing.    Eyes:  Negative for photophobia and visual disturbance.   Respiratory:  Positive for cough and shortness of breath.    Cardiovascular:  Negative for chest pain, palpitations and leg swelling.   Gastrointestinal:  Negative for abdominal pain, diarrhea, nausea and vomiting.   Genitourinary:  Negative for dysuria.   Musculoskeletal:  Negative for back pain, neck pain and neck stiffness.   Skin:  Negative for color change and rash.   Neurological:  Negative for dizziness, syncope, weakness and light-headedness.   Psychiatric/Behavioral:  Negative for confusion.        Past Medical History:   Diagnosis Date    Cataracts, bilateral     COPD (chronic obstructive pulmonary disease)     Diabetes mellitus 1999    type 2    Hyperlipidemia        No Known Allergies    Past Surgical History:   Procedure Laterality Date    CARDIAC CATHETERIZATION Right 10/07/2019    Procedure: Left Heart Cath and coronary angiogram;  Surgeon: Ishan June MD;  Location: [facility] CATH INVASIVE LOCATION;  Service: " "Cardiovascular    HYSTERECTOMY  1972    ROTATOR CUFF REPAIR  2018       Family History   Problem Relation Age of Onset    Heart disease Father        Social History     Socioeconomic History    Marital status:    Tobacco Use    Smoking status: Every Day     Current packs/day: 0.75     Average packs/day: 0.8 packs/day for 60.0 years (45.0 ttl pk-yrs)     Types: Cigarettes    Smokeless tobacco: Never   Vaping Use    Vaping status: Never Used   Substance and Sexual Activity    Alcohol use: Not Currently     Comment: RARELY    Drug use: Never    Sexual activity: Not Currently     Birth control/protection: Hysterectomy     Comment: 1972           Objective   Physical Exam    Procedures           ED Course      /54   Pulse 90   Temp 98.3 °F (36.8 °C) (Oral)   Resp 18   Ht 157.5 cm (62\")   Wt 60.1 kg (132 lb 7.9 oz)   SpO2 96%   BMI 24.23 kg/m²   Medications   sodium chloride 0.9 % flush 10 mL (has no administration in time range)   ipratropium-albuterol (DUO-NEB) nebulizer solution 3 mL (has no administration in time range)   sodium chloride 0.9 % bolus 1,000 mL (0 mL Intravenous Stopped 4/10/24 0036)     XR Chest 1 View    Result Date: 4/9/2024  Impression: No active disease Electronically Signed: Parviz Be MD  4/9/2024 11:16 PM EDT  Workstation ID: [id number]   Lab Results (last 24 hours)       Procedure Component Value Units Date/Time    CBC & Differential [[id number]]  (Abnormal) Collected: 04/09/24 2243    Specimen: Blood Updated: 04/09/24 2247    Narrative:      The following orders were created for panel order CBC & Differential.  Procedure                               Abnormality         Status                     ---------                               -----------         ------                     CBC Auto Differential[[id number]]        Abnormal            Final result                 Please view results for these tests on the individual orders.    Basic Metabolic Panel [[id number]]  " (Abnormal) Collected: 04/09/24 2243    Specimen: Blood Updated: 04/09/24 2314     Glucose 496 mg/dL      BUN 32 mg/dL      Creatinine 1.53 mg/dL      Sodium 134 mmol/L      Potassium 5.0 mmol/L      Chloride 97 mmol/L      CO2 26.0 mmol/L      Calcium 9.8 mg/dL      BUN/Creatinine Ratio 20.9     Anion Gap 11.0 mmol/L      eGFR 35.6 mL/min/1.73     Narrative:      GFR Normal >60  Chronic Kidney Disease <60  Kidney Failure <15    The GFR formula is only valid for adults with stable renal function between ages 18 and 70.    High Sensitivity Troponin T [[id number]]  (Abnormal) Collected: 04/09/24 2243    Specimen: Blood Updated: 04/09/24 2312     HS Troponin T 21 ng/L     Narrative:      High Sensitive Troponin T Reference Range:  <14.0 ng/L- Negative Female for AMI  <22.0 ng/L- Negative Male for AMI  >=14 - Abnormal Female indicating possible myocardial injury.  >=22 - Abnormal Male indicating possible myocardial injury.   Clinicians would have to utilize clinical acumen, EKG, Troponin, and serial changes to determine if it is an Acute Myocardial Infarction or myocardial injury due to an underlying chronic condition.         D-dimer, Quantitative [[id number]]  (Normal) Collected: 04/09/24 2243    Specimen: Blood Updated: 04/09/24 2259     D-Dimer, Quantitative 0.72 mg/L (FEU)     Narrative:      According to the assay 's published package insert, a normal (<0.50 mg/L (FEU)) D-dimer result in conjunction with a non-high clinical probability assessment, excludes deep vein thrombosis (DVT) and pulmonary embolism (PE) with high sensitivity.    D-dimer values increase with age and this can make VTE exclusion of an older population difficult. To address this, the [employer], based on best available evidence and recent guidelines, recommends that clinicians use age-adjusted D-dimer thresholds in patients greater than 50 years of age with: a) a low probability of PE who do not meet all Pulmonary  "Embolism Rule Out Criteria, or b) in those with intermediate probability of PE.   The formula for an age-adjusted D-dimer cut-off is \"age/100\".  For example, a 60 year old patient would have an age-adjusted cut-off of 0.60 mg/L (FEU) and an 80 year old 0.80 mg/L (FEU).    BNP [[id number]]  (Normal) Collected: 04/09/24 2243    Specimen: Blood Updated: 04/09/24 2312     proBNP 137.2 pg/mL     Narrative:      This assay is used as an aid in the diagnosis of individuals suspected of having heart failure. It can be used as an aid in the diagnosis of acute decompensated heart failure (ADHF) in patients presenting with signs and symptoms of ADHF to the emergency department (ED). In addition, NT-proBNP of <300 pg/mL indicates ADHF is not likely.    Age Range Result Interpretation  NT-proBNP Concentration (pg/mL:      <50             Positive            >450                   Gray                 300-450                    Negative             <300    50-75           Positive            >900                  Gray                300-900                  Negative            <300      >75             Positive            >1800                  Gray                300-1800                  Negative            <300    Respiratory Panel PCR w/COVID-19(SARS-CoV-2) LUZ/SANTIAGO/EVA/PAD/COR/GABRIELLE In-House, NP Swab in UTM/VTM, 2 HR TAT - Swab, Nasopharynx [[id number]]  (Abnormal) Collected: 04/09/24 2243    Specimen: Swab from Nasopharynx Updated: 04/09/24 2332     ADENOVIRUS, PCR Not Detected     Coronavirus 229E Not Detected     Coronavirus HKU1 Not Detected     Coronavirus NL63 Not Detected     Coronavirus OC43 Not Detected     COVID19 Not Detected     Human Metapneumovirus Not Detected     Human Rhinovirus/Enterovirus Detected     Influenza A PCR Not Detected     Influenza B PCR Not Detected     Parainfluenza Virus 1 Not Detected     Parainfluenza Virus 2 Not Detected     Parainfluenza Virus 3 Not Detected     Parainfluenza Virus 4 Not " Detected     RSV, PCR Not Detected     Bordetella pertussis pcr Not Detected     Bordetella parapertussis PCR Not Detected     Chlamydophila pneumoniae PCR Not Detected     Mycoplasma pneumo by PCR Not Detected    Narrative:      In the setting of a positive respiratory panel with a viral infection PLUS a negative procalcitonin without other underlying concern for bacterial infection, consider observing off antibiotics or discontinuation of antibiotics and continue supportive care. If the respiratory panel is positive for atypical bacterial infection (Bordetella pertussis, Chlamydophila pneumoniae, or Mycoplasma pneumoniae), consider antibiotic de-escalation to target atypical bacterial infection.    CBC Auto Differential [[id number]]  (Abnormal) Collected: 04/09/24 2243    Specimen: Blood Updated: 04/09/24 2247     WBC 10.85 10*3/mm3      RBC 5.01 10*6/mm3      Hemoglobin 15.0 g/dL      Hematocrit 46.8 %      MCV 93.4 fL      MCH 29.9 pg      MCHC 32.1 g/dL      RDW 13.4 %      RDW-SD 45.7 fl      MPV 8.5 fL      Platelets 261 10*3/mm3      Neutrophil % 55.1 %      Lymphocyte % 34.6 %      Monocyte % 8.1 %      Eosinophil % 1.6 %      Basophil % 0.3 %      Immature Grans % 0.3 %      Neutrophils, Absolute 5.99 10*3/mm3      Lymphocytes, Absolute 3.75 10*3/mm3      Monocytes, Absolute 0.88 10*3/mm3      Eosinophils, Absolute 0.17 10*3/mm3      Basophils, Absolute 0.03 10*3/mm3      Immature Grans, Absolute 0.03 10*3/mm3      nRBC 0.0 /100 WBC     Acetone [[id number]]  (Normal) Collected: 04/09/24 2243    Specimen: Blood Updated: 04/09/24 2353     Acetone Negative    Urinalysis With Microscopic If Indicated (No Culture) - Urine, Clean Catch [[id number]]  (Abnormal) Collected: 04/09/24 2308    Specimen: Urine, Clean Catch Updated: 04/09/24 2313     Color, UA Yellow     Appearance, UA Clear     pH, UA <=5.0     Specific Gravity, UA 1.032     Glucose, UA >=1000 mg/dL (3+)     Ketones, UA Negative     Bilirubin, UA Negative      Blood, UA Negative     Protein, UA Negative     Leuk Esterase, UA Negative     Nitrite, UA Negative     Urobilinogen, UA 0.2 E.U./dL    Narrative:      Urine microscopic not indicated.    Blood Gas, Arterial - [[id number]]  (Abnormal) Collected: 04/09/24 2321    Specimen: Arterial Blood Updated: 04/09/24 2326     Site Right Radial     Torey's Test Positive     pH, Arterial 7.432 pH units      pCO2, Arterial 33.4 mm Hg      pO2, Arterial 68.7 mm Hg      HCO3, Arterial 22.3 mmol/L      Base Excess, Arterial -1.2 mmol/L      Comment: Serial Number: [id number]:  [id number]        O2 Saturation, Arterial 94.2 %      CO2 Content 23.3 mmol/L      Barometric Pressure for Blood Gas --     Comment: N/A        Modality Room Air     FIO2 21 %      Hemodilution No    High Sensitivity Troponin T 2Hr [[id number]]  (Abnormal) Collected: 04/10/24 0059    Specimen: Blood Updated: 04/10/24 0125     HS Troponin T 19 ng/L      Troponin T Delta -2 ng/L     Narrative:      High Sensitive Troponin T Reference Range:  <14.0 ng/L- Negative Female for AMI  <22.0 ng/L- Negative Male for AMI  >=14 - Abnormal Female indicating possible myocardial injury.  >=22 - Abnormal Male indicating possible myocardial injury.   Clinicians would have to utilize clinical acumen, EKG, Troponin, and serial changes to determine if it is an Acute Myocardial Infarction or myocardial injury due to an underlying chronic condition.         POC Glucose Once [[id number]]  (Abnormal) Collected: 04/10/24 0132    Specimen: Blood Updated: 04/10/24 0134     Glucose 190 mg/dL      Comment: Serial Number: [id number]:  [id number]                                                    Medical Decision Making  Problems Addressed:  Bronchitis: complicated acute illness or injury  COPD exacerbation: complicated acute illness or injury  Dyspnea, unspecified type: complicated acute illness or injury    Amount and/or Complexity of Data Reviewed  Labs: ordered.  Radiology:  ordered.  ECG/medicine tests: ordered.    Risk  Prescription drug management.    Discussed with ED attending physician.  Chart Review: Freestanding ED visit 3/31/2024 for cough and dyspnea.  Imaging: XR Chest 1 View    Result Date: 4/9/2024  Impression: No active disease Electronically Signed: Parviz Be MD  4/9/2024 11:16 PM EDT  Workstation ID: [id number]   EKG sinus tachycardia rate 116.  Compared to previous 7/25/2022.  Tachycardia now present.  No acute ST changes.  Independently interpreted per ED physician  Pt was Placed on appropriate monitoring.  Differential diagnoses considered for patient presentation, this list is not all inclusive of diagnoses considered: COPD exacerbation, pneumonia, PE  Patient presents to the ED for the above complaint, underwent the above, exam and workup.  Breath sounds clear on initial exam.  Patient is noted to have a dry nonproductive cough.  She is positive for rhinovirus.  Her chest x-ray is negative.  Glucose noted be 496, patient was hydrated, mildly elevated creatinine 1.5.  Blood sugar decreased to 190.  Patient does report that she is due to take her medicine tonight for diabetes.  Her acetone is negative.  She is not acidotic.  White blood cell count noted to be 10.85.  Initial troponin 21, repeat is 19.  No signs of acute coronary syndrome.  No chest pain.  Close most likely COPD exacerbation secondary to rhinovirus.  Given her elevated glucose, I will not place her on any further steroids.  She will continue her nebulizers at home and follow-up with PCP.    Disposition: I discussed my findings, plan of care, discharge instructions, the importance of follow up with their PCP/ and or specialist for repeat evaluation and to discuss any abnormal findings in labs or imaging that warrant further outpatient evaluation. We discussed that although a definitive diagnosis is not always found in the ED, it is believed emergent conditions have been ruled out, and patient is safe  for discharge at this time.  We discussed return precautions for the emergency department.  Patient verbalizes understandings, and agrees with current plan of care.  Note Disclaimer: At [facility], we believe that sharing information builds trust and better relationships. You are receiving this note because you recently visited [facility]. It is possible you will see health information before a provider has talked with you about it. This kind of information can be easy to misunderstand. To help you fully understand what it means for your health, we urge you to discuss this note with your provider  Note dictated utilizing Dragon Dictation.  Appropriate PPE worn during patient interactions.        Final diagnoses:   Dyspnea, unspecified type   Bronchitis   COPD exacerbation       ED Disposition  ED Disposition       ED Disposition   Discharge    Condition   Stable    Comment   --               Deborah Ochoa MD  [address] [facility] [address] [phone]          [facility] EMERGENCY DEPARTMENT  [facility] [facility] [address] [phone]             Medication List      No changes were made to your prescriptions during this visit.            Caitie Becker, APRN  04/10/24 0435

## 2024-05-07 ENCOUNTER — LAB (OUTPATIENT)
Dept: LAB | Facility: HOSPITAL | Age: 74
End: 2024-05-07
Payer: MEDICARE

## 2024-05-07 DIAGNOSIS — Z79.899 ENCOUNTER FOR LONG-TERM (CURRENT) USE OF OTHER MEDICATIONS: ICD-10-CM

## 2024-05-07 DIAGNOSIS — E05.90 HYPERTHYROIDISM: ICD-10-CM

## 2024-05-07 LAB
ALBUMIN SERPL-MCNC: 4.1 G/DL (ref 3.5–5.2)
ALBUMIN/GLOB SERPL: 1.9 G/DL
ALP SERPL-CCNC: 105 U/L (ref 39–117)
ALT SERPL W P-5'-P-CCNC: 12 U/L (ref 1–33)
ANION GAP SERPL CALCULATED.3IONS-SCNC: 8.1 MMOL/L (ref 5–15)
AST SERPL-CCNC: 14 U/L (ref 1–32)
BASOPHILS # BLD AUTO: 0.03 10*3/MM3 (ref 0–0.2)
BASOPHILS NFR BLD AUTO: 0.3 % (ref 0–1.5)
BILIRUB SERPL-MCNC: 0.3 MG/DL (ref 0–1.2)
BUN SERPL-MCNC: 23 MG/DL (ref 8–23)
BUN/CREAT SERPL: 19.5 (ref 7–25)
CALCIUM SPEC-SCNC: 9.8 MG/DL (ref 8.6–10.5)
CHLORIDE SERPL-SCNC: 105 MMOL/L (ref 98–107)
CO2 SERPL-SCNC: 26.9 MMOL/L (ref 22–29)
CREAT SERPL-MCNC: 1.18 MG/DL (ref 0.57–1)
DEPRECATED RDW RBC AUTO: 42.6 FL (ref 37–54)
EGFRCR SERPLBLD CKD-EPI 2021: 48.6 ML/MIN/1.73
EOSINOPHIL # BLD AUTO: 0.21 10*3/MM3 (ref 0–0.4)
EOSINOPHIL NFR BLD AUTO: 2.1 % (ref 0.3–6.2)
ERYTHROCYTE [DISTWIDTH] IN BLOOD BY AUTOMATED COUNT: 12.6 % (ref 12.3–15.4)
GLOBULIN UR ELPH-MCNC: 2.2 GM/DL
GLUCOSE SERPL-MCNC: 144 MG/DL (ref 65–99)
HCT VFR BLD AUTO: 41.8 % (ref 34–46.6)
HGB BLD-MCNC: 13.6 G/DL (ref 12–15.9)
IMM GRANULOCYTES # BLD AUTO: 0.02 10*3/MM3 (ref 0–0.05)
IMM GRANULOCYTES NFR BLD AUTO: 0.2 % (ref 0–0.5)
LYMPHOCYTES # BLD AUTO: 4.02 10*3/MM3 (ref 0.7–3.1)
LYMPHOCYTES NFR BLD AUTO: 40.7 % (ref 19.6–45.3)
MCH RBC QN AUTO: 30.4 PG (ref 26.6–33)
MCHC RBC AUTO-ENTMCNC: 32.5 G/DL (ref 31.5–35.7)
MCV RBC AUTO: 93.3 FL (ref 79–97)
MONOCYTES # BLD AUTO: 0.69 10*3/MM3 (ref 0.1–0.9)
MONOCYTES NFR BLD AUTO: 7 % (ref 5–12)
NEUTROPHILS NFR BLD AUTO: 4.91 10*3/MM3 (ref 1.7–7)
NEUTROPHILS NFR BLD AUTO: 49.7 % (ref 42.7–76)
NRBC BLD AUTO-RTO: 0 /100 WBC (ref 0–0.2)
PLATELET # BLD AUTO: 249 10*3/MM3 (ref 140–450)
PMV BLD AUTO: 9 FL (ref 6–12)
POTASSIUM SERPL-SCNC: 4.9 MMOL/L (ref 3.5–5.2)
PROT SERPL-MCNC: 6.3 G/DL (ref 6–8.5)
RBC # BLD AUTO: 4.48 10*6/MM3 (ref 3.77–5.28)
SODIUM SERPL-SCNC: 140 MMOL/L (ref 136–145)
T4 FREE SERPL-MCNC: 1.28 NG/DL (ref 0.93–1.7)
TSH SERPL DL<=0.05 MIU/L-ACNC: 1.27 UIU/ML (ref 0.27–4.2)
WBC NRBC COR # BLD AUTO: 9.88 10*3/MM3 (ref 3.4–10.8)

## 2024-05-07 PROCEDURE — 84439 ASSAY OF FREE THYROXINE: CPT

## 2024-05-07 PROCEDURE — 36415 COLL VENOUS BLD VENIPUNCTURE: CPT

## 2024-05-07 PROCEDURE — 85025 COMPLETE CBC W/AUTO DIFF WBC: CPT

## 2024-05-07 PROCEDURE — 84443 ASSAY THYROID STIM HORMONE: CPT

## 2024-05-07 PROCEDURE — 80053 COMPREHEN METABOLIC PANEL: CPT

## 2024-05-14 ENCOUNTER — OFFICE VISIT (OUTPATIENT)
Dept: ENDOCRINOLOGY | Facility: CLINIC | Age: 74
End: 2024-05-14
Payer: MEDICARE

## 2024-05-14 VITALS
SYSTOLIC BLOOD PRESSURE: 100 MMHG | HEART RATE: 111 BPM | BODY MASS INDEX: 24.48 KG/M2 | HEIGHT: 62 IN | DIASTOLIC BLOOD PRESSURE: 58 MMHG | OXYGEN SATURATION: 96 % | WEIGHT: 133 LBS

## 2024-05-14 DIAGNOSIS — E05.90 HYPERTHYROIDISM: Primary | ICD-10-CM

## 2024-05-14 DIAGNOSIS — Z79.899 ENCOUNTER FOR LONG-TERM (CURRENT) USE OF OTHER MEDICATIONS: ICD-10-CM

## 2024-05-14 PROCEDURE — 3074F SYST BP LT 130 MM HG: CPT | Performed by: INTERNAL MEDICINE

## 2024-05-14 PROCEDURE — 99213 OFFICE O/P EST LOW 20 MIN: CPT | Performed by: INTERNAL MEDICINE

## 2024-05-14 PROCEDURE — 3078F DIAST BP <80 MM HG: CPT | Performed by: INTERNAL MEDICINE

## 2024-05-14 RX ORDER — IPRATROPIUM BROMIDE AND ALBUTEROL SULFATE 2.5; .5 MG/3ML; MG/3ML
SOLUTION RESPIRATORY (INHALATION)
COMMUNITY
Start: 2024-04-16

## 2024-05-14 RX ORDER — TIRZEPATIDE 2.5 MG/.5ML
INJECTION, SOLUTION SUBCUTANEOUS
COMMUNITY
Start: 2024-04-12

## 2024-05-29 NOTE — PROGRESS NOTES
[facility]        Patient Care Team:  Deborah Ochoa MD as PCP - General  Ishan June MD as Consulting Physician (Cardiology)    Chief Complaint:    Chief Complaint   Patient presents with    Hyperthyroidism     FU / Hyperthyroidism          Subjective     Here for thyroid f/u  Taking methimazole one tab daily as directed.  Exercise program: walking @ work    Interval History:     Patient Complaints: none  Patient Denies:  Palpitations   History taken from: patient    Review of Systems:   Review of Systems   HENT:  Negative for trouble swallowing.    Cardiovascular:  Negative for palpitations and leg swelling.   Neurological:  Positive for tremors.   Lost  20 lb since last visit  Objective     Vital Signs     Vitals:    05/14/24 1213   BP: 100/58   Pulse: 111   SpO2: 96%         Physical Exam:     General Appearance:    Alert, cooperative, in no acute distress   Head:    Normocephalic, without obvious abnormality, atraumatic   Eyes:       Mouth:  Neck:       No periorbital edema. No lid lag    No lesions    36 cm in circumference, thyroid soft   Lungs:     Clear    Heart:    Regular rhythm and normal rate   Abdomen:     Normal bowel sounds, soft                 Extremities:   Coarse hand tremors (same), no edema                Pulses:   Pulses palpable and equal bilaterally   Skin:   Dry   Neurologic:  DTR 1+          Results Review:    I have reviewed the patient's new clinical results, labs & imaging.    Medication Review:   Prior to Admission medications    Medication Sig Start Date End Date Taking? Authorizing Provider   acetaminophen (TYLENOL) 325 MG tablet Take 2 tablets by mouth Every 4 (Four) Hours As Needed for Mild Pain . 7/11/21  Yes Merna Fam MD   ALPRAZolam (XANAX) 0.5 MG tablet Take 1 tablet by mouth At Night As Needed for Anxiety.   Yes Provider, MD Dennys   amphetamine-dextroamphetamine (ADDERALL) 20 MG tablet Take 1 tablet by mouth 2 (Two) Times a Day. Only  getting filled every 6 months per [facility]   Yes Dennys Reyes MD   aspirin 81 MG tablet Take 1 tablet by mouth Daily. 10/9/19  Yes Merna Fam MD   cyclobenzaprine (FLEXERIL) 10 MG tablet Take 1 tablet by mouth every night at bedtime.   Yes Dennys Reyes MD   DULoxetine (CYMBALTA) 30 MG capsule Take 1 capsule by mouth Daily.   Yes Dennys Reeys MD   esomeprazole (nexIUM) 40 MG capsule Take 1 capsule by mouth 2 (Two) Times a Day.   Yes Dennys Reyes MD   Fluticasone-Umeclidin-Vilant (TRELEGY) 100-62.5-25 MCG/INH inhaler Inhale 1 container Daily.   Yes Dennys Reyes MD   gabapentin (NEURONTIN) 300 MG capsule Take 1 capsule by mouth 3 (Three) Times a Day.   Yes Dennys eRyes MD   insulin aspart (novoLOG) 100 UNIT/ML injection Inject 10 Units under the skin into the appropriate area as directed 3 (Three) Times a Day Before Meals. 7/11/21  Yes Merna Fam MD   Insulin Degludec (TRESIBA FLEXTOUCH) 200 UNIT/ML solution pen-injector pen injection Inject 34 Units under the skin into the appropriate area as directed every night at bedtime.   Yes Dennys Reyes MD   ipratropium-albuterol (DUO-NEB) 0.5-2.5 mg/3 ml nebulizer USE 3 ML VIA NEBULIZER FOUR TIMES DAILY AS NEEDED 4/16/24  Yes Dennys Reyes MD   methIMAzole (TAPAZOLE) 5 MG tablet TAKE ONE TABLET BY MOUTH daily 1/8/24  Yes Pierre Tripp MD   methylPREDNISolone (MEDROL) 4 MG dose pack Take as directed on package instructions. 3/31/24  Yes Fran Ahmadi PA   midodrine (PROAMATINE) 2.5 MG tablet Take 1 tablet by mouth As Needed. 7/10/23  Yes Dennys Reyes MD   Mirabegron ER (MYRBETRIQ) 50 MG tablet sustained-release 24 hour 24 hr tablet Take 50 mg by mouth Daily.   Yes Dennys Reyes MD   Mounjaro 2.5 MG/0.5ML solution pen-injector pen INJECT 2.5MG UNDER THE SKIN EVERY WEEK FOR 4 WEEKS 4/12/24  Yes Dennys Reyes MD   rosuvastatin (CRESTOR) 20 MG tablet Take 1 tablet by  mouth Daily.   Yes Provider, MD Dennys         Lab Results   Component Value Date    GLUCOSE 144 (H) 05/07/2024    BUN 23 05/07/2024    CREATININE 1.18 (H) 05/07/2024    EGFRIFNONA 62 07/11/2021    BCR 19.5 05/07/2024    K 4.9 05/07/2024    CO2 26.9 05/07/2024    CALCIUM 9.8 05/07/2024    ALBUMIN 4.1 05/07/2024    AST 14 05/07/2024    ALT 12 05/07/2024     Lab Results   Component Value Date    TSH 1.270 05/07/2024    FREET4 1.28 05/07/2024     WBC 9.88, Hb 13.6    Assessment & Plan     Diagnoses and all orders for this visit:    1. Hyperthyroidism (Primary)  -     TSH; Future  -     T4, Free; Future    2. Encounter for long-term (current) use of other medications  -     CBC & Differential; Future  -     Comprehensive Metabolic Panel; Future    Thyroid stable.    Continue exercise.  Continue methimazole one tab daily.        Pierre Tripp MD  05/29/24  18:21 EDT

## 2024-07-25 ENCOUNTER — APPOINTMENT (OUTPATIENT)
Dept: GENERAL RADIOLOGY | Facility: HOSPITAL | Age: 74
End: 2024-07-25
Payer: MEDICARE

## 2024-07-25 ENCOUNTER — HOSPITAL ENCOUNTER (OUTPATIENT)
Facility: HOSPITAL | Age: 74
Discharge: HOME | End: 2024-07-25
Attending: EMERGENCY MEDICINE | Admitting: EMERGENCY MEDICINE
Payer: MEDICARE

## 2024-07-25 VITALS
WEIGHT: 125 LBS | TEMPERATURE: 97.7 F | DIASTOLIC BLOOD PRESSURE: 58 MMHG | HEIGHT: 62 IN | BODY MASS INDEX: 23 KG/M2 | RESPIRATION RATE: 16 BRPM | SYSTOLIC BLOOD PRESSURE: 140 MMHG | OXYGEN SATURATION: 99 % | HEART RATE: 90 BPM

## 2024-07-25 DIAGNOSIS — S82.61XA CLOSED FRACTURE OF PROXIMAL LATERAL MALLEOLUS OF RIGHT FIBULA, INITIAL ENCOUNTER: Primary | ICD-10-CM

## 2024-07-25 PROCEDURE — 73610 X-RAY EXAM OF ANKLE: CPT

## 2024-07-25 PROCEDURE — 29515 APPLICATION SHORT LEG SPLINT: CPT | Performed by: PHYSICIAN ASSISTANT

## 2024-07-25 PROCEDURE — 73630 X-RAY EXAM OF FOOT: CPT

## 2024-07-25 PROCEDURE — G0463 HOSPITAL OUTPT CLINIC VISIT: HCPCS | Performed by: PHYSICIAN ASSISTANT

## 2024-07-25 PROCEDURE — 99213 OFFICE O/P EST LOW 20 MIN: CPT | Performed by: PHYSICIAN ASSISTANT

## 2024-07-25 RX ORDER — HYDROCODONE BITARTRATE AND ACETAMINOPHEN 5; 325 MG/1; MG/1
1 TABLET ORAL ONCE AS NEEDED
Status: DISCONTINUED | OUTPATIENT
Start: 2024-07-25 | End: 2024-07-25 | Stop reason: HOSPADM

## 2024-07-25 RX ORDER — HYDROCODONE BITARTRATE AND ACETAMINOPHEN 5; 325 MG/1; MG/1
1 TABLET ORAL EVERY 6 HOURS PRN
Qty: 15 TABLET | Refills: 0 | Status: SHIPPED | OUTPATIENT
Start: 2024-07-25

## 2024-07-25 RX ORDER — ONDANSETRON 4 MG/1
4 TABLET, ORALLY DISINTEGRATING ORAL EVERY 8 HOURS PRN
Qty: 15 TABLET | Refills: 0 | Status: SHIPPED | OUTPATIENT
Start: 2024-07-25

## 2024-07-25 RX ADMIN — HYDROCODONE BITARTRATE AND ACETAMINOPHEN 1 TABLET: 5; 325 TABLET ORAL at 15:29

## 2024-07-25 NOTE — FSED PROVIDER NOTE
EMERGENCY DEPARTMENT ENCOUNTER    Room Number:  08/08  Date seen:  7/25/2024  Time seen: 15:53 EDT  PCP: Deborah Ochoa MD  Historian: Patient    Discussed/obtained information from independent historians: N/A    HPI:  Chief complaint: Right ankle injury  A complete HPI/ROS/PMH/PSH/SH/FH are unobtainable due to: Nothing  Context:Dyana Monteamyor is a 74 y.o. female who presents to the ED with c/o right ankle injury that occurred just prior to arrival.  Patient reports she had an inversion like injury at home just prior to arrival with right ankle.  She denies falling to the ground or striking her head.  Pain is said to primarily be along the lateral aspect of the right ankle.  She does endorse some right lateral foot pain as well.  No knee pain or proximal tib-fib pain.  Patient denies sustaining any other injury at this time.  Symptoms are exacerbated when she attempts to bear weight or move the ankle.  Holding the ankle still/elevating the ankle does slightly alleviate the symptoms.  The pain does not radiate and is localized to the right lateral aspect of the ankle and right lateral aspect of the foot.        Chronic or social conditions impacting care:    ALLERGIES  Patient has no known allergies.    PAST MEDICAL HISTORY  Active Ambulatory Problems     Diagnosis Date Noted    Sepsis 09/30/2019    Non-STEMI (non-ST elevated myocardial infarction) 10/07/2019    Encounter for long-term (current) use of other medications 03/10/2015    Hyperlipidemia 09/17/2013    Numbness and tingling sensation of skin 03/09/2017    Peripheral vascular disease 09/17/2013    COPD (chronic obstructive pulmonary disease) 07/10/2021    Abnormal chest CT 07/11/2021    Chronic intractable headache 07/11/2021    Diabetes mellitus with neuropathy 07/11/2021    Generalized weakness 04/13/2022    Acute UTI 04/14/2022    ADAMARIS (acute kidney injury) 04/14/2022    Mood disorder 04/14/2022    Diabetes mellitus with coincident hypertension  04/14/2022    Type 2 diabetes mellitus with hyperlipidemia 04/14/2022    Hyperthyroidism 05/01/2022     Resolved Ambulatory Problems     Diagnosis Date Noted    No Resolved Ambulatory Problems     Past Medical History:   Diagnosis Date    Cataracts, bilateral     Diabetes mellitus 1999    Hypothyroidism 2023    Thyroid nodule 2023    Type 2 diabetes mellitus 1997       PAST SURGICAL HISTORY  Past Surgical History:   Procedure Laterality Date    CARDIAC CATHETERIZATION Right 10/07/2019    Procedure: Left Heart Cath and coronary angiogram;  Surgeon: Ishan June MD;  Location: [facility] CATH INVASIVE LOCATION;  Service: Cardiovascular    HYSTERECTOMY  1972    ROTATOR CUFF REPAIR  2018       FAMILY HISTORY  Family History   Problem Relation Age of Onset    Heart disease Father        SOCIAL HISTORY  Social History     Socioeconomic History    Marital status:    Tobacco Use    Smoking status: Every Day     Current packs/day: 0.50     Average packs/day: 0.6 packs/day for 120.4 years (75.2 ttl pk-yrs)     Types: Cigarettes     Start date: 3/16/1964    Smokeless tobacco: Never    Tobacco comments:     still smoking   Vaping Use    Vaping status: Never Used   Substance and Sexual Activity    Alcohol use: Not Currently     Comment: RARELY    Drug use: Never    Sexual activity: Not Currently     Partners: Male     Birth control/protection: Hysterectomy     Comment: 1971       REVIEW OF SYSTEMS  Review of Systems    All systems reviewed and negative except for those discussed in HPI.     PHYSICAL EXAM    I have reviewed the triage vital signs and nursing notes.  Vitals:    07/25/24 1620   BP: 140/58   Pulse: 90   Resp: 16   Temp:    SpO2: 99%     Physical Exam    GENERAL: WDWN female, not distressed  HENT: nares patent  EYES: no scleral icterus  NECK: no ROM limitations  CV: regular rhythm, regular rate, normal S1 and S2, DP and PT pulses +2 bilaterally cap refill less than 2 seconds.  RESPIRATORY: normal  effort  ABDOMEN: soft  : deferred  MUSCULOSKELETAL: Right foot and ankle: TTP along the lateral malleolus.  Moderate soft tissue swelling.  No obvious displacement or deformity.  There is TTP vicinity of the fourth and fifth metatarsals of the right foot.  Again no swelling or deformity.  Compartments of the right lower extremity are soft.  NEURO: alert, moves all extremities, follows commands  SKIN: warm, dry    LAB RESULTS  No results found for this or any previous visit (from the past 24 hour(s)).    Ordered the above labs and independently interpreted results.  My findings will be discussed in the ED course or medical decision making section below    RADIOLOGY RESULTS  XR Foot 3+ View Right    Result Date: 7/25/2024  XR FOOT 3+ VW RIGHT Date of Exam: 7/25/2024 3:20 PM EDT Indication: Right foot and ankle injury Comparison: Right ankle radiograph dated 7/25/2024 Findings: There is an obliquely oriented fracture through the distal fibular shaft as seen on the right ankle radiographs. There is degenerative joint space narrowing at the first metatarsal phalangeal joint and interphalangeal joints of the toes. There is no acute fracture of the foot. There is normal alignment at the Lisfranc joint. There is no osseous erosion..     Impression: 1. Obliquely oriented mildly displaced fracture through the distal fibular shaft, better seen on the dedicated ankle radiographs. 2. Degenerative changes of the first MTP joint and interphalangeal joints of the toes. Electronically Signed: Renny Pelletier  7/25/2024 3:53 PM EDT  Workstation ID: [id number]    XR Ankle 3+ View Right    Result Date: 7/25/2024  XR ANKLE 3+ VW RIGHT Date of Exam: 7/25/2024 3:05 PM EDT Indication: Fall down 2-3 steps Comparison: None available Findings: There is an obliquely oriented fracture through the distal fibular shaft with approximately 3 mm of posterior lateral displacement. There is overlying soft tissue swelling. The ankle mortise and talar  dome remain intact. The tibiotalar and subtalar joints are normal alignment. There is early distal Achilles insertional enthesopathy.     Impression: 1. Mildly displaced obliquely oriented fracture through the distal fibular shaft. Electronically Signed: Renny Dotyor  7/25/2024 3:24 PM EDT  Workstation ID: [id number]      Ordered the above noted radiological studies.  Independently interpreted by me.  My findings will be discussed in the medical decision section below.     PROGRESS, DATA ANALYSIS, CONSULTS AND MEDICAL DECISION MAKING    Please note that this section constitutes my independent interpretation of clinical data including lab results, radiology, EKG's.  This constitutes my independent professional opinion regarding differential diagnosis and management of this patient.  It may include any factors such as history from outside sources, review of external records, social determinants of health, management of medications, response to those treatments, and discussions with other providers.    ED Course as of 07/25/24 1636   Thu Jul 25, 2024   1513 Viewed the patient's plain films of the right ankle.  There is a transverse fracture that begins at roughly the level of the mortise and extends superiorly.  Mortise does not appear widened and does appear intact.  Medial posterior malleolus uninvolved.  Right foot x-ray pending [RC]   1601 The patient's [place] inspect report.  There is been no narcotic fills this past year. [RC]   1635 Patient did not tolerate crutches.  They were not given or prescribed to the patient at this visit and she should not be billed for such.  She was giving a walker instead in order for scooter was also written so that the patient can remain nonweightbearing. [RC]      ED Course User Index  [RC] Fabio Saha III, PA     Orders placed during this visit:  Orders Placed This Encounter   Procedures    Splint Cast Strapping    [facility] 11.  Crutches; Prevents  Accomplishing MRADLs; Able to Safely Use Equipment; Mobility Deficit Can Be Sufficiently Resolved By Use of Equipment    [facility] 12.  Standard Walker Folding; Prevents Accomplishing MRADLs; Able to Safely Use Equipment; Mobility Deficit Can Be Sufficiently Resolved By Use of Equipment    XR Ankle 3+ View Right    XR Foot 3+ View Right      Splint - Cast - Strapping    Date/Time: 7/25/2024 3:57 PM    Performed by: Fabio Saha III, PA  Authorized by: Raoul Tran MD    Consent:     Consent obtained:  Verbal    Consent given by:  Patient    Risks discussed:  Discoloration    Alternatives discussed:  No treatment  Universal protocol:     Patient identity confirmed:  Verbally with patient and arm band  Pre-procedure details:     Distal neurologic exam:  Numbness    Distal perfusion: distal pulses strong    Procedure details:     Location:  Ankle    Ankle location:  R ankle    Splint type:  Short leg and ankle stirrup    Supplies:  Elastic bandage, fiberglass and cotton padding    Attestation: Splint applied and adjusted personally by me    Post-procedure details:     Distal neurologic exam:  Normal    Distal perfusion: distal pulses strong      Procedure completion:  Tolerated well, no immediate complications          Medical Decision Making  Problems Addressed:  Closed fracture of proximal lateral malleolus of right fibula, initial encounter: complicated acute illness or injury    Amount and/or Complexity of Data Reviewed  Radiology: ordered.    Risk  Prescription drug management.      Lateral malleolus fracture, bimalleolar fracture, trimalleolar fracture, fourth and fifth MT fracture, Maisonneuve fracture.  Patient has no tenderness with proximal tib-fib squeeze and I doubt Maisonneuve fracture.  Achilles is intact.  Pain is primarily along the lateral ankle.  Will go ahead and obtain plain films of both the right foot and ankle to further evaluate.      DIAGNOSIS  Final diagnoses:    Closed fracture of proximal lateral malleolus of right fibula, initial encounter          Medication List        New Prescriptions      HYDROcodone-acetaminophen 5-325 MG per tablet  Commonly known as: NORCO  Take 1 tablet by mouth Every 6 (Six) Hours As Needed for Severe Pain.     ondansetron ODT 4 MG disintegrating tablet  Commonly known as: ZOFRAN-ODT  Place 1 tablet on the tongue Every 8 (Eight) Hours As Needed for Nausea or Vomiting.               Where to Get Your Medications        These medications were sent to [facility][id number] - [place], [place] - [address] AT SEC OF [place] [address] [address] - [phone]  - [phone] FX  [address] [facility] [place] [address]      Phone: [phone]   HYDROcodone-acetaminophen 5-325 MG per tablet  ondansetron ODT 4 MG disintegrating tablet         FOLLOW-UP  Morris Thomas II, MD  [address] [facility] [place] [address] [phone]    Schedule an appointment as soon as possible for a visit   For further evaluation and treatment        Latest Documented Vital Signs:  As of 16:36 EDT  BP- 140/58 HR- 90 Temp- 97.7 °F (36.5 °C) (Temporal) O2 sat- 99%    Appropriate PPE utilized throughout this patient encounter to include mask, if indicated, per current protocol. Hand hygiene was performed before donning PPE and after removal when leaving the room.    Please note that portions of this were completed with a voice recognition program.     Note Disclaimer: At [facility], we believe that sharing information builds trust and better relationships. You are receiving this note because you are receiving care at [facility] or recently visited. It is possible you will see health information before a provider has talked with you about it. This kind of information can be easy to misunderstand. To help you fully understand what it means for your health, we urge you to discuss this note with your provider.

## 2024-07-25 NOTE — DISCHARGE INSTRUCTIONS
Wear the splint until directed to do otherwise by the orthopedist above.  Elevate the extremity above the heart to help with swelling.  Recommend ice 3-4 times a day to help with swelling as well.  Take ibuprofen as needed for pain.  You may take the Norco prescribed as needed for breakthrough pain.  Return to the ER with worsening symptoms, or should she have any further concerns.  Recommend use of crutches or a scooter and remaining nonweightbearing until cleared to do otherwise by the orthopedist.

## 2024-07-26 ENCOUNTER — TRANSCRIBE ORDERS (OUTPATIENT)
Dept: ADMINISTRATIVE | Facility: HOSPITAL | Age: 74
End: 2024-07-26
Payer: MEDICARE

## 2024-07-26 ENCOUNTER — LAB (OUTPATIENT)
Dept: LAB | Facility: HOSPITAL | Age: 74
End: 2024-07-26
Payer: MEDICARE

## 2024-07-26 ENCOUNTER — APPOINTMENT (OUTPATIENT)
Dept: CARDIOLOGY | Facility: HOSPITAL | Age: 74
End: 2024-07-26
Payer: MEDICARE

## 2024-07-26 ENCOUNTER — HOSPITAL ENCOUNTER (OUTPATIENT)
Dept: GENERAL RADIOLOGY | Facility: HOSPITAL | Age: 74
Discharge: HOME | End: 2024-07-26
Payer: MEDICARE

## 2024-07-26 DIAGNOSIS — Z01.818 PRE-OP EXAMINATION: ICD-10-CM

## 2024-07-26 DIAGNOSIS — Z01.818 PRE-OP EXAMINATION: Primary | ICD-10-CM

## 2024-07-26 LAB
ALBUMIN SERPL-MCNC: 4 G/DL (ref 3.5–5.2)
ALBUMIN/GLOB SERPL: 1.4 G/DL
ALP SERPL-CCNC: 95 U/L (ref 39–117)
ALT SERPL W P-5'-P-CCNC: 11 U/L (ref 1–33)
ANION GAP SERPL CALCULATED.3IONS-SCNC: 11.2 MMOL/L (ref 5–15)
AST SERPL-CCNC: 19 U/L (ref 1–32)
BASOPHILS # BLD AUTO: 0.04 10*3/MM3 (ref 0–0.2)
BASOPHILS NFR BLD AUTO: 0.4 % (ref 0–1.5)
BILIRUB SERPL-MCNC: 0.5 MG/DL (ref 0–1.2)
BUN SERPL-MCNC: 15 MG/DL (ref 8–23)
BUN/CREAT SERPL: 14 (ref 7–25)
CALCIUM SPEC-SCNC: 9.9 MG/DL (ref 8.6–10.5)
CHLORIDE SERPL-SCNC: 103 MMOL/L (ref 98–107)
CO2 SERPL-SCNC: 24.8 MMOL/L (ref 22–29)
CREAT SERPL-MCNC: 1.07 MG/DL (ref 0.57–1)
DEPRECATED RDW RBC AUTO: 39.8 FL (ref 37–54)
EGFRCR SERPLBLD CKD-EPI 2021: 54.3 ML/MIN/1.73
EOSINOPHIL # BLD AUTO: 0.11 10*3/MM3 (ref 0–0.4)
EOSINOPHIL NFR BLD AUTO: 1.1 % (ref 0.3–6.2)
ERYTHROCYTE [DISTWIDTH] IN BLOOD BY AUTOMATED COUNT: 12.2 % (ref 12.3–15.4)
GLOBULIN UR ELPH-MCNC: 2.8 GM/DL
GLUCOSE SERPL-MCNC: 147 MG/DL (ref 65–99)
HCT VFR BLD AUTO: 39.2 % (ref 34–46.6)
HGB BLD-MCNC: 13 G/DL (ref 12–15.9)
IMM GRANULOCYTES # BLD AUTO: 0.03 10*3/MM3 (ref 0–0.05)
IMM GRANULOCYTES NFR BLD AUTO: 0.3 % (ref 0–0.5)
LYMPHOCYTES # BLD AUTO: 2.84 10*3/MM3 (ref 0.7–3.1)
LYMPHOCYTES NFR BLD AUTO: 28.3 % (ref 19.6–45.3)
MCH RBC QN AUTO: 30.3 PG (ref 26.6–33)
MCHC RBC AUTO-ENTMCNC: 33.2 G/DL (ref 31.5–35.7)
MCV RBC AUTO: 91.4 FL (ref 79–97)
MONOCYTES # BLD AUTO: 0.68 10*3/MM3 (ref 0.1–0.9)
MONOCYTES NFR BLD AUTO: 6.8 % (ref 5–12)
NEUTROPHILS NFR BLD AUTO: 6.33 10*3/MM3 (ref 1.7–7)
NEUTROPHILS NFR BLD AUTO: 63.1 % (ref 42.7–76)
NRBC BLD AUTO-RTO: 0 /100 WBC (ref 0–0.2)
PLATELET # BLD AUTO: 264 10*3/MM3 (ref 140–450)
PMV BLD AUTO: 9.2 FL (ref 6–12)
POTASSIUM SERPL-SCNC: 4.2 MMOL/L (ref 3.5–5.2)
PROT SERPL-MCNC: 6.8 G/DL (ref 6–8.5)
RBC # BLD AUTO: 4.29 10*6/MM3 (ref 3.77–5.28)
SODIUM SERPL-SCNC: 139 MMOL/L (ref 136–145)
WBC NRBC COR # BLD AUTO: 10.03 10*3/MM3 (ref 3.4–10.8)

## 2024-07-26 PROCEDURE — 85025 COMPLETE CBC W/AUTO DIFF WBC: CPT

## 2024-07-26 PROCEDURE — 71046 X-RAY EXAM CHEST 2 VIEWS: CPT

## 2024-07-26 PROCEDURE — 36415 COLL VENOUS BLD VENIPUNCTURE: CPT

## 2024-07-26 PROCEDURE — 80053 COMPREHEN METABOLIC PANEL: CPT

## 2024-08-08 ENCOUNTER — APPOINTMENT (OUTPATIENT)
Dept: GENERAL RADIOLOGY | Facility: HOSPITAL | Age: 74
End: 2024-08-08
Payer: MEDICARE

## 2024-08-08 ENCOUNTER — HOSPITAL ENCOUNTER (OUTPATIENT)
Facility: HOSPITAL | Age: 74
Discharge: HOME | End: 2024-08-08
Attending: EMERGENCY MEDICINE | Admitting: EMERGENCY MEDICINE
Payer: MEDICARE

## 2024-08-08 VITALS
WEIGHT: 115 LBS | BODY MASS INDEX: 21.16 KG/M2 | DIASTOLIC BLOOD PRESSURE: 63 MMHG | HEART RATE: 107 BPM | SYSTOLIC BLOOD PRESSURE: 124 MMHG | HEIGHT: 62 IN | RESPIRATION RATE: 20 BRPM | TEMPERATURE: 97.6 F | OXYGEN SATURATION: 95 %

## 2024-08-08 DIAGNOSIS — U07.1 COVID-19: Primary | ICD-10-CM

## 2024-08-08 LAB
FLUAV SUBTYP SPEC NAA+PROBE: NOT DETECTED
FLUBV RNA ISLT QL NAA+PROBE: NOT DETECTED
SARS-COV-2 RNA RESP QL NAA+PROBE: DETECTED

## 2024-08-08 PROCEDURE — 87636 SARSCOV2 & INF A&B AMP PRB: CPT

## 2024-08-08 PROCEDURE — 99214 OFFICE O/P EST MOD 30 MIN: CPT | Performed by: EMERGENCY MEDICINE

## 2024-08-08 PROCEDURE — G0463 HOSPITAL OUTPT CLINIC VISIT: HCPCS | Performed by: EMERGENCY MEDICINE

## 2024-08-08 PROCEDURE — 71046 X-RAY EXAM CHEST 2 VIEWS: CPT

## 2024-08-08 RX ORDER — DEXTROMETHORPHAN HBR. AND GUAIFENESIN 10; 100 MG/5ML; MG/5ML
5 SOLUTION ORAL EVERY 6 HOURS PRN
Qty: 180 ML | Refills: 0 | Status: SHIPPED | OUTPATIENT
Start: 2024-08-08

## 2024-08-08 NOTE — DISCHARGE INSTRUCTIONS
Take 1000 mg of tylenol every 6 hours for aches / fevers / Chills.    Use your albuterol nebulizer 4 hours to help with cough  Robitussin DM will also help with your cough    Follow up with your primary care provider in 2-3 days for continuance of care.  Return to the Emergency Department for worsening symptoms or any other serious concerns.    Please bring an up to date list of your current medications to each and every visit.  If you have any questions or concerns please contact our facility. Thank you for allowing us to  participate in your care.

## 2024-08-08 NOTE — FSED PROVIDER NOTE
Subjective   History of Present Illness    Review of Systems    Past Medical History:   Diagnosis Date    Cataracts, bilateral     COPD (chronic obstructive pulmonary disease)     Diabetes mellitus 1999    type 2    Hyperlipidemia     Hypothyroidism 2023    Thyroid nodule 2023    Type 2 diabetes mellitus 1997       No Known Allergies    Past Surgical History:   Procedure Laterality Date    CARDIAC CATHETERIZATION Right 10/07/2019    Procedure: Left Heart Cath and coronary angiogram;  Surgeon: Ishan June MD;  Location: [facility] CATH INVASIVE LOCATION;  Service: Cardiovascular    HYSTERECTOMY  1972    ROTATOR CUFF REPAIR  2018       Family History   Problem Relation Age of Onset    Heart disease Father        Social History     Socioeconomic History    Marital status:    Tobacco Use    Smoking status: Every Day     Current packs/day: 0.50     Average packs/day: 0.6 packs/day for 120.4 years (75.2 ttl pk-yrs)     Types: Cigarettes     Start date: 3/16/1964    Smokeless tobacco: Never    Tobacco comments:     still smoking   Vaping Use    Vaping status: Never Used   Substance and Sexual Activity    Alcohol use: Not Currently     Comment: RARELY    Drug use: Never    Sexual activity: Not Currently     Partners: Male     Birth control/protection: Hysterectomy     Comment: 1971           Objective   Physical Exam    Procedures           ED Course    This is the adequate H&P required by 2023 coding guidelines  HISTORY  74 y.o.  year old female  cough congestion SOB body aches  Denies  fever headache  nausea vomiting sore throat    EXAM  SpO2 reassuring at 98%  lungs with coarse at end expiratory    CXR reassuring, nothing acute (per rads, My interpretation is the same.)            Covid POS  Flu  NEG      d/w pt and son:  Covid POS - self quarantine, precautions for return, pcm followup, self care  does not meet criteria for admission and admission not in pt's best interest    d/w Pt: No evidence of acute life  threat, significant bacterial infection, or other emergent condition.    Robitussin DM / Albuterol for cough    discussed plan of care with pt, who concurs. All questions answered.  precautions for return and pcm followup    Laboratory studies reviewed and considered in the medical decision-making.  Radiographic studies reviewed and considered in the medical decision-making.                                         Medical Decision Making  Amount and/or Complexity of Data Reviewed  Radiology: ordered.        Final diagnoses:   COVID-19       ED Disposition  ED Disposition       ED Disposition   Discharge    Condition   Stable    Comment   --               Deborah Ochoa MD  [address] [facility] [place] [address] [phone]    Schedule an appointment as soon as possible for a visit in 2 days      [place] [address] [address] [place] [address] [phone]    As needed, If symptoms worsen         Medication List        New Prescriptions      dextromethorphan-guaifenesin  MG/5ML liquid  Commonly known as: ROBITUSSIN-DM  Take 5 mL by mouth Every 6 (Six) Hours As Needed for Cough.               Where to Get Your Medications        These medications were sent to [facility][id number] - [place], [place] - [address] AT SEC OF [place] [address] & [address] - [phone]  - [phone] FX  [address] [facility] [place] [address]      Phone: [phone]   dextromethorphan-guaifenesin  MG/5ML liquid

## 2024-10-10 ENCOUNTER — OFFICE VISIT (OUTPATIENT)
Dept: CARDIOLOGY | Facility: CLINIC | Age: 74
End: 2024-10-10
Payer: MEDICARE

## 2024-10-10 VITALS
WEIGHT: 122 LBS | HEART RATE: 93 BPM | DIASTOLIC BLOOD PRESSURE: 69 MMHG | OXYGEN SATURATION: 98 % | SYSTOLIC BLOOD PRESSURE: 116 MMHG | BODY MASS INDEX: 22.45 KG/M2 | HEIGHT: 62 IN

## 2024-10-10 DIAGNOSIS — E78.00 PURE HYPERCHOLESTEROLEMIA: ICD-10-CM

## 2024-10-10 DIAGNOSIS — G47.33 OBSTRUCTIVE SLEEP APNEA: ICD-10-CM

## 2024-10-10 DIAGNOSIS — I10 PRIMARY HYPERTENSION: ICD-10-CM

## 2024-10-10 DIAGNOSIS — E11.9 TYPE 2 DIABETES MELLITUS WITHOUT COMPLICATION, WITH LONG-TERM CURRENT USE OF INSULIN: ICD-10-CM

## 2024-10-10 DIAGNOSIS — Z79.4 TYPE 2 DIABETES MELLITUS WITHOUT COMPLICATION, WITH LONG-TERM CURRENT USE OF INSULIN: ICD-10-CM

## 2024-10-10 DIAGNOSIS — J44.9 CHRONIC OBSTRUCTIVE PULMONARY DISEASE, UNSPECIFIED COPD TYPE: ICD-10-CM

## 2024-10-10 DIAGNOSIS — I25.10 CORONARY ARTERY DISEASE INVOLVING NATIVE CORONARY ARTERY OF NATIVE HEART WITHOUT ANGINA PECTORIS: Primary | ICD-10-CM

## 2024-10-10 NOTE — PROGRESS NOTES
"    Subjective:     Encounter Date:10/10/2024      Patient ID: Dyana Montemayor is a 74 y.o. female.    Chief Complaint:  Follow-upAssociated symptoms include: dizziness. Pertinent negatives include no chest pain, no nausea, no palpitations, no shortness of breath, no headaches, no focal weakness, no abdominal pain and no cough.     73-year-old white female with history of coronary disease diabetes COPD hypertension hyperlipidemia presents to my office for a follow-up.  Patient is currently stable without any symptoms of chest pain or shortness of breath at rest or exertion.  No complaint of any PND orthopnea.  No palpitation but has some dizziness.  No syncope or swelling of the feet.  Patient has been taking all the medicines regular.  Patient still continues to smoke  The following portions of the patient's history were reviewed and updated as appropriate: allergies, current medications, past family history, past medical history, past social history, past surgical history, and problem list.  Past Medical History:   Diagnosis Date    Cataracts, bilateral     COPD (chronic obstructive pulmonary disease)     Diabetes mellitus 1999    type 2    Hyperlipidemia     Hypothyroidism 2023    Thyroid nodule 2023    Type 2 diabetes mellitus 1997     Past Surgical History:   Procedure Laterality Date    CARDIAC CATHETERIZATION Right 10/07/2019    Procedure: Left Heart Cath and coronary angiogram;  Surgeon: Ishan June MD;  [facility]: [facility] CATH INVASIVE LOC[facility];  Service: Cardiovascular    HYSTERECTOMY  1972    ROTATOR CUFF REPAIR  2018     /69   Pulse 93   Ht 157.5 cm (62\")   Wt 55.3 kg (122 lb)   SpO2 98%   BMI 22.31 kg/m²   Family History   Problem Relation Age of Onset    Heart disease Father        Current Outpatient Medications:     acetaminophen (TYLENOL) 325 MG tablet, Take 2 tablets by mouth Every 4 (Four) Hours As Needed for Mild Pain ., Disp: , Rfl:     ALPRAZolam (XANAX) 0.5 MG tablet, Take 1 tablet " by mouth At Night As Needed for Anxiety., Disp: , Rfl:     amphetamine-dextroamphetamine (ADDERALL) 20 MG tablet, Take 1 tablet by mouth 2 (Two) Times a Day. Only getting filled every 6 months per [facility], Disp: , Rfl:     aspirin 81 MG tablet, Take 1 tablet by mouth Daily., Disp: 30 tablet, Rfl: 5    cyclobenzaprine (FLEXERIL) 10 MG tablet, Take 1 tablet by mouth every night at bedtime., Disp: , Rfl:     dextromethorphan-guaifenesin (ROBITUSSIN-DM)  MG/5ML liquid, Take 5 mL by mouth Every 6 (Six) Hours As Needed for Cough., Disp: 180 mL, Rfl: 0    DULoxetine (CYMBALTA) 30 MG capsule, Take 1 capsule by mouth Daily., Disp: , Rfl:     esomeprazole (nexIUM) 40 MG capsule, Take 1 capsule by mouth 2 (Two) Times a Day., Disp: , Rfl:     Fluticasone-Umeclidin-Vilant (TRELEGY) 100-62.5-25 MCG/INH inhaler, Inhale 1 container Daily., Disp: , Rfl:     gabapentin (NEURONTIN) 300 MG capsule, Take 1 capsule by mouth 3 (Three) Times a Day., Disp: , Rfl:     HYDROcodone-acetaminophen (NORCO) 5-325 MG per tablet, Take 1 tablet by mouth Every 6 (Six) Hours As Needed for Severe Pain., Disp: 15 tablet, Rfl: 0    insulin aspart (novoLOG) 100 UNIT/ML injection, Inject 10 Units under the skin into the appropriate area as directed 3 (Three) Times a Day Before Meals., Disp: , Rfl: 12    Insulin Degludec (TRESIBA FLEXTOUCH) 200 UNIT/ML solution pen-injector pen injection, Inject 34 Units under the skin into the appropriate area as directed every night at bedtime., Disp: , Rfl:     ipratropium-albuterol (DUO-NEB) 0.5-2.5 mg/3 ml nebulizer, USE 3 ML VIA NEBULIZER FOUR TIMES DAILY AS NEEDED, Disp: , Rfl:     methIMAzole (TAPAZOLE) 5 MG tablet, TAKE ONE TABLET BY MOUTH daily, Disp: 90 tablet, Rfl: 2    methylPREDNISolone (MEDROL) 4 MG dose pack, Take as directed on package instructions., Disp: 21 tablet, Rfl: 0    midodrine (PROAMATINE) 2.5 MG tablet, Take 1 tablet by mouth As Needed., Disp: , Rfl:     Mirabegron ER (MYRBETRIQ)  50 MG tablet sustained-release 24 hour 24 hr tablet, Take 50 mg by mouth Daily., Disp: , Rfl:     Mounjaro 2.5 MG/0.5ML solution pen-injector pen, INJECT 2.5MG UNDER THE SKIN EVERY WEEK FOR 4 WEEKS, Disp: , Rfl:     ondansetron ODT (ZOFRAN-ODT) 4 MG disintegrating tablet, Place 1 tablet on the tongue Every 8 (Eight) Hours As Needed for Nausea or Vomiting., Disp: 15 tablet, Rfl: 0    rosuvastatin (CRESTOR) 20 MG tablet, Take 1 tablet by mouth Daily., Disp: , Rfl:   No Known Allergies  Social History     Socioeconomic History    Marital status:    Tobacco Use    Smoking status: Every Day     Current packs/day: 0.50     Average packs/day: 0.6 packs/day for 120.6 years (75.3 ttl pk-yrs)     Types: Cigarettes     Start date: 3/16/1964    Smokeless tobacco: Never    Tobacco comments:     still smoking   Vaping Use    Vaping status: Never Used   Substance and Sexual Activity    Alcohol use: Not Currently     Comment: RARELY    Drug use: Never    Sexual activity: Not Currently     Partners: Male     Birth control/protection: Hysterectomy     Comment: 1971     Review of Systems   Constitutional: Negative for malaise/fatigue.   Cardiovascular:  Negative for chest pain, dyspnea on exertion, leg swelling and palpitations.   Respiratory:  Negative for cough and shortness of breath.    Gastrointestinal:  Negative for abdominal pain, nausea and vomiting.   Neurological:  Positive for dizziness. Negative for focal weakness, headaches, light-headedness and numbness.   All other systems reviewed and are negative.             Objective:     Constitutional:       Appearance: Well-developed.   Eyes:      General: No scleral icterus.     Conjunctiva/sclera: Conjunctivae normal.   HENT:      Head: Normocephalic and atraumatic.   Neck:      Vascular: No carotid bruit or JVD.   Pulmonary:      Effort: Pulmonary effort is normal.      Breath sounds: Normal breath sounds. No wheezing. No rales.   Cardiovascular:      Normal rate.  Regular rhythm.   Pulses:     Intact distal pulses.   Abdominal:      General: Bowel sounds are normal.      Palpations: Abdomen is soft.   Musculoskeletal:      Cervical back: Normal range of motion and neck supple. Skin:     General: Skin is warm and dry.      Findings: No rash.   Neurological:      Mental Status: Alert.       Procedures    Lab Review:         MDM  #1 coronary disease  Patient has nonobstructive disease in the past and is currently stable with normal function without any angina  2.  Hyperlipidemia  Patient is on Crestor and the lipid levels are well within normal limits  3.  Diabetes  Patient is on insulin and followed by the primary care doctor.  4.  COPD and obstructive sleep apnea  Patient uses inhalers as well as a CPAP machine      Patient's previous medical records, labs, and EKG were reviewed and discussed with the patient at today's visit.

## 2025-04-16 ENCOUNTER — OFFICE VISIT (OUTPATIENT)
Dept: CARDIOLOGY | Facility: CLINIC | Age: 75
End: 2025-04-16
Payer: MEDICARE

## 2025-04-16 VITALS
HEIGHT: 62 IN | HEART RATE: 72 BPM | WEIGHT: 119 LBS | BODY MASS INDEX: 21.9 KG/M2 | DIASTOLIC BLOOD PRESSURE: 76 MMHG | SYSTOLIC BLOOD PRESSURE: 103 MMHG | OXYGEN SATURATION: 100 %

## 2025-04-16 DIAGNOSIS — I10 PRIMARY HYPERTENSION: ICD-10-CM

## 2025-04-16 DIAGNOSIS — Z79.4 TYPE 2 DIABETES MELLITUS WITHOUT COMPLICATION, WITH LONG-TERM CURRENT USE OF INSULIN: ICD-10-CM

## 2025-04-16 DIAGNOSIS — E11.9 TYPE 2 DIABETES MELLITUS WITHOUT COMPLICATION, WITH LONG-TERM CURRENT USE OF INSULIN: ICD-10-CM

## 2025-04-16 DIAGNOSIS — E78.00 PURE HYPERCHOLESTEROLEMIA: ICD-10-CM

## 2025-04-16 DIAGNOSIS — G47.33 OBSTRUCTIVE SLEEP APNEA: ICD-10-CM

## 2025-04-16 DIAGNOSIS — J44.9 CHRONIC OBSTRUCTIVE PULMONARY DISEASE, UNSPECIFIED COPD TYPE: ICD-10-CM

## 2025-04-16 DIAGNOSIS — I25.10 CORONARY ARTERY DISEASE INVOLVING NATIVE CORONARY ARTERY OF NATIVE HEART WITHOUT ANGINA PECTORIS: Primary | ICD-10-CM

## 2025-04-16 NOTE — PROGRESS NOTES
"    Subjective:     Encounter Date:04/16/2025      Patient ID: Dyana Montemayor is a 74 y.o. female.    Chief Complaint:  Coronary Artery Disease  Pertinent negatives include no chest pain, dizziness, leg swelling, palpitations or shortness of breath.   73-year-old white female with history of coronary disease history of hypertension hyperlipidemia and diabetes COPD sleep apnea presents to the office for follow-up.  Patient is currently stable without any symptoms of chest pain or shortness of breath at rest on exertion.  No complaints of any PND orthopnea.  No palpitation dizziness syncope or swelling of the feet.  Patient is taking all the medicines regular.  Patient does not smoke.    The following portions of the patient's history were reviewed and updated as appropriate: allergies, current medications, past family history, past medical history, past social history, past surgical history, and problem list.  Past Medical History:   Diagnosis Date    Cataracts, bilateral     COPD (chronic obstructive pulmonary disease)     Diabetes mellitus 1999    type 2    Hyperlipidemia     Hypothyroidism 2023    Thyroid nodule 2023    Type 2 diabetes mellitus 1997     Past Surgical History:   Procedure Laterality Date    CARDIAC CATHETERIZATION Right 10/07/2019    Procedure: Left Heart Cath and coronary angiogram;  Surgeon: Ishan June MD;  Location: [facility] CATH INVASIVE LOCATION;  Service: Cardiovascular    HYSTERECTOMY  1972    ROTATOR CUFF REPAIR  2018     /76   Pulse 72   Ht 157.5 cm (62.01\")   Wt 54 kg (119 lb)   SpO2 100%   BMI 21.76 kg/m²   Family History   Problem Relation Age of Onset    Heart disease Father        Current Outpatient Medications:     acetaminophen (TYLENOL) 325 MG tablet, Take 2 tablets by mouth Every 4 (Four) Hours As Needed for Mild Pain ., Disp: , Rfl:     ALPRAZolam (XANAX) 0.5 MG tablet, Take 1 tablet by mouth At Night As Needed for Anxiety., Disp: , Rfl:     " amphetamine-dextroamphetamine (ADDERALL) 20 MG tablet, Take 1 tablet by mouth 2 (Two) Times a Day. Only getting filled every 6 months per [facility], Disp: , Rfl:     aspirin 81 MG tablet, Take 1 tablet by mouth Daily., Disp: 30 tablet, Rfl: 5    cyclobenzaprine (FLEXERIL) 10 MG tablet, Take 1 tablet by mouth every night at bedtime., Disp: , Rfl:     dextromethorphan-guaifenesin (ROBITUSSIN-DM)  MG/5ML liquid, Take 5 mL by mouth Every 6 (Six) Hours As Needed for Cough., Disp: 180 mL, Rfl: 0    DULoxetine (CYMBALTA) 30 MG capsule, Take 1 capsule by mouth Daily., Disp: , Rfl:     esomeprazole (nexIUM) 40 MG capsule, Take 1 capsule by mouth 2 (Two) Times a Day., Disp: , Rfl:     Fluticasone-Umeclidin-Vilant (TRELEGY) 100-62.5-25 MCG/INH inhaler, Inhale 1 container Daily., Disp: , Rfl:     gabapentin (NEURONTIN) 300 MG capsule, Take 1 capsule by mouth 3 (Three) Times a Day., Disp: , Rfl:     HYDROcodone-acetaminophen (NORCO) 5-325 MG per tablet, Take 1 tablet by mouth Every 6 (Six) Hours As Needed for Severe Pain., Disp: 15 tablet, Rfl: 0    insulin aspart (novoLOG) 100 UNIT/ML injection, Inject 10 Units under the skin into the appropriate area as directed 3 (Three) Times a Day Before Meals., Disp: , Rfl: 12    Insulin Degludec (TRESIBA FLEXTOUCH) 200 UNIT/ML solution pen-injector pen injection, Inject 34 Units under the skin into the appropriate area as directed every night at bedtime., Disp: , Rfl:     ipratropium-albuterol (DUO-NEB) 0.5-2.5 mg/3 ml nebulizer, USE 3 ML VIA NEBULIZER FOUR TIMES DAILY AS NEEDED, Disp: , Rfl:     methIMAzole (TAPAZOLE) 5 MG tablet, TAKE ONE TABLET BY MOUTH daily, Disp: 90 tablet, Rfl: 2    midodrine (PROAMATINE) 2.5 MG tablet, Take 1 tablet by mouth As Needed., Disp: , Rfl:     Mirabegron ER (MYRBETRIQ) 50 MG tablet sustained-release 24 hour 24 hr tablet, Take 50 mg by mouth Daily., Disp: , Rfl:     Mounjaro 2.5 MG/0.5ML solution pen-injector pen, INJECT 2.5MG UNDER THE  SKIN EVERY WEEK FOR 4 WEEKS, Disp: , Rfl:     ondansetron ODT (ZOFRAN-ODT) 4 MG disintegrating tablet, Place 1 tablet on the tongue Every 8 (Eight) Hours As Needed for Nausea or Vomiting., Disp: 15 tablet, Rfl: 0    rosuvastatin (CRESTOR) 20 MG tablet, Take 1 tablet by mouth Daily., Disp: , Rfl:     methylPREDNISolone (MEDROL) 4 MG dose pack, Take as directed on package instructions. (Patient not taking: Reported on 4/16/2025), Disp: 21 tablet, Rfl: 0  No Known Allergies  Social History     Socioeconomic History    Marital status:    Tobacco Use    Smoking status: Every Day     Current packs/day: 0.50     Average packs/day: 0.6 packs/day for 121.1 years (75.5 ttl pk-yrs)     Types: Cigarettes     Start date: 3/16/1964    Smokeless tobacco: Never    Tobacco comments:     still smoking   Vaping Use    Vaping status: Never Used   Substance and Sexual Activity    Alcohol use: Not Currently     Comment: RARELY    Drug use: Never    Sexual activity: Not Currently     Partners: Male     Birth control/protection: Hysterectomy     Comment: 1971     Review of Systems   Constitutional: Negative for malaise/fatigue.   Cardiovascular:  Negative for chest pain, dyspnea on exertion, leg swelling and palpitations.   Respiratory:  Negative for cough and shortness of breath.    Gastrointestinal:  Negative for abdominal pain, nausea and vomiting.   Neurological:  Negative for dizziness, headaches, light-headedness, numbness and weakness.   All other systems reviewed and are negative.             Objective:     Constitutional:       Appearance: Well-developed.   Eyes:      General: No scleral icterus.     Conjunctiva/sclera: Conjunctivae normal.   HENT:      Head: Normocephalic and atraumatic.   Neck:      Vascular: No carotid bruit or JVD.   Pulmonary:      Effort: Pulmonary effort is normal.      Breath sounds: Normal breath sounds. No wheezing. No rales.   Cardiovascular:      Normal rate. Regular rhythm.   Pulses:     Intact  distal pulses.   Abdominal:      General: Bowel sounds are normal.      Palpations: Abdomen is soft.   Musculoskeletal:      Cervical back: Normal range of motion and neck supple. Skin:     General: Skin is warm and dry.      Findings: No rash.   Neurological:      Mental Status: Alert.       Procedures    Lab Review:         MDM    #1 coronary disease  Patient has nonobstructive coronary disease with normal V function is currently stable without any angina.  2.  Hyperlipidemia  Patient is on Crestor and the lipid levels are well within normal limits  3.  Diabetes  Patient is on insulin and followed by the primary care doctor  4.  Hypertension  Patient blood pressure is in the lower side and hence she is using midodrine.  5.  COPD/sleep apnea  Patient has sleep apnea and uses a CPAP machine but does not smoke anymore.    Patient's previous medical records, labs, and EKG were reviewed and discussed with the patient at today's visit.

## 2025-06-02 DIAGNOSIS — E05.90 HYPERTHYROIDISM: ICD-10-CM

## 2025-06-04 RX ORDER — METHIMAZOLE 5 MG/1
5 TABLET ORAL DAILY
Qty: 90 TABLET | Refills: 2 | OUTPATIENT
Start: 2025-06-04

## 2025-06-25 ENCOUNTER — OFFICE VISIT (OUTPATIENT)
Dept: ENDOCRINOLOGY | Facility: CLINIC | Age: 75
End: 2025-06-25
Payer: MEDICARE

## 2025-06-25 DIAGNOSIS — Z51.81 ENCOUNTER FOR MEDICATION MONITORING: ICD-10-CM

## 2025-06-25 DIAGNOSIS — E05.90 HYPERTHYROIDISM: Primary | ICD-10-CM

## 2025-06-25 RX ORDER — THEOPHYLLINE 300 MG/1
300 TABLET, EXTENDED RELEASE ORAL DAILY
COMMUNITY

## 2025-06-25 NOTE — PATIENT INSTRUCTIONS
Labs drawn today.  Will call you with the lab results  Continue  exercise.  F/u in 6 months, with labs prior.

## 2025-06-26 LAB
ALBUMIN SERPL-MCNC: 4.3 G/DL (ref 3.8–4.8)
ALP SERPL-CCNC: 93 IU/L (ref 44–121)
ALT SERPL-CCNC: 11 IU/L (ref 0–32)
AST SERPL-CCNC: 16 IU/L (ref 0–40)
BASOPHILS # BLD AUTO: 0 X10E3/UL (ref 0–0.2)
BASOPHILS NFR BLD AUTO: 0 %
BILIRUB SERPL-MCNC: 0.5 MG/DL (ref 0–1.2)
BUN SERPL-MCNC: 17 MG/DL (ref 8–27)
BUN/CREAT SERPL: 15 (ref 12–28)
CALCIUM SERPL-MCNC: 10 MG/DL (ref 8.7–10.3)
CHLORIDE SERPL-SCNC: 100 MMOL/L (ref 96–106)
CO2 SERPL-SCNC: 25 MMOL/L (ref 20–29)
CREAT SERPL-MCNC: 1.14 MG/DL (ref 0.57–1)
EGFRCR SERPLBLD CKD-EPI 2021: 50 ML/MIN/1.73
EOSINOPHIL # BLD AUTO: 0.3 X10E3/UL (ref 0–0.4)
EOSINOPHIL NFR BLD AUTO: 2 %
ERYTHROCYTE [DISTWIDTH] IN BLOOD BY AUTOMATED COUNT: 12 % (ref 11.7–15.4)
GLOBULIN SER CALC-MCNC: 2.4 G/DL (ref 1.5–4.5)
GLUCOSE SERPL-MCNC: 180 MG/DL (ref 70–99)
HCT VFR BLD AUTO: 44.8 % (ref 34–46.6)
HGB BLD-MCNC: 14.3 G/DL (ref 11.1–15.9)
IMM GRANULOCYTES # BLD AUTO: 0 X10E3/UL (ref 0–0.1)
IMM GRANULOCYTES NFR BLD AUTO: 0 %
LYMPHOCYTES # BLD AUTO: 4.4 X10E3/UL (ref 0.7–3.1)
LYMPHOCYTES NFR BLD AUTO: 40 %
MCH RBC QN AUTO: 30.2 PG (ref 26.6–33)
MCHC RBC AUTO-ENTMCNC: 31.9 G/DL (ref 31.5–35.7)
MCV RBC AUTO: 95 FL (ref 79–97)
MONOCYTES # BLD AUTO: 0.7 X10E3/UL (ref 0.1–0.9)
MONOCYTES NFR BLD AUTO: 6 %
NEUTROPHILS # BLD AUTO: 5.8 X10E3/UL (ref 1.4–7)
NEUTROPHILS NFR BLD AUTO: 52 %
PLATELET # BLD AUTO: 258 X10E3/UL (ref 150–450)
POTASSIUM SERPL-SCNC: 4.8 MMOL/L (ref 3.5–5.2)
PROT SERPL-MCNC: 6.7 G/DL (ref 6–8.5)
RBC # BLD AUTO: 4.73 X10E6/UL (ref 3.77–5.28)
SODIUM SERPL-SCNC: 139 MMOL/L (ref 134–144)
T4 FREE SERPL-MCNC: 1.85 NG/DL (ref 0.82–1.77)
TSH SERPL DL<=0.005 MIU/L-ACNC: 0.04 UIU/ML (ref 0.45–4.5)
WBC # BLD AUTO: 11.3 X10E3/UL (ref 3.4–10.8)

## 2025-06-26 RX ORDER — METHIMAZOLE 5 MG/1
TABLET ORAL
Qty: 90 TABLET | Refills: 1 | Status: SHIPPED | OUTPATIENT
Start: 2025-06-26

## 2025-06-27 VITALS
SYSTOLIC BLOOD PRESSURE: 100 MMHG | BODY MASS INDEX: 20.98 KG/M2 | HEART RATE: 71 BPM | WEIGHT: 114 LBS | HEIGHT: 62 IN | DIASTOLIC BLOOD PRESSURE: 68 MMHG

## 2025-06-27 NOTE — PROGRESS NOTES
[facility]        Patient Care Team:  Deborah Ochoa MD as PCP - General  Ishan June MD as Consulting Physician (Cardiology)    Chief Complaint:    Chief Complaint   Patient presents with    Hyperthyroidism     FU / Hyperthyroid          Subjective     Here for thyroid f/u  Last seen in May 2024  Ran out of methimazole 3 weeks ago  Exercise program: walking    Interval History:     Patient Complaints: insomnia  Patient Denies: bloating  History taken from: patient    Review of Systems:   Review of Systems   Constitutional:  Positive for unexpected weight loss.   HENT:  Negative for trouble swallowing.    Eyes:  Negative for blurred vision and double vision.   Cardiovascular:  Negative for palpitations.   Gastrointestinal:  Negative for nausea.   Endocrine: Negative for polyuria.   Neurological:  Positive for tremors. Negative for headache.   Psychiatric/Behavioral:  Positive for sleep disturbance.      Lost  19 lb since last visit  Objective     Vital Signs     Vitals:    06/25/25 0809   BP: 100/68   Pulse: 71         Physical Exam:     General Appearance:    Alert, cooperative, in no acute distress, obese   Head:    Normocephalic, without obvious abnormality, atraumatic   Eyes:       Mouth:  Neck:       No periorbital edema. No lid lag. R stare    No lesions    36 cm in circumference, thyroid soft   Lungs:     Clear    Heart:    Regular rhythm and normal rate   Abdomen:     Normal bowel sounds, soft                 Extremities:   Moves all extremities well, no edema or tremors               Pulses:   Pulses palpable and equal bilaterally   Skin:   Dry   Neurologic:  DTR 1+          Results Review:    I have reviewed the patient's new clinical results, labs & imaging.    Medication Review:   Prior to Admission medications    Medication Sig Start Date End Date Taking? Authorizing Provider   acetaminophen (TYLENOL) 325 MG tablet Take 2 tablets by mouth Every 4 (Four) Hours As Needed for  Mild Pain . 7/11/21  Yes Merna Fam MD   ALPRAZolam (XANAX) 0.5 MG tablet Take 1 tablet by mouth At Night As Needed for Anxiety.   Yes Dennys Reyes MD   amphetamine-dextroamphetamine (ADDERALL) 20 MG tablet Take 1 tablet by mouth 2 (Two) Times a Day. Only getting filled every 6 months per [facility]   Yes Dennys Reyes MD   aspirin 81 MG tablet Take 1 tablet by mouth Daily. 10/9/19  Yes Merna Fam MD   cyclobenzaprine (FLEXERIL) 10 MG tablet Take 1 tablet by mouth every night at bedtime.   Yes Dennys Reyes MD   DULoxetine (CYMBALTA) 30 MG capsule Take 1 capsule by mouth Daily.   Yes Dennys Reeys MD   esomeprazole (nexIUM) 40 MG capsule Take 1 capsule by mouth 2 (Two) Times a Day.   Yes Dennys Reyes MD   Fluticasone-Umeclidin-Vilant (TRELEGY) 100-62.5-25 MCG/INH inhaler Inhale 1 container Daily.   Yes Dennys Reyes MD   gabapentin (NEURONTIN) 300 MG capsule Take 1 capsule by mouth 3 (Three) Times a Day.   Yes Dennys Reyes MD   insulin aspart (novoLOG) 100 UNIT/ML injection Inject 10 Units under the skin into the appropriate area as directed 3 (Three) Times a Day Before Meals. 7/11/21  Yes Merna Fam MD   Insulin Degludec (TRESIBA FLEXTOUCH) 200 UNIT/ML solution pen-injector pen injection Inject 34 Units under the skin into the appropriate area as directed every night at bedtime.   Yes Dennys Reyes MD   ipratropium-albuterol (DUO-NEB) 0.5-2.5 mg/3 ml nebulizer USE 3 ML VIA NEBULIZER FOUR TIMES DAILY AS NEEDED 4/16/24  Yes Dennys Reyes MD   methIMAzole (TAPAZOLE) 5 MG tablet TAKE ONE TABLET BY MOUTH daily 1/8/24  Yes Pierre Tripp MD   methylPREDNISolone (MEDROL) 4 MG dose pack Take as directed on package instructions. 3/31/24  Yes Fran Ahmadi PA   midodrine (PROAMATINE) 2.5 MG tablet Take 1 tablet by mouth As Needed. 7/10/23  Yes Dennys Reyes MD   Mirabegron ER (MYRBETRIQ) 50 MG tablet sustained-release 24  hour 24 hr tablet Take 50 mg by mouth Daily.   Yes Dennys Reyes MD   Mounjaro 2.5 MG/0.5ML solution pen-injector pen INJECT 2.5MG UNDER THE SKIN EVERY WEEK FOR 4 WEEKS 4/12/24  Yes Dennys Reyes MD   rosuvastatin (CRESTOR) 20 MG tablet Take 1 tablet by mouth Daily.   Yes Dennys Reyes MD   dextromethorphan-guaifenesin (ROBITUSSIN-DM)  MG/5ML liquid Take 5 mL by mouth Every 6 (Six) Hours As Needed for Cough. 8/8/24   Lee Huynh MD   HYDROcodone-acetaminophen (NORCO) 5-325 MG per tablet Take 1 tablet by mouth Every 6 (Six) Hours As Needed for Severe Pain. 7/25/24   Fabio Saha III, PA   ondansetron ODT (ZOFRAN-ODT) 4 MG disintegrating tablet Place 1 tablet on the tongue Every 8 (Eight) Hours As Needed for Nausea or Vomiting. 7/25/24   Fabio Saha III, PA   theophylline (THEODUR) 300 MG 12 hr tablet Take 1 tablet by mouth Daily.    Dennys Reyes MD       Lab Results (most recent)       Procedure Component Value Units Date/Time    TSH [[id number]]  (Abnormal) Collected: 06/25/25 0903    Specimen: Blood Updated: 06/26/25 0508     TSH 0.040 uIU/mL     Narrative:      Performed at:  [address] [place] [address]  [id number]  : Winston Chan PhD, Phone:  [phone]  Patient Fasting:  Y     T4, Free [[id number]]  (Abnormal) Collected: 06/25/25 0903    Specimen: Blood Updated: 06/26/25 0508     Free T4 1.85 ng/dL     Narrative:      Performed at:  [address] [place] [address]  [id number]  : Winston Chan PhD, Phone:  [phone]  Patient Fasting:  Y     CBC & Differential [[id number]]  (Abnormal) Collected: 06/25/25 0903    Specimen: Blood Updated: 06/26/25 0508     WBC 11.3 x10E3/uL      RBC 4.73 x10E6/uL      Hemoglobin 14.3 g/dL      Hematocrit 44.8 %      MCV 95 fL      MCH 30.2 pg      MCHC 31.9 g/dL      RDW 12.0 %      Platelets 258 x10E3/uL      Neutrophil Rel % 52 %      Lymphocyte Rel  % 40 %      Monocyte Rel % 6 %      Eosinophil Rel % 2 %      Basophil Rel % 0 %      Neutrophils Absolute 5.8 x10E3/uL      Lymphocytes Absolute 4.4 x10E3/uL      Monocytes Absolute 0.7 x10E3/uL      Eosinophils Absolute 0.3 x10E3/uL      Basophils Absolute 0.0 x10E3/uL      Immature Granulocyte Rel % 0 %      Immature Grans Absolute 0.0 x10E3/uL     Narrative:      Performed at:  [address] [place] [address]  [id number]  : Winston Chan PhD, Phone:  [phone]  Patient Fasting:  Y     Comprehensive Metabolic Panel [[id number]]  (Abnormal) Collected: 06/25/25 0903    Specimen: Blood Updated: 06/26/25 0508     Glucose 180 mg/dL      BUN 17 mg/dL      Creatinine 1.14 mg/dL      EGFR Result 50 mL/min/1.73      BUN/Creatinine Ratio 15     Sodium 139 mmol/L      Potassium 4.8 mmol/L      Chloride 100 mmol/L      Total CO2 25 mmol/L      Calcium 10.0 mg/dL      Total Protein 6.7 g/dL      Albumin 4.3 g/dL      Globulin 2.4 g/dL      Total Bilirubin 0.5 mg/dL      Alkaline Phosphatase 93 IU/L      AST (SGOT) 16 IU/L      ALT (SGPT) 11 IU/L     Narrative:      Performed at:  [address] [place] [address]  [id number]  : Winston Chan PhD, Phone:  [phone]  Patient Fasting:  Y         Lab Results   Component Value Date    GLUCOSE 180 (H) 06/25/2025    BUN 17 06/25/2025    CREATININE 1.14 (H) 06/25/2025    EGFRIFNONA 62 07/11/2021    BCR 15 06/25/2025    K 4.8 06/25/2025    CO2 25 06/25/2025    CALCIUM 10.0 06/25/2025    ALBUMIN 4.3 06/25/2025    AST 16 06/25/2025    ALT 11 06/25/2025     Lab Results   Component Value Date    TSH 0.040 (L) 06/25/2025    FREET4 1.85 (H) 06/25/2025     WBC 11.3, Hb 14.3    Assessment & Plan     Diagnoses and all orders for this visit:    1. Hyperthyroidism (Primary)  -     TSH  -     T4, Free  -     methIMAzole (TAPAZOLE) 5 MG tablet; TAKE ONE TABLET BY MOUTH daily  Dispense: 90 tablet; Refill: 1  -     T4, Free; Future  -      TSH; Future    2. Encounter for medication monitoring  -     CBC & Differential  -     Comprehensive Metabolic Panel    Thyrotoxic again, mild.    Restart methimazole 5 mg one tab daily.  Have labs rechecked in 1 month.  Will call you with the lab results.  Drink plenty of water.  Continue  exercise.        Pierre Tripp MD  06/27/25  13:54 EDT

## 2025-07-23 ENCOUNTER — LAB (OUTPATIENT)
Dept: ENDOCRINOLOGY | Facility: CLINIC | Age: 75
End: 2025-07-23
Payer: MEDICARE

## 2025-07-23 DIAGNOSIS — E05.90 HYPERTHYROIDISM: ICD-10-CM

## 2025-07-23 DIAGNOSIS — Z79.899 ENCOUNTER FOR LONG-TERM (CURRENT) USE OF OTHER MEDICATIONS: ICD-10-CM

## 2025-07-24 LAB
ALBUMIN SERPL-MCNC: 4.3 G/DL (ref 3.8–4.8)
ALP SERPL-CCNC: 78 IU/L (ref 44–121)
ALT SERPL-CCNC: 10 IU/L (ref 0–32)
AST SERPL-CCNC: 18 IU/L (ref 0–40)
BASOPHILS # BLD AUTO: 0 X10E3/UL (ref 0–0.2)
BASOPHILS NFR BLD AUTO: 0 %
BILIRUB SERPL-MCNC: 0.4 MG/DL (ref 0–1.2)
BUN SERPL-MCNC: 31 MG/DL (ref 8–27)
BUN/CREAT SERPL: 24 (ref 12–28)
CALCIUM SERPL-MCNC: 9.6 MG/DL (ref 8.7–10.3)
CHLORIDE SERPL-SCNC: 98 MMOL/L (ref 96–106)
CO2 SERPL-SCNC: 23 MMOL/L (ref 20–29)
CREAT SERPL-MCNC: 1.31 MG/DL (ref 0.57–1)
EGFRCR SERPLBLD CKD-EPI 2021: 42 ML/MIN/1.73
EOSINOPHIL # BLD AUTO: 0.3 X10E3/UL (ref 0–0.4)
EOSINOPHIL NFR BLD AUTO: 2 %
ERYTHROCYTE [DISTWIDTH] IN BLOOD BY AUTOMATED COUNT: 12.7 % (ref 11.7–15.4)
GLOBULIN SER CALC-MCNC: 2.3 G/DL (ref 1.5–4.5)
GLUCOSE SERPL-MCNC: 145 MG/DL (ref 70–99)
HCT VFR BLD AUTO: 41.5 % (ref 34–46.6)
HGB BLD-MCNC: 13.1 G/DL (ref 11.1–15.9)
IMM GRANULOCYTES # BLD AUTO: 0 X10E3/UL (ref 0–0.1)
IMM GRANULOCYTES NFR BLD AUTO: 0 %
LYMPHOCYTES # BLD AUTO: 4.9 X10E3/UL (ref 0.7–3.1)
LYMPHOCYTES NFR BLD AUTO: 44 %
MCH RBC QN AUTO: 30 PG (ref 26.6–33)
MCHC RBC AUTO-ENTMCNC: 31.6 G/DL (ref 31.5–35.7)
MCV RBC AUTO: 95 FL (ref 79–97)
MONOCYTES # BLD AUTO: 0.7 X10E3/UL (ref 0.1–0.9)
MONOCYTES NFR BLD AUTO: 6 %
MORPHOLOGY BLD-IMP: ABNORMAL
NEUTROPHILS # BLD AUTO: 5.1 X10E3/UL (ref 1.4–7)
NEUTROPHILS NFR BLD AUTO: 48 %
PLATELET # BLD AUTO: 259 X10E3/UL (ref 150–450)
POTASSIUM SERPL-SCNC: 5.2 MMOL/L (ref 3.5–5.2)
PROT SERPL-MCNC: 6.6 G/DL (ref 6–8.5)
RBC # BLD AUTO: 4.37 X10E6/UL (ref 3.77–5.28)
SODIUM SERPL-SCNC: 135 MMOL/L (ref 134–144)
T4 FREE SERPL-MCNC: 1.32 NG/DL (ref 0.82–1.77)
TSH SERPL DL<=0.005 MIU/L-ACNC: 2.25 UIU/ML (ref 0.45–4.5)
WBC # BLD AUTO: 11 X10E3/UL (ref 3.4–10.8)

## (undated) DEVICE — CATH DIAG IMPULSE FR4 6F 100CM

## (undated) DEVICE — MYNXGRIP 6F/7F: Brand: MYNXGRIP

## (undated) DEVICE — GW DIAG EMERALD HEPCOAT MOVE JTIP STD .035 3MM 150CM

## (undated) DEVICE — KT DYEVERT PLS EZ W/SMART SYR FOR HI VISC CONTRST DISP

## (undated) DEVICE — CATH DIAG IMPULSE PIG .056 6F 110CM

## (undated) DEVICE — CATH DIAG IMPULSE FL4 6F 100CM

## (undated) DEVICE — PINNACLE INTRODUCER SHEATH: Brand: PINNACLE

## (undated) DEVICE — PK TRY HEART CATH 50